# Patient Record
Sex: FEMALE | Race: WHITE | NOT HISPANIC OR LATINO | Employment: OTHER | URBAN - METROPOLITAN AREA
[De-identification: names, ages, dates, MRNs, and addresses within clinical notes are randomized per-mention and may not be internally consistent; named-entity substitution may affect disease eponyms.]

---

## 2022-08-04 DIAGNOSIS — Z00.6 EXAMINATION FOR NORMAL COMPARISON OR CONTROL IN CLINICAL RESEARCH: Primary | ICD-10-CM

## 2022-08-09 DIAGNOSIS — Z01.812 BLOOD TESTS PRIOR TO TREATMENT OR PROCEDURE: Primary | ICD-10-CM

## 2022-08-11 ENCOUNTER — OFFICE VISIT (OUTPATIENT)
Dept: PULMONOLOGY | Facility: CLINIC | Age: 72
End: 2022-08-11

## 2022-08-11 VITALS
TEMPERATURE: 97.5 F | HEIGHT: 61 IN | DIASTOLIC BLOOD PRESSURE: 60 MMHG | HEART RATE: 71 BPM | WEIGHT: 163.4 LBS | BODY MASS INDEX: 30.85 KG/M2 | OXYGEN SATURATION: 98 % | SYSTOLIC BLOOD PRESSURE: 128 MMHG

## 2022-08-11 DIAGNOSIS — M05.79 RHEUMATOID ARTHRITIS INVOLVING MULTIPLE SITES WITH POSITIVE RHEUMATOID FACTOR: ICD-10-CM

## 2022-08-11 DIAGNOSIS — R06.02 SOB (SHORTNESS OF BREATH): Primary | ICD-10-CM

## 2022-08-11 DIAGNOSIS — J84.10 PULMONARY FIBROSIS: ICD-10-CM

## 2022-08-11 PROCEDURE — 94729 DIFFUSING CAPACITY: CPT | Performed by: INTERNAL MEDICINE

## 2022-08-11 PROCEDURE — 94060 EVALUATION OF WHEEZING: CPT | Performed by: INTERNAL MEDICINE

## 2022-08-11 PROCEDURE — 94726 PLETHYSMOGRAPHY LUNG VOLUMES: CPT | Performed by: INTERNAL MEDICINE

## 2022-08-11 PROCEDURE — 99204 OFFICE O/P NEW MOD 45 MIN: CPT | Performed by: INTERNAL MEDICINE

## 2022-08-11 RX ORDER — ALBUTEROL SULFATE 90 UG/1
4 AEROSOL, METERED RESPIRATORY (INHALATION) ONCE
Status: COMPLETED | OUTPATIENT
Start: 2022-08-11 | End: 2022-08-11

## 2022-08-11 RX ORDER — ACETAMINOPHEN 325 MG/1
650 TABLET ORAL EVERY 4 HOURS PRN
COMMUNITY

## 2022-08-11 RX ORDER — OMEGA-3 FATTY ACIDS/FISH OIL 300-1000MG
1 CAPSULE ORAL AS NEEDED
COMMUNITY

## 2022-08-11 RX ORDER — ADALIMUMAB 40MG/0.4ML
40 KIT SUBCUTANEOUS
COMMUNITY
Start: 2022-07-12

## 2022-08-11 RX ORDER — SIMETHICONE 125 MG
125 TABLET,CHEWABLE ORAL EVERY 6 HOURS PRN
COMMUNITY

## 2022-08-11 RX ORDER — PANTOPRAZOLE SODIUM 40 MG/1
40 TABLET, DELAYED RELEASE ORAL DAILY
COMMUNITY
Start: 2022-08-01

## 2022-08-11 RX ORDER — FLUTICASONE PROPIONATE 50 MCG
2 SPRAY, SUSPENSION (ML) NASAL DAILY
COMMUNITY
Start: 2022-08-01

## 2022-08-11 RX ORDER — HYDROXYCHLOROQUINE SULFATE 200 MG/1
200 TABLET, FILM COATED ORAL
COMMUNITY
End: 2022-08-11 | Stop reason: ALTCHOICE

## 2022-08-11 RX ORDER — AMOXICILLIN 500 MG
1200 CAPSULE ORAL DAILY
COMMUNITY

## 2022-08-11 RX ADMIN — ALBUTEROL SULFATE 4 PUFF: 90 AEROSOL, METERED RESPIRATORY (INHALATION) at 13:58

## 2022-08-11 NOTE — PROGRESS NOTES
New Pulmonary Patient Office Visit      Patient Name: Carey Ramsey    Referring Physician: Regina Weinstein MD    Chief Complaint:    Chief Complaint   Patient presents with   • Pulmonary fibrosis     New patient, pulmonary fibrosis        History of Present Illness: Carey Ramsey is a 72 y.o. female who is here today to establish care with Pulmonary.      72-year-old female with past medical history of longstanding rheumatoid arthritis and inflammatory arthritis with possible lupus.  Patient states that she is under care of Dr. Weinstein for a number of years.  She also has history of iron deficiency anemia, hiatal hernia and GERD and hypertension.  She also has history of multiple allergies and used to be on allergy shots before but stopped using those.  Patient started having problem with more joint pain and joint stiffness.  She  has been off and on multiple drugs for her inflammatory arthritis.  She was tried on CellCept for little while but she could not tolerate it due to GI side effects.  She subsequently was started on diet therapy and states that things are getting stabilized for little bit and she was not on any treatment for 6 months earlier this year.  She started having more problem with the joint discomfort and was placed on Humira in first part of May which she has been taking currently.  She has been on Plaquenil for a number of years as well.  She had COVID-19 infection in December but stated that she was not very sick from it and she stayed at home.  Did not require any hospitalization or any steroid treatments at that time.  She is started having pain with deep breathing and pleuritic component few months ago and underwent chest x-ray which was abnormal subsequently led to chest CT scan and found to have pulmonary fibrotic changes and she is referred here for further evaluation.  Patient states that she used to have dry cough after COVID-19 infection but that kind of went away.  She has  occasional cough with postnasal drip but not persistent.  Denies any sputum production currently.  Denies any fevers, chills or night sweats.  Denies any ongoing exposures at home.  She denies any mold exposure.  No birds in the house.  No down comforter or pillow use.  Used to have feather pillows for years ago but not lately.  She does have a dog which is outside the house.  No swimming pool or hot tub use.  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  No asbestos exposure in the past either.    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  She feels that her voice is changing a little bit but she is able to keep a note and does not really get short of breath sinking.  However if she talks for too long that initiates cough.      Subjective      Review of Systems:   Review of Systems   Constitutional: Positive for fatigue.   HENT: Negative.    Eyes: Positive for itching.   Respiratory: Positive for shortness of breath.    Cardiovascular: Negative.    Gastrointestinal: Positive for abdominal distention and constipation.   Endocrine: Positive for cold intolerance and heat intolerance.   Musculoskeletal: Positive for arthralgias, back pain, gait problem, joint swelling, myalgias, neck pain and neck stiffness.   Skin: Negative.    Allergic/Immunologic: Positive for environmental allergies.   Hematological: Negative.    Psychiatric/Behavioral: Negative.    All other systems reviewed and are negative.      Past Medical History:   Past Medical History:   Diagnosis Date   • Anemia    • Back pain    • Bronchitis    • Hypertension    • Low back pain    • Rheumatoid arthritis (HCC)        Past Surgical History: No past surgical history on file.    Family History:   Family History   Problem Relation Age of Onset   • Heart failure Mother    • Stroke Mother    • Cancer Father    • Diabetes Son    • Diabetes Son         Social History:   Social History     Socioeconomic History   • Marital status:    Tobacco Use   • Smoking status: Never Smoker   • Smokeless tobacco: Never Used   Substance and Sexual Activity   • Alcohol use: No       Medications:     Current Outpatient Medications:   •  acetaminophen (TYLENOL) 325 MG tablet, Take 650 mg by mouth Every 4 (Four) Hours As Needed., Disp: , Rfl:   •  amLODIPine-benazepril (LOTREL 5-10) 5-10 MG per capsule, Take 1 capsule by mouth Daily., Disp: , Rfl:   •  Calcium-Phosphorus-Vitamin D (CITRACAL +D3 PO), Take 1 tablet by mouth., Disp: , Rfl:   •  cyclobenzaprine (FLEXERIL) 10 MG tablet, Take 1 tablet by mouth 3 (Three) Times a Day As Needed for muscle spasms., Disp: 21 tablet, Rfl: 0  •  ferrous gluconate 324 (37.5 FE) MG tablet tablet, Take  by mouth Daily With Breakfast., Disp: , Rfl:   •  fluticasone (FLONASE) 50 MCG/ACT nasal spray, 2 sprays by Each Nare route Daily., Disp: , Rfl:   •  Humira Pen 40 MG/0.4ML Pen-injector Kit, Inject 40 mg under the skin into the appropriate area as directed Every 14 (Fourteen) Days., Disp: , Rfl:   •  hydroxychloroquine (PLAQUENIL) 200 MG tablet, Take  by mouth 2 (Two) Times a Day., Disp: , Rfl:   •  Ibuprofen 200 MG capsule, Take 1 tablet by mouth As Needed., Disp: , Rfl:   •  meloxicam (MOBIC) 15 MG tablet, Take 1 tablet by mouth Daily., Disp: 10 tablet, Rfl: 0  •  montelukast (SINGULAIR) 10 MG tablet, Take 10 mg by mouth Every Night., Disp: , Rfl:   •  Omega-3 Fatty Acids (fish oil) 1200 MG capsule capsule, Take 1,200 mg by mouth Daily., Disp: , Rfl:   •  pantoprazole (PROTONIX) 40 MG EC tablet, Take 40 mg by mouth Daily., Disp: , Rfl:   •  simethicone (MYLICON) 125 MG chewable tablet, Chew 125 mg Every 6 (Six) Hours As Needed for Flatulence., Disp: , Rfl:   •  traMADol (ULTRAM) 50 MG tablet, Take 1 tablet by mouth Every 4 (Four) Hours As Needed for moderate pain (4-6)., Disp: 20 tablet, Rfl: 0  No current facility-administered  "medications for this visit.    Allergies:   No Known Allergies    Objective     Physical Exam:  Vital Signs:   Vitals:    08/11/22 1340   BP: 128/60   BP Location: Right arm   Patient Position: Sitting   Cuff Size: Adult   Pulse: 71   Temp: 97.5 °F (36.4 °C)   TempSrc: Infrared   SpO2: 98%  Comment: resting, room air   Weight: 74.1 kg (163 lb 6.4 oz)   Height: 153.7 cm (60.5\")       Physical Exam  Vitals and nursing note reviewed.   Constitutional:       General: She is not in acute distress.     Appearance: She is well-developed. She is obese. She is not diaphoretic.   HENT:      Head: Normocephalic and atraumatic.      Comments: Mild pharyngeal erythema.      Nose: Nose normal.      Mouth/Throat:      Pharynx: No oropharyngeal exudate.   Eyes:      General: No scleral icterus.        Right eye: No discharge.         Left eye: No discharge.      Pupils: Pupils are equal, round, and reactive to light.   Neck:      Thyroid: No thyromegaly.      Trachea: No tracheal deviation.   Cardiovascular:      Rate and Rhythm: Normal rate and regular rhythm.      Heart sounds: Murmur heard.     No gallop.   Pulmonary:      Effort: Pulmonary effort is normal. No respiratory distress.      Breath sounds: Normal breath sounds. No stridor. No wheezing or rales.   Chest:      Chest wall: No tenderness.   Abdominal:      Palpations: Abdomen is soft.   Musculoskeletal:         General: No tenderness.      Cervical back: Neck supple.      Right lower leg: No edema.      Left lower leg: No edema.      Comments: Clubbing: none  Chronic arthritic changes   Lymphadenopathy:      Cervical: No cervical adenopathy.   Neurological:      Mental Status: She is alert and oriented to person, place, and time.      Cranial Nerves: No cranial nerve deficit.      Coordination: Coordination normal.   Psychiatric:         Behavior: Behavior normal.         Thought Content: Thought content normal.         Judgment: Judgment normal.         Results Review: "   I reviewed the patient's new clinical results.    Patient's outside high-resolution CT scan reviewed.  Patient does have bilateral pulmonary fibrotic changes with mild traction bronchiectasis especially left lower lobe.  Nonspecific pleural thickening noted which is worse on the right side.  Calcified mediastinal lymph nodes and calcified granuloma in the spleen.  Radiology report reviewed as well.  No previous studies available for comparison.    PFT IMPRESSION:   1. Available data suggests normal spirometry.    2. No significant bronchodilator response, but this does not preclude their clinical use.  3. Lung volume reveals normal       4. Airway resistance is mildly elevated .  5. DLCO is mildly reduced and could be suggestive of emphysema, interstitial lung disease, significant anemia, Pulmonary vascular disease etc. Clinical correlation will be required.      Assessment / Plan      Assessment:   Problem List Items Addressed This Visit    None     Visit Diagnoses     SOB (shortness of breath)    -  Primary    Relevant Medications    albuterol sulfate HFA (PROVENTIL HFA;VENTOLIN HFA;PROAIR HFA) inhaler 4 puff (Completed)    Other Relevant Orders    Pulmonary Function Test (Completed)    Pulmonary fibrosis (HCC)        Relevant Medications    fluticasone (FLONASE) 50 MCG/ACT nasal spray    Other Relevant Orders    CT Chest Hi Resolution    Rheumatoid arthritis involving multiple sites with positive rheumatoid factor (HCC)        Relevant Medications    Humira Pen 40 MG/0.4ML Pen-injector Kit    Ibuprofen 200 MG capsule          Plan:   1.  Patient with a history of inflammatory arthritis presenting with abnormal chest CT scan with evidence of pulmonary fibrosis.  Discussed that more than likely etiology for her pulmonary fibrosis is underlying rheumatologic disease processes.  Idiopathic pulmonary fibrosis is a possibility but seems less likely.  Hypersensitivity pneumonitis.  Discussed that treatment generally  is by controlling the inflammation as she is already on those medications to control her rheumatoid arthritis.  Discussed that alternative medication such as antifibrotic agents can be considered but I do worry about the side effects.  She is not much symptomatic from pulmonary fibrosis.  PFTs are showing normal spirometry with mild reduction in DLCO.  no significant hypoxemia noted either.  At this point I think we wait full watching and getting her back in 3-month with CT scan and PFTs would be reasonable.  If we are seeing significant decline in PFTs or imaging then we can intervene with medication such as nintedanib which has been approved for this indication.  Discussed the medication and its potential benefits of slowing down the progression of lung disease reviewed with the patient.  Side effects reviewed which can be unexpected weight loss and diarrhea.  She does have some pleural thickening which seems again inflammatory and that may be causing her pleurisy symptoms which have abated now.  If she continues to have issues we can try a short course of prednisone and see if that will help.  Patient is comfortable with our discussion and is willing to follow-up closely.    2.  I reviewed cardiology work-up to look for any evidence of pulmonary hypertension.    3.  She does have intermittent cough.  Has been on ACE inhibitor's with benazepril.  Discussed that if continues to have issues with cough then will switch it out to angiotensin receptor blocker.  Her cough etiology could be from upper airway cough syndrome as well as from pulmonary fibrosis and has multiple possible etiologies.  She also has reflux with hiatal hernia and that seems to be under control.    4.  Up-to-date on immunization.  We will continue monitoring.    I will see her back in 3 months with above studies or sooner if needed.  Patient had no further questions.  Thank you for allowing me to participate in the care of this patient.    Follow  Up:   3 months with HRCT and PFTs    Discussed plan of care in detail with patient today. Patient verbally understands and agrees.I spent 45 minutes on this date of service. This time includes time spent by me in the following activities:preparing for the visit, reviewing tests, obtaining and/or reviewing a separately obtained history, performing a medically appropriate examination, counseling the patient, ordering tests, or procedures, and/or documenting information in the medical record.     Laurent Tomlinson MD  Pulmonary Critical Care and Sleep Medicine

## 2022-12-05 ENCOUNTER — HOSPITAL ENCOUNTER (OUTPATIENT)
Dept: CT IMAGING | Facility: HOSPITAL | Age: 72
Discharge: HOME | End: 2022-12-05
Admitting: INTERNAL MEDICINE

## 2022-12-05 DIAGNOSIS — J84.10 PULMONARY FIBROSIS: ICD-10-CM

## 2022-12-05 PROCEDURE — 71250 CT THORAX DX C-: CPT

## 2022-12-21 ENCOUNTER — OFFICE VISIT (OUTPATIENT)
Dept: PULMONOLOGY | Facility: CLINIC | Age: 72
End: 2022-12-21

## 2022-12-21 VITALS
DIASTOLIC BLOOD PRESSURE: 80 MMHG | RESPIRATION RATE: 18 BRPM | OXYGEN SATURATION: 96 % | HEART RATE: 68 BPM | BODY MASS INDEX: 32.55 KG/M2 | TEMPERATURE: 97.5 F | HEIGHT: 61 IN | SYSTOLIC BLOOD PRESSURE: 126 MMHG | WEIGHT: 172.4 LBS

## 2022-12-21 DIAGNOSIS — M06.9 RHEUMATOID ARTHRITIS INVOLVING MULTIPLE SITES, UNSPECIFIED WHETHER RHEUMATOID FACTOR PRESENT: ICD-10-CM

## 2022-12-21 DIAGNOSIS — R06.02 SOB (SHORTNESS OF BREATH): Primary | ICD-10-CM

## 2022-12-21 DIAGNOSIS — J84.10 PULMONARY FIBROSIS: ICD-10-CM

## 2022-12-21 PROCEDURE — 94060 EVALUATION OF WHEEZING: CPT | Performed by: INTERNAL MEDICINE

## 2022-12-21 PROCEDURE — 99214 OFFICE O/P EST MOD 30 MIN: CPT | Performed by: INTERNAL MEDICINE

## 2022-12-21 PROCEDURE — 94618 PULMONARY STRESS TESTING: CPT | Performed by: INTERNAL MEDICINE

## 2022-12-21 PROCEDURE — 94726 PLETHYSMOGRAPHY LUNG VOLUMES: CPT | Performed by: INTERNAL MEDICINE

## 2022-12-21 PROCEDURE — 94729 DIFFUSING CAPACITY: CPT | Performed by: INTERNAL MEDICINE

## 2022-12-21 RX ORDER — MONTELUKAST SODIUM 10 MG/1
1 TABLET ORAL DAILY
COMMUNITY

## 2022-12-21 RX ORDER — ALBUTEROL SULFATE 90 UG/1
4 AEROSOL, METERED RESPIRATORY (INHALATION) ONCE
Status: COMPLETED | OUTPATIENT
Start: 2022-12-21 | End: 2022-12-21

## 2022-12-21 RX ORDER — PREDNISONE 20 MG/1
30 TABLET ORAL DAILY
Qty: 11 TABLET | Refills: 0 | Status: SHIPPED | OUTPATIENT
Start: 2022-12-21

## 2022-12-21 RX ORDER — AMLODIPINE AND VALSARTAN 5; 160 MG/1; MG/1
TABLET ORAL
COMMUNITY
Start: 2022-12-15

## 2022-12-21 RX ORDER — LIDOCAINE HYDROCHLORIDE 20 MG/ML
SOLUTION OROPHARYNGEAL
COMMUNITY
Start: 2022-11-07

## 2022-12-21 RX ORDER — TIZANIDINE 2 MG/1
TABLET ORAL
COMMUNITY
Start: 2022-08-26

## 2022-12-21 RX ADMIN — ALBUTEROL SULFATE 4 PUFF: 90 AEROSOL, METERED RESPIRATORY (INHALATION) at 15:58

## 2022-12-21 NOTE — PROGRESS NOTES
Follow Up Office Visit      Patient Name: Tala Ramsey    Chief Complaint:    Chief Complaint   Patient presents with   • Shortness of Breath     3 mo f/u       History of Present Illness: Tala Ramsey is a 72 y.o. female who is here today for follow up of Pulmonary fibrosis    72-year-old female with past medical history of longstanding rheumatoid arthritis and inflammatory arthritis with possible lupus.  Patient states that she is under care of Dr. Weinstein for a number of years.  She also has history of iron deficiency anemia, hiatal hernia and GERD and hypertension.  She also has history of multiple allergies and used to be on allergy shots before but stopped using those.  Patient started having problem with more joint pain and joint stiffness.  She  has been off and on multiple drugs for her inflammatory arthritis.  She was tried on CellCept for little while but she could not tolerate it due to GI side effects.  She subsequently was started on diet therapy and states that things are getting stabilized for little bit and she was not on any treatment for 6 months earlier this year.  She started having more problem with the joint discomfort and was placed on Humira in first part of May which she has been taking currently.  She has been on Plaquenil for a number of years as well.  She had COVID-19 infection in December but stated that she was not very sick from it and she stayed at home.  Did not require any hospitalization or any steroid treatments at that time.  She is started having pain with deep breathing and pleuritic component few months ago and underwent chest x-ray which was abnormal subsequently led to chest CT scan and found to have pulmonary fibrotic changes and she is referred here for further evaluation.  Patient states that she used to have dry cough after COVID-19 infection but that kind of went away    Patient was evaluated and found to have pulmonary fibrosis but her PFTs were  relatively stable and no other untoward findings noted on the CT scan.  We discussed biopsy but patient is not interested in that either.  We brought her back to have another follow-up CT scan, PFTs and 6-minute walk test.  Patient comes today for follow-up continues to have intermittent chest discomfort which is pleuritic component.  She states that it has significantly improved after starting Humira.  She also was having worsening cough after upper respiratory infection.  She was treated with amoxicillin for tonsillar infection and her ACE inhibitor was changed to ARB and with that her cough significantly improved.  She states that now she has cough intermittently but not as persistent.  She denies any worsening shortness of breath.  Does get short of breath with exertional activity but it has not worsened in the interim.  She continues on her med and states that her joints still hurt at times like today's not a good day for her.  Overall she seem to have some improvement in her joint symptoms with current treatment plan.    She has not had influenza vaccination.  Offered her that and she states she has only taken influenza vaccination 2 times in her life and she is just not wanting to get it.  She has not had COVID-19 booster and she is thinking about that.  Has never had pneumonia vaccines either.    Subjective      Review of Systems:   Review of Systems   Constitutional: Positive for fatigue.   HENT: Negative.    Respiratory: Positive for cough and shortness of breath.    Cardiovascular: Positive for leg swelling.   Gastrointestinal: Negative.    Endocrine: Negative.    Musculoskeletal: Positive for arthralgias, back pain and joint swelling.   Skin: Negative.    Neurological: Negative.    Hematological: Negative.    Psychiatric/Behavioral: Negative.    All other systems reviewed and are negative.      The following portions of the patient's history were reviewed and updated as appropriate: allergies, current  "medications, past family history, past medical history, past social history, past surgical history and problem list.    Objective     Physical Exam:  Vital Signs:   Vitals:    12/21/22 1537   BP: 126/80   Pulse: 68   Resp: 18   Temp: 97.5 °F (36.4 °C)   TempSrc: Infrared   SpO2: 96%   Weight: 78.2 kg (172 lb 6.4 oz)   Height: 153.7 cm (60.5\")     Body mass index is 33.12 kg/m².    Physical Exam  Vitals and nursing note reviewed.   Constitutional:       General: She is not in acute distress.     Appearance: She is well-developed. She is obese. She is not diaphoretic.   Cardiovascular:      Rate and Rhythm: Normal rate and regular rhythm.      Heart sounds: Murmur heard.     No gallop.   Pulmonary:      Effort: Pulmonary effort is normal. No respiratory distress.      Breath sounds: Normal breath sounds. No stridor. No wheezing or rales. Occ crackles right base.    Musculoskeletal:         General: No tenderness.      Cervical back: Neck supple.      Right lower leg: No edema.      Left lower leg: No edema.      Comments: Clubbing: none  Chronic arthritic changes   Neurological:      Mental Status: She is alert and oriented to person, place, and time.      Cranial Nerves: No cranial nerve deficit.      Coordination: Coordination normal.   Psychiatric:         Behavior: Behavior normal.         Thought Content: Thought content normal.         Judgment: Judgment normal.          Results Review:   I reviewed the patient's new clinical results.  High-resolution CT scan of the chest reviewed.  Bilateral interstitial and pulmonary fibrotic changes.  Stable for the most part.  No adenopathy noted.  No other suspicious nodules noted either.  No consolidation.  Left pleural thickening has worsened.  Radiology reporting pleural effusion but it is nondependent and no definite pleural fluid collection and looks more like pleural thickening.      PFT IMPRESSION:   1. Available data suggests normal spirometry.    2. No significant " bronchodilator response, but this does not preclude their clinical use.  3. Lung volume reveals mild restriction.       4. Airway resistance is mildly elevated.  5. DLCO is mildly reduced and could be suggestive of emphysema, interstitial lung disease, significant anemia, Pulmonary vascular disease etc. Clinical correlation will be required.   6.  Comparing to previous study done in August spirometry is stable.  Lung volume has decreased but FVC is stable.  DLCO has slightly improved.    6-minute walk test did not show any evidence of hypoxemia.  Moderate dyspnea noted.  Patient's total walk distance was 1000 feet which is 78% predicted.    Assessment / Plan      Assessment:   Problem List Items Addressed This Visit    None  Visit Diagnoses     SOB (shortness of breath)    -  Primary    Relevant Orders    6 Minute Walk Test    Pulmonary fibrosis (HCC)        Relevant Medications    montelukast (SINGULAIR) 10 MG tablet    Rheumatoid arthritis involving multiple sites, unspecified whether rheumatoid factor present (HCC)        Relevant Medications    predniSONE (DELTASONE) 20 MG tablet          Plan:   1.  Imaging, PFT and 6-minute walk test reviewed.  Discussed in detail with the patient.  The left lung pleural thickening he has slightly worsened.  I do not think there is any significant pleural effusion but looks more pleural thickening as there is significant nondependent component to it.  Reviewed the etiology behind it.  Discussed that her pattern is not consistent with a UIP pattern.  We are dealing with mostly nonspecific interstitial pneumonia and likely component would be underlying autoimmune disease process.  Discussed that generally treatment is control of autoimmune disease process which leads to improvement or stabilization.  Doubt if there is any drug toxicity here.  If things continue to worsen then antifibrotic therapy with nintedanib we considered.  Discussed that therapy in detail with her and  discussed side effects.  Patient states that she will take the medication if absolutely needed.  Discussed that currently I do not have that indication at this point.  I am however concerned about pleural thickening.  Offered VATS biopsy but patient worried about side effects as well.  After our discussion we decided to follow her in 3 more months with repeat CT scan and see where things are.  If continues to have worsening findings then we will proceed with biopsy before making any major treatment decisions.    2.  There is pleuritic chest discomfort.  She does have pleural thickening.  I will like to give her a trial of prednisone 0.5 mg/kg for 7 days.  She is amenable to that.  She has taken short courses before but does not want to stay on it for long-term which I completely agree with.  We will try her on 7 days treatment course and see how she tolerates that and see if that will help improve some of the chest discomfort she is experiencing    3.  Cough has improved after switching ACE inhibitor's to ARB.  Likely some upper airway cough syndrome component as well.  Continue to monitor for now.  There may be component of pulmonary fibrosis playing a role in dry cough.  No definite evidence of infection currently.    4.  Offered immunization but patient is hesitant.  We will continue to monitor.    We will follow her in 3 months or sooner as needed.  I will discuss case with Dr. Weinstein (message left with nursing staff) as well.    Follow Up:   3 months with HRCT    Discussed plan of care in detail with patient today. Patient verbally understands and agrees.     Laurent Tomlinson MD  Pulmonary Critical Care and Sleep Medicine      ADDENDUM:  Discussed with Dr Weinstein. We will continue to monitor for now and if gets worse or does not improve will consider VATS biopsy.

## 2022-12-22 DIAGNOSIS — J84.10 PULMONARY FIBROSIS: Primary | ICD-10-CM

## 2023-03-20 ENCOUNTER — HOSPITAL ENCOUNTER (OUTPATIENT)
Dept: CT IMAGING | Facility: HOSPITAL | Age: 73
Discharge: HOME | End: 2023-03-20
Admitting: INTERNAL MEDICINE
Payer: MEDICARE

## 2023-03-20 DIAGNOSIS — J84.10 PULMONARY FIBROSIS: ICD-10-CM

## 2023-03-20 PROCEDURE — 71250 CT THORAX DX C-: CPT

## 2023-04-23 ENCOUNTER — APPOINTMENT (OUTPATIENT)
Dept: CT IMAGING | Facility: HOSPITAL | Age: 73
End: 2023-04-23
Payer: MEDICARE

## 2023-04-23 ENCOUNTER — HOSPITAL ENCOUNTER (INPATIENT)
Facility: HOSPITAL | Age: 73
LOS: 32 days | Discharge: NURSING, REHAB OR LONG-TERM CARE | End: 2023-05-25
Attending: EMERGENCY MEDICINE | Admitting: STUDENT IN AN ORGANIZED HEALTH CARE EDUCATION/TRAINING PROGRAM
Payer: MEDICARE

## 2023-04-23 ENCOUNTER — APPOINTMENT (OUTPATIENT)
Dept: GENERAL RADIOLOGY | Facility: HOSPITAL | Age: 73
End: 2023-04-23
Payer: MEDICARE

## 2023-04-23 DIAGNOSIS — R09.02 HYPOXIA: ICD-10-CM

## 2023-04-23 DIAGNOSIS — M51.26 LUMBAR HERNIATED DISC: ICD-10-CM

## 2023-04-23 DIAGNOSIS — Z78.9 FAILURE OF OUTPATIENT TREATMENT: ICD-10-CM

## 2023-04-23 DIAGNOSIS — J18.9 MULTIFOCAL PNEUMONIA: Primary | ICD-10-CM

## 2023-04-23 LAB
ALBUMIN SERPL-MCNC: 3.5 G/DL (ref 3.5–5.2)
ALBUMIN/GLOB SERPL: 0.9 G/DL
ALP SERPL-CCNC: 109 U/L (ref 39–117)
ALT SERPL W P-5'-P-CCNC: 10 U/L (ref 1–33)
ANION GAP SERPL CALCULATED.3IONS-SCNC: 14 MMOL/L (ref 5–15)
AST SERPL-CCNC: 23 U/L (ref 1–32)
BACTERIA UR QL AUTO: NORMAL /HPF
BASOPHILS # BLD AUTO: 0.08 10*3/MM3 (ref 0–0.2)
BASOPHILS NFR BLD AUTO: 0.6 % (ref 0–1.5)
BILIRUB SERPL-MCNC: 0.2 MG/DL (ref 0–1.2)
BILIRUB UR QL STRIP: NEGATIVE
BUN SERPL-MCNC: 14 MG/DL (ref 8–23)
BUN/CREAT SERPL: 28 (ref 7–25)
CALCIUM SPEC-SCNC: 9 MG/DL (ref 8.6–10.5)
CHLORIDE SERPL-SCNC: 92 MMOL/L (ref 98–107)
CLARITY UR: CLEAR
CO2 SERPL-SCNC: 23 MMOL/L (ref 22–29)
COLOR UR: YELLOW
CREAT SERPL-MCNC: 0.5 MG/DL (ref 0.57–1)
D-LACTATE SERPL-SCNC: 1.1 MMOL/L (ref 0.5–2)
DEPRECATED RDW RBC AUTO: 45.3 FL (ref 37–54)
EGFRCR SERPLBLD CKD-EPI 2021: 99.2 ML/MIN/1.73
EOSINOPHIL # BLD AUTO: 0.14 10*3/MM3 (ref 0–0.4)
EOSINOPHIL NFR BLD AUTO: 1 % (ref 0.3–6.2)
ERYTHROCYTE [DISTWIDTH] IN BLOOD BY AUTOMATED COUNT: 14.6 % (ref 12.3–15.4)
FLUAV RNA RESP QL NAA+PROBE: NOT DETECTED
FLUBV RNA RESP QL NAA+PROBE: NOT DETECTED
GLOBULIN UR ELPH-MCNC: 3.9 GM/DL
GLUCOSE SERPL-MCNC: 104 MG/DL (ref 65–99)
GLUCOSE UR STRIP-MCNC: NEGATIVE MG/DL
HCT VFR BLD AUTO: 35.2 % (ref 34–46.6)
HGB BLD-MCNC: 11.5 G/DL (ref 12–15.9)
HGB UR QL STRIP.AUTO: ABNORMAL
HOLD SPECIMEN: NORMAL
HYALINE CASTS UR QL AUTO: NORMAL /LPF
IMM GRANULOCYTES # BLD AUTO: 0.22 10*3/MM3 (ref 0–0.05)
IMM GRANULOCYTES NFR BLD AUTO: 1.6 % (ref 0–0.5)
KETONES UR QL STRIP: NEGATIVE
LEUKOCYTE ESTERASE UR QL STRIP.AUTO: NEGATIVE
LYMPHOCYTES # BLD AUTO: 1.5 10*3/MM3 (ref 0.7–3.1)
LYMPHOCYTES NFR BLD AUTO: 10.7 % (ref 19.6–45.3)
MCH RBC QN AUTO: 29.3 PG (ref 26.6–33)
MCHC RBC AUTO-ENTMCNC: 32.7 G/DL (ref 31.5–35.7)
MCV RBC AUTO: 89.6 FL (ref 79–97)
MONOCYTES # BLD AUTO: 0.79 10*3/MM3 (ref 0.1–0.9)
MONOCYTES NFR BLD AUTO: 5.6 % (ref 5–12)
MRSA DNA SPEC QL NAA+PROBE: NEGATIVE
NEUTROPHILS NFR BLD AUTO: 11.33 10*3/MM3 (ref 1.7–7)
NEUTROPHILS NFR BLD AUTO: 80.5 % (ref 42.7–76)
NITRITE UR QL STRIP: NEGATIVE
NRBC BLD AUTO-RTO: 0.2 /100 WBC (ref 0–0.2)
NT-PROBNP SERPL-MCNC: 163.6 PG/ML (ref 0–900)
OSMOLALITY SERPL: 273 MOSM/KG (ref 275–295)
OSMOLALITY UR: 230 MOSM/KG (ref 300–1100)
PH UR STRIP.AUTO: 6.5 [PH] (ref 5–8)
PLATELET # BLD AUTO: 592 10*3/MM3 (ref 140–450)
PMV BLD AUTO: 8.3 FL (ref 6–12)
POTASSIUM SERPL-SCNC: 4.4 MMOL/L (ref 3.5–5.2)
PROT SERPL-MCNC: 7.4 G/DL (ref 6–8.5)
PROT UR QL STRIP: NEGATIVE
QT INTERVAL: 350 MS
QTC INTERVAL: 423 MS
RBC # BLD AUTO: 3.93 10*6/MM3 (ref 3.77–5.28)
RBC # UR STRIP: NORMAL /HPF
REF LAB TEST METHOD: NORMAL
RSV RNA NPH QL NAA+NON-PROBE: NOT DETECTED
SARS-COV-2 RNA RESP QL NAA+PROBE: NOT DETECTED
SODIUM SERPL-SCNC: 129 MMOL/L (ref 136–145)
SODIUM UR-SCNC: 27 MMOL/L
SP GR UR STRIP: 1.01 (ref 1–1.03)
SQUAMOUS #/AREA URNS HPF: NORMAL /HPF
TROPONIN T SERPL HS-MCNC: 8 NG/L
UROBILINOGEN UR QL STRIP: ABNORMAL
WBC # UR STRIP: NORMAL /HPF
WBC NRBC COR # BLD: 14.06 10*3/MM3 (ref 3.4–10.8)
WHOLE BLOOD HOLD SPECIMEN: NORMAL

## 2023-04-23 PROCEDURE — 25010000002 HEPARIN (PORCINE) PER 1000 UNITS: Performed by: INTERNAL MEDICINE

## 2023-04-23 PROCEDURE — 87449 NOS EACH ORGANISM AG IA: CPT | Performed by: INTERNAL MEDICINE

## 2023-04-23 PROCEDURE — 83880 ASSAY OF NATRIURETIC PEPTIDE: CPT | Performed by: EMERGENCY MEDICINE

## 2023-04-23 PROCEDURE — 25010000002 AZITHROMYCIN PER 500 MG: Performed by: EMERGENCY MEDICINE

## 2023-04-23 PROCEDURE — 25010000002 PIPERACILLIN SOD-TAZOBACTAM PER 1 G: Performed by: INTERNAL MEDICINE

## 2023-04-23 PROCEDURE — 84300 ASSAY OF URINE SODIUM: CPT | Performed by: INTERNAL MEDICINE

## 2023-04-23 PROCEDURE — 25010000002 PIPERACILLIN SOD-TAZOBACTAM PER 1 G: Performed by: EMERGENCY MEDICINE

## 2023-04-23 PROCEDURE — 93005 ELECTROCARDIOGRAM TRACING: CPT

## 2023-04-23 PROCEDURE — 36415 COLL VENOUS BLD VENIPUNCTURE: CPT

## 2023-04-23 PROCEDURE — 85025 COMPLETE CBC W/AUTO DIFF WBC: CPT | Performed by: EMERGENCY MEDICINE

## 2023-04-23 PROCEDURE — 99285 EMERGENCY DEPT VISIT HI MDM: CPT

## 2023-04-23 PROCEDURE — 84484 ASSAY OF TROPONIN QUANT: CPT | Performed by: EMERGENCY MEDICINE

## 2023-04-23 PROCEDURE — 80053 COMPREHEN METABOLIC PANEL: CPT | Performed by: EMERGENCY MEDICINE

## 2023-04-23 PROCEDURE — 87899 AGENT NOS ASSAY W/OPTIC: CPT | Performed by: INTERNAL MEDICINE

## 2023-04-23 PROCEDURE — 83935 ASSAY OF URINE OSMOLALITY: CPT | Performed by: INTERNAL MEDICINE

## 2023-04-23 PROCEDURE — 93005 ELECTROCARDIOGRAM TRACING: CPT | Performed by: EMERGENCY MEDICINE

## 2023-04-23 PROCEDURE — 87637 SARSCOV2&INF A&B&RSV AMP PRB: CPT | Performed by: EMERGENCY MEDICINE

## 2023-04-23 PROCEDURE — 83930 ASSAY OF BLOOD OSMOLALITY: CPT | Performed by: INTERNAL MEDICINE

## 2023-04-23 PROCEDURE — 94640 AIRWAY INHALATION TREATMENT: CPT

## 2023-04-23 PROCEDURE — 71045 X-RAY EXAM CHEST 1 VIEW: CPT

## 2023-04-23 PROCEDURE — 71250 CT THORAX DX C-: CPT

## 2023-04-23 PROCEDURE — 94799 UNLISTED PULMONARY SVC/PX: CPT

## 2023-04-23 PROCEDURE — 81001 URINALYSIS AUTO W/SCOPE: CPT | Performed by: INTERNAL MEDICINE

## 2023-04-23 PROCEDURE — 99223 1ST HOSP IP/OBS HIGH 75: CPT | Performed by: INTERNAL MEDICINE

## 2023-04-23 PROCEDURE — 83605 ASSAY OF LACTIC ACID: CPT | Performed by: EMERGENCY MEDICINE

## 2023-04-23 PROCEDURE — 87040 BLOOD CULTURE FOR BACTERIA: CPT | Performed by: EMERGENCY MEDICINE

## 2023-04-23 PROCEDURE — 87641 MR-STAPH DNA AMP PROBE: CPT | Performed by: INTERNAL MEDICINE

## 2023-04-23 RX ORDER — SODIUM CHLORIDE 0.9 % (FLUSH) 0.9 %
10 SYRINGE (ML) INJECTION EVERY 12 HOURS SCHEDULED
Status: DISCONTINUED | OUTPATIENT
Start: 2023-04-23 | End: 2023-05-17

## 2023-04-23 RX ORDER — SODIUM CHLORIDE 0.9 % (FLUSH) 0.9 %
10 SYRINGE (ML) INJECTION AS NEEDED
Status: DISCONTINUED | OUTPATIENT
Start: 2023-04-23 | End: 2023-05-25 | Stop reason: HOSPADM

## 2023-04-23 RX ORDER — IPRATROPIUM BROMIDE AND ALBUTEROL SULFATE 2.5; .5 MG/3ML; MG/3ML
3 SOLUTION RESPIRATORY (INHALATION)
Status: DISCONTINUED | OUTPATIENT
Start: 2023-04-23 | End: 2023-05-25 | Stop reason: HOSPADM

## 2023-04-23 RX ORDER — ONDANSETRON 2 MG/ML
4 INJECTION INTRAMUSCULAR; INTRAVENOUS EVERY 6 HOURS PRN
Status: DISCONTINUED | OUTPATIENT
Start: 2023-04-23 | End: 2023-05-25 | Stop reason: HOSPADM

## 2023-04-23 RX ORDER — ACETAMINOPHEN 650 MG/1
650 SUPPOSITORY RECTAL EVERY 4 HOURS PRN
Status: DISCONTINUED | OUTPATIENT
Start: 2023-04-23 | End: 2023-05-25 | Stop reason: HOSPADM

## 2023-04-23 RX ORDER — HYDROXYCHLOROQUINE SULFATE 200 MG/1
200 TABLET, FILM COATED ORAL EVERY 12 HOURS SCHEDULED
Status: DISCONTINUED | OUTPATIENT
Start: 2023-04-23 | End: 2023-04-24

## 2023-04-23 RX ORDER — ACETAMINOPHEN 160 MG/5ML
650 SOLUTION ORAL EVERY 4 HOURS PRN
Status: DISCONTINUED | OUTPATIENT
Start: 2023-04-23 | End: 2023-05-25 | Stop reason: HOSPADM

## 2023-04-23 RX ORDER — TIZANIDINE 4 MG/1
2 TABLET ORAL EVERY 12 HOURS PRN
Status: DISCONTINUED | OUTPATIENT
Start: 2023-04-23 | End: 2023-04-26

## 2023-04-23 RX ORDER — ONDANSETRON 4 MG/1
4 TABLET, FILM COATED ORAL EVERY 6 HOURS PRN
Status: DISCONTINUED | OUTPATIENT
Start: 2023-04-23 | End: 2023-05-25 | Stop reason: HOSPADM

## 2023-04-23 RX ORDER — PANTOPRAZOLE SODIUM 40 MG/1
40 TABLET, DELAYED RELEASE ORAL DAILY
Status: DISCONTINUED | OUTPATIENT
Start: 2023-04-24 | End: 2023-05-25 | Stop reason: HOSPADM

## 2023-04-23 RX ORDER — HEPARIN SODIUM 5000 [USP'U]/ML
5000 INJECTION, SOLUTION INTRAVENOUS; SUBCUTANEOUS EVERY 8 HOURS SCHEDULED
Status: DISCONTINUED | OUTPATIENT
Start: 2023-04-23 | End: 2023-05-25 | Stop reason: HOSPADM

## 2023-04-23 RX ORDER — MONTELUKAST SODIUM 10 MG/1
10 TABLET ORAL DAILY
Status: DISCONTINUED | OUTPATIENT
Start: 2023-04-24 | End: 2023-05-25 | Stop reason: HOSPADM

## 2023-04-23 RX ORDER — ACETAMINOPHEN 325 MG/1
650 TABLET ORAL EVERY 4 HOURS PRN
Status: DISCONTINUED | OUTPATIENT
Start: 2023-04-23 | End: 2023-05-25 | Stop reason: HOSPADM

## 2023-04-23 RX ORDER — SODIUM CHLORIDE 9 MG/ML
40 INJECTION, SOLUTION INTRAVENOUS AS NEEDED
Status: DISCONTINUED | OUTPATIENT
Start: 2023-04-23 | End: 2023-05-25 | Stop reason: HOSPADM

## 2023-04-23 RX ORDER — VALSARTAN 160 MG/1
160 TABLET ORAL
Status: DISCONTINUED | OUTPATIENT
Start: 2023-04-24 | End: 2023-05-09

## 2023-04-23 RX ORDER — AMLODIPINE BESYLATE 5 MG/1
5 TABLET ORAL
Status: DISCONTINUED | OUTPATIENT
Start: 2023-04-24 | End: 2023-05-25 | Stop reason: HOSPADM

## 2023-04-23 RX ORDER — FLUTICASONE PROPIONATE 50 MCG
2 SPRAY, SUSPENSION (ML) NASAL DAILY
Status: DISCONTINUED | OUTPATIENT
Start: 2023-04-23 | End: 2023-04-26

## 2023-04-23 RX ADMIN — TAZOBACTAM SODIUM AND PIPERACILLIN SODIUM 3.38 G: 375; 3 INJECTION, SOLUTION INTRAVENOUS at 17:49

## 2023-04-23 RX ADMIN — HYDROXYCHLOROQUINE SULFATE 200 MG: 200 TABLET ORAL at 21:04

## 2023-04-23 RX ADMIN — TAZOBACTAM SODIUM AND PIPERACILLIN SODIUM 3.38 G: 375; 3 INJECTION, SOLUTION INTRAVENOUS at 11:38

## 2023-04-23 RX ADMIN — AZITHROMYCIN 500 MG: 500 INJECTION, POWDER, LYOPHILIZED, FOR SOLUTION INTRAVENOUS at 12:19

## 2023-04-23 RX ADMIN — FLUTICASONE PROPIONATE 2 SPRAY: 50 SPRAY, METERED NASAL at 15:23

## 2023-04-23 RX ADMIN — ACETAMINOPHEN 325MG 650 MG: 325 TABLET ORAL at 21:04

## 2023-04-23 RX ADMIN — TIZANIDINE 2 MG: 4 TABLET ORAL at 21:04

## 2023-04-23 RX ADMIN — IPRATROPIUM BROMIDE AND ALBUTEROL SULFATE 3 ML: .5; 2.5 SOLUTION RESPIRATORY (INHALATION) at 20:27

## 2023-04-23 RX ADMIN — HEPARIN SODIUM 5000 UNITS: 5000 INJECTION, SOLUTION INTRAVENOUS; SUBCUTANEOUS at 21:04

## 2023-04-23 RX ADMIN — HEPARIN SODIUM 5000 UNITS: 5000 INJECTION, SOLUTION INTRAVENOUS; SUBCUTANEOUS at 14:10

## 2023-04-23 RX ADMIN — ACETAMINOPHEN 325MG 650 MG: 325 TABLET ORAL at 14:10

## 2023-04-23 RX ADMIN — Medication 10 ML: at 21:04

## 2023-04-23 NOTE — ED PROVIDER NOTES
EMERGENCY DEPARTMENT ENCOUNTER    Pt Name: Tala Ramsey  MRN: [id number]  Pt :   1950  Room Number:    Date of encounter:  2023  PCP: Sg Horne MD  ED Provider: Jose R Castellon MD    Historian: Patient and son      HPI:  Chief Complaint: Shortness of breath and cough        Context: Tala Ramsey is a 72 y.o. female who presents to the ED c/o symptoms which started as what the patient describes as a sinus infection roughly a week and 1/2 to 2 weeks ago.  She was placed on amoxicillin and the sinuses seem to clear up.  5 days ago she had increasing dyspnea and was seen by her PCP.  Chest x-ray was performed and she was diagnosed with pneumonia.  She was placed on Levaquin, albuterol and given a steroid shot on that day.  This should be day 5 for her for Levaquin and she feels as if she is worsening not improving.  She has significant dyspnea on exertion and some orthopnea.  The patient does have a history of pulmonary fibrosis and follows with Dr. Tomlinson.  She denies fevers, chills, nausea, vomiting.  She has no home oxygen.  She has not been checking oxygen saturations at home.          PAST MEDICAL HISTORY  Past Medical History:   Diagnosis Date   • Anemia    • Back pain    • Bronchitis    • Hypertension    • Low back pain    • Rheumatoid arthritis          PAST SURGICAL HISTORY  History reviewed. No pertinent surgical history.      FAMILY HISTORY  Family History   Problem Relation Age of Onset   • Heart failure Mother    • Stroke Mother    • Cancer Father    • Diabetes Son    • Diabetes Son          SOCIAL HISTORY  Social History     Socioeconomic History   • Marital status:    Tobacco Use   • Smoking status: Never   • Smokeless tobacco: Never   Vaping Use   • Vaping Use: Never used   Substance and Sexual Activity   • Alcohol use: No   • Drug use: Never   • Sexual activity: Defer         ALLERGIES  Patient has no known allergies.        REVIEW OF SYSTEMS  Review of  Systems       All systems reviewed and negative except for those discussed in HPI.       PHYSICAL EXAM    I have reviewed the triage vital signs and nursing notes.    ED Triage Vitals [04/23/23 1014]   Temp Heart Rate Resp BP SpO2   97.7 °F (36.5 °C) 91 20 152/44 (!) 82 %      Temp src Heart Rate Source Patient Position BP Location FiO2 (%)   Oral Monitor Sitting -- --       Physical Exam  GENERAL:   Appears in very mild respiratory distress but feeling improved since we placed her on oxygen at 2 L.  HENT: Nares patent.  Oxygen via nasal cannula.  Somewhat dry mucous membranes  EYES: No scleral icterus.  CV: Regular rhythm, regular rate.  No murmurs gallops rubs  RESPIRATORY: Diminished in lower half of lung fields with rales throughout mid and lower lung fields of the left side.  No accessory muscle use or retractions.  Able to speak in full sentences.  ABDOMEN: Soft, nontender  MUSCULOSKELETAL: No deformities.   NEURO: Alert, moves all extremities, follows commands.  SKIN: Warm, dry, no rash visualized.      LAB RESULTS  Recent Results (from the past 24 hour(s))   ECG 12 Lead ED Triage Standing Order; SOA    Collection Time: 04/23/23 10:45 AM   Result Value Ref Range    QT Interval 350 ms    QTC Interval 423 ms   Comprehensive Metabolic Panel    Collection Time: 04/23/23 10:48 AM    Specimen: Blood   Result Value Ref Range    Glucose 104 (H) 65 - 99 mg/dL    BUN 14 8 - 23 mg/dL    Creatinine 0.50 (L) 0.57 - 1.00 mg/dL    Sodium 129 (L) 136 - 145 mmol/L    Potassium 4.4 3.5 - 5.2 mmol/L    Chloride 92 (L) 98 - 107 mmol/L    CO2 23.0 22.0 - 29.0 mmol/L    Calcium 9.0 8.6 - 10.5 mg/dL    Total Protein 7.4 6.0 - 8.5 g/dL    Albumin 3.5 3.5 - 5.2 g/dL    ALT (SGPT) 10 1 - 33 U/L    AST (SGOT) 23 1 - 32 U/L    Alkaline Phosphatase 109 39 - 117 U/L    Total Bilirubin 0.2 0.0 - 1.2 mg/dL    Globulin 3.9 gm/dL    A/G Ratio 0.9 g/dL    BUN/Creatinine Ratio 28.0 (H) 7.0 - 25.0    Anion Gap 14.0 5.0 - 15.0 mmol/L    eGFR 99.2  >60.0 mL/min/1.73   BNP    Collection Time: 04/23/23 10:48 AM    Specimen: Blood   Result Value Ref Range    proBNP 163.6 0.0 - 900.0 pg/mL   Single High Sensitivity Troponin T    Collection Time: 04/23/23 10:48 AM    Specimen: Blood   Result Value Ref Range    HS Troponin T 8 <10 ng/L   CBC Auto Differential    Collection Time: 04/23/23 10:48 AM    Specimen: Blood   Result Value Ref Range    WBC 14.06 (H) 3.40 - 10.80 10*3/mm3    RBC 3.93 3.77 - 5.28 10*6/mm3    Hemoglobin 11.5 (L) 12.0 - 15.9 g/dL    Hematocrit 35.2 34.0 - 46.6 %    MCV 89.6 79.0 - 97.0 fL    MCH 29.3 26.6 - 33.0 pg    MCHC 32.7 31.5 - 35.7 g/dL    RDW 14.6 12.3 - 15.4 %    RDW-SD 45.3 37.0 - 54.0 fl    MPV 8.3 6.0 - 12.0 fL    Platelets 592 (H) 140 - 450 10*3/mm3    Neutrophil % 80.5 (H) 42.7 - 76.0 %    Lymphocyte % 10.7 (L) 19.6 - 45.3 %    Monocyte % 5.6 5.0 - 12.0 %    Eosinophil % 1.0 0.3 - 6.2 %    Basophil % 0.6 0.0 - 1.5 %    Immature Grans % 1.6 (H) 0.0 - 0.5 %    Neutrophils, Absolute 11.33 (H) 1.70 - 7.00 10*3/mm3    Lymphocytes, Absolute 1.50 0.70 - 3.10 10*3/mm3    Monocytes, Absolute 0.79 0.10 - 0.90 10*3/mm3    Eosinophils, Absolute 0.14 0.00 - 0.40 10*3/mm3    Basophils, Absolute 0.08 0.00 - 0.20 10*3/mm3    Immature Grans, Absolute 0.22 (H) 0.00 - 0.05 10*3/mm3    nRBC 0.2 0.0 - 0.2 /100 WBC   Lactic Acid, Plasma    Collection Time: 04/23/23 10:48 AM    Specimen: Blood   Result Value Ref Range    Lactate 1.1 0.5 - 2.0 mmol/L       If labs were ordered, I independently reviewed the results and considered them in treating the patient.        RADIOLOGY  XR Chest 1 View    Result Date: 4/23/2023  XR CHEST 1 VW Date of Exam: 4/23/2023 10:25 AM EDT Indication: SOA triage protocol Comparison: High-res chest CT 3/20/2023 Findings: There is a background interstitial lung disease with peripheral septal thickening and bronchiectasis lower lobe predominant. There is increased opacity in the left lung likely due to superimposed multifocal  pneumonia. There is some airspace opacity in the periphery of the right upper lobe that appears increased as well from previous CT chest. There is osteopenia. Heart size is normal.    Multifocal airspace opacities left greater than right superimposed on background of chronic interstitial lung disease. Multifocal pneumonia is suspected. Electronically Signed: Shelley Ulloa  4/23/2023 11:01 AM EDT  Workstation ID: [id number]      I ordered and independently reviewed the above noted radiographic studies.      I viewed images of chest x-ray which showed pulmonary fibrotic changes with superimposed significant left mid lung field infiltrate likely indicating pneumonia per my independent interpretation.    See radiologist's dictation for official interpretation.        PROCEDURES    Procedures    ECG 12 Lead ED Triage Standing Order; SOA   Preliminary Result   Test Reason : ED Triage Standing Order~   Blood Pressure :   */*   mmHG   Vent. Rate :  88 BPM     Atrial Rate :  88 BPM      P-R Int : 154 ms          QRS Dur :  92 ms       QT Int : 350 ms       P-R-T Axes :  47 -24  33 degrees      QTc Int : 423 ms      Normal sinus rhythm   Voltage criteria for left ventricular hypertrophy   Abnormal ECG   No previous ECGs available      Referred By: ERMD           Confirmed By:           MEDICATIONS GIVEN IN ER    Medications   sodium chloride 0.9 % flush 10 mL (has no administration in time range)   piperacillin-tazobactam (ZOSYN) 3.375 g in iso-osmotic dextrose 50 ml (premix) (3.375 g Intravenous New Bag 4/23/23 1138)   AZITHROMYCIN 500 MG/250 ML 0.9% NS IVPB (vial-mate) (has no administration in time range)         MEDICAL DECISION MAKING, PROGRESS, and CONSULTS    All labs have been independently reviewed by me.  All radiology studies have been reviewed by me and the radiologist dictating the report.  All EKG's have been independently viewed and interpreted by me.      Discussion below represents my analysis of pertinent  findings related to patient's condition, differential diagnosis, treatment plan and final disposition.      Differential diagnosis:    Pneumonia versus CHF versus pulmonary embolism versus bronchitis, etc.  Patient has underlying pulmonary fibrosis to further complicate things.      Additional sources:    - Discussed/ obtained information from independent historians: Patient's son is a good historian    - External (non-ED) record review: Epic chart review    - Chronic or social conditions impacting care: Pulmonary fibrosis    - Shared decision making: Patient, son in full agreement with current plan for evaluation, treatment with IV antibiotics, admission to the hospitalist.      Orders placed during this visit:  Orders Placed This Encounter   Procedures   • Blood Culture - Blood,   • Blood Culture - Blood,   • COVID PRE-OP / PRE-PROCEDURE SCREENING ORDER (NO ISOLATION) - Swab, Nasopharynx   • COVID-19, FLU A/B, RSV PCR - Swab, Nasopharynx   • XR Chest 1 View   • [place] Draw   • Comprehensive Metabolic Panel   • BNP   • Single High Sensitivity Troponin T   • CBC Auto Differential   • Lactic Acid, Plasma   • NPO Diet NPO Type: Strict NPO   • Undress & Gown   • Cardiac Monitoring   • Continuous Pulse Oximetry   • Vital Signs   • Oxygen Therapy- Nasal Cannula; 2 LPM; Titrate for SPO2: equal to or greater than, 92%   • ECG 12 Lead ED Triage Standing Order; SOA   • Insert Peripheral IV   • CBC & Differential   • Green Top (Gel)   • Lavender Top   • Gold Top - SST   • Gray Top         Additional orders considered but not ordered:  CT scan of the chest which can be performed on an inpatient basis if felt needed    ED Course:    Consultants:      ED Course as of 04/23/23 1149   Sun Apr 23, 2023   1146 I have ordered broad-spectrum antibiotics to cover the patient for possible Pseudomonas infection.  I have contacted Dr. Kelly, hospitalist for admission.  The patient has been on oxygen at 2 L and oxygen saturations are  remaining in the mid 90s on this.  She continues to appear nontoxic. [MS]      ED Course User Index  [MS] Jose R Castellon MD                  AS OF 11:49 EDT VITALS:    BP - 170/76  HR - 91  TEMP - 97.7 °F (36.5 °C) (Oral)  O2 SATS - 98%                  DIAGNOSIS  Final diagnoses:   Multifocal pneumonia   Failure of outpatient treatment   Hypoxia         DISPOSITION  Admission      Please note that portions of this document were completed with voice recognition software.      Jose R Castellon MD  04/23/23 0593

## 2023-04-23 NOTE — LETTER
EMS Transport Request  For use at [facility], [place], [place], and [place] only   Patient Name: Tala Ramsey : 1950   Weight:80.3 kg (177 lb) Pick-up Location: [facility] BLS/ALS: BLS/ALS: BLS   Insurance: [employer] MEDICARE REPLACEMENT Auth End Date:    Pre-Cert #: D/C Summary complete:    Destination: [address], [facility]   Contact Precautions: None   Equipment (O2, Fluids, etc.): O2, settings 6 liters   Arrive By Date/Time: 2023 Late evening Stretcher/WC: Stretcher   CM Requesting: Ivet Reynolds, Social Work Student  Ext: [phone] [place]   Notes/Medical Necessity: Fall risk, poor sitting balance, Acute hypoxemic respiratory failure -likely due to ILD exacerbation      ______________________________________________________________________    *Only 2 patient bags OR 1 carry-on size bag are permitted.  Wheelchairs and walkers CANNOT transported with the patient. Acknowledge: Yes

## 2023-04-23 NOTE — H&P
"    [facility]  HISTORY AND PHYSICAL    Patient Name: Tala Ramsey  : 1950  MRN: [id number]  Primary Care Physician: Sg Horne MD  Date of admission: 2023      Subjective   Subjective     Chief Complaint:  Shortness of breath    HPI:  Tala Ramsey is a 72 y.o. female with history of HTN, iron deficiency anemia, RA/inflammatory arthritis, possible lupus and pulmonary fibrosis presents with increasing shortness of breath.  Symptoms started approximately 10 days ago, with Ms Ramsey visiting her PCP for \"ulcers in my mouth\" and throat discomfort.  Amoxicillin prescribed.  The patient revisited her PCP on Tuesday and was prescribed levaquin for possible pneumonia. She was also given a steroid injection. Since then, her shortness of breath has progressed with Ms Ramsey experiencing significant dyspnea with exertion.        Review of Systems   Constitutional: Negative for chills and fever.   HENT: Negative.    Eyes: Negative.    Respiratory: Positive for shortness of breath.    Gastrointestinal: Negative.    Endocrine: Negative.    Genitourinary: Negative.    Musculoskeletal: Positive for arthralgias.   Skin: Negative.    Allergic/Immunologic: Negative.    Neurological: Negative.    Hematological: Negative.    Psychiatric/Behavioral: Negative.           Personal History     Past Medical History:   Diagnosis Date   • Anemia    • Back pain    • Bronchitis    • Hypertension    • Low back pain    • Pulmonary fibrosis    • Rheumatoid arthritis              Past Surgical History:   Procedure Laterality Date   • DILATION AND CURETTAGE, DIAGNOSTIC / THERAPEUTIC         Family History: family history includes Cancer in her father; Diabetes in her son and son; Heart failure in her mother; Stroke in her mother.     Social History:  reports that she has never smoked. She has never used smokeless tobacco. She reports that she does not drink alcohol and does not " use drugs.  Social History     Social History Narrative   • Not on file       Medications:  Available home medication information reviewed.  Medications Prior to Admission   Medication Sig Dispense Refill Last Dose   • acetaminophen (TYLENOL) 325 MG tablet Take 2 tablets by mouth Every 4 (Four) Hours As Needed.      • amLODIPine-valsartan (EXFORGE) 5-160 MG per tablet TAKE 1 A DAY FOR BLOOD PRESSURE      • Calcium-Phosphorus-Vitamin D (CITRACAL +D3 PO) Take 1 tablet by mouth.      • cyclobenzaprine (FLEXERIL) 10 MG tablet Take 1 tablet by mouth 3 (Three) Times a Day As Needed for muscle spasms. 21 tablet 0    • fluticasone (FLONASE) 50 MCG/ACT nasal spray 2 sprays by Each Nare route Daily.      • Humira Pen 40 MG/0.4ML Pen-injector Kit Inject 40 mg under the skin into the appropriate area as directed Every 14 (Fourteen) Days.      • hydroxychloroquine (PLAQUENIL) 200 MG tablet Take  by mouth 2 (Two) Times a Day.      • Ibuprofen 200 MG capsule Take 1 tablet by mouth As Needed.      • Lidocaine Viscous HCl (XYLOCAINE) 2 % solution       • montelukast (SINGULAIR) 10 MG tablet Take 1 tablet by mouth Daily.      • Omega-3 Fatty Acids (fish oil) 1200 MG capsule capsule Take 1 capsule by mouth Daily.      • pantoprazole (PROTONIX) 40 MG EC tablet Take 1 tablet by mouth Daily.      • predniSONE (DELTASONE) 20 MG tablet Take 1.5 tablets by mouth Daily. 11 tablet 0    • simethicone (MYLICON) 125 MG chewable tablet Chew 1 tablet Every 6 (Six) Hours As Needed for Flatulence.      • tiZANidine (ZANAFLEX) 2 MG tablet 1'2-1 in the Am and afternoon, 1-2 at bedtime. as needed      • ferrous gluconate 324 (37.5 FE) MG tablet tablet Take  by mouth Daily With Breakfast.      • montelukast (SINGULAIR) 10 MG tablet Take 1 tablet by mouth Every Night.          No Known Allergies    Objective   Objective     Vital Signs:   Temp:  [97.7 °F (36.5 °C)] 97.7 °F (36.5 °C)  Heart Rate:  [80-91] 83  Resp:  [20] 20  BP: (137-170)/(44-76)  137/58  Flow (L/min):  [2-4] 2       Physical Exam   Constitutional - appears fatigue, in bed  HEENT-NCAT, mucous membranes moist  CV-RRR  Resp-dry crackles  Abd-soft, nontender, nondistended, normoactive bowel sounds  Ext-No lower extremity cyanosis, clubbing or edema bilaterally  Neuro-alert, speech clear, moves all extremities   Psych-normal affect   Skin- No rash on exposed UE or LE bilaterally      Result Review:  I have personally reviewed the results from the time of this admission to 4/23/2023 13:11 EDT and agree with these findings:  [x]  Laboratory list / accordion  [x]  Microbiology  []  Radiology  []  EKG/Telemetry   []  Cardiology/Vascular   []  Pathology  []  Old records  []  Other:  Most notable findings include:         LAB RESULTS:      Lab 04/23/23  1048   WBC 14.06*   HEMOGLOBIN 11.5*   HEMATOCRIT 35.2   PLATELETS 592*   NEUTROS ABS 11.33*   IMMATURE GRANS (ABS) 0.22*   LYMPHS ABS 1.50   MONOS ABS 0.79   EOS ABS 0.14   MCV 89.6   LACTATE 1.1         Lab 04/23/23  1048   SODIUM 129*   POTASSIUM 4.4   CHLORIDE 92*   CO2 23.0   ANION GAP 14.0   BUN 14   CREATININE 0.50*   EGFR 99.2   GLUCOSE 104*   CALCIUM 9.0         Lab 04/23/23  1048   TOTAL PROTEIN 7.4   ALBUMIN 3.5   GLOBULIN 3.9   ALT (SGPT) 10   AST (SGOT) 23   BILIRUBIN 0.2   ALK PHOS 109         Lab 04/23/23  1048   PROBNP 163.6   HSTROP T 8                     Microbiology Results (last 10 days)     Procedure Component Value - Date/Time    COVID PRE-OP / PRE-PROCEDURE SCREENING ORDER (NO ISOLATION) - Swab, Nasopharynx [[id number]]  (Normal) Collected: 04/23/23 1125    Lab Status: Final result Specimen: Swab from Nasopharynx Updated: 04/23/23 1225    Narrative:      The following orders were created for panel order COVID PRE-OP / PRE-PROCEDURE SCREENING ORDER (NO ISOLATION) - Swab, Nasopharynx.  Procedure                               Abnormality         Status                     ---------                               -----------          ------                     COVID-19, FLU A/B, RSV P...[[id number]]  Normal              Final result                 Please view results for these tests on the individual orders.    COVID-19, FLU A/B, RSV PCR - Swab, Nasopharynx [[id number]]  (Normal) Collected: 04/23/23 1125    Lab Status: Final result Specimen: Swab from Nasopharynx Updated: 04/23/23 1225     COVID19 Not Detected     Influenza A PCR Not Detected     Influenza B PCR Not Detected     RSV, PCR Not Detected    Narrative:      Fact sheet for providers: https://www.fda.gov/media/669909/download    Fact sheet for patients: https://www.fda.gov/media/206518/download    Test performed by PCR.          XR Chest 1 View    Result Date: 4/23/2023  XR CHEST 1 VW Date of Exam: 4/23/2023 10:25 AM EDT Indication: SOA triage protocol Comparison: High-res chest CT 3/20/2023 Findings: There is a background interstitial lung disease with peripheral septal thickening and bronchiectasis lower lobe predominant. There is increased opacity in the left lung likely due to superimposed multifocal pneumonia. There is some airspace opacity in the periphery of the right upper lobe that appears increased as well from previous CT chest. There is osteopenia. Heart size is normal.    Impression: Multifocal airspace opacities left greater than right superimposed on background of chronic interstitial lung disease. Multifocal pneumonia is suspected. Electronically Signed: Shelley Ulloa  4/23/2023 11:01 AM EDT  Workstation ID: [id number]          Assessment & Plan   Assessment & Plan     Active Hospital Problems    Diagnosis  POA   • **Multifocal pneumonia [J18.9]  Yes       Pneumonia  Pulmonary fibrosis  - Obtain CT chest  - check MRSA nares, legionella and strep pneumo urinary antigens, respiratory culture  - CBC, procalcitionin and CRP am  - continue zosyn and azithromycin  - duonebs  - consider Pulmonary/ID consultations  - Addendum: CT chest reviewed, concern for viral pneumonia.  Initial  COVID-19/Influenza/RSV PCR negative.  Will obtain full respiratory PCR panel    RA    HTN    Hyponatremia  - may represent volume depletion vs SIADH  - check serum and urine osmoles, urine sodium, UA  - currently asymptomatic    anemia      DVT prophylaxis:  heparin      CODE STATUS:  DNR -- patient says with her underlying pulmonary fibrosis she would not want CPR or intubation with mechanical ventilation should her medical condition worsen.  Son at bedside during this discussion.  Code Status and Medical Interventions:   Ordered at: 04/23/23 1310     Medical Intervention Limits:    NO intubation (DNI)     Level Of Support Discussed With:    Patient     Code Status (Patient has no pulse and is not breathing):    No CPR (Do Not Attempt to Resuscitate)     Medical Interventions (Patient has pulse or is breathing):    Limited Support       Expected Discharge        Elias Morales MD  04/23/23

## 2023-04-23 NOTE — LETTER
[facility]  [facility] [address]  [phone]        May 2, 2023      Patient: Tala Ramsey  YOB: 1950  Date of Visit: 4/23/2023      Inpatient admission at [facility].    Attending: Marisela COOK  Certifying: Dr. Estefania Singleton  Referring: Dr. Wojciech Castrejon      Thank you            Anh Singh RN

## 2023-04-24 LAB
ALBUMIN SERPL-MCNC: 3.3 G/DL (ref 3.5–5.2)
ALBUMIN/GLOB SERPL: 0.9 G/DL
ALP SERPL-CCNC: 130 U/L (ref 39–117)
ALT SERPL W P-5'-P-CCNC: 13 U/L (ref 1–33)
ANION GAP SERPL CALCULATED.3IONS-SCNC: 11 MMOL/L (ref 5–15)
AST SERPL-CCNC: 22 U/L (ref 1–32)
B PARAPERT DNA SPEC QL NAA+PROBE: NOT DETECTED
B PERT DNA SPEC QL NAA+PROBE: NOT DETECTED
BASOPHILS # BLD AUTO: 0.07 10*3/MM3 (ref 0–0.2)
BASOPHILS NFR BLD AUTO: 0.5 % (ref 0–1.5)
BILIRUB SERPL-MCNC: 0.3 MG/DL (ref 0–1.2)
BUN SERPL-MCNC: 12 MG/DL (ref 8–23)
BUN/CREAT SERPL: 19 (ref 7–25)
C PNEUM DNA NPH QL NAA+NON-PROBE: NOT DETECTED
CALCIUM SPEC-SCNC: 8.3 MG/DL (ref 8.6–10.5)
CHLORIDE SERPL-SCNC: 95 MMOL/L (ref 98–107)
CO2 SERPL-SCNC: 24 MMOL/L (ref 22–29)
CREAT SERPL-MCNC: 0.63 MG/DL (ref 0.57–1)
CRP SERPL-MCNC: 15.21 MG/DL (ref 0–0.5)
DEPRECATED RDW RBC AUTO: 46.7 FL (ref 37–54)
EGFRCR SERPLBLD CKD-EPI 2021: 93.8 ML/MIN/1.73
EOSINOPHIL # BLD AUTO: 0.31 10*3/MM3 (ref 0–0.4)
EOSINOPHIL NFR BLD AUTO: 2.4 % (ref 0.3–6.2)
ERYTHROCYTE [DISTWIDTH] IN BLOOD BY AUTOMATED COUNT: 14.9 % (ref 12.3–15.4)
FLUAV SUBTYP SPEC NAA+PROBE: NOT DETECTED
FLUBV RNA ISLT QL NAA+PROBE: NOT DETECTED
GLOBULIN UR ELPH-MCNC: 3.5 GM/DL
GLUCOSE SERPL-MCNC: 113 MG/DL (ref 65–99)
HADV DNA SPEC NAA+PROBE: NOT DETECTED
HCOV 229E RNA SPEC QL NAA+PROBE: NOT DETECTED
HCOV HKU1 RNA SPEC QL NAA+PROBE: NOT DETECTED
HCOV NL63 RNA SPEC QL NAA+PROBE: NOT DETECTED
HCOV OC43 RNA SPEC QL NAA+PROBE: NOT DETECTED
HCT VFR BLD AUTO: 30.8 % (ref 34–46.6)
HGB BLD-MCNC: 10.2 G/DL (ref 12–15.9)
HMPV RNA NPH QL NAA+NON-PROBE: NOT DETECTED
HPIV1 RNA ISLT QL NAA+PROBE: NOT DETECTED
HPIV2 RNA SPEC QL NAA+PROBE: NOT DETECTED
HPIV3 RNA NPH QL NAA+PROBE: NOT DETECTED
HPIV4 P GENE NPH QL NAA+PROBE: NOT DETECTED
IMM GRANULOCYTES # BLD AUTO: 0.17 10*3/MM3 (ref 0–0.05)
IMM GRANULOCYTES NFR BLD AUTO: 1.3 % (ref 0–0.5)
L PNEUMO1 AG UR QL IA: NEGATIVE
LYMPHOCYTES # BLD AUTO: 1.14 10*3/MM3 (ref 0.7–3.1)
LYMPHOCYTES NFR BLD AUTO: 8.9 % (ref 19.6–45.3)
M PNEUMO IGG SER IA-ACNC: NOT DETECTED
MCH RBC QN AUTO: 29.8 PG (ref 26.6–33)
MCHC RBC AUTO-ENTMCNC: 33.1 G/DL (ref 31.5–35.7)
MCV RBC AUTO: 90.1 FL (ref 79–97)
MONOCYTES # BLD AUTO: 0.65 10*3/MM3 (ref 0.1–0.9)
MONOCYTES NFR BLD AUTO: 5.1 % (ref 5–12)
NEUTROPHILS NFR BLD AUTO: 10.5 10*3/MM3 (ref 1.7–7)
NEUTROPHILS NFR BLD AUTO: 81.8 % (ref 42.7–76)
NRBC BLD AUTO-RTO: 0 /100 WBC (ref 0–0.2)
PLATELET # BLD AUTO: 520 10*3/MM3 (ref 140–450)
PMV BLD AUTO: 8.4 FL (ref 6–12)
POTASSIUM SERPL-SCNC: 4.1 MMOL/L (ref 3.5–5.2)
PROCALCITONIN SERPL-MCNC: 0.05 NG/ML (ref 0–0.25)
PROT SERPL-MCNC: 6.8 G/DL (ref 6–8.5)
RBC # BLD AUTO: 3.42 10*6/MM3 (ref 3.77–5.28)
RHINOVIRUS RNA SPEC NAA+PROBE: NOT DETECTED
RSV RNA NPH QL NAA+NON-PROBE: NOT DETECTED
S PNEUM AG SPEC QL LA: NEGATIVE
SARS-COV-2 RNA NPH QL NAA+NON-PROBE: NOT DETECTED
SODIUM SERPL-SCNC: 130 MMOL/L (ref 136–145)
WBC NRBC COR # BLD: 12.84 10*3/MM3 (ref 3.4–10.8)

## 2023-04-24 PROCEDURE — 84145 PROCALCITONIN (PCT): CPT | Performed by: INTERNAL MEDICINE

## 2023-04-24 PROCEDURE — 94799 UNLISTED PULMONARY SVC/PX: CPT

## 2023-04-24 PROCEDURE — 0202U NFCT DS 22 TRGT SARS-COV-2: CPT | Performed by: INTERNAL MEDICINE

## 2023-04-24 PROCEDURE — 25010000002 HEPARIN (PORCINE) PER 1000 UNITS: Performed by: INTERNAL MEDICINE

## 2023-04-24 PROCEDURE — 80053 COMPREHEN METABOLIC PANEL: CPT | Performed by: INTERNAL MEDICINE

## 2023-04-24 PROCEDURE — 99223 1ST HOSP IP/OBS HIGH 75: CPT | Performed by: INTERNAL MEDICINE

## 2023-04-24 PROCEDURE — 25010000002 METHYLPREDNISOLONE PER 40 MG: Performed by: INTERNAL MEDICINE

## 2023-04-24 PROCEDURE — 0 METHYLPREDNISOLONE PER 125 MG: Performed by: INTERNAL MEDICINE

## 2023-04-24 PROCEDURE — 25010000002 PIPERACILLIN SOD-TAZOBACTAM PER 1 G: Performed by: INTERNAL MEDICINE

## 2023-04-24 PROCEDURE — 85025 COMPLETE CBC W/AUTO DIFF WBC: CPT | Performed by: INTERNAL MEDICINE

## 2023-04-24 PROCEDURE — 86140 C-REACTIVE PROTEIN: CPT | Performed by: INTERNAL MEDICINE

## 2023-04-24 PROCEDURE — 99233 SBSQ HOSP IP/OBS HIGH 50: CPT | Performed by: INTERNAL MEDICINE

## 2023-04-24 RX ORDER — IPRATROPIUM BROMIDE AND ALBUTEROL SULFATE 2.5; .5 MG/3ML; MG/3ML
3 SOLUTION RESPIRATORY (INHALATION) EVERY 4 HOURS PRN
Status: DISCONTINUED | OUTPATIENT
Start: 2023-04-24 | End: 2023-05-25 | Stop reason: HOSPADM

## 2023-04-24 RX ORDER — SULFAMETHOXAZOLE AND TRIMETHOPRIM 800; 160 MG/1; MG/1
1 TABLET ORAL 3 TIMES WEEKLY
Status: DISCONTINUED | OUTPATIENT
Start: 2023-04-24 | End: 2023-05-09

## 2023-04-24 RX ORDER — METHYLPREDNISOLONE SODIUM SUCCINATE 40 MG/ML
40 INJECTION, POWDER, LYOPHILIZED, FOR SOLUTION INTRAMUSCULAR; INTRAVENOUS EVERY 12 HOURS
Status: DISCONTINUED | OUTPATIENT
Start: 2023-04-24 | End: 2023-04-24

## 2023-04-24 RX ADMIN — AMLODIPINE BESYLATE 5 MG: 5 TABLET ORAL at 08:25

## 2023-04-24 RX ADMIN — SODIUM CHLORIDE 1000 MG: 900 INJECTION INTRAVENOUS at 15:54

## 2023-04-24 RX ADMIN — ACETAMINOPHEN 325MG 650 MG: 325 TABLET ORAL at 08:25

## 2023-04-24 RX ADMIN — ACETAMINOPHEN 325MG 650 MG: 325 TABLET ORAL at 20:51

## 2023-04-24 RX ADMIN — TAZOBACTAM SODIUM AND PIPERACILLIN SODIUM 3.38 G: 375; 3 INJECTION, SOLUTION INTRAVENOUS at 02:47

## 2023-04-24 RX ADMIN — TAZOBACTAM SODIUM AND PIPERACILLIN SODIUM 3.38 G: 375; 3 INJECTION, SOLUTION INTRAVENOUS at 18:40

## 2023-04-24 RX ADMIN — TAZOBACTAM SODIUM AND PIPERACILLIN SODIUM 3.38 G: 375; 3 INJECTION, SOLUTION INTRAVENOUS at 08:26

## 2023-04-24 RX ADMIN — SULFAMETHOXAZOLE AND TRIMETHOPRIM 1 TABLET: 800; 160 TABLET ORAL at 10:43

## 2023-04-24 RX ADMIN — PANTOPRAZOLE SODIUM 40 MG: 40 TABLET, DELAYED RELEASE ORAL at 08:25

## 2023-04-24 RX ADMIN — IPRATROPIUM BROMIDE AND ALBUTEROL SULFATE 3 ML: 2.5; .5 SOLUTION RESPIRATORY (INHALATION) at 03:09

## 2023-04-24 RX ADMIN — METHYLPREDNISOLONE SODIUM SUCCINATE 40 MG: 40 INJECTION, POWDER, FOR SOLUTION INTRAMUSCULAR; INTRAVENOUS at 08:34

## 2023-04-24 RX ADMIN — IPRATROPIUM BROMIDE AND ALBUTEROL SULFATE 3 ML: .5; 2.5 SOLUTION RESPIRATORY (INHALATION) at 07:17

## 2023-04-24 RX ADMIN — Medication 10 ML: at 20:51

## 2023-04-24 RX ADMIN — VALSARTAN 160 MG: 160 TABLET, FILM COATED ORAL at 08:25

## 2023-04-24 RX ADMIN — HEPARIN SODIUM 5000 UNITS: 5000 INJECTION, SOLUTION INTRAVENOUS; SUBCUTANEOUS at 05:44

## 2023-04-24 RX ADMIN — HEPARIN SODIUM 5000 UNITS: 5000 INJECTION, SOLUTION INTRAVENOUS; SUBCUTANEOUS at 20:51

## 2023-04-24 RX ADMIN — FLUTICASONE PROPIONATE 2 SPRAY: 50 SPRAY, METERED NASAL at 08:25

## 2023-04-24 RX ADMIN — ACETAMINOPHEN 325MG 650 MG: 325 TABLET ORAL at 15:54

## 2023-04-24 RX ADMIN — IPRATROPIUM BROMIDE AND ALBUTEROL SULFATE 3 ML: .5; 2.5 SOLUTION RESPIRATORY (INHALATION) at 19:23

## 2023-04-24 RX ADMIN — MONTELUKAST 10 MG: 10 TABLET, FILM COATED ORAL at 08:25

## 2023-04-24 RX ADMIN — IPRATROPIUM BROMIDE AND ALBUTEROL SULFATE 3 ML: .5; 2.5 SOLUTION RESPIRATORY (INHALATION) at 15:29

## 2023-04-24 RX ADMIN — TIZANIDINE 2 MG: 4 TABLET ORAL at 20:51

## 2023-04-24 RX ADMIN — HEPARIN SODIUM 5000 UNITS: 5000 INJECTION, SOLUTION INTRAVENOUS; SUBCUTANEOUS at 15:54

## 2023-04-24 NOTE — CASE MANAGEMENT/SOCIAL WORK
Discharge Planning Assessment  [facility]     Patient Name: Tala Ramsey  MRN: [id number]  Today's Date: 4/24/2023    Admit Date: 4/23/2023    Plan: CHRISTIANO an   Discharge Needs Assessment     Row Name 04/24/23 1300       Living Environment    People in Home child(nikolas), adult    Current Living Arrangements home    Primary Care Provided by self    Provides Primary Care For no one    Family Caregiver if Needed child(nikolas), adult    Quality of Family Relationships involved    Able to Return to Prior Arrangements other (see comments)    Living Arrangement Comments may need rehab prior to home       Transition Planning    Patient/Family Anticipates Transition to home with help/services;inpatient rehabilitation facility    Patient/Family Anticipated Services at Transition     Transportation Anticipated family or friend will provide       Discharge Needs Assessment    Concerns to be Addressed discharge planning               Discharge Plan     Row Name 04/24/23 1301       Plan    Plan CM eval    Plan Comments Spoke with patient at bedside regarding discharge planning-  Patient confirmed she lives in [place] [facility] [place] [facility] and has 2 adult sons who live in the home with her but they both work during the day so she won't have much help at home. She is current on high flow 02 at 65%/45L and does not wear 02 at baseline. Pulmonary consult pending for today. PT and OT ordered. Discussed with patient that CM will follow for PT and OT recommendations and discuss further dc planning based on their recommendations. She will need to be off high flow 02 to be considered for skilled rehab if recommended - CM will continue to follow- derrick [phone]    Final Discharge Disposition Code 01 - home or self-care              Continued Care and Services - Admitted Since 4/23/2023    Coordination has not been started for this encounter.          Demographic Summary     Row Name 04/24/23 1300       General Information    Admission Type  inpatient    Arrived From home    Referral Source admission list    Reason for Consult discharge planning    Preferred Language English       Contact Information    Permission Granted to Share Info With                Functional Status     Row Name 04/24/23 1300       Functional Status    Usual Activity Tolerance moderate    Current Activity Tolerance moderate       Functional Status, IADL    Medications independent    Meal Preparation independent    Housekeeping independent    Laundry independent    Shopping independent               Psychosocial    No documentation.                Abuse/Neglect    No documentation.                Legal    No documentation.                Substance Abuse    No documentation.                Patient Forms    No documentation.                   Isabel Riggs RN

## 2023-04-24 NOTE — PROGRESS NOTES
"                    Clinical Nutrition       Patient Name: Tala Ramsey  YOB: 1950  MRN: [id number]  Date of Encounter: 04/24/23 09:34 EDT  Admission date: 4/23/2023    Comment:    Changed to a soft, whole diet 2/2 SOB - may return to regular consistency by request    Reason for Visit   MST score 2+, \"Unsure\" unintentional weight loss, Reduced oral intake    EMR Reviewed     EMR Reviewed: yes     Admission Diagnosis:  Multifocal pneumonia [J18.9]    Problem List:    Multifocal pneumonia          Anthropometric      Flowsheet Rows    Flowsheet Row First Filed Value   Admission Height 153.7 cm (60.5\") Documented at 04/23/2023 1014   Admission Weight 80.3 kg (177 lb) Documented at 04/23/2023 1014            Height: 153.7 cm (60.5\")  Last Filed Weight: Weight: 80.3 kg (177 lb) (04/23/23 1014)  Weight Method: Stated  BMI: BMI (Calculated): 34  BMI classification: Obese Class I: 30-34.9kg/m2   IBW:  102lbs    UBW: 177lbs  Weight change:   No documented weight change      Nutrition Focused Physical Exam     Date: 4/24    NFPE completed, patient does not meet criteria for MSA at this time.        Reported/Observed/Food/Nutrition Related - Comments     Pt up in bed with HFNC in place - able to provide some weight/nutrition hx. ? Reliable hxn. Endorsed a moderate appetite however recent decline in intake 2/2 acute illness and oral ulcers. Denies difficulty chewing/swallowing - however endorsed being SOB during breakfast. Discussed soft to chew diet, agreeable. Dislikes ONS - however reports drinking Glucerna as needed.  Denies N/V/D. NKFA     Current Nutrition Prescription     Diet: Regular/House Diet; Texture: Regular Texture (IDDSI 7); Fluid Consistency: Thin (IDDSI 0)  No active supplement orders      Average Intake from Charting: Insufficient data     Nutrition Diagnosis     Problem Predicted suboptimal energy intake   Etiology SOA   Signs/Symptoms Reported difficulty with PO intake.  "   Status:    Actions     Follow treatment progress, Care plan reviewed, Advise alternate selection, Advised available snacks, Menu adjusted, Encourage intake    Monitor Per Protocol      Yamilex Davidson MS,RD,LD,   Time Spent: 25min

## 2023-04-24 NOTE — PROGRESS NOTES
[facility] Medicine Services  PROGRESS NOTE    Patient Name: Tala Ramsey  : 1950  MRN: [id number]    Date of Admission: 2023  Primary Care Physician: Sg Horne MD    Subjective   Subjective     CC:  Hypoxia, pneumonia    HPI:  Dyspnea, dry cough. Some intermittent pleuritic pain    ROS:  No n/v/d  No rash    Objective   Objective     Vital Signs:   Temp:  [97.7 °F (36.5 °C)-98.7 °F (37.1 °C)] 98.7 °F (37.1 °C)  Heart Rate:  [] 89  Resp:  [16-30] 18  BP: ()/(44-76) 152/72  Flow (L/min):  [2-40] 40     Physical Exam:  Constitutional:Alert, oriented x 3, nontoxic appearing but ill appearing, hi flow cnaula in place, sitting upright in bed. Frequent dry coughing  Psych:Normal/appropriate affect  HEENT:NCAT, oropharynx clear  Neck: neck supple, full range of motion  Neuro: Face symmetric, speech clear, equal , moves all extremities  Cardiac: RRR; No pretibial pitting edema  Resp: faint mid insp crackles noted, no distress  GI: abd soft, nontender, obese  Skin: No extremity rash  Musculoskeletal/extremities: no cyanosis of extremities; no significant ankle edema        Results Reviewed:  LAB RESULTS:      Lab 23  0647 23  1048   WBC 12.84* 14.06*   HEMOGLOBIN 10.2* 11.5*   HEMATOCRIT 30.8* 35.2   PLATELETS 520* 592*   NEUTROS ABS 10.50* 11.33*   IMMATURE GRANS (ABS) 0.17* 0.22*   LYMPHS ABS 1.14 1.50   MONOS ABS 0.65 0.79   EOS ABS 0.31 0.14   MCV 90.1 89.6   CRP 15.21*  --    PROCALCITONIN 0.05  --    LACTATE  --  1.1         Lab 23  0647 23  1048   SODIUM 130* 129*   POTASSIUM 4.1 4.4   CHLORIDE 95* 92*   CO2 24.0 23.0   ANION GAP 11.0 14.0   BUN 12 14   CREATININE 0.63 0.50*   EGFR 93.8 99.2   GLUCOSE 113* 104*   CALCIUM 8.3* 9.0         Lab 23  0647 23  1048   TOTAL PROTEIN 6.8 7.4   ALBUMIN 3.3* 3.5   GLOBULIN 3.5 3.9   ALT (SGPT) 13 10   AST (SGOT) 22 23   BILIRUBIN 0.3 0.2   ALK PHOS 130* 109         Lab  04/23/23  1048   PROBNP 163.6   HSTROP T 8                 Brief Urine Lab Results  (Last result in the past 365 days)      Color   Clarity   Blood   Leuk Est   Nitrite   Protein   CREAT   Urine HCG        04/23/23 1757 Yellow   Clear   Trace   Negative   Negative   Negative                 Microbiology Results Abnormal     Procedure Component Value - Date/Time    Respiratory Panel PCR w/COVID-19(SARS-CoV-2) CHARI/MARISSA/APOLINAR/PAD/COR/MAD/AMY In-House, NP Swab in UTM/VTM, 3-4 HR TAT - Swab, Nasopharynx [[id number]]  (Normal) Collected: 04/24/23 0544    Lab Status: Final result Specimen: Swab from Nasopharynx Updated: 04/24/23 0647     ADENOVIRUS, PCR Not Detected     Coronavirus 229E Not Detected     Coronavirus HKU1 Not Detected     Coronavirus NL63 Not Detected     Coronavirus OC43 Not Detected     COVID19 Not Detected     Human Metapneumovirus Not Detected     Human Rhinovirus/Enterovirus Not Detected     Influenza A PCR Not Detected     Influenza B PCR Not Detected     Parainfluenza Virus 1 Not Detected     Parainfluenza Virus 2 Not Detected     Parainfluenza Virus 3 Not Detected     Parainfluenza Virus 4 Not Detected     RSV, PCR Not Detected     Bordetella pertussis pcr Not Detected     Bordetella parapertussis PCR Not Detected     Chlamydophila pneumoniae PCR Not Detected     Mycoplasma pneumo by PCR Not Detected    Narrative:      In the setting of a positive respiratory panel with a viral infection PLUS a negative procalcitonin without other underlying concern for bacterial infection, consider observing off antibiotics or discontinuation of antibiotics and continue supportive care. If the respiratory panel is positive for atypical bacterial infection (Bordetella pertussis, Chlamydophila pneumoniae, or Mycoplasma pneumoniae), consider antibiotic de-escalation to target atypical bacterial infection.    MRSA Screen, PCR (Inpatient) - Swab, Nares [[id number]]  (Normal) Collected: 04/23/23 1540    Lab Status: Final  result Specimen: Swab from Nares Updated: 04/23/23 1651     MRSA PCR Negative    Narrative:      The negative predictive value of this diagnostic test is high and should only be used to consider de-escalating anti-MRSA therapy. A positive result may indicate colonization with MRSA and must be correlated clinically.  MRSA Negative    COVID PRE-OP / PRE-PROCEDURE SCREENING ORDER (NO ISOLATION) - Swab, Nasopharynx [[id number]]  (Normal) Collected: 04/23/23 1125    Lab Status: Final result Specimen: Swab from Nasopharynx Updated: 04/23/23 1225    Narrative:      The following orders were created for panel order COVID PRE-OP / PRE-PROCEDURE SCREENING ORDER (NO ISOLATION) - Swab, Nasopharynx.  Procedure                               Abnormality         Status                     ---------                               -----------         ------                     COVID-19, FLU A/B, RSV P...[[id number]]  Normal              Final result                 Please view results for these tests on the individual orders.    COVID-19, FLU A/B, RSV PCR - Swab, Nasopharynx [[id number]]  (Normal) Collected: 04/23/23 1125    Lab Status: Final result Specimen: Swab from Nasopharynx Updated: 04/23/23 1225     COVID19 Not Detected     Influenza A PCR Not Detected     Influenza B PCR Not Detected     RSV, PCR Not Detected    Narrative:      Fact sheet for providers: https://www.fda.gov/media/999987/download    Fact sheet for patients: https://www.fda.gov/media/572950/download    Test performed by PCR.          CT Chest Without Contrast Diagnostic    Result Date: 4/23/2023  CT CHEST WO CONTRAST DIAGNOSTIC Date of Exam: 4/23/2023 3:34 PM EDT Indication: Pneumonia, complication suspected, xray done. Comparison: Chest x-ray 4/23/2023, high-resolution chest CT 3/20/2023 Technique: Axial CT images were obtained of the chest without contrast administration.  Reconstructed coronal and sagittal images were also obtained. Automated exposure control  and iterative construction methods were used. Findings: Hilum and Mediastinum: Severe coronary artery atherosclerotic calcification. Enlarged hilar mediastinal lymph nodes. AP window lymph node measures 1 cm short axis image #28. Precarinal lymph node measures 1 cm short axis measurement 29. No pericardial effusion. Moderate size hiatal hernia. Thoracic aorta and pulmonary arteries are normal in caliber. Lung Parenchyma and Pleura: No focal consolidations. No suspicious pulmonary nodules. No endobronchial lesions. No significant pleural effusions. Upper Abdomen: Unremarkable. Soft tissues: Unremarkable. Osseous structures: No aggressive focal lytic or sclerotic osseous lesions. Osteopenia. Compression deformity of a midthoracic vertebral body, unchanged since 2016.     Impression: 1.There is diffuse airspace opacity superimposed on chronic interstitial lung disease. The focal pneumonia most likely. Correlate for possible viral pneumonia such as COVID-19 or influenza. Bacterial pneumonia less likely. 2.Moderate size hiatal hernia. 3.Stable compression deformity mid thoracic spine. Osteopenia. 4.Enlarged mediastinal lymph nodes likely reactive. Follow-up CT chest with contrast in 3 months time is recommended. Electronically Signed: Shelley Ulloa  4/23/2023 3:54 PM EDT  Workstation ID: [id number]    XR Chest 1 View    Result Date: 4/23/2023  XR CHEST 1 VW Date of Exam: 4/23/2023 10:25 AM EDT Indication: SOA triage protocol Comparison: High-res chest CT 3/20/2023 Findings: There is a background interstitial lung disease with peripheral septal thickening and bronchiectasis lower lobe predominant. There is increased opacity in the left lung likely due to superimposed multifocal pneumonia. There is some airspace opacity in the periphery of the right upper lobe that appears increased as well from previous CT chest. There is osteopenia. Heart size is normal.    Impression: Multifocal airspace opacities left greater than right  "superimposed on background of chronic interstitial lung disease. Multifocal pneumonia is suspected. Electronically Signed: Shelley Ulloa  4/23/2023 11:01 AM EDT  Workstation ID: [id number]          Current medications:  Scheduled Meds:amLODIPine, 5 mg, Oral, Q24H   And  valsartan, 160 mg, Oral, Q24H  azithromycin, 500 mg, Intravenous, Q24H  fluticasone, 2 spray, Each Nare, Daily  heparin (porcine), 5,000 Units, Subcutaneous, Q8H  ipratropium-albuterol, 3 mL, Nebulization, 4x Daily - RT  methylPREDNISolone sodium succinate, 40 mg, Intravenous, Q12H  montelukast, 10 mg, Oral, Daily  pantoprazole, 40 mg, Oral, Daily  piperacillin-tazobactam, 3.375 g, Intravenous, Q8H  sodium chloride, 10 mL, Intravenous, Q12H      Continuous Infusions:   PRN Meds:.•  acetaminophen **OR** acetaminophen **OR** acetaminophen  •  ipratropium-albuterol  •  Magnesium Standard Dose Replacement - Follow Nurse / BPA Driven Protocol  •  ondansetron **OR** ondansetron  •  sodium chloride  •  sodium chloride  •  sodium chloride  •  tiZANidine    Assessment & Plan   Assessment & Plan     Active Hospital Problems    Diagnosis  POA   • **Multifocal pneumonia [J18.9]  Yes      Resolved Hospital Problems   No resolved problems to display.        Brief Hospital Course to date:  Tala Ramsey is a 72 y.o. female w/ hx interstitial lung dz, RA (on humira until recently, recent steroid injections) who presents w/ progressive dyspnea, cough over ~10 days, failed levaquin. Hypoxic upon admission. Ct chest w/o contrast w/ bilateral multifocal air space dz. resp pcr panel negative. Had progressive hypoxia evening after admission placed on hi flow canula    Acute hypoxic resp failure  Pulm fibrosis  Worsening bilateral airspace dz/pneumonia  -has been on levaquin last week  -unclear if progession/\"flare\" of interstitial lung dz vs infection  -continue day #2 zosyn & z-max; add thrice weekly bactrim ds (pcp prophylaxis as has been on humira and steroids)  -Pulm " "consult pending (d/w Dr. Roshan Aguila who agrees to see in consultation)  -wean oxygen as able (on no oxygen at home), currently on 65% fio2 hi flow canula  -currently dnr/dni, encouraged to discuss w/ pulm when they see today    Ra  -holding humira  -has received steroid \"shots\" recently  -hold plaquenil    Hyponatremia  -improved, 129 on admisison, 130 now. Monitor    HTN  -continue amlodpine/valsartan    Chronic anemia  -monitor    4 Am labs: cbc,cmp,mag,crp,probnp    Expected Discharge Location and Transportation: home  Expected Discharge ? 4/29 (when oxygenation improved)       DVT prophylaxis:  Medical DVT prophylaxis orders are present.     AM-PAC 6 Clicks Score (PT): 17 (04/23/23 1501)    CODE STATUS:   Code Status and Medical Interventions:   Ordered at: 04/23/23 1310     Medical Intervention Limits:    NO intubation (DNI)     Level Of Support Discussed With:    Patient     Code Status (Patient has no pulse and is not breathing):    No CPR (Do Not Attempt to Resuscitate)     Medical Interventions (Patient has pulse or is breathing):    Limited Support       Ramón Blanton MD  04/24/23      "

## 2023-04-24 NOTE — CONSULTS
"Pulmonary Hospital Consult Note     LOS: 1 day   Patient Care Team:  Sg Horne MD as PCP - General (Family Medicine)  Laurent Tomlinson MD as Consulting Physician (Pulmonary Disease)    Chief Complaint:    Chief Complaint   Patient presents with   • Shortness of Breath       Subjective     HPI:     72 y.o. female with history of rheumatoid arthritis/inflammatory arthritis and possible lupus on Humira and Plaquenil followed by Dr. Weinstein.  Also with history of iron deficiency anemia, hiatal hernia, GERD, hypertension.  She saw Dr. Tomlinson for pulmonary fibrosis in August 2022 and again in December 2022.  At that time, she had a normal spirometry with mild restriction and decreased DLCO.  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    Patient in the past few weeks reported some URI type symptoms followed by worsening dyspnea.  She actually held her Humira.  She saw her PCP about 10 days before the admission reporting \"ulcers in my mouth\", and throat discomfort and was treated with amoxicillin.  Subsequently was seen and treated with Levaquin for possible pneumonia and given a steroid injection.  She has had progressive dyspnea.    She presented to the hospital on 4/23/2023 and was subsequently admitted to the hospitalist service for bilateral infiltrates/possible pneumonia and started on azithromycin and Zosyn.    Procalcitonin was within normal limits.  White blood cell count was elevated at 14.  She had hyponatremia with a sodium of 129.  Respiratory PCR panel was negative.  Blood cultures pending.  She has not been able to produce any sputum.    Patient has not had any significant improvement since admission.  She denies fevers or chills.    History taken from: patient    Past Medical History:   Diagnosis Date   • " Anemia    • Back pain    • Bronchitis    • Hypertension    • Low back pain    • Pulmonary fibrosis    • Rheumatoid arthritis        Past Surgical History:   Procedure Laterality Date   • DILATION AND CURETTAGE, DIAGNOSTIC / THERAPEUTIC         Family History   Problem Relation Age of Onset   • Heart failure Mother    • Stroke Mother    • Cancer Father    • Diabetes Son    • Diabetes Son        Social History     Socioeconomic History   • Marital status:    Tobacco Use   • Smoking status: Never   • Smokeless tobacco: Never   Vaping Use   • Vaping Use: Never used   Substance and Sexual Activity   • Alcohol use: No   • Drug use: Never   • Sexual activity: Defer       No Known Allergies    PMH/FH/SocH were reviewed by me and updates were made.     Review of Systems:    All other systems were reviewed and are negative.  Exceptions are noted in subjective or below.    Objective     Vital Signs  Temp:  [97.9 °F (36.6 °C)-98.7 °F (37.1 °C)] 97.9 °F (36.6 °C)  Heart Rate:  [] 96  Resp:  [16-30] 18  BP: (124-152)/(55-72) 151/55    Physical Exam:     Constitutional:    Alert, cooperative, in no acute distress   Head:    Normocephalic, without obvious abnormality, atraumatic   Eyes:            Lids and lashes normal, conjunctivae and sclerae normal, no   icterus, no pallor, corneas clear, PER   ENMT:   Ears appear intact with no abnormalities noted         Neck:  Trachea midline, no thyromegaly, no JVD       Lungs/Resp:    Moderately increased effort, symmetric chest rise, no crepitus, mild to moderate bilateral diffuse rales, no chest wall tenderness                 Heart/CV:    Regular rhythm and normal rate, normal S1 and S2, no            murmur   Abdomen/GI:     Normal bowel sounds, no masses, no organomegaly, soft,        non-tender, non-distended   :     Deferred   Extremities/MSK:   No clubbing, cyanosis or edema.  No deformities.    Pulses:   Pulses palpable and equal bilaterally   Skin:   No bleeding,  bruising or rash   Hem/Lymph:   No cervical or supraclavicular adenopathy.    Neurologic:   Moves all extremities with no obvious focal motor deficit.  Cranial nerves 2 - 12 grossly intact             Psychiatric: Normal mood and affect, oriented x 3.   The above findings are documentation my personal physical examination from today.  Electronically signed by:Bernard Aguila MD 04/24/23 18:25 EDT     Results Review:     I reviewed the patient's new clinical results.   Results from last 7 days   Lab Units 04/24/23  0647 04/23/23  1048   SODIUM mmol/L 130* 129*   POTASSIUM mmol/L 4.1 4.4   CHLORIDE mmol/L 95* 92*   CO2 mmol/L 24.0 23.0   BUN mg/dL 12 14   CREATININE mg/dL 0.63 0.50*   CALCIUM mg/dL 8.3* 9.0   BILIRUBIN mg/dL 0.3 0.2   ALK PHOS U/L 130* 109   ALT (SGPT) U/L 13 10   AST (SGOT) U/L 22 23   GLUCOSE mg/dL 113* 104*     Results from last 7 days   Lab Units 04/24/23  0647 04/23/23  1048   WBC 10*3/mm3 12.84* 14.06*   HEMOGLOBIN g/dL 10.2* 11.5*   HEMATOCRIT % 30.8* 35.2   PLATELETS 10*3/mm3 520* 592*           I reviewed the patient's new imaging including images and reports.    CT chest from 4/23/2023 was reviewed.  I reviewed both the images and the radiologist report.  She has bilateral diffuse groundglass infiltrates with a background of chronic fibrotic lung disease.    Medication Review:   amLODIPine, 5 mg, Oral, Q24H   And  valsartan, 160 mg, Oral, Q24H  azithromycin, 500 mg, Intravenous, Q24H  fluticasone, 2 spray, Each Nare, Daily  heparin (porcine), 5,000 Units, Subcutaneous, Q8H  ipratropium-albuterol, 3 mL, Nebulization, 4x Daily - RT  methylPREDNISolone sodium succinate, 1,000 mg, Intravenous, Q24H  montelukast, 10 mg, Oral, Daily  pantoprazole, 40 mg, Oral, Daily  piperacillin-tazobactam, 3.375 g, Intravenous, Q8H  sodium chloride, 10 mL, Intravenous, Q12H  sulfamethoxazole-trimethoprim, 1 tablet, Oral, Once per day on Mon Wed Fri           Assessment & Plan       Multifocal pneumonia     "Pulmonary fibrosis    ILD (interstitial lung disease) -likely RA associated ILD with acute exacerbation    Acute hypoxemic respiratory failure -likely due to ILD exacerbation    72 y.o. female with history of rheumatoid arthritis/inflammatory arthritis and possible lupus on Humira and Plaquenil followed by Dr. Weinstein.  Also with history of iron deficiency anemia, hiatal hernia, GERD, hypertension.  She saw Dr. Tomlinson for pulmonary fibrosis in August 2022 and again in December 2022.  At that time, she had a normal spirometry with mild restriction and decreased DLCO.  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    Patient in the past few weeks reported some URI type symptoms followed by worsening dyspnea.  She actually held her Humira.  She saw her PCP about 10 days before the admission reporting \"ulcers in my mouth\", and throat discomfort and was treated with amoxicillin.  Subsequently was seen and treated with Levaquin for possible pneumonia and given a steroid injection.  She has had progressive dyspnea.    She presented to the hospital on 4/23/2023 and was subsequently admitted to the hospitalist service for bilateral infiltrates/possible pneumonia and started on azithromycin and Zosyn.    Procalcitonin was within normal limits.  proBNP was low at 163.6.  White blood cell count was elevated at 14.  She had hyponatremia with a sodium of 129.  Respiratory PCR panel was negative.  Blood cultures pending.  She has not been able to produce any sputum.    Patient has not had any significant improvement since admission.  She denies fevers or chills.    Patient has fairly severe lung disease at this point.  She is requiring high flow nasal cannula oxygen to maintain oxygen saturations and is dyspneic at rest.    Ideally, I would " perform a bronchoscopy to rule out infection prior to consideration of pulse dose steroids.  I do not think she would tolerate bronchoscopy without being intubated, and likely would have difficulty coming off of mechanical ventilation.  I had this discussion with the patient.  She does not want to go on mechanical ventilation and wishes to be a DO NOT RESUSCITATE (as previously relayed to the hospitalist).    I think that the patient most likely has an acute exacerbation of her underlying interstitial lung disease, which is likely related to her underlying rheumatoid arthritis.  Is also possible she has interstitial lung disease related to Humira, atypical infectious process, or other cause for her infiltrates and respiratory failure.    After discussion with the patient including the risks of exacerbating infection, developing opportunistic infection, and death, I offered the patient a trial of a pulse dose of steroids to hopefully decrease the inflammation from presumed flare of interstitial lung disease.  The patient is in agreement, and I will give her Solu-Medrol 1 g daily for 3 days followed by a prolonged steroid taper.  I would recommend continuing the Zosyn and azithromycin for now and recommended adding Bactrim for pneumocystis prophylaxis.    1.  For bilateral pulmonary infiltrates: Differential includes flare of underlying rheumatoid associated interstitial lung disease, which I feel most likely, followed by possible infectious including opportunistic infection, or other inflammatory cause.  Plan for pulse steroids followed by prolonged steroid taper as above.  Continue current antibiotics and add Bactrim for pneumocystis prophylaxis.  2.  For acute hypoxemic respiratory failure: As above.  Supplemental oxygen as needed.  3.  For rheumatoid arthritis: Recently held Humira secondary to presumed illness.  Plaquenil is currently held.  I will consider restarting this.  4.  For possible pneumonia: As  above.  5.  DVT prophylaxis: Subcutaneous heparin.    Discussed with Dr. Blanton.    Electronically signed by:    Bernard Aguila MD  04/24/23  18:25 EDT        • Please note that portions of this note were completed with [employer] - a voice recognition program.

## 2023-04-24 NOTE — PLAN OF CARE
Goal Outcome Evaluation:  Plan of Care Reviewed With: patient        Progress: declining  Outcome Evaluation: VSS on 40L 65% high flow nasal cannula. Pt had been on 5L nasal cannula for much of the shift and had been ambualting to BSC, at 0300 pt was assisted to bedside commode and assisted back to bed, O2 sats dropped to 79%, pt tachypneic, pt O2 increased to 6L nasal cannula and was unable to recover, pt sats remained in the low 80s, pt was placed on 15L nonrebreather and RT was called, Caren Dominguez APRN notfied, prn nebs and high flow O2 ordered. Pt maintaining sats @ 92% currently on HFNC. IV abx infusing. Son at bedside. Pt appears to be sleeping at this time.

## 2023-04-24 NOTE — SIGNIFICANT NOTE
Called per nursing secondary to pt with increased dyspnea after getting up to the bathroom. Difficult recovery, had to increase to 6L then NRB. Elle PRN ordered. HF nasal canula, wean as appropriate

## 2023-04-25 ENCOUNTER — APPOINTMENT (OUTPATIENT)
Dept: GENERAL RADIOLOGY | Facility: HOSPITAL | Age: 73
End: 2023-04-25
Payer: MEDICARE

## 2023-04-25 LAB
ALBUMIN SERPL-MCNC: 3.5 G/DL (ref 3.5–5.2)
ALBUMIN/GLOB SERPL: 1.1 G/DL
ALP SERPL-CCNC: 185 U/L (ref 39–117)
ALT SERPL W P-5'-P-CCNC: 32 U/L (ref 1–33)
ANION GAP SERPL CALCULATED.3IONS-SCNC: 15 MMOL/L (ref 5–15)
ARTERIAL PATENCY WRIST A: ABNORMAL
AST SERPL-CCNC: 41 U/L (ref 1–32)
ATMOSPHERIC PRESS: ABNORMAL MM[HG]
BASE EXCESS BLDA CALC-SCNC: 1.2 MMOL/L (ref 0–2)
BDY SITE: ABNORMAL
BILIRUB SERPL-MCNC: 0.2 MG/DL (ref 0–1.2)
BODY TEMPERATURE: 37 C
BUN SERPL-MCNC: 13 MG/DL (ref 8–23)
BUN/CREAT SERPL: 21.7 (ref 7–25)
CALCIUM SPEC-SCNC: 9.3 MG/DL (ref 8.6–10.5)
CHLORIDE SERPL-SCNC: 98 MMOL/L (ref 98–107)
CO2 BLDA-SCNC: 25.9 MMOL/L (ref 22–33)
CO2 SERPL-SCNC: 19 MMOL/L (ref 22–29)
COHGB MFR BLD: ABNORMAL %
CREAT SERPL-MCNC: 0.6 MG/DL (ref 0.57–1)
CRP SERPL-MCNC: 14.7 MG/DL (ref 0–0.5)
D DIMER PPP FEU-MCNC: 1.88 MCGFEU/ML (ref 0–0.73)
DEPRECATED RDW RBC AUTO: 45.5 FL (ref 37–54)
EGFRCR SERPLBLD CKD-EPI 2021: 94.9 ML/MIN/1.73
EPAP: 0
ERYTHROCYTE [DISTWIDTH] IN BLOOD BY AUTOMATED COUNT: 14.6 % (ref 12.3–15.4)
GEN 5 2HR TROPONIN T REFLEX: 10 NG/L
GLOBULIN UR ELPH-MCNC: 3.2 GM/DL
GLUCOSE BLDC GLUCOMTR-MCNC: 240 MG/DL (ref 70–130)
GLUCOSE SERPL-MCNC: 149 MG/DL (ref 65–99)
HCO3 BLDA-SCNC: 24.8 MMOL/L (ref 20–26)
HCT VFR BLD AUTO: 33.1 % (ref 34–46.6)
HCT VFR BLD CALC: 36 % (ref 38–51)
HGB BLD-MCNC: 10.8 G/DL (ref 12–15.9)
HGB BLDA-MCNC: 11.8 G/DL (ref 14–18)
INHALED O2 CONCENTRATION: 100 %
IPAP: 0
MAGNESIUM SERPL-MCNC: 2.5 MG/DL (ref 1.6–2.4)
MCH RBC QN AUTO: 29.1 PG (ref 26.6–33)
MCHC RBC AUTO-ENTMCNC: 32.6 G/DL (ref 31.5–35.7)
MCV RBC AUTO: 89.2 FL (ref 79–97)
METHGB BLD QL: 0.5 % (ref 0–1.5)
MODALITY: ABNORMAL
NOTE: ABNORMAL
NT-PROBNP SERPL-MCNC: 210.6 PG/ML (ref 0–900)
NT-PROBNP SERPL-MCNC: 360.4 PG/ML (ref 0–900)
OXYHGB MFR BLDV: 95.8 % (ref 94–99)
PAW @ PEAK INSP FLOW SETTING VENT: 0 CMH2O
PCO2 BLDA: 35.2 MM HG (ref 35–45)
PCO2 TEMP ADJ BLD: 35.2 MM HG (ref 35–45)
PH BLDA: 7.46 PH UNITS (ref 7.35–7.45)
PH, TEMP CORRECTED: 7.46 PH UNITS
PLATELET # BLD AUTO: 624 10*3/MM3 (ref 140–450)
PMV BLD AUTO: 8.4 FL (ref 6–12)
PO2 BLDA: 103 MM HG (ref 83–108)
PO2 TEMP ADJ BLD: 103 MM HG (ref 83–108)
POTASSIUM SERPL-SCNC: 4.4 MMOL/L (ref 3.5–5.2)
PROT SERPL-MCNC: 6.7 G/DL (ref 6–8.5)
RBC # BLD AUTO: 3.71 10*6/MM3 (ref 3.77–5.28)
SODIUM SERPL-SCNC: 132 MMOL/L (ref 136–145)
TOTAL RATE: 0 BREATHS/MINUTE
TROPONIN T DELTA: -2 NG/L
TROPONIN T SERPL HS-MCNC: 12 NG/L
VENTILATOR MODE: ABNORMAL
WBC NRBC COR # BLD: 15.74 10*3/MM3 (ref 3.4–10.8)

## 2023-04-25 PROCEDURE — 36600 WITHDRAWAL OF ARTERIAL BLOOD: CPT

## 2023-04-25 PROCEDURE — 25010000002 PIPERACILLIN SOD-TAZOBACTAM PER 1 G: Performed by: INTERNAL MEDICINE

## 2023-04-25 PROCEDURE — 85027 COMPLETE CBC AUTOMATED: CPT | Performed by: INTERNAL MEDICINE

## 2023-04-25 PROCEDURE — 80053 COMPREHEN METABOLIC PANEL: CPT | Performed by: INTERNAL MEDICINE

## 2023-04-25 PROCEDURE — 97162 PT EVAL MOD COMPLEX 30 MIN: CPT

## 2023-04-25 PROCEDURE — 93005 ELECTROCARDIOGRAM TRACING: CPT | Performed by: PHYSICIAN ASSISTANT

## 2023-04-25 PROCEDURE — 82375 ASSAY CARBOXYHB QUANT: CPT

## 2023-04-25 PROCEDURE — 82962 GLUCOSE BLOOD TEST: CPT

## 2023-04-25 PROCEDURE — 82805 BLOOD GASES W/O2 SATURATION: CPT

## 2023-04-25 PROCEDURE — 71045 X-RAY EXAM CHEST 1 VIEW: CPT

## 2023-04-25 PROCEDURE — 94660 CPAP INITIATION&MGMT: CPT

## 2023-04-25 PROCEDURE — 85379 FIBRIN DEGRADATION QUANT: CPT | Performed by: PHYSICIAN ASSISTANT

## 2023-04-25 PROCEDURE — 99232 SBSQ HOSP IP/OBS MODERATE 35: CPT | Performed by: INTERNAL MEDICINE

## 2023-04-25 PROCEDURE — 86140 C-REACTIVE PROTEIN: CPT | Performed by: INTERNAL MEDICINE

## 2023-04-25 PROCEDURE — 84484 ASSAY OF TROPONIN QUANT: CPT | Performed by: PHYSICIAN ASSISTANT

## 2023-04-25 PROCEDURE — 94799 UNLISTED PULMONARY SVC/PX: CPT

## 2023-04-25 PROCEDURE — 25010000002 FUROSEMIDE PER 20 MG: Performed by: INTERNAL MEDICINE

## 2023-04-25 PROCEDURE — 99233 SBSQ HOSP IP/OBS HIGH 50: CPT | Performed by: INTERNAL MEDICINE

## 2023-04-25 PROCEDURE — 97166 OT EVAL MOD COMPLEX 45 MIN: CPT

## 2023-04-25 PROCEDURE — 83880 ASSAY OF NATRIURETIC PEPTIDE: CPT | Performed by: INTERNAL MEDICINE

## 2023-04-25 PROCEDURE — 93010 ELECTROCARDIOGRAM REPORT: CPT | Performed by: INTERNAL MEDICINE

## 2023-04-25 PROCEDURE — 94761 N-INVAS EAR/PLS OXIMETRY MLT: CPT

## 2023-04-25 PROCEDURE — 83050 HGB METHEMOGLOBIN QUAN: CPT

## 2023-04-25 PROCEDURE — 83880 ASSAY OF NATRIURETIC PEPTIDE: CPT | Performed by: PHYSICIAN ASSISTANT

## 2023-04-25 PROCEDURE — 25010000002 AZITHROMYCIN PER 500 MG: Performed by: INTERNAL MEDICINE

## 2023-04-25 PROCEDURE — 25010000002 HEPARIN (PORCINE) PER 1000 UNITS: Performed by: INTERNAL MEDICINE

## 2023-04-25 PROCEDURE — 0 METHYLPREDNISOLONE PER 125 MG: Performed by: INTERNAL MEDICINE

## 2023-04-25 PROCEDURE — 83735 ASSAY OF MAGNESIUM: CPT | Performed by: INTERNAL MEDICINE

## 2023-04-25 RX ORDER — HYDROXYZINE HYDROCHLORIDE 25 MG/1
25 TABLET, FILM COATED ORAL EVERY 6 HOURS PRN
Status: COMPLETED | OUTPATIENT
Start: 2023-04-25 | End: 2023-05-05

## 2023-04-25 RX ORDER — SIMETHICONE 80 MG
80 TABLET,CHEWABLE ORAL 4 TIMES DAILY PRN
Status: DISCONTINUED | OUTPATIENT
Start: 2023-04-25 | End: 2023-05-25 | Stop reason: HOSPADM

## 2023-04-25 RX ORDER — HYDROXYZINE HYDROCHLORIDE 25 MG/1
25 TABLET, FILM COATED ORAL 3 TIMES DAILY PRN
Status: DISCONTINUED | OUTPATIENT
Start: 2023-04-25 | End: 2023-04-25

## 2023-04-25 RX ORDER — GUAIFENESIN 600 MG/1
1200 TABLET, EXTENDED RELEASE ORAL EVERY 12 HOURS SCHEDULED
Status: DISCONTINUED | OUTPATIENT
Start: 2023-04-25 | End: 2023-05-25 | Stop reason: HOSPADM

## 2023-04-25 RX ORDER — CETIRIZINE HYDROCHLORIDE 10 MG/1
10 TABLET ORAL DAILY
Status: DISCONTINUED | OUTPATIENT
Start: 2023-04-25 | End: 2023-05-25 | Stop reason: HOSPADM

## 2023-04-25 RX ORDER — METHYLPREDNISOLONE SODIUM SUCCINATE 125 MG/2ML
125 INJECTION, POWDER, LYOPHILIZED, FOR SOLUTION INTRAMUSCULAR; INTRAVENOUS
Status: ACTIVE | OUTPATIENT
Start: 2023-04-25 | End: 2023-04-25

## 2023-04-25 RX ORDER — CALCIUM CARBONATE 500 MG/1
2 TABLET, CHEWABLE ORAL 3 TIMES DAILY PRN
Status: DISCONTINUED | OUTPATIENT
Start: 2023-04-25 | End: 2023-05-25 | Stop reason: HOSPADM

## 2023-04-25 RX ORDER — FUROSEMIDE 10 MG/ML
20 INJECTION INTRAMUSCULAR; INTRAVENOUS ONCE
Status: COMPLETED | OUTPATIENT
Start: 2023-04-25 | End: 2023-04-25

## 2023-04-25 RX ADMIN — TAZOBACTAM SODIUM AND PIPERACILLIN SODIUM 3.38 G: 375; 3 INJECTION, SOLUTION INTRAVENOUS at 17:33

## 2023-04-25 RX ADMIN — IPRATROPIUM BROMIDE AND ALBUTEROL SULFATE 3 ML: 2.5; .5 SOLUTION RESPIRATORY (INHALATION) at 03:07

## 2023-04-25 RX ADMIN — HEPARIN SODIUM 5000 UNITS: 5000 INJECTION, SOLUTION INTRAVENOUS; SUBCUTANEOUS at 05:39

## 2023-04-25 RX ADMIN — SODIUM CHLORIDE 1000 MG: 900 INJECTION INTRAVENOUS at 15:19

## 2023-04-25 RX ADMIN — ACETAMINOPHEN 325MG 650 MG: 325 TABLET ORAL at 03:35

## 2023-04-25 RX ADMIN — TAZOBACTAM SODIUM AND PIPERACILLIN SODIUM 3.38 G: 375; 3 INJECTION, SOLUTION INTRAVENOUS at 08:50

## 2023-04-25 RX ADMIN — ACETAMINOPHEN 325MG 650 MG: 325 TABLET ORAL at 15:18

## 2023-04-25 RX ADMIN — TAZOBACTAM SODIUM AND PIPERACILLIN SODIUM 3.38 G: 375; 3 INJECTION, SOLUTION INTRAVENOUS at 02:42

## 2023-04-25 RX ADMIN — HYDROXYZINE HYDROCHLORIDE 25 MG: 25 TABLET, FILM COATED ORAL at 21:38

## 2023-04-25 RX ADMIN — GUAIFENESIN 1200 MG: 600 TABLET, EXTENDED RELEASE ORAL at 15:20

## 2023-04-25 RX ADMIN — VALSARTAN 160 MG: 160 TABLET, FILM COATED ORAL at 08:50

## 2023-04-25 RX ADMIN — AZITHROMYCIN 500 MG: 500 INJECTION, POWDER, LYOPHILIZED, FOR SOLUTION INTRAVENOUS at 12:29

## 2023-04-25 RX ADMIN — AMLODIPINE BESYLATE 5 MG: 5 TABLET ORAL at 08:50

## 2023-04-25 RX ADMIN — MONTELUKAST 10 MG: 10 TABLET, FILM COATED ORAL at 08:50

## 2023-04-25 RX ADMIN — IPRATROPIUM BROMIDE AND ALBUTEROL SULFATE 3 ML: .5; 2.5 SOLUTION RESPIRATORY (INHALATION) at 07:48

## 2023-04-25 RX ADMIN — FLUTICASONE PROPIONATE 2 SPRAY: 50 SPRAY, METERED NASAL at 08:50

## 2023-04-25 RX ADMIN — HEPARIN SODIUM 5000 UNITS: 5000 INJECTION, SOLUTION INTRAVENOUS; SUBCUTANEOUS at 15:19

## 2023-04-25 RX ADMIN — CETIRIZINE HYDROCHLORIDE 10 MG: 10 TABLET, FILM COATED ORAL at 15:20

## 2023-04-25 RX ADMIN — IPRATROPIUM BROMIDE AND ALBUTEROL SULFATE 3 ML: .5; 2.5 SOLUTION RESPIRATORY (INHALATION) at 15:22

## 2023-04-25 RX ADMIN — FUROSEMIDE 20 MG: 10 INJECTION, SOLUTION INTRAMUSCULAR; INTRAVENOUS at 15:19

## 2023-04-25 RX ADMIN — Medication 10 ML: at 21:38

## 2023-04-25 RX ADMIN — IPRATROPIUM BROMIDE AND ALBUTEROL SULFATE 3 ML: 2.5; .5 SOLUTION RESPIRATORY (INHALATION) at 19:49

## 2023-04-25 RX ADMIN — HEPARIN SODIUM 5000 UNITS: 5000 INJECTION, SOLUTION INTRAVENOUS; SUBCUTANEOUS at 21:38

## 2023-04-25 RX ADMIN — CALCIUM CARBONATE (ANTACID) CHEW TAB 500 MG 2 TABLET: 500 CHEW TAB at 02:42

## 2023-04-25 RX ADMIN — ACETAMINOPHEN 325MG 650 MG: 325 TABLET ORAL at 09:47

## 2023-04-25 RX ADMIN — PANTOPRAZOLE SODIUM 40 MG: 40 TABLET, DELAYED RELEASE ORAL at 08:50

## 2023-04-25 RX ADMIN — GUAIFENESIN 1200 MG: 600 TABLET, EXTENDED RELEASE ORAL at 20:58

## 2023-04-25 RX ADMIN — ALUMINUM HYDROXIDE, MAGNESIUM HYDROXIDE, AND DIMETHICONE: 400; 400; 40 SUSPENSION ORAL at 15:18

## 2023-04-25 NOTE — PLAN OF CARE
Goal Outcome Evaluation:  Plan of Care Reviewed With: patient        Progress: no change  Outcome Evaluation: Pt presents below her functional baseline with deficits including generalized weakness, decreased activity tolerance, impaired mobility, and poor resp status warranting skilled OT services. Deferred all OOB activity due to SpO2 drop to low 80s with EOB. Rec SNF at [place].

## 2023-04-25 NOTE — THERAPY EVALUATION
Patient Name: Tala Ramsey  : 1950    MRN: [id number]                              Today's Date: 2023       Admit Date: 2023    Visit Dx:     ICD-10-CM ICD-9-CM   1. Multifocal pneumonia  J18.9 486   2. Failure of outpatient treatment  Z78.9 V49.89   3. Hypoxia  R09.02 799.02     Patient Active Problem List   Diagnosis   • Lumbar herniated disc   • Multifocal pneumonia   • Pulmonary fibrosis   • ILD (interstitial lung disease) -likely RA associated ILD with acute exacerbation   • Acute hypoxemic respiratory failure -likely due to ILD exacerbation     Past Medical History:   Diagnosis Date   • Anemia    • Back pain    • Bronchitis    • Hypertension    • Low back pain    • Pulmonary fibrosis    • Rheumatoid arthritis      Past Surgical History:   Procedure Laterality Date   • DILATION AND CURETTAGE, DIAGNOSTIC / THERAPEUTIC        General Information     Row Name 23 1518          OT Time and Intention    Document Type evaluation  -AN     Mode of Treatment occupational therapy  -AN     Row Name 23 1518          General Information    Patient Profile Reviewed yes  -AN     Prior Level of Function independent:;all household mobility;community mobility;gait;ADL's;home management  -AN     Existing Precautions/Restrictions fall;oxygen therapy device and L/min  HFNC  -AN     Barriers to Rehab medically complex  -AN     Row Name 23 1518          Living Environment    People in Home child(nikolas), adult  -AN     Row Name 23 1518          Home Main Entrance    Number of Stairs, Main Entrance none  -AN     Row Name 23 1518          Stairs Within Home, Primary    Stairs Comment, Within Home, Primary 1 level with a basement, however reports that she does not have to go downstairs  -AN     Row Name 23 1518          Cognition    Orientation Status (Cognition) oriented x 4  -AN     Row Name 23 1518          Safety Issues, Functional Mobility    Safety Issues Affecting  Function (Mobility) safety precautions follow-through/compliance;safety precaution awareness  -AN     Impairments Affecting Function (Mobility) balance;strength;shortness of breath;postural/trunk control;endurance/activity tolerance  -AN     Comment, Safety Issues/Impairments (Mobility) activity limited by resp status, increased time and cues for PLB with bed mobility  -AN           User Key  (r) = Recorded By, (t) = Taken By, (c) = Cosigned By    Initials Name Provider Type    Maddison Fisher OT Occupational Therapist                 Mobility/ADL's     Row Name 04/25/23 1523          Bed Mobility    Bed Mobility rolling left;rolling right;supine-sit  -AN     Rolling Left [place] (Bed Mobility) minimum assist (75% patient effort);1 person assist;verbal cues  -AN     Rolling Right [place] (Bed Mobility) minimum assist (75% patient effort);1 person assist;verbal cues  -AN     Supine-Sit [place] (Bed Mobility) verbal cues;moderate assist (50% patient effort);1 person assist  -AN     Bed Mobility, Safety Issues decreased use of arms for pushing/pulling;impaired trunk control for bed mobility  -AN     Assistive Device (Bed Mobility) head of bed elevated;bed rails  -AN     Comment, (Bed Mobility) cues for sequencing of steps, assist provided at trunk  -AN     Row Name 04/25/23 1523          Transfers    Comment, (Transfers) deferred transfers due to poor resp status  -AN     Row Name 04/25/23 1523          Functional Mobility    Functional Mobility- Ind. Level unable to perform  -AN     Row Name 04/25/23 1523          Activities of Daily Living    BADL Assessment/Intervention upper body dressing;lower body dressing  -AN     Row Name 04/25/23 1523          Upper Body Dressing Assessment/Training    [place] Level (Upper Body Dressing) don;set up  gown  -AN     Position (Upper Body Dressing) edge of bed sitting  -AN     Row Name 04/25/23 1523          Lower Body Dressing Assessment/Training     [place] Level (Lower Body Dressing) don;socks;moderate assist (50% patient effort)  -AN     Position (Lower Body Dressing) edge of bed sitting  -AN           User Key  (r) = Recorded By, (t) = Taken By, (c) = Cosigned By    Initials Name Provider Type    Maddison Fisher OT Occupational Therapist               Obj/Interventions     Row Name 04/25/23 1529          Sensory Assessment (Somatosensory)    Sensory Assessment (Somatosensory) UE sensation intact  -AN     Row Name 04/25/23 1529          Vision Assessment/Intervention    Visual Impairment/Limitations WFL  -AN     Row Name 04/25/23 1529          Range of Motion Comprehensive    General Range of Motion no range of motion deficits identified  -AN     Row Name 04/25/23 1529          Strength Comprehensive (MMT)    General Manual Muscle Testing (MMT) Assessment upper extremity strength deficits identified  -AN     Comment, General Manual Muscle Testing (MMT) Assessment BUE grossly 3+/5  -AN     Row Name 04/25/23 1529          Motor Skills    Motor Skills functional endurance  -AN     Functional Endurance SpO2 dropped to 81% with seated EOB activities, returning to low 90s with PLB  -AN     Row Name 04/25/23 1529          Balance    Balance Assessment sitting static balance;sitting dynamic balance  -AN     Static Sitting Balance supervision  -AN     Dynamic Sitting Balance standby assist  -AN     Position, Sitting Balance sitting edge of bed  -AN           User Key  (r) = Recorded By, (t) = Taken By, (c) = Cosigned By    Initials Name Provider Type    Maddison Fisher OT Occupational Therapist               Goals/Plan     Row Name 04/25/23 1536          Bed Mobility Goal 1 (OT)    Activity/Assistive Device (Bed Mobility Goal 1, OT) sit to supine/supine to sit  -AN     [place] Level/Cues Needed (Bed Mobility Goal 1, OT) contact guard required  -AN     Time Frame (Bed Mobility Goal 1, OT) long term goal (LTG);10 days  -AN     Row Name 04/25/23  1536          Transfer Goal 1 (OT)    Activity/Assistive Device (Transfer Goal 1, OT) sit-to-stand/stand-to-sit;commode, bedside without drop arms  -AN     [place] Level/Cues Needed (Transfer Goal 1, OT) minimum assist (75% or more patient effort)  -AN     Time Frame (Transfer Goal 1, OT) long term goal (LTG);10 days  -AN     Row Name 04/25/23 1536          Toileting Goal 1 (OT)    Activity/Device (Toileting Goal 1, OT) adjust/manage clothing;perform perineal hygiene;commode, bedside without drop arms  -AN     [place] Level/Cues Needed (Toileting Goal 1, OT) minimum assist (75% or more patient effort)  -AN     Time Frame (Toileting Goal 1, OT) long term goal (LTG);10 days  -AN     Row Name 04/25/23 1536          Strength Goal 1 (OT)    Strength Goal 1 (OT) Pt will perform UE HEP for improved resp and strengthening with supervision.  -AN     Time Frame (Strength Goal 1, OT) long term goal (LTG);10 days  -AN     Row Name 04/25/23 1536          Therapy Assessment/Plan (OT)    Planned Therapy Interventions (OT) activity tolerance training;adaptive equipment training;BADL retraining;edema control/reduction;functional balance retraining;occupation/activity based interventions;patient/caregiver education/training;transfer/mobility retraining;strengthening exercise  -AN           User Key  (r) = Recorded By, (t) = Taken By, (c) = Cosigned By    Initials Name Provider Type    AN Maddison Bean OT Occupational Therapist               Clinical Impression     Row Name 04/25/23 1530          Pain Assessment    Pretreatment Pain Rating 0/10 - no pain  -AN     Posttreatment Pain Rating 0/10 - no pain  -AN     Pre/Posttreatment Pain Comment asymptomatic  -AN     Pain Intervention(s) Repositioned;Ambulation/increased activity  -AN     Row Name 04/25/23 1530          Plan of Care Review    Plan of Care Reviewed With patient  -AN     Progress no change  -AN     Outcome Evaluation Pt presents below her functional baseline  with deficits including generalized weakness, decreased activity tolerance, impaired mobility, and poor resp status warranting skilled OT services. Deferred all OOB activity due to SpO2 drop to low 80s with EOB. Rec SNF at [place].  -AN     Row Name 04/25/23 1530          Therapy Assessment/Plan (OT)    Patient/Family Therapy Goal Statement (OT) Return to PLOF  -AN     Rehab Potential (OT) good, to achieve stated therapy goals  -AN     Criteria for Skilled Therapeutic Interventions Met (OT) yes;skilled treatment is necessary  -AN     Therapy Frequency (OT) daily  -AN     Row Name 04/25/23 1530          Therapy Plan Review/Discharge Plan (OT)    Anticipated Discharge Disposition (OT) skilled nursing facility  -AN     Row Name 04/25/23 1530          Vital Signs    Pre Systolic BP Rehab --  VSS  -AN     Pre SpO2 (%) 93  -AN     O2 Delivery Pre Treatment hi-flow  -AN     Intra SpO2 (%) 81  -AN     O2 Delivery Intra Treatment hi-flow  -AN     Post SpO2 (%) 92  -AN     O2 Delivery Post Treatment hi-flow  -AN     Pre Patient Position Supine  -AN     Intra Patient Position Sitting  -AN     Post Patient Position Sitting  -AN     Row Name 04/25/23 1530          Positioning and Restraints    Pre-Treatment Position in bed  -AN     Post Treatment Position bed  -AN     In Bed notified nsg;sitting EOB;with PT  -AN           User Key  (r) = Recorded By, (t) = Taken By, (c) = Cosigned By    Initials Name Provider Type    Maddison Fisher, OT Occupational Therapist               Outcome Measures     Row Name 04/25/23 8844          How much help from another is currently needed...    Putting on and taking off regular lower body clothing? 3  -AN     Bathing (including washing, rinsing, and drying) 2  -AN     Toileting (which includes using toilet bed pan or urinal) 2  -AN     Putting on and taking off regular upper body clothing 3  -AN     Taking care of personal grooming (such as brushing teeth) 3  -AN     Eating meals 3  -AN      AM-PAC 6 Clicks Score (OT) 16  -AN     Row Name 04/25/23 1528 04/25/23 0800       How much help from another person do you currently need...    Turning from your back to your side while in flat bed without using bedrails? 3  -KG 3  -ORLY    Moving from lying on back to sitting on the side of a flat bed without bedrails? 3  -KG 3  -ORLY    Moving to and from a bed to a chair (including a wheelchair)? 2  -KG 3  -ORLY    Standing up from a chair using your arms (e.g., wheelchair, bedside chair)? 2  -KG 3  -ORLY    Climbing 3-5 steps with a railing? 1  -KG 3  -ORLY    To walk in hospital room? 1  -KG 3  -ORLY    AM-PAC 6 Clicks Score (PT) 12  -KG 18  -ORLY    Highest level of mobility 4 --> Transferred to chair/commode  -KG 6 --> Walked 10 steps or more  -ORLY    Row Name 04/25/23 1542 04/25/23 1528       Functional Assessment    Outcome Measure Options AM-PAC 6 Clicks Daily Activity (OT)  -AN AM-PAC 6 Clicks Basic Mobility (PT)  -KG          User Key  (r) = Recorded By, (t) = Taken By, (c) = Cosigned By    Initials Name Provider Type    Toña Hernandez, RN Registered Nurse    Cici Pyle, PT Physical Therapist    Maddison Fisher, OT Occupational Therapist                Occupational Therapy Education     Title: PT OT SLP Therapies (In Progress)     Topic: Occupational Therapy (In Progress)     Point: ADL training (Done)     Description:   Instruct learner(s) on proper safety adaptation and remediation techniques during self care or transfers.   Instruct in proper use of assistive devices.              Learning Progress Summary           Patient Acceptance, E, VU by AN at 4/25/2023 1543   Family Acceptance, E, VU by AN at 4/25/2023 1543                   Point: Home exercise program (Not Started)     Description:   Instruct learner(s) on appropriate technique for monitoring, assisting and/or progressing therapeutic exercises/activities.              Learner Progress:  Not documented in this visit.           Point: Precautions (Done)     Description:   Instruct learner(s) on prescribed precautions during self-care and functional transfers.              Learning Progress Summary           Patient Acceptance, E, VU by AN at 4/25/2023 1543   Family Acceptance, E, VU by AN at 4/25/2023 1543                   Point: Body mechanics (Done)     Description:   Instruct learner(s) on proper positioning and spine alignment during self-care, functional mobility activities and/or exercises.              Learning Progress Summary           Patient Acceptance, E, VU by AN at 4/25/2023 1543   Family Acceptance, E, VU by AN at 4/25/2023 1543                               User Key     Initials Effective Dates Name Provider Type Discipline    AN 09/21/21 -  Maddison Bean OT Occupational Therapist OT              OT Recommendation and Plan  Planned Therapy Interventions (OT): activity tolerance training, adaptive equipment training, BADL retraining, edema control/reduction, functional balance retraining, occupation/activity based interventions, patient/caregiver education/training, transfer/mobility retraining, strengthening exercise  Therapy Frequency (OT): daily  Plan of Care Review  Plan of Care Reviewed With: patient  Progress: no change  Outcome Evaluation: Pt presents below her functional baseline with deficits including generalized weakness, decreased activity tolerance, impaired mobility, and poor resp status warranting skilled OT services. Deferred all OOB activity due to SpO2 drop to low 80s with EOB. Rec SNF at [place].     Time Calculation:    Time Calculation- OT     Row Name 04/25/23 1544             Time Calculation- OT    OT Start Time 1350  -AN      OT Received On 04/25/23  -AN      OT Goal Re-Cert Due Date 05/05/23  -AN         Untimed Charges    OT Eval/Re-eval Minutes 38  -AN         Total Minutes    Untimed Charges Total Minutes 38  -AN       Total Minutes 38  -AN            User Key  (r) = Recorded By, (t) = Taken  By, (c) = Cosigned By    Initials Name Provider Type    Maddison Fisher OT Occupational Therapist              Therapy Charges for Today     Code Description Service Date Service Provider Modifiers Qty    51163701299 HC OT EVAL MOD COMPLEXITY 3 4/25/2023 Maddison Bean OT GO 1               Maddison Bean OT  4/25/2023

## 2023-04-25 NOTE — THERAPY EVALUATION
Patient Name: Tala Ramsey  : 1950    MRN: [id number]                              Today's Date: 2023       Admit Date: 2023    Visit Dx:     ICD-10-CM ICD-9-CM   1. Multifocal pneumonia  J18.9 486   2. Failure of outpatient treatment  Z78.9 V49.89   3. Hypoxia  R09.02 799.02     Patient Active Problem List   Diagnosis   • Lumbar herniated disc   • Multifocal pneumonia   • Pulmonary fibrosis   • ILD (interstitial lung disease) -likely RA associated ILD with acute exacerbation   • Acute hypoxemic respiratory failure -likely due to ILD exacerbation     Past Medical History:   Diagnosis Date   • Anemia    • Back pain    • Bronchitis    • Hypertension    • Low back pain    • Pulmonary fibrosis    • Rheumatoid arthritis      Past Surgical History:   Procedure Laterality Date   • DILATION AND CURETTAGE, DIAGNOSTIC / THERAPEUTIC        General Information     Row Name 23 1524          Physical Therapy Time and Intention    Document Type evaluation  -KG     Mode of Treatment physical therapy  -KG     Row Name 23 1524          General Information    Patient Profile Reviewed yes  -KG     Prior Level of Function independent:;all household mobility;gait;transfer;ADL's;dressing;bathing  -KG     Existing Precautions/Restrictions fall;oxygen therapy device and L/min;other (see comments)  HFNC  -KG     Barriers to Rehab medically complex  -KG     Row Name 23 1524          Living Environment    People in Home child(nikolas), adult  -KG     Row Name 23 1524          Home Main Entrance    Number of Stairs, Main Entrance none  -KG     Row Name 23 1524          Stairs Within Home, Primary    Number of Stairs, Within Home, Primary twelve  -KG     Row Name 23 1524          Cognition    Orientation Status (Cognition) oriented x 4  -KG     Row Name 23 1524          Safety Issues, Functional Mobility    Safety Issues Affecting Function (Mobility) insight into  deficits/self-awareness;safety precaution awareness;safety precautions follow-through/compliance  -KG     Impairments Affecting Function (Mobility) balance;coordination;endurance/activity tolerance;motor control;motor planning;postural/trunk control;shortness of breath;strength  -KG           User Key  (r) = Recorded By, (t) = Taken By, (c) = Cosigned By    Initials Name Provider Type    Cici Pyle PT Physical Therapist               Mobility     Row Name 04/25/23 1524          Bed Mobility    Bed Mobility scooting/bridging;sit-supine  -KG     Scooting/Bridging Centre (Bed Mobility) maximum assist (25% patient effort);2 person assist;verbal cues  -KG     Sit-Supine Centre (Bed Mobility) moderate assist (50% patient effort);2 person assist;verbal cues  -KG     Assistive Device (Bed Mobility) bed rails;draw sheet;head of bed elevated  -KG     Comment, (Bed Mobility) VC's for sequencing and technique. Pt required assistance with trunk and BLEs. Pt able to sit EOB with supervision. Mobility significantly limited this date by increased SOA.  -KG     Row Name 04/25/23 1524          Transfers    Comment, (Transfers) Unable to assess due to respiratory status.  -KG     Row Name 04/25/23 1524          Bed-Chair Transfer    Bed-Chair Centre (Transfers) unable to assess  -KG     Row Name 04/25/23 1524          Sit-Stand Transfer    Sit-Stand Centre (Transfers) unable to assess  -KG     Row Name 04/25/23 1524          Gait/Stairs (Locomotion)    Centre Level (Gait) unable to assess  -KG     Comment, (Gait/Stairs) Ambulation deferred. Not appropriate to assess.  -KG           User Key  (r) = Recorded By, (t) = Taken By, (c) = Cosigned By    Initials Name Provider Type    Cici Pyle, PT Physical Therapist               Obj/Interventions     Row Name 04/25/23 1526          Range of Motion Comprehensive    General Range of Motion no range of motion deficits identified  -KG      Comment, General Range of Motion B LE WFL  -KG     Row Name 04/25/23 1526          Strength Comprehensive (MMT)    Comment, General Manual Muscle Testing (MMT) Assessment B LE grossly 3+/5  -KG     Row Name 04/25/23 1526          Balance    Balance Assessment sitting static balance;sitting dynamic balance  -KG     Static Sitting Balance supervision  -KG     Dynamic Sitting Balance standby assist  -KG     Position, Sitting Balance unsupported;sitting edge of bed  -KG     Row Name 04/25/23 1526          Sensory Assessment (Somatosensory)    Sensory Assessment (Somatosensory) LE sensation intact  -KG           User Key  (r) = Recorded By, (t) = Taken By, (c) = Cosigned By    Initials Name Provider Type    KG Cici Salas N, PT Physical Therapist               Goals/Plan     Row Name 04/25/23 1528          Bed Mobility Goal 1 (PT)    Activity/Assistive Device (Bed Mobility Goal 1, PT) sit to supine;supine to sit  -KG     [place] Level/Cues Needed (Bed Mobility Goal 1, PT) minimum assist (75% or more patient effort)  -KG     Time Frame (Bed Mobility Goal 1, PT) 2 weeks  -KG     Progress/Outcomes (Bed Mobility Goal 1, PT) goal ongoing  -KG     Row Name 04/25/23 1528          Transfer Goal 1 (PT)    Activity/Assistive Device (Transfer Goal 1, PT) sit-to-stand/stand-to-sit;bed-to-chair/chair-to-bed;walker, rolling  -KG     [place] Level/Cues Needed (Transfer Goal 1, PT) minimum assist (75% or more patient effort)  -KG     Time Frame (Transfer Goal 1, PT) 2 weeks  -KG     Progress/Outcome (Transfer Goal 1, PT) goal ongoing  -KG     Row Name 04/25/23 1528          Gait Training Goal 1 (PT)    Activity/Assistive Device (Gait Training Goal 1, PT) gait (walking locomotion);assistive device use;walker, rolling  -KG     [place] Level (Gait Training Goal 1, PT) moderate assist (50-74% patient effort)  -KG     Distance (Gait Training Goal 1, PT) 25 feet  -KG     Time Frame (Gait Training Goal 1, PT) 2 weeks   -KG     Progress/Outcome (Gait Training Goal 1, PT) goal ongoing  -KG     Row Name 04/25/23 1528          Therapy Assessment/Plan (PT)    Planned Therapy Interventions (PT) balance training;bed mobility training;gait training;strengthening;transfer training  -KG           User Key  (r) = Recorded By, (t) = Taken By, (c) = Cosigned By    Initials Name Provider Type    KG Cici Salas N, PT Physical Therapist               Clinical Impression     Row Name 04/25/23 1527          Pain    Pretreatment Pain Rating 0/10 - no pain  -KG     Posttreatment Pain Rating 0/10 - no pain  -KG     Row Name 04/25/23 1527          Plan of Care Review    Plan of Care Reviewed With patient  -KG     Outcome Evaluation PT initial evaluation completed for pt presenting with generalized weakness, increased SOA, impaired balance, and decreased functional mobility. Pt required modA x2 for sit to supine. All OOB mobility deferred due to respiratory status. Pt's decreased independence warrants PT skilled care. Recommend D/C to SNF.  -KG     Row Name 04/25/23 1521          Therapy Assessment/Plan (PT)    Patient/Family Therapy Goals Statement (PT) return to PLOF  -KG     Rehab Potential (PT) good, to achieve stated therapy goals  -KG     Criteria for Skilled Interventions Met (PT) yes;skilled treatment is necessary  -KG     Therapy Frequency (PT) daily  -KG     Row Name 04/25/23 1521          Vital Signs    Pre Systolic BP Rehab 139  -KG     Pre Treatment Diastolic BP 95  -KG     Pretreatment Heart Rate (beats/min) 103  -KG     Posttreatment Heart Rate (beats/min) 103  -KG     Pre SpO2 (%) 92  -KG     O2 Delivery Pre Treatment hi-flow  -KG     Intra SpO2 (%) 81  -KG     O2 Delivery Intra Treatment hi-flow  -KG     Post SpO2 (%) 90  -KG     O2 Delivery Post Treatment hi-flow  -KG     Pre Patient Position Sitting  -KG     Intra Patient Position Sitting  -KG     Post Patient Position Supine  -KG     Row Name 04/25/23 1520           Positioning and Restraints    Pre-Treatment Position in bed  -KG     Post Treatment Position bed  -KG     In Bed notified nsg;supine;call light within reach;encouraged to call for assist;exit alarm on;with family/caregiver;side rails up x3  -KG           User Key  (r) = Recorded By, (t) = Taken By, (c) = Cosigned By    Initials Name Provider Type    Cici Pyle, PT Physical Therapist               Outcome Measures     Row Name 04/25/23 1528 04/25/23 0800       How much help from another person do you currently need...    Turning from your back to your side while in flat bed without using bedrails? 3  -KG 3  -AN    Moving from lying on back to sitting on the side of a flat bed without bedrails? 3  -KG 3  -AN    Moving to and from a bed to a chair (including a wheelchair)? 2  -KG 3  -AN    Standing up from a chair using your arms (e.g., wheelchair, bedside chair)? 2  -KG 3  -AN    Climbing 3-5 steps with a railing? 1  -KG 3  -AN    To walk in hospital room? 1  -KG 3  -AN    AM-PAC 6 Clicks Score (PT) 12  -KG 18  -AN    Highest level of mobility 4 --> Transferred to chair/commode  -KG 6 --> Walked 10 steps or more  -AN    Row Name 04/25/23 1528          Functional Assessment    Outcome Measure Options AM-PAC 6 Clicks Basic Mobility (PT)  -KG           User Key  (r) = Recorded By, (t) = Taken By, (c) = Cosigned By    Initials Name Provider Type    Toña Whitt RN Registered Nurse    Cici Pyle, PT Physical Therapist                             Physical Therapy Education     Title: PT OT SLP Therapies (In Progress)     Topic: Physical Therapy (In Progress)     Point: Mobility training (In Progress)     Learning Progress Summary           Patient Acceptance, E, NR by ALICE at 4/25/2023 1402                   Point: Home exercise program (Not Started)     Learner Progress:  Not documented in this visit.          Point: Body mechanics (In Progress)     Learning Progress Summary            Patient Acceptance, E, NR by KG at 4/25/2023 1402                   Point: Precautions (In Progress)     Learning Progress Summary           Patient Acceptance, E, NR by KG at 4/25/2023 1402                               User Key     Initials Effective Dates Name Provider Type Discipline    KG 05/22/20 -  Cici Salas PT Physical Therapist PT              PT Recommendation and Plan  Planned Therapy Interventions (PT): balance training, bed mobility training, gait training, strengthening, transfer training  Plan of Care Reviewed With: patient  Outcome Evaluation: PT initial evaluation completed for pt presenting with generalized weakness, increased SOA, impaired balance, and decreased functional mobility. Pt required modA x2 for sit to supine. All OOB mobility deferred due to respiratory status. Pt's decreased independence warrants PT skilled care. Recommend D/C to SNF.     Time Calculation:    PT Charges     Row Name 04/25/23 1402             Time Calculation    Start Time 1402  -KG      PT Received On 04/25/23  -KG      PT Goal Re-Cert Due Date 05/05/23  -KG         Untimed Charges    PT Eval/Re-eval Minutes 31  -KG         Total Minutes    Untimed Charges Total Minutes 31  -KG       Total Minutes 31  -KG            User Key  (r) = Recorded By, (t) = Taken By, (c) = Cosigned By    Initials Name Provider Type    KG Cici Salas, PT Physical Therapist              Therapy Charges for Today     Code Description Service Date Service Provider Modifiers Qty    07030285253 HC PT EVAL MOD COMPLEXITY 3 4/25/2023 Cici Salas, PT GP 1          PT G-Codes  Outcome Measure Options: AM-PAC 6 Clicks Basic Mobility (PT)  AM-PAC 6 Clicks Score (PT): 12  PT Discharge Summary  Anticipated Discharge Disposition (PT): skilled nursing facility    Marcelina Salas PT  4/25/2023

## 2023-04-25 NOTE — PLAN OF CARE
Goal Outcome Evaluation:  Plan of Care Reviewed With: patient        Progress: declining  Outcome Evaluation: VSS. High flow had to be increased to 55L 70% to maintain O2 sats of >90%. Tachypenic. PRN meds given for pain. Son at bedside. Pt appears to be resting at this time.

## 2023-04-25 NOTE — PLAN OF CARE
Goal Outcome Evaluation:  Plan of Care Reviewed With: patient           Outcome Evaluation: PT initial evaluation completed for pt presenting with generalized weakness, increased SOA, impaired balance, and decreased functional mobility. Pt required modA x2 for sit to supine. All OOB mobility deferred due to respiratory status. Pt's decreased independence warrants PT skilled care. Recommend D/C to SNF.

## 2023-04-25 NOTE — PROGRESS NOTES
[facility] Medicine Services  PROGRESS NOTE    Patient Name: Tala Ramsey  : 1950  MRN: [id number]    Date of Admission: 2023  Primary Care Physician: Sg Horne MD    Subjective   Subjective     CC:  Hypoxia, pneumonia    HPI:  Dyspnea persists. No sputum. No n/v/d    ROS:  No n/v/d  No rash    Objective   Objective     Vital Signs:   Temp:  [97.7 °F (36.5 °C)-98.3 °F (36.8 °C)] 97.9 °F (36.6 °C)  Heart Rate:  [] 100  Resp:  [16-18] 18  BP: (133-156)/(55-95) 139/95  Flow (L/min):  [40-55] 55     Physical Exam:  Constitutional:Alert, oriented x 3, nontoxic appearing but ill appearing, hi flow cnaula in place, sitting upright in bed. Frequent dry coughing  Psych:Normal/appropriate affect  HEENT:NCAT, oropharynx clear  Neck: neck supple, full range of motion  Neuro: Face symmetric, speech clear, equal , moves all extremities  Cardiac: RRR; No pretibial pitting edema  Resp: faint mid insp crackles noted, no distress  GI: abd soft, nontender, obese  Skin: No extremity rash  Musculoskeletal/extremities: no cyanosis of extremities; no significant ankle edema        Results Reviewed:  LAB RESULTS:      Lab 23  0747 23  0647 23  1048   WBC 15.74* 12.84* 14.06*   HEMOGLOBIN 10.8* 10.2* 11.5*   HEMATOCRIT 33.1* 30.8* 35.2   PLATELETS 624* 520* 592*   NEUTROS ABS  --  10.50* 11.33*   IMMATURE GRANS (ABS)  --  0.17* 0.22*   LYMPHS ABS  --  1.14 1.50   MONOS ABS  --  0.65 0.79   EOS ABS  --  0.31 0.14   MCV 89.2 90.1 89.6   CRP 14.70* 15.21*  --    PROCALCITONIN  --  0.05  --    LACTATE  --   --  1.1         Lab 23  0747 23  0647 04/23/23  1048   SODIUM 132* 130* 129*   POTASSIUM 4.4 4.1 4.4   CHLORIDE 98 95* 92*   CO2 19.0* 24.0 23.0   ANION GAP 15.0 11.0 14.0   BUN 13 12 14   CREATININE 0.60 0.63 0.50*   EGFR 94.9 93.8 99.2   GLUCOSE 149* 113* 104*   CALCIUM 9.3 8.3* 9.0   MAGNESIUM 2.5*  --   --          Lab 23  0747  04/24/23  0647 04/23/23  1048   TOTAL PROTEIN 6.7 6.8 7.4   ALBUMIN 3.5 3.3* 3.5   GLOBULIN 3.2 3.5 3.9   ALT (SGPT) 32 13 10   AST (SGOT) 41* 22 23   BILIRUBIN 0.2 0.3 0.2   ALK PHOS 185* 130* 109         Lab 04/25/23  0747 04/23/23  1048   PROBNP 210.6 163.6   HSTROP T  --  8                 Brief Urine Lab Results  (Last result in the past 365 days)      Color   Clarity   Blood   Leuk Est   Nitrite   Protein   CREAT   Urine HCG        04/23/23 1757 Yellow   Clear   Trace   Negative   Negative   Negative                 Microbiology Results Abnormal     Procedure Component Value - Date/Time    Blood Culture - Blood, Arm, Right [[id number]]  (Normal) Collected: 04/23/23 1125    Lab Status: Preliminary result Specimen: Blood from Arm, Right Updated: 04/25/23 1145     Blood Culture No growth at 2 days    Blood Culture - Blood, Arm, Left [[id number]]  (Normal) Collected: 04/23/23 1125    Lab Status: Preliminary result Specimen: Blood from Arm, Left Updated: 04/25/23 1145     Blood Culture No growth at 2 days    S. Pneumo Ag Urine or CSF - Urine, Urine, Clean Catch [[id number]]  (Normal) Collected: 04/23/23 1757    Lab Status: Final result Specimen: Urine, Clean Catch Updated: 04/24/23 0949     Strep Pneumo Ag Negative    Legionella Antigen, Urine - Urine, Urine, Clean Catch [[id number]]  (Normal) Collected: 04/23/23 1757    Lab Status: Final result Specimen: Urine, Clean Catch Updated: 04/24/23 0949     LEGIONELLA ANTIGEN, URINE Negative    Respiratory Panel PCR w/COVID-19(SARS-CoV-2) CHARI/MARISSA/APOLINAR/PAD/COR/MAD/AMY In-House, NP Swab in UTM/VTM, 3-4 HR TAT - Swab, Nasopharynx [[id number]]  (Normal) Collected: 04/24/23 0544    Lab Status: Final result Specimen: Swab from Nasopharynx Updated: 04/24/23 0647     ADENOVIRUS, PCR Not Detected     Coronavirus 229E Not Detected     Coronavirus HKU1 Not Detected     Coronavirus NL63 Not Detected     Coronavirus OC43 Not Detected     COVID19 Not Detected     Human Metapneumovirus Not  Detected     Human Rhinovirus/Enterovirus Not Detected     Influenza A PCR Not Detected     Influenza B PCR Not Detected     Parainfluenza Virus 1 Not Detected     Parainfluenza Virus 2 Not Detected     Parainfluenza Virus 3 Not Detected     Parainfluenza Virus 4 Not Detected     RSV, PCR Not Detected     Bordetella pertussis pcr Not Detected     Bordetella parapertussis PCR Not Detected     Chlamydophila pneumoniae PCR Not Detected     Mycoplasma pneumo by PCR Not Detected    Narrative:      In the setting of a positive respiratory panel with a viral infection PLUS a negative procalcitonin without other underlying concern for bacterial infection, consider observing off antibiotics or discontinuation of antibiotics and continue supportive care. If the respiratory panel is positive for atypical bacterial infection (Bordetella pertussis, Chlamydophila pneumoniae, or Mycoplasma pneumoniae), consider antibiotic de-escalation to target atypical bacterial infection.    MRSA Screen, PCR (Inpatient) - Swab, Nares [[id number]]  (Normal) Collected: 04/23/23 1540    Lab Status: Final result Specimen: Swab from Nares Updated: 04/23/23 1651     MRSA PCR Negative    Narrative:      The negative predictive value of this diagnostic test is high and should only be used to consider de-escalating anti-MRSA therapy. A positive result may indicate colonization with MRSA and must be correlated clinically.  MRSA Negative    COVID PRE-OP / PRE-PROCEDURE SCREENING ORDER (NO ISOLATION) - Swab, Nasopharynx [[id number]]  (Normal) Collected: 04/23/23 1125    Lab Status: Final result Specimen: Swab from Nasopharynx Updated: 04/23/23 1225    Narrative:      The following orders were created for panel order COVID PRE-OP / PRE-PROCEDURE SCREENING ORDER (NO ISOLATION) - Swab, Nasopharynx.  Procedure                               Abnormality         Status                     ---------                               -----------         ------                      COVID-19, FLU A/B, RSV P...[[id number]]  Normal              Final result                 Please view results for these tests on the individual orders.    COVID-19, FLU A/B, RSV PCR - Swab, Nasopharynx [[id number]]  (Normal) Collected: 04/23/23 1125    Lab Status: Final result Specimen: Swab from Nasopharynx Updated: 04/23/23 1225     COVID19 Not Detected     Influenza A PCR Not Detected     Influenza B PCR Not Detected     RSV, PCR Not Detected    Narrative:      Fact sheet for providers: https://www.fda.gov/media/045146/download    Fact sheet for patients: https://www.fda.gov/media/653569/download    Test performed by PCR.          CT Chest Without Contrast Diagnostic    Result Date: 4/23/2023  CT CHEST WO CONTRAST DIAGNOSTIC Date of Exam: 4/23/2023 3:34 PM EDT Indication: Pneumonia, complication suspected, xray done. Comparison: Chest x-ray 4/23/2023, high-resolution chest CT 3/20/2023 Technique: Axial CT images were obtained of the chest without contrast administration.  Reconstructed coronal and sagittal images were also obtained. Automated exposure control and iterative construction methods were used. Findings: Hilum and Mediastinum: Severe coronary artery atherosclerotic calcification. Enlarged hilar mediastinal lymph nodes. AP window lymph node measures 1 cm short axis image #28. Precarinal lymph node measures 1 cm short axis measurement 29. No pericardial effusion. Moderate size hiatal hernia. Thoracic aorta and pulmonary arteries are normal in caliber. Lung Parenchyma and Pleura: No focal consolidations. No suspicious pulmonary nodules. No endobronchial lesions. No significant pleural effusions. Upper Abdomen: Unremarkable. Soft tissues: Unremarkable. Osseous structures: No aggressive focal lytic or sclerotic osseous lesions. Osteopenia. Compression deformity of a midthoracic vertebral body, unchanged since 2016.     Impression: 1.There is diffuse airspace opacity superimposed on chronic  interstitial lung disease. The focal pneumonia most likely. Correlate for possible viral pneumonia such as COVID-19 or influenza. Bacterial pneumonia less likely. 2.Moderate size hiatal hernia. 3.Stable compression deformity mid thoracic spine. Osteopenia. 4.Enlarged mediastinal lymph nodes likely reactive. Follow-up CT chest with contrast in 3 months time is recommended. Electronically Signed: Shelley Ulloa  4/23/2023 3:54 PM EDT  Workstation ID: [id number]    XR Chest 1 View    Result Date: 4/25/2023  XR CHEST 1 VW Date of Exam: 4/25/2023 8:57 AM EDT Indication: f/u respiratory illness Comparison: CT 4/23/2023. Findings: Extensive multifocal bilateral airspace disease appears similar to CT comparison, with background fibrotic changes also present. There is no new focal lobar consolidation. There is no enlarging effusion or distinct pneumothorax. Unchanged heart and mediastinal contours.     Impression: Impression: Extensive multifocal bilateral airspace disease appears similar to CT comparison, with background fibrotic changes also present. There is no new focal lobar consolidation. There is no enlarging effusion or distinct pneumothorax. Unchanged heart and mediastinal contours. Electronically Signed: Oleksandr Sewell  4/25/2023 10:46 AM EDT  Workstation ID: [id number]          Current medications:  Scheduled Meds:amLODIPine, 5 mg, Oral, Q24H   And  valsartan, 160 mg, Oral, Q24H  azithromycin, 500 mg, Intravenous, Q24H  cetirizine, 10 mg, Oral, Daily  fluticasone, 2 spray, Each Nare, Daily  furosemide, 20 mg, Intravenous, Once  guaiFENesin, 1,200 mg, Oral, Q12H  heparin (porcine), 5,000 Units, Subcutaneous, Q8H  ipratropium-albuterol, 3 mL, Nebulization, 4x Daily - RT  methylPREDNISolone sodium succinate, 1,000 mg, Intravenous, Q24H  montelukast, 10 mg, Oral, Daily  pantoprazole, 40 mg, Oral, Daily  piperacillin-tazobactam, 3.375 g, Intravenous, Q8H  sodium chloride, 10 mL, Intravenous, Q12H  sulfamethoxazole-trimethoprim,  "1 tablet, Oral, Once per day on Mon Wed Fri      Continuous Infusions:   PRN Meds:.•  acetaminophen **OR** acetaminophen **OR** acetaminophen  •  GI cocktail  •  calcium carbonate  •  ipratropium-albuterol  •  Magnesium Standard Dose Replacement - Follow Nurse / BPA Driven Protocol  •  ondansetron **OR** ondansetron  •  simethicone  •  sodium chloride  •  sodium chloride  •  sodium chloride  •  tiZANidine    Assessment & Plan   Assessment & Plan     Active Hospital Problems    Diagnosis  POA   • **Multifocal pneumonia [J18.9]  Yes   • Pulmonary fibrosis [J84.10]  Yes   • ILD (interstitial lung disease) -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        Brief Hospital Course to date:  Tala Ramsey is a 72 y.o. female w/ hx interstitial lung dz, RA (on humira until recently, recent steroid injections) who presents w/ progressive dyspnea, cough over ~10 days, failed levaquin. Hypoxic upon admission. Ct chest w/o contrast w/ bilateral multifocal air space dz. resp pcr panel negative. Had progressive hypoxia evening after admission placed on hi flow canula    Acute hypoxic resp failure  Pulm fibrosis  Worsening bilateral airspace dz/pneumonia  -has been on levaquin last week  -unclear if progession/\"flare\" of interstitial lung dz vs infection  -continue day #3 zosyn & z-max; add thrice weekly bactrim ds (pcp prophylaxis as has been on humira and steroids)  -Pulm consult following: continue on pulse steroids 1g solumedrol day #2/3  -will give lasix 20mg iv today  -currently dnr/dni, encouraged to discuss w/ pulm when they see today  -currently on 75% fio2 hi flow canula (up from 65-60% fio2 yesterday)    RA  -holding humira  -has received steroid \"shots\" recently  -hold plaquenil    Hyponatremia, improved  -improved, 129 on admisison, 132 now . " Watch sodium getting lasix    HTN  -continue amlodpine/valsartan    Chronic anemia   -monitor    4 Am labs: cbc,bmp,crp,probnp,cxr      Expected Discharge Location and Transportation: home  Expected Discharge ? 4/29 (when oxygenation improved)  No data recorded     DVT prophylaxis:  Medical DVT prophylaxis orders are present.     AM-PAC 6 Clicks Score (PT): 18 (04/25/23 0800)    CODE STATUS:   Code Status and Medical Interventions:   Ordered at: 04/23/23 1310     Medical Intervention Limits:    NO intubation (DNI)     Level Of Support Discussed With:    Patient     Code Status (Patient has no pulse and is not breathing):    No CPR (Do Not Attempt to Resuscitate)     Medical Interventions (Patient has pulse or is breathing):    Limited Support       Ramón Blanton MD  04/25/23

## 2023-04-25 NOTE — PROGRESS NOTES
"Pulmonary Hospital Consult Note     LOS: 2 days   Patient Care Team:  Sg Horne MD as PCP - General (Family Medicine)  Laurent Tomlinson MD as Consulting Physician (Pulmonary Disease)    Chief Complaint:    Chief Complaint   Patient presents with   • Shortness of Breath     Subjective     HPI:     Initial history (from pulmonary consult note 4/24/2023):    72 y.o. female with history of rheumatoid arthritis/inflammatory arthritis and possible lupus on Humira and Plaquenil followed by Dr. Weinstein.  Also with history of iron deficiency anemia, hiatal hernia, GERD, hypertension.  She saw Dr. Tomlinson for pulmonary fibrosis in August 2022 and again in December 2022.  At that time, she had a normal spirometry with mild restriction and decreased DLCO.  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    Patient in the past few weeks reported some URI type symptoms followed by worsening dyspnea.  She actually held her Humira.  She saw her PCP about 10 days before the admission reporting \"ulcers in my mouth\", and throat discomfort and was treated with amoxicillin.  Subsequently was seen and treated with Levaquin for possible pneumonia and given a steroid injection.  She has had progressive dyspnea.    She presented to the hospital on 4/23/2023 and was subsequently admitted to the hospitalist service for bilateral infiltrates/possible pneumonia and started on azithromycin and Zosyn.    Procalcitonin was within normal limits.  White blood cell count was elevated at 14.  She had hyponatremia with a sodium of 129.  Respiratory PCR panel was negative.  Blood cultures pending.  She has not been able to produce any sputum.    Patient has not had any significant improvement since admission.  She denies fevers or chills.    Interval history: " Patient not sure if she feels any better today.  Still requiring high flow nasal cannula oxygen.  Drops oxygen saturations with activity.  Seems to have tolerated the pulse steroids yesterday evening.    History taken from: patient    Past Medical History:   Diagnosis Date   • Anemia    • Back pain    • Bronchitis    • Hypertension    • Low back pain    • Pulmonary fibrosis    • Rheumatoid arthritis        Past Surgical History:   Procedure Laterality Date   • DILATION AND CURETTAGE, DIAGNOSTIC / THERAPEUTIC         Family History   Problem Relation Age of Onset   • Heart failure Mother    • Stroke Mother    • Cancer Father    • Diabetes Son    • Diabetes Son        Social History     Socioeconomic History   • Marital status:    Tobacco Use   • Smoking status: Never   • Smokeless tobacco: Never   Vaping Use   • Vaping Use: Never used   Substance and Sexual Activity   • Alcohol use: No   • Drug use: Never   • Sexual activity: Defer       No Known Allergies    PMH/FH/SocH were reviewed by me and updates were made.     Review of Systems:    All other systems were reviewed and are negative.  Exceptions are noted in subjective or below.    Objective     Vital Signs  Temp:  [97.7 °F (36.5 °C)-98.3 °F (36.8 °C)] 97.9 °F (36.6 °C)  Heart Rate:  [] 100  Resp:  [16-18] 18  BP: (133-156)/(55-95) 139/95    Physical Exam:     Constitutional:    Alert, cooperative, in no acute distress   Head:    Normocephalic, without obvious abnormality, atraumatic   Eyes:            Lids and lashes normal, conjunctivae and sclerae normal, no   icterus, no pallor, corneas clear, PER   ENMT:   Ears appear intact with no abnormalities noted         Neck:  Trachea midline, no thyromegaly, no JVD       Lungs/Resp:    Moderately increased effort, symmetric chest rise, no crepitus, mild bilateral diffuse rales, no chest wall tenderness                 Heart/CV:    Regular rhythm and normal rate, normal S1 and S2, no            murmur    Abdomen/GI:     Normal bowel sounds, no masses, no organomegaly, soft,        non-tender, non-distended   :     Deferred   Extremities/MSK:   No clubbing, cyanosis or edema.  No deformities.    Pulses:   Pulses palpable and equal bilaterally   Skin:   No bleeding, bruising or rash   Hem/Lymph:   No cervical or supraclavicular adenopathy.    Neurologic:   Moves all extremities with no obvious focal motor deficit.  Cranial nerves 2 - 12 grossly intact             Psychiatric: Normal mood and affect, oriented x 3.   The above findings are documentation my personal physical examination from today.  Electronically signed by:Bernard Aguila MD 04/25/23 14:37 EDT     Results Review:     I reviewed the patient's new clinical results.   Results from last 7 days   Lab Units 04/25/23  0747 04/24/23  0647 04/23/23  1048   SODIUM mmol/L 132* 130* 129*   POTASSIUM mmol/L 4.4 4.1 4.4   CHLORIDE mmol/L 98 95* 92*   CO2 mmol/L 19.0* 24.0 23.0   BUN mg/dL 13 12 14   CREATININE mg/dL 0.60 0.63 0.50*   CALCIUM mg/dL 9.3 8.3* 9.0   BILIRUBIN mg/dL 0.2 0.3 0.2   ALK PHOS U/L 185* 130* 109   ALT (SGPT) U/L 32 13 10   AST (SGOT) U/L 41* 22 23   GLUCOSE mg/dL 149* 113* 104*     Results from last 7 days   Lab Units 04/25/23  0747 04/24/23  0647 04/23/23  1048   WBC 10*3/mm3 15.74* 12.84* 14.06*   HEMOGLOBIN g/dL 10.8* 10.2* 11.5*   HEMATOCRIT % 33.1* 30.8* 35.2   PLATELETS 10*3/mm3 624* 520* 592*           I reviewed the patient's new imaging including images and reports.    Chest x-ray today shows essentially stable diffuse bilateral interstitial/airspace disease.    Medication Review:   amLODIPine, 5 mg, Oral, Q24H   And  valsartan, 160 mg, Oral, Q24H  azithromycin, 500 mg, Intravenous, Q24H  cetirizine, 10 mg, Oral, Daily  fluticasone, 2 spray, Each Nare, Daily  furosemide, 20 mg, Intravenous, Once  guaiFENesin, 1,200 mg, Oral, Q12H  heparin (porcine), 5,000 Units, Subcutaneous, Q8H  ipratropium-albuterol, 3 mL, Nebulization, 4x  "Daily - RT  methylPREDNISolone sodium succinate, 1,000 mg, Intravenous, Q24H  montelukast, 10 mg, Oral, Daily  pantoprazole, 40 mg, Oral, Daily  piperacillin-tazobactam, 3.375 g, Intravenous, Q8H  sodium chloride, 10 mL, Intravenous, Q12H  sulfamethoxazole-trimethoprim, 1 tablet, Oral, Once per day on Mon Wed Fri           Assessment & Plan       Multifocal pneumonia    Pulmonary fibrosis    ILD (interstitial lung disease) -likely RA associated ILD with acute exacerbation    Acute hypoxemic respiratory failure -likely due to ILD exacerbation    72 y.o. female with history of rheumatoid arthritis/inflammatory arthritis and possible lupus on Humira and Plaquenil followed by Dr. Weinstein.  Also with history of iron deficiency anemia, hiatal hernia, GERD, hypertension.  She saw Dr. Tomlinson for pulmonary fibrosis in August 2022 and again in December 2022.  At that time, she had a normal spirometry with mild restriction and decreased DLCO.  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    Patient in the past few weeks reported some URI type symptoms followed by worsening dyspnea.  She actually held her Humira.  She saw her PCP about 10 days before the admission reporting \"ulcers in my mouth\", and throat discomfort and was treated with amoxicillin.  Subsequently was seen and treated with Levaquin for possible pneumonia and given a steroid injection.  She has had progressive dyspnea.    She presented to the hospital on 4/23/2023 and was subsequently admitted to the hospitalist service for bilateral infiltrates/possible pneumonia and started on azithromycin and Zosyn.    Procalcitonin was within normal limits.  proBNP was low at 163.6.  White blood cell count was elevated at 14.  She had hyponatremia with a sodium of 129.  Respiratory PCR " panel was negative.  Blood cultures pending.  She has not been able to produce any sputum.    Patient has not had any significant improvement since admission.  She denies fevers or chills.    Patient has fairly severe lung disease at this point.  She is requiring high flow nasal cannula oxygen to maintain oxygen saturations and is dyspneic at rest.    Ideally, I would perform a bronchoscopy to rule out infection prior to consideration of pulse dose steroids.  I do not think she would tolerate bronchoscopy without being intubated, and likely would have difficulty coming off of mechanical ventilation.  I had this discussion with the patient.  She does not want to go on mechanical ventilation and wishes to be a DO NOT RESUSCITATE (as previously relayed to the hospitalist).    I think that the patient most likely has an acute exacerbation of her underlying interstitial lung disease, which is likely related to her underlying rheumatoid arthritis.  Is also possible she has interstitial lung disease related to Humira, atypical infectious process, or other cause for her infiltrates and respiratory failure.    After discussion with the patient including the risks of exacerbating infection, developing opportunistic infection, and death, I offered the patient a trial of a pulse dose of steroids to hopefully decrease the inflammation from presumed flare of interstitial lung disease.  The patient is in agreement, and I will give her Solu-Medrol 1 g daily for 3 days followed by a prolonged steroid taper.  I would recommend continuing the Zosyn and azithromycin for now and recommended adding Bactrim for pneumocystis prophylaxis.    No major change today.  If she responds to steroids and will likely take a little bit of time.  I explained this to her.  I discussed with the patient's son as well.    1.  For bilateral pulmonary infiltrates: Differential includes flare of underlying rheumatoid associated interstitial lung disease,  which I feel most likely, followed by possible infectious including opportunistic infection, or other inflammatory cause.  Plan for pulse steroids followed by prolonged steroid taper as above.  Continue current antibiotics and add Bactrim for pneumocystis prophylaxis.  2.  For acute hypoxemic respiratory failure: As above.  Supplemental oxygen as needed.  3.  For rheumatoid arthritis: Recently held Humira secondary to presumed illness.  Plaquenil is currently held.  I will consider restarting this.  4.  For possible pneumonia: As above.  5.  DVT prophylaxis: Subcutaneous heparin.      Electronically signed by:    Bernard Aguila MD  04/25/23  14:37 EDT        • Please note that portions of this note were completed with [employer]now [employer] - a voice recognition program.

## 2023-04-26 ENCOUNTER — APPOINTMENT (OUTPATIENT)
Dept: GENERAL RADIOLOGY | Facility: HOSPITAL | Age: 73
End: 2023-04-26
Payer: MEDICARE

## 2023-04-26 LAB
ANION GAP SERPL CALCULATED.3IONS-SCNC: 15 MMOL/L (ref 5–15)
BUN SERPL-MCNC: 16 MG/DL (ref 8–23)
BUN/CREAT SERPL: 25.4 (ref 7–25)
CALCIUM SPEC-SCNC: 9.3 MG/DL (ref 8.6–10.5)
CHLORIDE SERPL-SCNC: 98 MMOL/L (ref 98–107)
CO2 SERPL-SCNC: 23 MMOL/L (ref 22–29)
CREAT SERPL-MCNC: 0.63 MG/DL (ref 0.57–1)
CRP SERPL-MCNC: 6.04 MG/DL (ref 0–0.5)
DEPRECATED RDW RBC AUTO: 48.3 FL (ref 37–54)
EGFRCR SERPLBLD CKD-EPI 2021: 93.8 ML/MIN/1.73
ERYTHROCYTE [DISTWIDTH] IN BLOOD BY AUTOMATED COUNT: 15.2 % (ref 12.3–15.4)
GLUCOSE SERPL-MCNC: 147 MG/DL (ref 65–99)
HCT VFR BLD AUTO: 34 % (ref 34–46.6)
HGB BLD-MCNC: 10.8 G/DL (ref 12–15.9)
MAGNESIUM SERPL-MCNC: 2.8 MG/DL (ref 1.6–2.4)
MCH RBC QN AUTO: 28.9 PG (ref 26.6–33)
MCHC RBC AUTO-ENTMCNC: 31.8 G/DL (ref 31.5–35.7)
MCV RBC AUTO: 90.9 FL (ref 79–97)
PLATELET # BLD AUTO: 739 10*3/MM3 (ref 140–450)
PMV BLD AUTO: 8.6 FL (ref 6–12)
POTASSIUM SERPL-SCNC: 4.3 MMOL/L (ref 3.5–5.2)
RBC # BLD AUTO: 3.74 10*6/MM3 (ref 3.77–5.28)
SODIUM SERPL-SCNC: 136 MMOL/L (ref 136–145)
WBC NRBC COR # BLD: 23.58 10*3/MM3 (ref 3.4–10.8)

## 2023-04-26 PROCEDURE — 99233 SBSQ HOSP IP/OBS HIGH 50: CPT | Performed by: INTERNAL MEDICINE

## 2023-04-26 PROCEDURE — 94664 DEMO&/EVAL PT USE INHALER: CPT

## 2023-04-26 PROCEDURE — 99232 SBSQ HOSP IP/OBS MODERATE 35: CPT | Performed by: INTERNAL MEDICINE

## 2023-04-26 PROCEDURE — 25010000002 FUROSEMIDE PER 20 MG: Performed by: INTERNAL MEDICINE

## 2023-04-26 PROCEDURE — 25010000002 AZITHROMYCIN PER 500 MG: Performed by: INTERNAL MEDICINE

## 2023-04-26 PROCEDURE — 94799 UNLISTED PULMONARY SVC/PX: CPT

## 2023-04-26 PROCEDURE — 86140 C-REACTIVE PROTEIN: CPT | Performed by: INTERNAL MEDICINE

## 2023-04-26 PROCEDURE — 25010000002 PIPERACILLIN SOD-TAZOBACTAM PER 1 G: Performed by: INTERNAL MEDICINE

## 2023-04-26 PROCEDURE — 0 METHYLPREDNISOLONE PER 125 MG: Performed by: INTERNAL MEDICINE

## 2023-04-26 PROCEDURE — 71045 X-RAY EXAM CHEST 1 VIEW: CPT

## 2023-04-26 PROCEDURE — 83735 ASSAY OF MAGNESIUM: CPT | Performed by: INTERNAL MEDICINE

## 2023-04-26 PROCEDURE — 80048 BASIC METABOLIC PNL TOTAL CA: CPT | Performed by: INTERNAL MEDICINE

## 2023-04-26 PROCEDURE — 85027 COMPLETE CBC AUTOMATED: CPT | Performed by: INTERNAL MEDICINE

## 2023-04-26 PROCEDURE — 94761 N-INVAS EAR/PLS OXIMETRY MLT: CPT

## 2023-04-26 PROCEDURE — 25010000002 HEPARIN (PORCINE) PER 1000 UNITS: Performed by: INTERNAL MEDICINE

## 2023-04-26 RX ORDER — METHYLPREDNISOLONE SODIUM SUCCINATE 125 MG/2ML
60 INJECTION, POWDER, LYOPHILIZED, FOR SOLUTION INTRAMUSCULAR; INTRAVENOUS EVERY 12 HOURS
Status: DISCONTINUED | OUTPATIENT
Start: 2023-04-27 | End: 2023-04-29

## 2023-04-26 RX ORDER — FLUTICASONE PROPIONATE 50 MCG
2 SPRAY, SUSPENSION (ML) NASAL NIGHTLY
Status: DISCONTINUED | OUTPATIENT
Start: 2023-04-26 | End: 2023-05-25 | Stop reason: HOSPADM

## 2023-04-26 RX ORDER — TIZANIDINE 4 MG/1
2 TABLET ORAL EVERY 8 HOURS PRN
Status: DISCONTINUED | OUTPATIENT
Start: 2023-04-26 | End: 2023-05-09

## 2023-04-26 RX ORDER — FUROSEMIDE 10 MG/ML
20 INJECTION INTRAMUSCULAR; INTRAVENOUS ONCE
Status: COMPLETED | OUTPATIENT
Start: 2023-04-26 | End: 2023-04-26

## 2023-04-26 RX ORDER — ALPRAZOLAM 0.25 MG/1
0.25 TABLET ORAL 3 TIMES DAILY PRN
Status: DISCONTINUED | OUTPATIENT
Start: 2023-04-26 | End: 2023-04-27

## 2023-04-26 RX ORDER — ALPRAZOLAM 0.25 MG/1
0.25 TABLET ORAL 2 TIMES DAILY PRN
Status: DISCONTINUED | OUTPATIENT
Start: 2023-04-26 | End: 2023-04-26 | Stop reason: SDUPTHER

## 2023-04-26 RX ORDER — CHOLECALCIFEROL (VITAMIN D3) 125 MCG
5 CAPSULE ORAL NIGHTLY
Status: DISCONTINUED | OUTPATIENT
Start: 2023-04-26 | End: 2023-05-25 | Stop reason: HOSPADM

## 2023-04-26 RX ORDER — MORPHINE SULFATE 2 MG/ML
2 INJECTION, SOLUTION INTRAMUSCULAR; INTRAVENOUS
Status: DISCONTINUED | OUTPATIENT
Start: 2023-04-26 | End: 2023-04-28

## 2023-04-26 RX ADMIN — TAZOBACTAM SODIUM AND PIPERACILLIN SODIUM 3.38 G: 375; 3 INJECTION, SOLUTION INTRAVENOUS at 17:35

## 2023-04-26 RX ADMIN — AMLODIPINE BESYLATE 5 MG: 5 TABLET ORAL at 08:57

## 2023-04-26 RX ADMIN — HEPARIN SODIUM 5000 UNITS: 5000 INJECTION, SOLUTION INTRAVENOUS; SUBCUTANEOUS at 13:47

## 2023-04-26 RX ADMIN — HEPARIN SODIUM 5000 UNITS: 5000 INJECTION, SOLUTION INTRAVENOUS; SUBCUTANEOUS at 21:00

## 2023-04-26 RX ADMIN — VALSARTAN 160 MG: 160 TABLET, FILM COATED ORAL at 08:57

## 2023-04-26 RX ADMIN — IPRATROPIUM BROMIDE AND ALBUTEROL SULFATE 3 ML: .5; 2.5 SOLUTION RESPIRATORY (INHALATION) at 12:25

## 2023-04-26 RX ADMIN — TAZOBACTAM SODIUM AND PIPERACILLIN SODIUM 3.38 G: 375; 3 INJECTION, SOLUTION INTRAVENOUS at 08:58

## 2023-04-26 RX ADMIN — IPRATROPIUM BROMIDE AND ALBUTEROL SULFATE 3 ML: .5; 2.5 SOLUTION RESPIRATORY (INHALATION) at 15:27

## 2023-04-26 RX ADMIN — PANTOPRAZOLE SODIUM 40 MG: 40 TABLET, DELAYED RELEASE ORAL at 08:51

## 2023-04-26 RX ADMIN — HEPARIN SODIUM 5000 UNITS: 5000 INJECTION, SOLUTION INTRAVENOUS; SUBCUTANEOUS at 05:08

## 2023-04-26 RX ADMIN — SULFAMETHOXAZOLE AND TRIMETHOPRIM 1 TABLET: 800; 160 TABLET ORAL at 08:56

## 2023-04-26 RX ADMIN — AZITHROMYCIN 500 MG: 500 INJECTION, POWDER, LYOPHILIZED, FOR SOLUTION INTRAVENOUS at 13:47

## 2023-04-26 RX ADMIN — FUROSEMIDE 20 MG: 10 INJECTION, SOLUTION INTRAMUSCULAR; INTRAVENOUS at 12:11

## 2023-04-26 RX ADMIN — SODIUM CHLORIDE 1000 MG: 900 INJECTION INTRAVENOUS at 16:34

## 2023-04-26 RX ADMIN — IPRATROPIUM BROMIDE AND ALBUTEROL SULFATE 3 ML: .5; 2.5 SOLUTION RESPIRATORY (INHALATION) at 07:08

## 2023-04-26 RX ADMIN — Medication 5 MG: at 21:00

## 2023-04-26 RX ADMIN — IPRATROPIUM BROMIDE AND ALBUTEROL SULFATE 3 ML: .5; 2.5 SOLUTION RESPIRATORY (INHALATION) at 19:03

## 2023-04-26 RX ADMIN — GUAIFENESIN 1200 MG: 600 TABLET, EXTENDED RELEASE ORAL at 08:51

## 2023-04-26 RX ADMIN — TAZOBACTAM SODIUM AND PIPERACILLIN SODIUM 3.38 G: 375; 3 INJECTION, SOLUTION INTRAVENOUS at 01:15

## 2023-04-26 RX ADMIN — Medication 2 SPRAY: at 21:15

## 2023-04-26 RX ADMIN — GUAIFENESIN 1200 MG: 600 TABLET, EXTENDED RELEASE ORAL at 20:59

## 2023-04-26 RX ADMIN — TIZANIDINE 2 MG: 4 TABLET ORAL at 21:00

## 2023-04-26 NOTE — PLAN OF CARE
Problem: Adult Inpatient Plan of Care  Goal: Plan of Care Review  Outcome: Ongoing, Progressing  Flowsheets (Taken 4/26/2023 1423)  Progress: no change  Plan of Care Reviewed With: patient  Outcome Evaluation: Bp slightly elevated,patient tolerating high flow O2 turned down by  to 80% 40 liters, IV Lasix 1x given per order, purewick in place, fall precautions in place, will continue with plan of care   Goal Outcome Evaluation:  Plan of Care Reviewed With: patient        Progress: no change  Outcome Evaluation: Bp slightly elevated,patient tolerating high flow O2 turned down by  to 80% 40 liters, IV Lasix 1x given per order, purewick in place, fall precautions in place, will continue with plan of care

## 2023-04-26 NOTE — PLAN OF CARE
Goal Outcome Evaluation:  Plan of Care Reviewed With: patient        Progress: no change  Outcome Evaluation: New palliative consult for assistance with GOC and symptom management per Dr. Blanton.  No ACP on file.  NOK are patient's sons Kalyan Li and Sebastian Ramsey.  Dr. Castrejon saw pt this afternoon and discussed GOC.  Palliative care introduced and brochure provided.  Pt. endorsed right shoulder pain at 2/10 ongoing for years related to RA.  Pt. stated she normally takes humira injections and is on plaquinil but is off of these meds due to infection.  She stated that the high dose steroids does help with the joint pain.  Pt. is on HFNC and stated her dyspnea is better than it was when she initially entered the hospital.  Her appetite is not very good but she is not nauseated.  Her daughter- in- law Laurel is at bedside.  Dr. Castrejon added Prn xanax for anxiety and IV morphine for dyspnea.  Plan to monitor for the next day or two.  Palliative care to follow for support, POC and ongoing GOC.      Problem: Palliative Care  Goal: Enhanced Quality of Life  Intervention: Promote Advance Care Planning  Flowsheets (Taken 4/26/2023 1537)  Life Transition/Adjustment:   palliative care initiated   palliative care discussed

## 2023-04-26 NOTE — CONSULTS
Sg Horne MD  Consulting physician: Harvinder    Chief Complaint   Patient presents with   • Shortness of Breath       Reason for consult: [facility]    HPI: Patient is a 72-year-old female brought hospital due to dyspnea.  Patient been found to have multifocal pneumonia as well as interstitial lung disease like related to her RA.  Patient states that prior to his hospitalization she is completely independent and was not using home oxygen.  Since coming to hospital patient has had a dramatic decline in her respiratory status to the point where now she requires high flow oxygen.  Apparently last night the patient was having a severe amount of distress secondary to anxiety as well as dyspnea.  Patient states that her dyspnea seems to be somewhat better today but the patient's daughter-in-law, who is at bedside, reports that the patient continues to have some anxiety.  On top of this the daughter-in-law states that this morning she did note some confusion and the patient, which she states the patient has never had in the past.    Pain assesment: Denies    Dyspnea: Positive    N/V: Denies    PPS: 40      Past Medical History:   Diagnosis Date   • Anemia    • Back pain    • Bronchitis    • Hypertension    • Low back pain    • Pulmonary fibrosis    • Rheumatoid arthritis      Past Surgical History:   Procedure Laterality Date   • DILATION AND CURETTAGE, DIAGNOSTIC / THERAPEUTIC       Current Code Status     Date Active Code Status Order ID Comments User Context       4/23/2023 1310 No CPR (Do Not Attempt to Resuscitate) [id number]  Elias Morales MD Inpatient      Question Answer    Code Status (Patient has no pulse and is not breathing) No CPR (Do Not Attempt to Resuscitate)    Medical Interventions (Patient has pulse or is breathing) Limited Support    Medical Intervention Limits: NO intubation (DNI)    Level Of Support Discussed With Patient              Current Facility-Administered Medications   Medication Dose Route  Frequency Provider Last Rate Last Admin   • acetaminophen (TYLENOL) tablet 650 mg  650 mg Oral Q4H PRN Elias Morales MD   650 mg at 04/25/23 1518    Or   • acetaminophen (TYLENOL) 160 MG/5ML solution 650 mg  650 mg Oral Q4H PRN Elias Morales MD        Or   • acetaminophen (TYLENOL) suppository 650 mg  650 mg Rectal Q4H PRN Elias Morales MD       • ALPRAZolam (XANAX) tablet 0.25 mg  0.25 mg Oral BID PRN Ramón Blanton MD       • ALPRAZolam (XANAX) tablet 0.25 mg  0.25 mg Oral TID PRN Wojciech Castrejon, DO       • aluminum-magnesium hydroxide-simethicone (MAALOX MAX) 400-400-40 MG/5ML 30 mL, Lidocaine Viscous HCl (XYLOCAINE) 2 % 15 mL suspension   Oral BID PRN Ramón Blanton MD   Given at 04/25/23 1518   • amLODIPine (NORVASC) tablet 5 mg  5 mg Oral Q24H Elias Morales MD   5 mg at 04/26/23 0857    And   • valsartan (DIOVAN) tablet 160 mg  160 mg Oral Q24H Elias Morales MD   160 mg at 04/26/23 0857   • [START ON 4/27/2023] azithromycin (ZITHROMAX) 250 mg in sodium chloride 0.9 % 250 mL IVPB  250 mg Intravenous Q24H Ramón Blanton MD       • AZITHROMYCIN 500 MG/250 ML 0.9% NS IVPB (vial-mate)  500 mg Intravenous Q24H Elias Morales MD   500 mg at 04/25/23 1229   • calcium carbonate (TUMS) chewable tablet 500 mg (200 mg elemental)  2 tablet Oral TID PRN Emelia Saucedo APRN   2 tablet at 04/25/23 0242   • cetirizine (zyrTEC) tablet 10 mg  10 mg Oral Daily Ramón Blanton MD   10 mg at 04/25/23 1520   • fluticasone (FLONASE) 50 MCG/ACT nasal spray 2 spray  2 spray Each Nare Nightly Ramón Blanton MD       • guaiFENesin (MUCINEX) 12 hr tablet 1,200 mg  1,200 mg Oral Q12H Ramón Blanton MD   1,200 mg at 04/26/23 0851   • heparin (porcine) 5000 UNIT/ML injection 5,000 Units  5,000 Units Subcutaneous Q8H Elias Morales MD   5,000 Units at 04/26/23 0508   • hydrOXYzine (ATARAX) tablet 25 mg  25 mg Oral Q6H PRN Morgan Baker MD       • ipratropium-albuterol (DUO-NEB)  nebulizer solution 3 mL  3 mL Nebulization 4x Daily - RT Elias Morales MD   3 mL at 04/26/23 1225   • ipratropium-albuterol (DUO-NEB) nebulizer solution 3 mL  3 mL Nebulization Q4H PRN Caren Dominguez APRN   3 mL at 04/1950   • Magnesium Standard Dose Replacement - Follow Nurse / BPA Driven Protocol   Does not apply PRN Ramón Blanton MD       • melatonin tablet 5 mg  5 mg Oral Nightly Ramón Blanton MD       • methylPREDNISolone sodium succinate (SOLU-Medrol) 1,000 mg in sodium chloride 0.9 % 100 mL IVPB  1,000 mg Intravenous Q24H Bernard Aguila  mL/hr at 04/25/23 1519 1,000 mg at 04/25/23 1519   • montelukast (SINGULAIR) tablet 10 mg  10 mg Oral Daily Elias Morales MD   10 mg at 04/25/23 0850   • morphine injection 2 mg  2 mg Intravenous Q1H PRN Wojciech Castrejon DO       • ondansetron (ZOFRAN) tablet 4 mg  4 mg Oral Q6H PRN Elias Morales MD        Or   • ondansetron (ZOFRAN) injection 4 mg  4 mg Intravenous Q6H PRN Elias Morales MD       • pantoprazole (PROTONIX) EC tablet 40 mg  40 mg Oral Daily Elias Morales MD   40 mg at 04/26/23 0851   • piperacillin-tazobactam (ZOSYN) 3.375 g in iso-osmotic dextrose 50 ml (premix)  3.375 g Intravenous Q8H Elias Morales MD   3.375 g at 04/26/23 0858   • simethicone (MYLICON) chewable tablet 80 mg  80 mg Oral 4x Daily PRN Ramón Blanton MD       • sodium chloride 0.9 % flush 10 mL  10 mL Intravenous PRN Jose R Castellon MD       • sodium chloride 0.9 % flush 10 mL  10 mL Intravenous Q12H Elias Morales MD   10 mL at 04/25/23 2138   • sodium chloride 0.9 % flush 10 mL  10 mL Intravenous PRN Elias Morales MD       • sodium chloride 0.9 % infusion 40 mL  40 mL Intravenous PRN Elias Morales MD       • sulfamethoxazole-trimethoprim (BACTRIM DS,SEPTRA DS) 800-160 MG per tablet 1 tablet  1 tablet Oral Once per day on Mon Wed Fri West, Ramón AVILA MD   1 tablet at 04/26/23 0856   • tiZANidine (ZANAFLEX) tablet 2 mg  2 mg  "Oral Q8H PRN Ramón Blanton MD            •  acetaminophen **OR** acetaminophen **OR** acetaminophen  •  ALPRAZolam  •  ALPRAZolam  •  GI cocktail  •  calcium carbonate  •  hydrOXYzine  •  ipratropium-albuterol  •  Magnesium Standard Dose Replacement - Follow Nurse / BPA Driven Protocol  •  Morphine  •  ondansetron **OR** ondansetron  •  simethicone  •  sodium chloride  •  sodium chloride  •  sodium chloride  •  tiZANidine  No Known Allergies  Family History   Problem Relation Age of Onset   • Heart failure Mother    • Stroke Mother    • Cancer Father    • Diabetes Son    • Diabetes Son      Social History     Socioeconomic History   • Marital status:    Tobacco Use   • Smoking status: Never   • Smokeless tobacco: Never   Vaping Use   • Vaping Use: Never used   Substance and Sexual Activity   • Alcohol use: No   • Drug use: Never   • Sexual activity: Defer     Review of Systems -all other reviewed and found negative except as mentioned in the HPI      /66   Pulse 90   Temp 98.7 °F (37.1 °C) (Oral)   Resp 18   Ht 153.7 cm (60.5\")   Wt 80.3 kg (177 lb)   SpO2 95%   BMI 34.00 kg/m²     Intake/Output Summary (Last 24 hours) at 4/26/2023 1244  Last data filed at 4/26/2023 1220  Gross per 24 hour   Intake --   Output 2525 ml   Net -2525 ml     Physical Exam:      General Appearance:    Alert, cooperative, conversational dyspnea noted   Head:    Normocephalic, without obvious abnormality, atraumatic   Eyes:            Lids and lashes normal, conjunctivae and sclerae normal, no   icterus, no pallor, corneas clear, PERRLA   Ears:    Ears appear intact with no abnormalities noted   Throat:   No oral lesions, no thrush, oral mucosa moist   Neck:   No adenopathy, supple, trachea midline, no thyromegaly, no     carotid bruit, no JVD   Back:     No kyphosis present, no scoliosis present, no skin lesions,       erythema or scars, no tenderness to percussion or                   palpation,   range of " motion normal   Lungs:    Diminished breath sounds throughout    Heart:    Regular rhythm and normal rate, normal S1 and S2, no            murmur, no gallop, no rub, no click   Breast Exam:    Deferred   Abdomen:     Normal bowel sounds, no masses, no organomegaly, soft        non-tender, non-distended, no guarding, no rebound                 tenderness   Genitalia:    Deferred   Extremities:   Moves all extremities well, no edema, no cyanosis, no              redness   Pulses:   Pulses palpable and equal bilaterally   Skin:   No bleeding, bruising or rash   Lymph nodes:   No palpable adenopathy   Neurologic:   Cranial nerves 2 - 12 grossly intact, sensation intact, DTR        present and equal bilaterally       Results from last 7 days   Lab Units 04/26/23  0532   WBC 10*3/mm3 23.58*   HEMOGLOBIN g/dL 10.8*   HEMATOCRIT % 34.0   PLATELETS 10*3/mm3 739*     Results from last 7 days   Lab Units 04/26/23  0532 04/25/23  0747   SODIUM mmol/L 136 132*   POTASSIUM mmol/L 4.3 4.4   CHLORIDE mmol/L 98 98   CO2 mmol/L 23.0 19.0*   BUN mg/dL 16 13   CREATININE mg/dL 0.63 0.60   CALCIUM mg/dL 9.3 9.3   BILIRUBIN mg/dL  --  0.2   ALK PHOS U/L  --  185*   ALT (SGPT) U/L  --  32   AST (SGOT) U/L  --  41*   GLUCOSE mg/dL 147* 149*     Results from last 7 days   Lab Units 04/26/23  0532   SODIUM mmol/L 136   POTASSIUM mmol/L 4.3   CHLORIDE mmol/L 98   CO2 mmol/L 23.0   BUN mg/dL 16   CREATININE mg/dL 0.63   GLUCOSE mg/dL 147*   CALCIUM mg/dL 9.3     Imaging Results (Last 72 Hours)     Procedure Component Value Units Date/Time    XR Chest 1 View [[id number]] Collected: 04/26/23 0748     Updated: 04/26/23 0752    Narrative:      XR CHEST 1 VW    Date of Exam: 4/26/2023 3:55 AM EDT    Indication: f/u respiratory illness    Comparison: 4/25/2023    Findings:  The heart appears enlarged. There is indistinctness of the pulmonary vasculature. Patchy airspace disease is seen within the left upper lobe and the right lower lobe as well as  the left lower lobe, similar compared to the previous study. No significant   pleural effusion identified. No pneumothorax. No acute osseous abnormality identified.      Impression:      Impression:  No significant change. Redemonstration of findings consistent with multifocal pneumonia.      Electronically Signed: Rizwana Miles    4/26/2023 7:49 AM EDT    Workstation ID: [id number]    XR Chest 1 View [[id number]] Collected: 04/25/23 2008     Updated: 04/25/23 2012    Narrative:      XR CHEST 1 VW    Date of Exam: 4/25/2023 7:44 PM EDT    Indication: dyspnea    Comparison: 4/25/2023 at 0912 hours    Findings:  Multifocal airspace infiltrates are again seen throughout the lungs bilaterally with interstitial changes. There is mild improvement in aeration when compared to the prior. The cardiac silhouette and mediastinum are stable.      Impression:      Impression:  Mild improvement in aeration throughout the lungs bilaterally.      Electronically Signed: Johnathon Walker    4/25/2023 8:09 PM EDT    Workstation ID: [id number]    XR Chest 1 View [[id number]] Collected: 04/25/23 1045     Updated: 04/25/23 1049    Narrative:      XR CHEST 1 VW    Date of Exam: 4/25/2023 8:57 AM EDT    Indication: f/u respiratory illness    Comparison: CT 4/23/2023.    Findings:  Extensive multifocal bilateral airspace disease appears similar to CT comparison, with background fibrotic changes also present. There is no new focal lobar consolidation. There is no enlarging effusion or distinct pneumothorax. Unchanged heart and   mediastinal contours.      Impression:      Impression:  Extensive multifocal bilateral airspace disease appears similar to CT comparison, with background fibrotic changes also present. There is no new focal lobar consolidation. There is no enlarging effusion or distinct pneumothorax. Unchanged heart and   mediastinal contours.        Electronically Signed: Oleksandr Sewell    4/25/2023 10:46 AM EDT    Workstation ID: [id number]     CT Chest Without Contrast Diagnostic [[id number]] Collected: 04/23/23 1547     Updated: 04/23/23 1557    Narrative:      CT CHEST WO CONTRAST DIAGNOSTIC    Date of Exam: 4/23/2023 3:34 PM EDT    Indication: Pneumonia, complication suspected, xray done.    Comparison: Chest x-ray 4/23/2023, high-resolution chest CT 3/20/2023    Technique: Axial CT images were obtained of the chest without contrast administration.  Reconstructed coronal and sagittal images were also obtained. Automated exposure control and iterative construction methods were used.      Findings:  Hilum and Mediastinum: Severe coronary artery atherosclerotic calcification. Enlarged hilar mediastinal lymph nodes. AP window lymph node measures 1 cm short axis image #28. Precarinal lymph node measures 1 cm short axis measurement 29. No pericardial   effusion. Moderate size hiatal hernia. Thoracic aorta and pulmonary arteries are normal in caliber.    Lung Parenchyma and Pleura: No focal consolidations. No suspicious pulmonary nodules. No endobronchial lesions. No significant pleural effusions.    Upper Abdomen: Unremarkable.    Soft tissues: Unremarkable.    Osseous structures: No aggressive focal lytic or sclerotic osseous lesions. Osteopenia. Compression deformity of a midthoracic vertebral body, unchanged since 2016.      Impression:      1.There is diffuse airspace opacity superimposed on chronic interstitial lung disease. The focal pneumonia most likely. Correlate for possible viral pneumonia such as COVID-19 or influenza. Bacterial pneumonia less likely.  2.Moderate size hiatal hernia.  3.Stable compression deformity mid thoracic spine. Osteopenia.  4.Enlarged mediastinal lymph nodes likely reactive. Follow-up CT chest with contrast in 3 months time is recommended.      Electronically Signed: Shelley Ulloa    4/23/2023 3:54 PM EDT    Workstation ID: [id number]        Impression: Interstitial lung disease  Pneumonia  Respiratory  distress  Dyspnea  Anxiety  Goals of care  Plan: Patient's CODE STATUS has been adjusted to be DNR/DNI which I agree with.  Overall from a goals of care standpoint the plan is to see how the patient does over the next 1 to 2 days and have further discussions based on how she progresses.  Did discuss with the daughter and all that this is very concerning that the patient may start declining further.  Again we will have further discussion based on how the patient responds to treatment.  I will go ahead and add some as needed Xanax as well as some as needed morphine to help with the patient's anxiety and dyspnea.  We will make adjustments with this as we proceed forward based on symptoms.        Wojciech Castrejon DO  04/26/23  12:44 EDT

## 2023-04-26 NOTE — PROGRESS NOTES
[facility]  PROGRESS NOTE    Patient Name: Tala Ramsey  : 1950  MRN: [id number]    Date of Admission: 2023  Primary Care Physician: Sg Horne MD    Subjective   Subjective     CC:  Hypoxia, pneumonia    HPI:  Worsening resp status overnight, requiring increased supplemental oxygen, now on 99% fio2    ROS:  No n/v/d  No rash    Objective   Objective     Vital Signs:   Temp:  [97.7 °F (36.5 °C)-98.4 °F (36.9 °C)] 97.7 °F (36.5 °C)  Heart Rate:  [] 83  Resp:  [16-20] 18  BP: (139-177)/() 162/101  Flow (L/min):  [40-60] 40     Physical Exam:  Constitutional:Alert, oriented x 3, nontoxic appearing but ill appearing, hi flow cnaula in place, sitting upright in bed, no overt distress  Psych:Normal/appropriate affect  HEENT:NCAT, oropharynx clear  Neck: neck supple, full range of motion  Neuro: Face symmetric, speech clear, equal , moves all extremities  Cardiac: RRR; No pretibial pitting edema  Resp: very faint scattered bilateral mid insp crackles noted, no distress  GI: abd soft, nontender  Skin: No extremity rash  Musculoskeletal/extremities: no cyanosis of extremities; no significant ankle edema        Results Reviewed:  LAB RESULTS:      Lab 23  0532 237 23  0747 23  0647 23  1048   WBC 23.58*  --  15.74* 12.84* 14.06*   HEMOGLOBIN 10.8*  --  10.8* 10.2* 11.5*   HEMATOCRIT 34.0  --  33.1* 30.8* 35.2   PLATELETS 739*  --  624* 520* 592*   NEUTROS ABS  --   --   --  10.50* 11.33*   IMMATURE GRANS (ABS)  --   --   --  0.17* 0.22*   LYMPHS ABS  --   --   --  1.14 1.50   MONOS ABS  --   --   --  0.65 0.79   EOS ABS  --   --   --  0.31 0.14   MCV 90.9  --  89.2 90.1 89.6   CRP 6.04*  --  14.70* 15.21*  --    PROCALCITONIN  --   --   --  0.05  --    LACTATE  --   --   --   --  1.1   D DIMER QUANT  --  1.88*  --   --   --          Lab 23  0532 23  0747 23  0647 23  1048   SODIUM  136 132* 130* 129*   POTASSIUM 4.3 4.4 4.1 4.4   CHLORIDE 98 98 95* 92*   CO2 23.0 19.0* 24.0 23.0   ANION GAP 15.0 15.0 11.0 14.0   BUN 16 13 12 14   CREATININE 0.63 0.60 0.63 0.50*   EGFR 93.8 94.9 93.8 99.2   GLUCOSE 147* 149* 113* 104*   CALCIUM 9.3 9.3 8.3* 9.0   MAGNESIUM 2.8* 2.5*  --   --          Lab 04/25/23  0747 04/24/23  0647 04/23/23  1048   TOTAL PROTEIN 6.7 6.8 7.4   ALBUMIN 3.5 3.3* 3.5   GLOBULIN 3.2 3.5 3.9   ALT (SGPT) 32 13 10   AST (SGOT) 41* 22 23   BILIRUBIN 0.2 0.3 0.2   ALK PHOS 185* 130* 109         Lab 04/25/23 2212 04/25/23 1957 04/25/23  0747 04/23/23  1048   PROBNP  --  360.4 210.6 163.6   HSTROP T 10* 12*  --  8                 Lab 04/25/23  1948   PH, ARTERIAL 7.457*   PCO2, ARTERIAL 35.2   PO2 .0   FIO2 100   HCO3 ART 24.8   BASE EXCESS ART 1.2     Brief Urine Lab Results  (Last result in the past 365 days)      Color   Clarity   Blood   Leuk Est   Nitrite   Protein   CREAT   Urine HCG        04/23/23 1757 Yellow   Clear   Trace   Negative   Negative   Negative                 Microbiology Results Abnormal     Procedure Component Value - Date/Time    Blood Culture - Blood, Arm, Right [[id number]]  (Normal) Collected: 04/23/23 1125    Lab Status: Preliminary result Specimen: Blood from Arm, Right Updated: 04/25/23 1145     Blood Culture No growth at 2 days    Blood Culture - Blood, Arm, Left [[id number]]  (Normal) Collected: 04/23/23 1125    Lab Status: Preliminary result Specimen: Blood from Arm, Left Updated: 04/25/23 1145     Blood Culture No growth at 2 days    S. Pneumo Ag Urine or CSF - Urine, Urine, Clean Catch [[id number]]  (Normal) Collected: 04/23/23 1757    Lab Status: Final result Specimen: Urine, Clean Catch Updated: 04/24/23 0949     Strep Pneumo Ag Negative    Legionella Antigen, Urine - Urine, Urine, Clean Catch [[id number]]  (Normal) Collected: 04/23/23 7806    Lab Status: Final result Specimen: Urine, Clean Catch Updated: 04/24/23 0949     LEGIONELLA ANTIGEN,  URINE Negative    Respiratory Panel PCR w/COVID-19(SARS-CoV-2) CHARI/MARISSA/APOLINAR/PAD/COR/MAD/AMY In-House, NP Swab in UTM/VTM, 3-4 HR TAT - Swab, Nasopharynx [[id number]]  (Normal) Collected: 04/24/23 0544    Lab Status: Final result Specimen: Swab from Nasopharynx Updated: 04/24/23 0647     ADENOVIRUS, PCR Not Detected     Coronavirus 229E Not Detected     Coronavirus HKU1 Not Detected     Coronavirus NL63 Not Detected     Coronavirus OC43 Not Detected     COVID19 Not Detected     Human Metapneumovirus Not Detected     Human Rhinovirus/Enterovirus Not Detected     Influenza A PCR Not Detected     Influenza B PCR Not Detected     Parainfluenza Virus 1 Not Detected     Parainfluenza Virus 2 Not Detected     Parainfluenza Virus 3 Not Detected     Parainfluenza Virus 4 Not Detected     RSV, PCR Not Detected     Bordetella pertussis pcr Not Detected     Bordetella parapertussis PCR Not Detected     Chlamydophila pneumoniae PCR Not Detected     Mycoplasma pneumo by PCR Not Detected    Narrative:      In the setting of a positive respiratory panel with a viral infection PLUS a negative procalcitonin without other underlying concern for bacterial infection, consider observing off antibiotics or discontinuation of antibiotics and continue supportive care. If the respiratory panel is positive for atypical bacterial infection (Bordetella pertussis, Chlamydophila pneumoniae, or Mycoplasma pneumoniae), consider antibiotic de-escalation to target atypical bacterial infection.    MRSA Screen, PCR (Inpatient) - Swab, Nares [[id number]]  (Normal) Collected: 04/23/23 1540    Lab Status: Final result Specimen: Swab from Nares Updated: 04/23/23 1651     MRSA PCR Negative    Narrative:      The negative predictive value of this diagnostic test is high and should only be used to consider de-escalating anti-MRSA therapy. A positive result may indicate colonization with MRSA and must be correlated clinically.  MRSA Negative    COVID PRE-OP /  PRE-PROCEDURE SCREENING ORDER (NO ISOLATION) - Swab, Nasopharynx [[id number]]  (Normal) Collected: 04/23/23 1125    Lab Status: Final result Specimen: Swab from Nasopharynx Updated: 04/23/23 1225    Narrative:      The following orders were created for panel order COVID PRE-OP / PRE-PROCEDURE SCREENING ORDER (NO ISOLATION) - Swab, Nasopharynx.  Procedure                               Abnormality         Status                     ---------                               -----------         ------                     COVID-19, FLU A/B, RSV P...[[id number]]  Normal              Final result                 Please view results for these tests on the individual orders.    COVID-19, FLU A/B, RSV PCR - Swab, Nasopharynx [[id number]]  (Normal) Collected: 04/23/23 1125    Lab Status: Final result Specimen: Swab from Nasopharynx Updated: 04/23/23 1225     COVID19 Not Detected     Influenza A PCR Not Detected     Influenza B PCR Not Detected     RSV, PCR Not Detected    Narrative:      Fact sheet for providers: https://www.fda.gov/media/321852/download    Fact sheet for patients: https://www.fda.gov/media/760437/download    Test performed by PCR.          XR Chest 1 View    Result Date: 4/26/2023  XR CHEST 1 VW Date of Exam: 4/26/2023 3:55 AM EDT Indication: f/u respiratory illness Comparison: 4/25/2023 Findings: The heart appears enlarged. There is indistinctness of the pulmonary vasculature. Patchy airspace disease is seen within the left upper lobe and the right lower lobe as well as the left lower lobe, similar compared to the previous study. No significant pleural effusion identified. No pneumothorax. No acute osseous abnormality identified.     Impression: Impression: No significant change. Redemonstration of findings consistent with multifocal pneumonia. Electronically Signed: Rizwana Miles  4/26/2023 7:49 AM EDT  Workstation ID: [id number]    XR Chest 1 View    Result Date: 4/25/2023  XR CHEST 1 VW Date of Exam: 4/25/2023  7:44 PM EDT Indication: dyspnea Comparison: 4/25/2023 at 0912 hours Findings: Multifocal airspace infiltrates are again seen throughout the lungs bilaterally with interstitial changes. There is mild improvement in aeration when compared to the prior. The cardiac silhouette and mediastinum are stable.     Impression: Impression: Mild improvement in aeration throughout the lungs bilaterally. Electronically Signed: Johnathon Walker  4/25/2023 8:09 PM EDT  Workstation ID: [id number]    XR Chest 1 View    Result Date: 4/25/2023  XR CHEST 1 VW Date of Exam: 4/25/2023 8:57 AM EDT Indication: f/u respiratory illness Comparison: CT 4/23/2023. Findings: Extensive multifocal bilateral airspace disease appears similar to CT comparison, with background fibrotic changes also present. There is no new focal lobar consolidation. There is no enlarging effusion or distinct pneumothorax. Unchanged heart and mediastinal contours.     Impression: Impression: Extensive multifocal bilateral airspace disease appears similar to CT comparison, with background fibrotic changes also present. There is no new focal lobar consolidation. There is no enlarging effusion or distinct pneumothorax. Unchanged heart and mediastinal contours. Electronically Signed: Oleksandr Sewell  4/25/2023 10:46 AM EDT  Workstation ID: [id number]          Current medications:  Scheduled Meds:amLODIPine, 5 mg, Oral, Q24H   And  valsartan, 160 mg, Oral, Q24H  [START ON 4/27/2023] azithromycin, 250 mg, Intravenous, Q24H  azithromycin, 500 mg, Intravenous, Q24H  cetirizine, 10 mg, Oral, Daily  fluticasone, 2 spray, Each Nare, Nightly  furosemide, 20 mg, Intravenous, Once  guaiFENesin, 1,200 mg, Oral, Q12H  heparin (porcine), 5,000 Units, Subcutaneous, Q8H  ipratropium-albuterol, 3 mL, Nebulization, 4x Daily - RT  methylPREDNISolone sodium succinate, 1,000 mg, Intravenous, Q24H  montelukast, 10 mg, Oral, Daily  pantoprazole, 40 mg, Oral, Daily  piperacillin-tazobactam, 3.375 g,  "Intravenous, Q8H  sodium chloride, 10 mL, Intravenous, Q12H  sulfamethoxazole-trimethoprim, 1 tablet, Oral, Once per day on Mon Wed Fri      Continuous Infusions:   PRN Meds:.•  acetaminophen **OR** acetaminophen **OR** acetaminophen  •  GI cocktail  •  calcium carbonate  •  hydrOXYzine  •  ipratropium-albuterol  •  Magnesium Standard Dose Replacement - Follow Nurse / BPA Driven Protocol  •  ondansetron **OR** ondansetron  •  simethicone  •  sodium chloride  •  sodium chloride  •  sodium chloride  •  tiZANidine    Assessment & Plan   Assessment & Plan     Active Hospital Problems    Diagnosis  POA   • **Multifocal pneumonia [J18.9]  Yes   • Pulmonary fibrosis [J84.10]  Yes   • ILD (interstitial lung disease) -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        Brief Hospital Course to date:  Tala Ramsey is a 72 y.o. female w/ hx interstitial lung dz, RA (on humira until recently, recent steroid injections) who presents w/ progressive dyspnea, cough over ~10 days, failed levaquin. Hypoxic upon admission. Ct chest w/o contrast w/ bilateral multifocal air space dz. resp pcr panel negative. Had progressive hypoxia evening after admission placed on hi flow canula    Acute hypoxic resp failure, owrsening  Pulm fibrosis  Bilateral airspace dz/pneumonia  -has been on levaquin last week  -unclear if progession/\"flare\" of interstitial lung dz vs infection  -continue day #4 zosyn & z-max; added thrice weekly bactrim ds (pcp prophylaxis as has been on humira and steroids)  -continue scheduled & prn duonebs  -Pulm following: continue on pulse steroids 1g solumedrol day #3/3. Further steroids per pulmonary  -repeat lasix 20mg iv today & prn  -oxygenation worse today, now requiring 99% fio2 on hi flow canula (up from 75% fio2 yesterday).    **Long d/w " "patient today 4/26/23 along w/ daughter in law bedside. Patient still wishes to be dnr/dni. I will ask palliative care to follow. Guarded prognosis at best as trajectory is poor at this point.      RA  -holding humira  -has received steroid \"shots\" recently  -hold plaquenil    Hyponatremia,resolved  -improved, 129 on admisison; now resolved    HTN  -continue amlodpine/valsartan    Chronic anemia   -monitor    4 Am labs: cbc,cmp,crp,mag (& daily cxr)    **addendum @ 6534**:  -went by to visit again this afternoon. Patient's hi flow canula now turned down to 78% fio2 (from 99% fio2 this morning). Reasonably comfortable, but does have some anxiety and muscular back pain for which xanax and zanaflex has been ordered. Daughter in law also present. Will see how next 1-2 days progress.  -again, patient is DNR/DNI (in event of further clinical decompensation patient would NOT want mechanical ventilation; in this case patient would wish to pursue comfort measures; patient's son and daughter in law have witnessed these discussions and agreed)        Expected Discharge Location and Transportation: home  Expected Discharge ? 4/29 (when oxygenation improved)  No data recorded     DVT prophylaxis:  Medical DVT prophylaxis orders are present. sq heparin    AM-PAC 6 Clicks Score (PT): 13 (04/26/23 0855)    CODE STATUS:   Code Status and Medical Interventions:   Ordered at: 04/23/23 1310     Medical Intervention Limits:    NO intubation (DNI)     Level Of Support Discussed With:    Patient     Code Status (Patient has no pulse and is not breathing):    No CPR (Do Not Attempt to Resuscitate)     Medical Interventions (Patient has pulse or is breathing):    Limited Support       Ramón Blanton MD  04/26/23      "

## 2023-04-26 NOTE — CASE MANAGEMENT/SOCIAL WORK
Continued Stay Note  [facility]     Patient Name: Tala Ramsey  MRN: [id number]  Today's Date: 4/26/2023    Admit Date: 4/23/2023    Plan:  update   Discharge Plan     Row Name 04/26/23 1442       Plan    Plan CM update    Plan Comments Patient is not ready for discharge at this time. She remains on HF 02 at 99%. CM will continue to follow for PT/OT recommendations once her 02 demands decrease - derrick [phone]               Discharge Codes    No documentation.               Expected Discharge Date and Time     Expected Discharge Date Expected Discharge Time    May 1, 2023             Derrick Riggs RN

## 2023-04-26 NOTE — PROGRESS NOTES
"Pulmonary Hospital Consult Note     LOS: 3 days   Patient Care Team:  Sg Horne MD as PCP - General (Family Medicine)  Laurent Tomlinson MD as Consulting Physician (Pulmonary Disease)    Chief Complaint:    Chief Complaint   Patient presents with   • Shortness of Breath     Subjective     HPI:     Initial history (from pulmonary consult note 4/24/2023):    72 y.o. female with history of rheumatoid arthritis/inflammatory arthritis and possible lupus on Humira and Plaquenil followed by Dr. Weinstein.  Also with history of iron deficiency anemia, hiatal hernia, GERD, hypertension.  She saw Dr. Tomlinson for pulmonary fibrosis in August 2022 and again in December 2022.  At that time, she had a normal spirometry with mild restriction and decreased DLCO.  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    Patient in the past few weeks reported some URI type symptoms followed by worsening dyspnea.  She actually held her Humira.  She saw her PCP about 10 days before the admission reporting \"ulcers in my mouth\", and throat discomfort and was treated with amoxicillin.  Subsequently was seen and treated with Levaquin for possible pneumonia and given a steroid injection.  She has had progressive dyspnea.    She presented to the hospital on 4/23/2023 and was subsequently admitted to the hospitalist service for bilateral infiltrates/possible pneumonia and started on azithromycin and Zosyn.    Procalcitonin was within normal limits.  White blood cell count was elevated at 14.  She had hyponatremia with a sodium of 129.  Respiratory PCR panel was negative.  Blood cultures pending.  She has not been able to produce any sputum.    Patient has not had any significant improvement since admission.  She denies fevers or chills.    Interval history: " Patient's symptoms are about the same although she had a rough night and apparently required BiPAP.  She was on 100% high flow nasal cannula when I saw her but I was able to bump her down to 80% high flow with oxygen saturations running about 90 to 92%.  I told nursing I am okay with 88-92 for an oxygen saturation.      History taken from: patient    Past Medical History:   Diagnosis Date   • Anemia    • Back pain    • Bronchitis    • Hypertension    • Low back pain    • Pulmonary fibrosis    • Rheumatoid arthritis        Past Surgical History:   Procedure Laterality Date   • DILATION AND CURETTAGE, DIAGNOSTIC / THERAPEUTIC         Family History   Problem Relation Age of Onset   • Heart failure Mother    • Stroke Mother    • Cancer Father    • Diabetes Son    • Diabetes Son        Social History     Socioeconomic History   • Marital status:    Tobacco Use   • Smoking status: Never   • Smokeless tobacco: Never   Vaping Use   • Vaping Use: Never used   Substance and Sexual Activity   • Alcohol use: No   • Drug use: Never   • Sexual activity: Defer       No Known Allergies    PMH/FH/SocH were reviewed by me and updates were made.     Review of Systems:    All other systems were reviewed and are negative.  Exceptions are noted in subjective or below.    Objective     Vital Signs  Temp:  [97.7 °F (36.5 °C)-98.7 °F (37.1 °C)] 98.5 °F (36.9 °C)  Heart Rate:  [] 88  Resp:  [16-20] 18  BP: (145-169)/() 145/79    Physical Exam:     Constitutional:    Alert, cooperative, in no acute distress   Head:    Normocephalic, without obvious abnormality, atraumatic   Eyes:            Lids and lashes normal, conjunctivae and sclerae normal, no   icterus, no pallor, corneas clear, PER   ENMT:   Ears appear intact with no abnormalities noted         Neck:  Trachea midline, no thyromegaly, no JVD       Lungs/Resp:    Moderately increased effort, symmetric chest rise, no crepitus, mild bilateral diffuse rales, no chest  wall tenderness                 Heart/CV:    Regular rhythm and normal rate, normal S1 and S2, no            murmur   Abdomen/GI:     Normal bowel sounds, no masses, no organomegaly, soft,        non-tender, non-distended   :     Deferred   Extremities/MSK:   No clubbing, cyanosis or edema.  No deformities.    Pulses:   Pulses palpable and equal bilaterally   Skin:   No bleeding, bruising or rash   Hem/Lymph:   No cervical or supraclavicular adenopathy.    Neurologic:   Moves all extremities with no obvious focal motor deficit.  Cranial nerves 2 - 12 grossly intact             Psychiatric: Normal mood and affect, oriented x 3.   The above findings are documentation my personal physical examination from today.  Electronically signed by:Bernard Aguila MD 04/26/23 17:51 EDT     Results Review:     I reviewed the patient's new clinical results.   Results from last 7 days   Lab Units 04/26/23  0532 04/25/23  0747 04/24/23  0647 04/23/23  1048   SODIUM mmol/L 136 132* 130* 129*   POTASSIUM mmol/L 4.3 4.4 4.1 4.4   CHLORIDE mmol/L 98 98 95* 92*   CO2 mmol/L 23.0 19.0* 24.0 23.0   BUN mg/dL 16 13 12 14   CREATININE mg/dL 0.63 0.60 0.63 0.50*   CALCIUM mg/dL 9.3 9.3 8.3* 9.0   BILIRUBIN mg/dL  --  0.2 0.3 0.2   ALK PHOS U/L  --  185* 130* 109   ALT (SGPT) U/L  --  32 13 10   AST (SGOT) U/L  --  41* 22 23   GLUCOSE mg/dL 147* 149* 113* 104*     Results from last 7 days   Lab Units 04/26/23  0532 04/25/23  0747 04/24/23  0647   WBC 10*3/mm3 23.58* 15.74* 12.84*   HEMOGLOBIN g/dL 10.8* 10.8* 10.2*   HEMATOCRIT % 34.0 33.1* 30.8*   PLATELETS 10*3/mm3 739* 624* 520*     Results from last 7 days   Lab Units 04/25/23  1948   PH, ARTERIAL pH units 7.457*   PO2 ART mm Hg 103.0   PCO2, ARTERIAL mm Hg 35.2   HCO3 ART mmol/L 24.8       I reviewed the patient's new imaging including images and reports.    Chest x-ray today shows essentially stable diffuse bilateral interstitial/airspace disease.    Medication Review:   amLODIPine,  "5 mg, Oral, Q24H   And  valsartan, 160 mg, Oral, Q24H  [START ON 4/27/2023] azithromycin, 250 mg, Intravenous, Q24H  azithromycin, 500 mg, Intravenous, Q24H  cetirizine, 10 mg, Oral, Daily  fluticasone, 2 spray, Each Nare, Nightly  guaiFENesin, 1,200 mg, Oral, Q12H  heparin (porcine), 5,000 Units, Subcutaneous, Q8H  ipratropium-albuterol, 3 mL, Nebulization, 4x Daily - RT  melatonin, 5 mg, Oral, Nightly  montelukast, 10 mg, Oral, Daily  pantoprazole, 40 mg, Oral, Daily  piperacillin-tazobactam, 3.375 g, Intravenous, Q8H  sodium chloride, 10 mL, Intravenous, Q12H  sulfamethoxazole-trimethoprim, 1 tablet, Oral, Once per day on Mon Wed Fri           Assessment & Plan       Multifocal pneumonia    Pulmonary fibrosis    ILD (interstitial lung disease) -likely RA associated ILD with acute exacerbation    Acute hypoxemic respiratory failure -likely due to ILD exacerbation    72 y.o. female with history of rheumatoid arthritis/inflammatory arthritis and possible lupus on Humira and Plaquenil followed by Dr. Weinstein.  Also with history of iron deficiency anemia, hiatal hernia, GERD, hypertension.  She saw Dr. Tomlinson for pulmonary fibrosis in August 2022 and again in December 2022.  At that time, she had a normal spirometry with mild restriction and decreased DLCO.  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    Patient in the past few weeks reported some URI type symptoms followed by worsening dyspnea.  She actually held her Humira.  She saw her PCP about 10 days before the admission reporting \"ulcers in my mouth\", and throat discomfort and was treated with amoxicillin.  Subsequently was seen and treated with Levaquin for possible pneumonia and given a steroid injection.  She has had progressive dyspnea.    She presented to the hospital " on 4/23/2023 and was subsequently admitted to the hospitalist service for bilateral infiltrates/possible pneumonia and started on azithromycin and Zosyn.    Procalcitonin was within normal limits.  proBNP was low at 163.6.  White blood cell count was elevated at 14.  She had hyponatremia with a sodium of 129.  Respiratory PCR panel was negative.  Blood cultures pending.  She has not been able to produce any sputum.    Patient has not had any significant improvement since admission.  She denies fevers or chills.    Patient has fairly severe lung disease at this point.  She is requiring high flow nasal cannula oxygen to maintain oxygen saturations and is dyspneic at rest.    Ideally, I would perform a bronchoscopy to rule out infection prior to consideration of pulse dose steroids.  I do not think she would tolerate bronchoscopy without being intubated, and likely would have difficulty coming off of mechanical ventilation.  I had this discussion with the patient.  She does not want to go on mechanical ventilation and wishes to be a DO NOT RESUSCITATE (as previously relayed to the hospitalist).    I think that the patient most likely has an acute exacerbation of her underlying interstitial lung disease, which is likely related to her underlying rheumatoid arthritis.  Is also possible she has interstitial lung disease related to Humira, atypical infectious process, or other cause for her infiltrates and respiratory failure.    After discussion with the patient including the risks of exacerbating infection, developing opportunistic infection, and death, I offered the patient a trial of a pulse dose of steroids to hopefully decrease the inflammation from presumed flare of interstitial lung disease.  The patient is in agreement, and I will give her Solu-Medrol 1 g daily for 3 days followed by a prolonged steroid taper.  I would recommend continuing the Zosyn and azithromycin for now and recommended adding Bactrim for  pneumocystis prophylaxis.    No major change today.  If she responds to steroids and will likely take a little bit of time.  I explained this to her.  I discussed with the patient's son as well.    1.  For bilateral pulmonary infiltrates: Differential includes flare of underlying rheumatoid associated interstitial lung disease, which I feel most likely, followed by possible infectious including opportunistic infection, or other inflammatory cause.  Plan for pulse steroids followed by prolonged steroid taper as above.  Today will be day 3 of pulse dose steroids.  Continue current antibiotics and add Bactrim for pneumocystis prophylaxis.  I agree with having palliative care follow along.  I discussed the patient with Dr. Blanton.  2.  For acute hypoxemic respiratory failure: As above.  Supplemental oxygen as needed.  3.  For rheumatoid arthritis: Recently held Humira secondary to presumed illness.  Plaquenil is currently held.  I will consider restarting this.  4.  For possible pneumonia: As above.  5.  DVT prophylaxis: Subcutaneous heparin.      Electronically signed by:    Bernard Aguila MD  04/26/23  17:51 EDT        • Please note that portions of this note were completed with [employer] - a voice recognition program.

## 2023-04-26 NOTE — PLAN OF CARE
Patient welcomed  visit, former [employer] 's wife. Patient not able to hold long conversation due to breathing issues, but shared a bit of her history and he reliance upon God. Prayed at bedside with patient. Provided spiritual support and active listening.

## 2023-04-26 NOTE — PLAN OF CARE
Goal Outcome Evaluation:  Plan of Care Reviewed With: patient        Progress: declining  Outcome Evaluation: VSS on Bipap 65%. Rapid reponse called at 1933 for respiratory distress, pt was tachypneic, shallow, labored breathing o2 sat 82% maxed out on on high flow nasal cannula. Pt placed on bipap at that time. IV abx infusing per orders. Family at bedside.

## 2023-04-27 ENCOUNTER — APPOINTMENT (OUTPATIENT)
Dept: GENERAL RADIOLOGY | Facility: HOSPITAL | Age: 73
End: 2023-04-27
Payer: MEDICARE

## 2023-04-27 LAB
ALBUMIN SERPL-MCNC: 3.1 G/DL (ref 3.5–5.2)
ALBUMIN/GLOB SERPL: 1.2 G/DL
ALP SERPL-CCNC: 152 U/L (ref 39–117)
ALT SERPL W P-5'-P-CCNC: 27 U/L (ref 1–33)
ANION GAP SERPL CALCULATED.3IONS-SCNC: 14 MMOL/L (ref 5–15)
AST SERPL-CCNC: 17 U/L (ref 1–32)
BILIRUB SERPL-MCNC: 0.2 MG/DL (ref 0–1.2)
BUN SERPL-MCNC: 23 MG/DL (ref 8–23)
BUN/CREAT SERPL: 37.1 (ref 7–25)
CALCIUM SPEC-SCNC: 8.6 MG/DL (ref 8.6–10.5)
CHLORIDE SERPL-SCNC: 99 MMOL/L (ref 98–107)
CO2 SERPL-SCNC: 24 MMOL/L (ref 22–29)
CREAT SERPL-MCNC: 0.62 MG/DL (ref 0.57–1)
CRP SERPL-MCNC: 2.55 MG/DL (ref 0–0.5)
DEPRECATED RDW RBC AUTO: 49.4 FL (ref 37–54)
EGFRCR SERPLBLD CKD-EPI 2021: 94.2 ML/MIN/1.73
ERYTHROCYTE [DISTWIDTH] IN BLOOD BY AUTOMATED COUNT: 15.1 % (ref 12.3–15.4)
GLOBULIN UR ELPH-MCNC: 2.6 GM/DL
GLUCOSE SERPL-MCNC: 160 MG/DL (ref 65–99)
HCT VFR BLD AUTO: 31.3 % (ref 34–46.6)
HGB BLD-MCNC: 9.9 G/DL (ref 12–15.9)
MAGNESIUM SERPL-MCNC: 2.5 MG/DL (ref 1.6–2.4)
MCH RBC QN AUTO: 28.9 PG (ref 26.6–33)
MCHC RBC AUTO-ENTMCNC: 31.6 G/DL (ref 31.5–35.7)
MCV RBC AUTO: 91.5 FL (ref 79–97)
PLATELET # BLD AUTO: 616 10*3/MM3 (ref 140–450)
PMV BLD AUTO: 8.6 FL (ref 6–12)
POTASSIUM SERPL-SCNC: 4.8 MMOL/L (ref 3.5–5.2)
PROT SERPL-MCNC: 5.7 G/DL (ref 6–8.5)
RBC # BLD AUTO: 3.42 10*6/MM3 (ref 3.77–5.28)
SODIUM SERPL-SCNC: 137 MMOL/L (ref 136–145)
WBC NRBC COR # BLD: 15.91 10*3/MM3 (ref 3.4–10.8)

## 2023-04-27 PROCEDURE — 94761 N-INVAS EAR/PLS OXIMETRY MLT: CPT

## 2023-04-27 PROCEDURE — 83735 ASSAY OF MAGNESIUM: CPT | Performed by: INTERNAL MEDICINE

## 2023-04-27 PROCEDURE — 71045 X-RAY EXAM CHEST 1 VIEW: CPT

## 2023-04-27 PROCEDURE — 86140 C-REACTIVE PROTEIN: CPT | Performed by: INTERNAL MEDICINE

## 2023-04-27 PROCEDURE — 94799 UNLISTED PULMONARY SVC/PX: CPT

## 2023-04-27 PROCEDURE — 99232 SBSQ HOSP IP/OBS MODERATE 35: CPT | Performed by: INTERNAL MEDICINE

## 2023-04-27 PROCEDURE — 25010000002 PIPERACILLIN SOD-TAZOBACTAM PER 1 G: Performed by: INTERNAL MEDICINE

## 2023-04-27 PROCEDURE — 80053 COMPREHEN METABOLIC PANEL: CPT | Performed by: INTERNAL MEDICINE

## 2023-04-27 PROCEDURE — 25010000002 HEPARIN (PORCINE) PER 1000 UNITS: Performed by: INTERNAL MEDICINE

## 2023-04-27 PROCEDURE — 99233 SBSQ HOSP IP/OBS HIGH 50: CPT | Performed by: INTERNAL MEDICINE

## 2023-04-27 PROCEDURE — 25010000002 MORPHINE PER 10 MG: Performed by: FAMILY MEDICINE

## 2023-04-27 PROCEDURE — 25010000002 AZITHROMYCIN PER 500 MG: Performed by: INTERNAL MEDICINE

## 2023-04-27 PROCEDURE — 25010000002 METHYLPREDNISOLONE PER 125 MG: Performed by: INTERNAL MEDICINE

## 2023-04-27 PROCEDURE — 85027 COMPLETE CBC AUTOMATED: CPT | Performed by: INTERNAL MEDICINE

## 2023-04-27 PROCEDURE — 25010000002 FUROSEMIDE PER 20 MG: Performed by: INTERNAL MEDICINE

## 2023-04-27 RX ORDER — HYDROXYCHLOROQUINE SULFATE 200 MG/1
200 TABLET, FILM COATED ORAL EVERY 12 HOURS SCHEDULED
Status: DISCONTINUED | OUTPATIENT
Start: 2023-04-27 | End: 2023-05-25 | Stop reason: HOSPADM

## 2023-04-27 RX ORDER — FUROSEMIDE 10 MG/ML
20 INJECTION INTRAMUSCULAR; INTRAVENOUS ONCE
Status: COMPLETED | OUTPATIENT
Start: 2023-04-27 | End: 2023-04-27

## 2023-04-27 RX ORDER — ALPRAZOLAM 0.25 MG/1
0.25 TABLET ORAL 4 TIMES DAILY PRN
Status: DISCONTINUED | OUTPATIENT
Start: 2023-04-27 | End: 2023-05-01

## 2023-04-27 RX ORDER — AZITHROMYCIN 250 MG/1
250 TABLET, FILM COATED ORAL
Status: COMPLETED | OUTPATIENT
Start: 2023-04-28 | End: 2023-04-29

## 2023-04-27 RX ADMIN — HEPARIN SODIUM 5000 UNITS: 5000 INJECTION, SOLUTION INTRAVENOUS; SUBCUTANEOUS at 14:23

## 2023-04-27 RX ADMIN — IPRATROPIUM BROMIDE AND ALBUTEROL SULFATE 3 ML: .5; 2.5 SOLUTION RESPIRATORY (INHALATION) at 07:40

## 2023-04-27 RX ADMIN — IPRATROPIUM BROMIDE AND ALBUTEROL SULFATE 3 ML: .5; 2.5 SOLUTION RESPIRATORY (INHALATION) at 17:23

## 2023-04-27 RX ADMIN — MORPHINE SULFATE 2 MG: 2 INJECTION, SOLUTION INTRAMUSCULAR; INTRAVENOUS at 21:50

## 2023-04-27 RX ADMIN — Medication 10 ML: at 21:42

## 2023-04-27 RX ADMIN — AMLODIPINE BESYLATE 5 MG: 5 TABLET ORAL at 08:55

## 2023-04-27 RX ADMIN — TIZANIDINE 2 MG: 4 TABLET ORAL at 21:40

## 2023-04-27 RX ADMIN — HEPARIN SODIUM 5000 UNITS: 5000 INJECTION, SOLUTION INTRAVENOUS; SUBCUTANEOUS at 06:19

## 2023-04-27 RX ADMIN — METHYLPREDNISOLONE SODIUM SUCCINATE 60 MG: 125 INJECTION, POWDER, FOR SOLUTION INTRAMUSCULAR; INTRAVENOUS at 18:11

## 2023-04-27 RX ADMIN — FUROSEMIDE 20 MG: 10 INJECTION, SOLUTION INTRAMUSCULAR; INTRAVENOUS at 07:29

## 2023-04-27 RX ADMIN — PANTOPRAZOLE SODIUM 40 MG: 40 TABLET, DELAYED RELEASE ORAL at 08:55

## 2023-04-27 RX ADMIN — GUAIFENESIN 1200 MG: 600 TABLET, EXTENDED RELEASE ORAL at 08:55

## 2023-04-27 RX ADMIN — MONTELUKAST 10 MG: 10 TABLET, FILM COATED ORAL at 08:55

## 2023-04-27 RX ADMIN — TAZOBACTAM SODIUM AND PIPERACILLIN SODIUM 3.38 G: 375; 3 INJECTION, SOLUTION INTRAVENOUS at 11:41

## 2023-04-27 RX ADMIN — Medication 2 SPRAY: at 21:42

## 2023-04-27 RX ADMIN — TAZOBACTAM SODIUM AND PIPERACILLIN SODIUM 3.38 G: 375; 3 INJECTION, SOLUTION INTRAVENOUS at 02:07

## 2023-04-27 RX ADMIN — GUAIFENESIN 1200 MG: 600 TABLET, EXTENDED RELEASE ORAL at 21:40

## 2023-04-27 RX ADMIN — VALSARTAN 160 MG: 160 TABLET, FILM COATED ORAL at 08:55

## 2023-04-27 RX ADMIN — Medication 5 MG: at 21:40

## 2023-04-27 RX ADMIN — FUROSEMIDE 20 MG: 10 INJECTION, SOLUTION INTRAMUSCULAR; INTRAVENOUS at 11:41

## 2023-04-27 RX ADMIN — ALPRAZOLAM 0.25 MG: 0.25 TABLET ORAL at 14:23

## 2023-04-27 RX ADMIN — HEPARIN SODIUM 5000 UNITS: 5000 INJECTION, SOLUTION INTRAVENOUS; SUBCUTANEOUS at 21:40

## 2023-04-27 RX ADMIN — TAZOBACTAM SODIUM AND PIPERACILLIN SODIUM 3.38 G: 375; 3 INJECTION, SOLUTION INTRAVENOUS at 18:11

## 2023-04-27 RX ADMIN — AZITHROMYCIN 250 MG: 500 INJECTION, POWDER, LYOPHILIZED, FOR SOLUTION INTRAVENOUS at 08:55

## 2023-04-27 RX ADMIN — ACETAMINOPHEN 325MG 650 MG: 325 TABLET ORAL at 12:10

## 2023-04-27 RX ADMIN — ALPRAZOLAM 0.25 MG: 0.25 TABLET ORAL at 07:29

## 2023-04-27 RX ADMIN — HYDROXYCHLOROQUINE SULFATE 200 MG: 200 TABLET, FILM COATED ORAL at 21:40

## 2023-04-27 RX ADMIN — CETIRIZINE HYDROCHLORIDE 10 MG: 10 TABLET, FILM COATED ORAL at 08:55

## 2023-04-27 NOTE — PROGRESS NOTES
"Pulmonary Hospital Consult Note     LOS: 4 days   Patient Care Team:  Sg Horne MD as PCP - General (Family Medicine)  Laurent Tomlinson MD as Consulting Physician (Pulmonary Disease)    Chief Complaint:    Chief Complaint   Patient presents with   • Shortness of Breath     Subjective     HPI:     Initial history (from pulmonary consult note 4/24/2023):    72 y.o. female with history of rheumatoid arthritis/inflammatory arthritis and possible lupus on Humira and Plaquenil followed by Dr. Weinstein.  Also with history of iron deficiency anemia, hiatal hernia, GERD, hypertension.  She saw Dr. Tomlinson for pulmonary fibrosis in August 2022 and again in December 2022.  At that time, she had a normal spirometry with mild restriction and decreased DLCO.  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    Patient in the past few weeks reported some URI type symptoms followed by worsening dyspnea.  She actually held her Humira.  She saw her PCP about 10 days before the admission reporting \"ulcers in my mouth\", and throat discomfort and was treated with amoxicillin.  Subsequently was seen and treated with Levaquin for possible pneumonia and given a steroid injection.  She has had progressive dyspnea.    She presented to the hospital on 4/23/2023 and was subsequently admitted to the hospitalist service for bilateral infiltrates/possible pneumonia and started on azithromycin and Zosyn.    Procalcitonin was within normal limits.  White blood cell count was elevated at 14.  She had hyponatremia with a sodium of 129.  Respiratory PCR panel was negative.  Blood cultures pending.  She has not been able to produce any sputum.    Patient has not had any significant improvement since admission.  She denies fevers or chills.    Interval history: " Patient reports she had a rough day earlier today but slept well last night.  She apparently was on 100% high flow this morning but is now back to 80%.  No fevers or chills.  Family is at the bedside.      History taken from: patient    Past Medical History:   Diagnosis Date   • Anemia    • Back pain    • Bronchitis    • Hypertension    • Low back pain    • Pulmonary fibrosis    • Rheumatoid arthritis        Past Surgical History:   Procedure Laterality Date   • DILATION AND CURETTAGE, DIAGNOSTIC / THERAPEUTIC         Family History   Problem Relation Age of Onset   • Heart failure Mother    • Stroke Mother    • Cancer Father    • Diabetes Son    • Diabetes Son        Social History     Socioeconomic History   • Marital status:    Tobacco Use   • Smoking status: Never   • Smokeless tobacco: Never   Vaping Use   • Vaping Use: Never used   Substance and Sexual Activity   • Alcohol use: No   • Drug use: Never   • Sexual activity: Defer       No Known Allergies    PMH/FH/SocH were reviewed by me and updates were made.     Review of Systems:    All other systems were reviewed and are negative.  Exceptions are noted in subjective or below.    Objective     Vital Signs  Temp:  [97.8 °F (36.6 °C)-98.5 °F (36.9 °C)] 98.5 °F (36.9 °C)  Heart Rate:  [62-95] 77  Resp:  [16-20] 20  BP: (121-162)/() 162/60    Physical Exam:     Constitutional:    Alert, cooperative, in no acute distress   Head:    Normocephalic, without obvious abnormality, atraumatic   Eyes:            Lids and lashes normal, conjunctivae and sclerae normal, no   icterus, no pallor, corneas clear, PER   ENMT:   Ears appear intact with no abnormalities noted         Neck:  Trachea midline, no thyromegaly, no JVD       Lungs/Resp:    Moderately increased effort, symmetric chest rise, no crepitus, mild bilateral diffuse rales, no chest wall tenderness                 Heart/CV:    Regular rhythm and normal rate, normal S1 and S2, no            murmur    Abdomen/GI:     Normal bowel sounds, no masses, no organomegaly, soft,        non-tender, non-distended   :     Deferred   Extremities/MSK:   No clubbing, cyanosis or edema.  No deformities.    Pulses:   Pulses palpable and equal bilaterally   Skin:   No bleeding, bruising or rash   Hem/Lymph:   No cervical or supraclavicular adenopathy.    Neurologic:   Moves all extremities with no obvious focal motor deficit.  Cranial nerves 2 - 12 grossly intact             Psychiatric: Normal mood and affect, oriented x 3.   The above findings are documentation my personal physical examination from today.  Electronically signed by:Bernard Aguila MD 04/27/23 18:06 EDT     Results Review:     I reviewed the patient's new clinical results.   Results from last 7 days   Lab Units 04/27/23 0346 04/26/23  0532 04/25/23  0747 04/24/23  0647   SODIUM mmol/L 137 136 132* 130*   POTASSIUM mmol/L 4.8 4.3 4.4 4.1   CHLORIDE mmol/L 99 98 98 95*   CO2 mmol/L 24.0 23.0 19.0* 24.0   BUN mg/dL 23 16 13 12   CREATININE mg/dL 0.62 0.63 0.60 0.63   CALCIUM mg/dL 8.6 9.3 9.3 8.3*   BILIRUBIN mg/dL 0.2  --  0.2 0.3   ALK PHOS U/L 152*  --  185* 130*   ALT (SGPT) U/L 27  --  32 13   AST (SGOT) U/L 17  --  41* 22   GLUCOSE mg/dL 160* 147* 149* 113*     Results from last 7 days   Lab Units 04/27/23  0346 04/26/23  0532 04/25/23  0747   WBC 10*3/mm3 15.91* 23.58* 15.74*   HEMOGLOBIN g/dL 9.9* 10.8* 10.8*   HEMATOCRIT % 31.3* 34.0 33.1*   PLATELETS 10*3/mm3 616* 739* 624*     Results from last 7 days   Lab Units 04/25/23 1948   PH, ARTERIAL pH units 7.457*   PO2 ART mm Hg 103.0   PCO2, ARTERIAL mm Hg 35.2   HCO3 ART mmol/L 24.8       I reviewed the patient's new imaging including images and reports.    Chest x-ray today again shows essentially stable diffuse bilateral interstitial/airspace disease.    Medication Review:   amLODIPine, 5 mg, Oral, Q24H   And  valsartan, 160 mg, Oral, Q24H  [START ON 4/28/2023] azithromycin, 250 mg, Oral,  "Q24H  cetirizine, 10 mg, Oral, Daily  fluticasone, 2 spray, Each Nare, Nightly  guaiFENesin, 1,200 mg, Oral, Q12H  heparin (porcine), 5,000 Units, Subcutaneous, Q8H  ipratropium-albuterol, 3 mL, Nebulization, 4x Daily - RT  melatonin, 5 mg, Oral, Nightly  methylPREDNISolone sodium succinate, 60 mg, Intravenous, Q12H  montelukast, 10 mg, Oral, Daily  pantoprazole, 40 mg, Oral, Daily  piperacillin-tazobactam, 3.375 g, Intravenous, Q8H  sodium chloride, 10 mL, Intravenous, Q12H  sulfamethoxazole-trimethoprim, 1 tablet, Oral, Once per day on Mon Wed Fri           Assessment & Plan       Multifocal pneumonia    Pulmonary fibrosis    ILD (interstitial lung disease) -likely RA associated ILD with acute exacerbation    Acute hypoxemic respiratory failure -likely due to ILD exacerbation    72 y.o. female with history of rheumatoid arthritis/inflammatory arthritis and possible lupus on Humira and Plaquenil followed by Dr. Weinstein.  Also with history of iron deficiency anemia, hiatal hernia, GERD, hypertension.  She saw Dr. Tomlinson for pulmonary fibrosis in August 2022 and again in December 2022.  At that time, she had a normal spirometry with mild restriction and decreased DLCO.  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    Patient in the past few weeks reported some URI type symptoms followed by worsening dyspnea.  She actually held her Humira.  She saw her PCP about 10 days before the admission reporting \"ulcers in my mouth\", and throat discomfort and was treated with amoxicillin.  Subsequently was seen and treated with Levaquin for possible pneumonia and given a steroid injection.  She has had progressive dyspnea.    She presented to the hospital on 4/23/2023 and was subsequently admitted to the hospitalist service for bilateral " infiltrates/possible pneumonia and started on azithromycin and Zosyn.    Procalcitonin was within normal limits.  proBNP was low at 163.6.  White blood cell count was elevated at 14.  She had hyponatremia with a sodium of 129.  Respiratory PCR panel was negative.  Blood cultures pending.  She has not been able to produce any sputum.    Patient has not had any significant improvement since admission.  She denies fevers or chills.    Patient has fairly severe lung disease at this point.  She is requiring high flow nasal cannula oxygen to maintain oxygen saturations and is dyspneic at rest.    Ideally, I would perform a bronchoscopy to rule out infection prior to consideration of pulse dose steroids.  I do not think she would tolerate bronchoscopy without being intubated, and likely would have difficulty coming off of mechanical ventilation.  I had this discussion with the patient.  She does not want to go on mechanical ventilation and wishes to be a DO NOT RESUSCITATE (as previously relayed by the hospitalist).    I think that the patient most likely has an acute exacerbation of her underlying interstitial lung disease, which is likely related to her underlying rheumatoid arthritis.  Is also possible she has interstitial lung disease related to Humira, atypical infectious process, or other cause for her infiltrates and respiratory failure.    Patient is status post 3 days of gram Solu-Medrol daily, completed on 4/20/2023.  Continues antibiotics and Bactrim for pneumocystis prophylaxis.    No major change today.  She is getting some benzodiazepine for anxiety and morphine for dyspnea.    1.  For bilateral pulmonary infiltrates: Differential includes flare of underlying rheumatoid associated interstitial lung disease, which I feel most likely, followed by possible infectious including opportunistic infection, or other inflammatory cause.  Plan for pulse steroids followed by prolonged steroid taper as above.  Completed 3  days of pulse dose steroids (1 g Solu-Medrol, completed on 4/26/2023).  Continue current antibiotics and add Bactrim for pneumocystis prophylaxis.  I agree with having palliative care follow along.    2.  For acute hypoxemic respiratory failure: As above.  Supplemental oxygen as needed.  3.  For rheumatoid arthritis: Recently held Humira secondary to presumed illness.  Restart Plaquenil.  Currently on Solu-Medrol 60 mg every 12 hours.  4.  For possible pneumonia: As above.  5.  DVT prophylaxis: Subcutaneous heparin.      Electronically signed by:    Bernard Aguila MD  04/27/23  18:06 EDT        • Please note that portions of this note were completed with [employer] - a voice recognition program.

## 2023-04-27 NOTE — PLAN OF CARE
Goal Outcome Evaluation:  Plan of Care Reviewed With: patient        Progress: improving   VSS, A/Ox4. Pt currently on high flow nasal cannula. IV Lasik given per order. PRNs given for anxiety. Currently  Problem: Adult Inpatient Plan of Care  Goal: Plan of Care Review  Outcome: Ongoing, Progressing  Flowsheets (Taken 4/27/2023 1634)  Progress: improving  Plan of Care Reviewed With: patient  Goal: Patient-Specific Goal (Individualized)  Outcome: Ongoing, Progressing  Goal: Absence of Hospital-Acquired Illness or Injury  Outcome: Ongoing, Progressing  Intervention: Identify and Manage Fall Risk  Recent Flowsheet Documentation  Taken 4/27/2023 1600 by Velma Calderon, RN  Safety Promotion/Fall Prevention:   clutter free environment maintained   assistive device/personal items within reach   activity supervised   fall prevention program maintained   safety round/check completed   toileting scheduled   room organization consistent   nonskid shoes/slippers when out of bed   lighting adjusted  Taken 4/27/2023 1400 by Velma Calderon, RN  Safety Promotion/Fall Prevention:   clutter free environment maintained   assistive device/personal items within reach   activity supervised   fall prevention program maintained   toileting scheduled   safety round/check completed   room organization consistent   nonskid shoes/slippers when out of bed   lighting adjusted  Taken 4/27/2023 1210 by Velma Calderon, RN  Safety Promotion/Fall Prevention:   clutter free environment maintained   assistive device/personal items within reach   activity supervised   fall prevention program maintained   toileting scheduled   safety round/check completed   room organization consistent   nonskid shoes/slippers when out of bed   lighting adjusted  Taken 4/27/2023 1000 by Velma Calderon, RN  Safety Promotion/Fall Prevention:   clutter free environment maintained   assistive device/personal items within reach   activity supervised   fall prevention program  maintained   toileting scheduled   safety round/check completed   room organization consistent   nonskid shoes/slippers when out of bed   lighting adjusted  Taken 4/27/2023 0800 by Velma Calderon RN  Safety Promotion/Fall Prevention:   clutter free environment maintained   activity supervised   assistive device/personal items within reach   fall prevention program maintained   toileting scheduled   safety round/check completed   room organization consistent   nonskid shoes/slippers when out of bed   lighting adjusted  Intervention: Prevent Skin Injury  Recent Flowsheet Documentation  Taken 4/27/2023 1600 by Velma Calderon RN  Body Position: position changed independently  Taken 4/27/2023 1400 by Velma Calderon RN  Body Position: position changed independently  Taken 4/27/2023 1210 by Velma Calderon RN  Body Position: position changed independently  Taken 4/27/2023 1000 by Velma Calderon RN  Body Position: position changed independently  Taken 4/27/2023 0800 by Velma Calderon RN  Body Position: position changed independently  Intervention: Prevent and Manage VTE (Venous Thromboembolism) Risk  Recent Flowsheet Documentation  Taken 4/27/2023 1600 by Velma Calderon RN  Activity Management: activity encouraged  Taken 4/27/2023 1400 by Velma Calderon RN  Activity Management: activity encouraged  Taken 4/27/2023 1210 by Velma Calderon RN  Activity Management: activity encouraged  Taken 4/27/2023 1000 by Velma Calderon RN  Activity Management: activity encouraged  Taken 4/27/2023 0800 by Velma Calderon RN  Activity Management: activity encouraged  Intervention: Prevent Infection  Recent Flowsheet Documentation  Taken 4/27/2023 1600 by Velma Calderon RN  Infection Prevention:   single patient room provided   rest/sleep promoted   visitors restricted/screened   hand hygiene promoted  Taken 4/27/2023 1400 by Velma Calderon RN  Infection Prevention:   rest/sleep promoted   single patient room provided   visitors  restricted/screened   hand hygiene promoted  Taken 4/27/2023 1210 by Velma Calderon RN  Infection Prevention:   single patient room provided   rest/sleep promoted   visitors restricted/screened   hand hygiene promoted  Taken 4/27/2023 1000 by Velma Calderon RN  Infection Prevention:   rest/sleep promoted   visitors restricted/screened   single patient room provided   hand hygiene promoted  Taken 4/27/2023 0800 by Velma Calderon RN  Infection Prevention:   rest/sleep promoted   single patient room provided   visitors restricted/screened   hand hygiene promoted  Goal: Optimal Comfort and Wellbeing  Outcome: Ongoing, Progressing  Intervention: Monitor Pain and Promote Comfort  Recent Flowsheet Documentation  Taken 4/27/2023 1210 by Velma Calderon RN  Pain Management Interventions: see MAR  Intervention: Provide Person-Centered Care  Recent Flowsheet Documentation  Taken 4/27/2023 0800 by Velma Calderon RN  Trust Relationship/Rapport:   care explained   choices provided  Goal: Readiness for Transition of Care  Outcome: Ongoing, Progressing     Problem: Asthma Comorbidity  Goal: Maintenance of Asthma Control  Outcome: Ongoing, Progressing  Intervention: Maintain Asthma Symptom Control  Recent Flowsheet Documentation  Taken 4/27/2023 1600 by Velma Calderon RN  Medication Review/Management: medications reviewed  Taken 4/27/2023 1400 by Velma Calderon RN  Medication Review/Management: medications reviewed  Taken 4/27/2023 1210 by Velma Calderon RN  Medication Review/Management: medications reviewed  Taken 4/27/2023 1000 by Velma Calderon RN  Medication Review/Management: medications reviewed  Taken 4/27/2023 0800 by Velma Calderon RN  Medication Review/Management: medications reviewed     Problem: Behavioral Health Comorbidity  Goal: Maintenance of Behavioral Health Symptom Control  Outcome: Ongoing, Progressing  Intervention: Maintain Behavioral Health Symptom Control  Recent Flowsheet Documentation  Taken 4/27/2023 1600  by Velma Calderon RN  Medication Review/Management: medications reviewed  Taken 4/27/2023 1400 by Velma Calderon RN  Medication Review/Management: medications reviewed  Taken 4/27/2023 1210 by Velma Calderon RN  Medication Review/Management: medications reviewed  Taken 4/27/2023 1000 by Velma Calderon RN  Medication Review/Management: medications reviewed  Taken 4/27/2023 0800 by Velma Calderon RN  Medication Review/Management: medications reviewed     Problem: COPD (Chronic Obstructive Pulmonary Disease) Comorbidity  Goal: Maintenance of COPD Symptom Control  Outcome: Ongoing, Progressing  Intervention: Maintain COPD-Symptom Control  Recent Flowsheet Documentation  Taken 4/27/2023 1600 by Velma Calderon RN  Medication Review/Management: medications reviewed  Taken 4/27/2023 1400 by Velma Calderon RN  Medication Review/Management: medications reviewed  Taken 4/27/2023 1210 by Velma Calderon RN  Medication Review/Management: medications reviewed  Taken 4/27/2023 1000 by Velma Calderon RN  Medication Review/Management: medications reviewed  Taken 4/27/2023 0800 by Velma Calderon RN  Medication Review/Management: medications reviewed     Problem: Diabetes Comorbidity  Goal: Blood Glucose Level Within Targeted Range  Outcome: Ongoing, Progressing     Problem: Heart Failure Comorbidity  Goal: Maintenance of Heart Failure Symptom Control  Outcome: Ongoing, Progressing  Intervention: Maintain Heart Failure-Management  Recent Flowsheet Documentation  Taken 4/27/2023 1600 by Velma Calderon RN  Medication Review/Management: medications reviewed  Taken 4/27/2023 1400 by Velma Calderon RN  Medication Review/Management: medications reviewed  Taken 4/27/2023 1210 by Velma Calderon RN  Medication Review/Management: medications reviewed  Taken 4/27/2023 1000 by Velma Calderon RN  Medication Review/Management: medications reviewed  Taken 4/27/2023 0800 by Velma Calderon RN  Medication Review/Management: medications reviewed      Problem: Hypertension Comorbidity  Goal: Blood Pressure in Desired Range  Outcome: Ongoing, Progressing  Intervention: Maintain Blood Pressure Management  Recent Flowsheet Documentation  Taken 4/27/2023 1600 by Velma Calderon RN  Medication Review/Management: medications reviewed  Taken 4/27/2023 1400 by Velma Calderon RN  Medication Review/Management: medications reviewed  Taken 4/27/2023 1210 by Velma Calderon RN  Medication Review/Management: medications reviewed  Taken 4/27/2023 1000 by Velma Calderon RN  Medication Review/Management: medications reviewed  Taken 4/27/2023 0800 by Velma Calderon RN  Medication Review/Management: medications reviewed     Problem: Obstructive Sleep Apnea Risk or Actual Comorbidity Management  Goal: Unobstructed Breathing During Sleep  Outcome: Ongoing, Progressing     Problem: Osteoarthritis Comorbidity  Goal: Maintenance of Osteoarthritis Symptom Control  Outcome: Ongoing, Progressing  Intervention: Maintain Osteoarthritis Symptom Control  Recent Flowsheet Documentation  Taken 4/27/2023 1600 by Velma Calderon RN  Activity Management: activity encouraged  Medication Review/Management: medications reviewed  Taken 4/27/2023 1400 by Velma Calderon RN  Activity Management: activity encouraged  Medication Review/Management: medications reviewed  Taken 4/27/2023 1210 by Velma Calderon RN  Activity Management: activity encouraged  Medication Review/Management: medications reviewed  Taken 4/27/2023 1000 by Velma Calderon RN  Activity Management: activity encouraged  Medication Review/Management: medications reviewed  Taken 4/27/2023 0800 by Velma Calderon RN  Activity Management: activity encouraged  Medication Review/Management: medications reviewed     Problem: Pain Chronic (Persistent) (Comorbidity Management)  Goal: Acceptable Pain Control and Functional Ability  Outcome: Ongoing, Progressing  Intervention: Manage Persistent Pain  Recent Flowsheet Documentation  Taken 4/27/2023 1600  by Velma Calderon RN  Medication Review/Management: medications reviewed  Taken 4/27/2023 1400 by Velma Calderon RN  Medication Review/Management: medications reviewed  Taken 4/27/2023 1210 by Velma Calderon RN  Medication Review/Management: medications reviewed  Taken 4/27/2023 1000 by Velma Calderon RN  Medication Review/Management: medications reviewed  Taken 4/27/2023 0800 by Velma Calderon RN  Medication Review/Management: medications reviewed  Intervention: Develop Pain Management Plan  Recent Flowsheet Documentation  Taken 4/27/2023 1210 by Velma Calderon RN  Pain Management Interventions: see MAR  Intervention: Optimize Psychosocial Wellbeing  Recent Flowsheet Documentation  Taken 4/27/2023 0800 by Velma Calderon RN  Diversional Activities: television  Family/Support System Care: support provided     Problem: Seizure Disorder Comorbidity  Goal: Maintenance of Seizure Control  Outcome: Ongoing, Progressing     Problem: Fall Injury Risk  Goal: Absence of Fall and Fall-Related Injury  Outcome: Ongoing, Progressing  Intervention: Identify and Manage Contributors  Recent Flowsheet Documentation  Taken 4/27/2023 1600 by Velma Calderon RN  Medication Review/Management: medications reviewed  Taken 4/27/2023 1400 by Velma Calderon RN  Medication Review/Management: medications reviewed  Taken 4/27/2023 1210 by Velma Calderon RN  Medication Review/Management: medications reviewed  Taken 4/27/2023 1000 by Velma Calderon RN  Medication Review/Management: medications reviewed  Taken 4/27/2023 0800 by Velma Calderon RN  Medication Review/Management: medications reviewed  Intervention: Promote Injury-Free Environment  Recent Flowsheet Documentation  Taken 4/27/2023 1600 by Velma Calderon RN  Safety Promotion/Fall Prevention:   clutter free environment maintained   assistive device/personal items within reach   activity supervised   fall prevention program maintained   safety round/check completed   toileting scheduled    room organization consistent   nonskid shoes/slippers when out of bed   lighting adjusted  Taken 4/27/2023 1400 by Velma Calderon RN  Safety Promotion/Fall Prevention:   clutter free environment maintained   assistive device/personal items within reach   activity supervised   fall prevention program maintained   toileting scheduled   safety round/check completed   room organization consistent   nonskid shoes/slippers when out of bed   lighting adjusted  Taken 4/27/2023 1210 by Velma Calderon RN  Safety Promotion/Fall Prevention:   clutter free environment maintained   assistive device/personal items within reach   activity supervised   fall prevention program maintained   toileting scheduled   safety round/check completed   room organization consistent   nonskid shoes/slippers when out of bed   lighting adjusted  Taken 4/27/2023 1000 by Velma Calderon RN  Safety Promotion/Fall Prevention:   clutter free environment maintained   assistive device/personal items within reach   activity supervised   fall prevention program maintained   toileting scheduled   safety round/check completed   room organization consistent   nonskid shoes/slippers when out of bed   lighting adjusted  Taken 4/27/2023 0800 by Velma Calderon RN  Safety Promotion/Fall Prevention:   clutter free environment maintained   activity supervised   assistive device/personal items within reach   fall prevention program maintained   toileting scheduled   safety round/check completed   room organization consistent   nonskid shoes/slippers when out of bed   lighting adjusted     Problem: Fluid Imbalance (Pneumonia)  Goal: Fluid Balance  Outcome: Ongoing, Progressing     Problem: Infection (Pneumonia)  Goal: Resolution of Infection Signs and Symptoms  Outcome: Ongoing, Progressing     Problem: Respiratory Compromise (Pneumonia)  Goal: Effective Oxygenation and Ventilation  Outcome: Ongoing, Progressing  Intervention: Promote Airway Secretion Clearance  Recent  Flowsheet Documentation  Taken 4/27/2023 0800 by Velma Calderon RN  Cough And Deep Breathing: done independently per patient  Intervention: Optimize Oxygenation and Ventilation  Recent Flowsheet Documentation  Taken 4/27/2023 1600 by Velma Calderon RN  Head of Bed (HOB) Positioning: HOB elevated  Taken 4/27/2023 1400 by Velma Calderon RN  Head of Bed (HOB) Positioning: HOB elevated  Taken 4/27/2023 1210 by Velma Calderon RN  Head of Bed (HOB) Positioning: HOB elevated  Taken 4/27/2023 1000 by Velma Calderon RN  Head of Bed (HOB) Positioning: HOB elevated  Taken 4/27/2023 0800 by Velma Calderon RN  Head of Bed (HOB) Positioning: HOB elevated     Problem: Palliative Care  Goal: Enhanced Quality of Life  Outcome: Ongoing, Progressing  Intervention: Maximize Comfort  Recent Flowsheet Documentation  Taken 4/27/2023 1210 by Velma Calderon RN  Pain Management Interventions: see MAR  Intervention: Optimize Psychosocial Wellbeing  Recent Flowsheet Documentation  Taken 4/27/2023 0800 by Velma Calderon RN  Family/Support System Care: support provided    resting in bed. Will continue plan of care.

## 2023-04-27 NOTE — PROGRESS NOTES
Palliative Care Progress Note    Date of Admission: 4/23/2023    Subjective: Does report that she slept very well last night.  Family at bedside states that she did have some dyspnea earlier this morning but that seems to have improved.  Current Code Status     Date Active Code Status Order ID Comments User Context       4/23/2023 1310 No CPR (Do Not Attempt to Resuscitate) [id number]  Elias Morales MD Inpatient      Question Answer    Code Status (Patient has no pulse and is not breathing) No CPR (Do Not Attempt to Resuscitate)    Medical Interventions (Patient has pulse or is breathing) Limited Support    Medical Intervention Limits: NO intubation (DNI)    Level Of Support Discussed With Patient              No current facility-administered medications on file prior to encounter.     Current Outpatient Medications on File Prior to Encounter   Medication Sig Dispense Refill   • acetaminophen (TYLENOL) 325 MG tablet Take 2 tablets by mouth Every 4 (Four) Hours As Needed.     • amLODIPine-valsartan (EXFORGE) 5-160 MG per tablet TAKE 1 A DAY FOR BLOOD PRESSURE     • Calcium-Phosphorus-Vitamin D (CITRACAL +D3 PO) Take 1 tablet by mouth.     • cyclobenzaprine (FLEXERIL) 10 MG tablet Take 1 tablet by mouth 3 (Three) Times a Day As Needed for muscle spasms. 21 tablet 0   • fluticasone (FLONASE) 50 MCG/ACT nasal spray 2 sprays by Each Nare route Daily.     • Humira Pen 40 MG/0.4ML Pen-injector Kit Inject 40 mg under the skin into the appropriate area as directed Every 14 (Fourteen) Days.     • hydroxychloroquine (PLAQUENIL) 200 MG tablet Take  by mouth 2 (Two) Times a Day.     • Ibuprofen 200 MG capsule Take 1 tablet by mouth As Needed.     • Lidocaine Viscous HCl (XYLOCAINE) 2 % solution      • montelukast (SINGULAIR) 10 MG tablet Take 1 tablet by mouth Daily.     • Omega-3 Fatty Acids (fish oil) 1200 MG capsule capsule Take 1 capsule by mouth Daily.     • pantoprazole (PROTONIX) 40 MG EC tablet Take 1 tablet by mouth  "Daily.     • predniSONE (DELTASONE) 20 MG tablet Take 1.5 tablets by mouth Daily. 11 tablet 0   • simethicone (MYLICON) 125 MG chewable tablet Chew 1 tablet Every 6 (Six) Hours As Needed for Flatulence.     • tiZANidine (ZANAFLEX) 2 MG tablet 1'2-1 in the Am and afternoon, 1-2 at bedtime. as needed          •  acetaminophen **OR** acetaminophen **OR** acetaminophen  •  ALPRAZolam  •  GI cocktail  •  calcium carbonate  •  hydrOXYzine  •  ipratropium-albuterol  •  Magnesium Standard Dose Replacement - Follow Nurse / BPA Driven Protocol  •  Morphine  •  ondansetron **OR** ondansetron  •  simethicone  •  sodium chloride  •  sodium chloride  •  sodium chloride  •  tiZANidine    Objective: /69 (BP Location: Right arm, Patient Position: Lying)   Pulse 78   Temp 98.1 °F (36.7 °C) (Oral)   Resp 16   Ht 153.7 cm (60.5\")   Wt 80.3 kg (177 lb)   SpO2 90%   BMI 34.00 kg/m²      Intake/Output Summary (Last 24 hours) at 4/27/2023 1236  Last data filed at 4/27/2023 0928  Gross per 24 hour   Intake 240 ml   Output 2500 ml   Net -2260 ml     Physical Exam:      General Appearance:    Alert, cooperative, conversational dyspnea noted   Head:    Normocephalic, without obvious abnormality, atraumatic   Eyes:            Lids and lashes normal, conjunctivae and sclerae normal, no   icterus, no pallor, corneas clear, PERRLA   Ears:    Ears appear intact with no abnormalities noted   Throat:   No oral lesions, no thrush, oral mucosa moist   Neck:   No adenopathy, supple, trachea midline, no thyromegaly, no     carotid bruit, no JVD   Back:     No kyphosis present, no scoliosis present, no skin lesions,       erythema or scars, no tenderness to percussion or                   palpation,   range of motion normal   Lungs:     Clear to auscultation,respirations regular, even and                   unlabored    Heart:    Regular rhythm and normal rate, normal S1 and S2, no            murmur, no gallop, no rub, no click   Breast Exam:  "   Deferred   Abdomen:     Normal bowel sounds, no masses, no organomegaly, soft        non-tender, non-distended, no guarding, no rebound                 tenderness   Genitalia:    Deferred   Extremities:   Moves all extremities well, no edema, no cyanosis, no              redness   Pulses:   Pulses palpable and equal bilaterally   Skin:   No bleeding, bruising or rash   Lymph nodes:   No palpable adenopathy   Neurologic:   Cranial nerves 2 - 12 grossly intact, sensation intact, DTR        present and equal bilaterally     Results from last 7 days   Lab Units 04/27/23  0346   WBC 10*3/mm3 15.91*   HEMOGLOBIN g/dL 9.9*   HEMATOCRIT % 31.3*   PLATELETS 10*3/mm3 616*     Results from last 7 days   Lab Units 04/27/23  0346   SODIUM mmol/L 137   POTASSIUM mmol/L 4.8   CHLORIDE mmol/L 99   CO2 mmol/L 24.0   BUN mg/dL 23   CREATININE mg/dL 0.62   CALCIUM mg/dL 8.6   BILIRUBIN mg/dL 0.2   ALK PHOS U/L 152*   ALT (SGPT) U/L 27   AST (SGOT) U/L 17   GLUCOSE mg/dL 160*       Impression: Interstitial lung disease  Pneumonia  Respiratory distress  Dyspnea  Anxiety  Goals of care  Plan: We will continue to follow and support the patient.  Did talk to the patient and family about the use of benzos as well as opioids for the treatment of the patient's anxiety and dyspnea.  Wife further goals of care conversations based on how the patient responds to treatment.            Wojciech Castrejon DO  04/27/23  12:36 EDT

## 2023-04-27 NOTE — PROGRESS NOTES
[facility] Medicine Services  PROGRESS NOTE    Patient Name: Tala Ramsey  : 1950  MRN: [id number]    Date of Admission: 2023  Primary Care Physician: Sg Horne MD    Subjective   Subjective     CC:  Hypoxia, pneumonia    HPI:  No improvement in hypoxia.no pain. + fatigue. Requiring 95% hi flow    ROS:  No n/v/d  No rash    Objective   Objective     Vital Signs:   Temp:  [97.8 °F (36.6 °C)-98.3 °F (36.8 °C)] 98.1 °F (36.7 °C)  Heart Rate:  [62-95] 82  Resp:  [16-20] 16  BP: (121-160)/() 155/69  Flow (L/min):  [40] 40     Physical Exam:  Constitutional:Alert, oriented x 3, nontoxic appearing but ill appearing, hi flow cnaula in place, sitting upright in bed, no overt distress  Psych:Normal/appropriate affect  HEENT:NCAT, oropharynx clear  Neck: neck supple, full range of motion  Neuro: Face symmetric, speech clear, equal , moves all extremities  Cardiac: RRR; No pretibial pitting edema  Resp: very faint scattered bilateral mid insp crackles noted, no distress  GI: abd soft, nontender  Skin: No extremity rash  Musculoskeletal/extremities: no cyanosis of extremities; no significant ankle edema        Results Reviewed:  LAB RESULTS:      Lab 23  0346 23  0532 23  1957 23  0747 23  0647 23  1048   WBC 15.91* 23.58*  --  15.74* 12.84* 14.06*   HEMOGLOBIN 9.9* 10.8*  --  10.8* 10.2* 11.5*   HEMATOCRIT 31.3* 34.0  --  33.1* 30.8* 35.2   PLATELETS 616* 739*  --  624* 520* 592*   NEUTROS ABS  --   --   --   --  10.50* 11.33*   IMMATURE GRANS (ABS)  --   --   --   --  0.17* 0.22*   LYMPHS ABS  --   --   --   --  1.14 1.50   MONOS ABS  --   --   --   --  0.65 0.79   EOS ABS  --   --   --   --  0.31 0.14   MCV 91.5 90.9  --  89.2 90.1 89.6   CRP 2.55* 6.04*  --  14.70* 15.21*  --    PROCALCITONIN  --   --   --   --  0.05  --    LACTATE  --   --   --   --   --  1.1   D DIMER QUANT  --   --  1.88*  --   --   --          Lab  04/27/23  0346 04/26/23  0532 04/25/23  0747 04/24/23  0647 04/23/23  1048   SODIUM 137 136 132* 130* 129*   POTASSIUM 4.8 4.3 4.4 4.1 4.4   CHLORIDE 99 98 98 95* 92*   CO2 24.0 23.0 19.0* 24.0 23.0   ANION GAP 14.0 15.0 15.0 11.0 14.0   BUN 23 16 13 12 14   CREATININE 0.62 0.63 0.60 0.63 0.50*   EGFR 94.2 93.8 94.9 93.8 99.2   GLUCOSE 160* 147* 149* 113* 104*   CALCIUM 8.6 9.3 9.3 8.3* 9.0   MAGNESIUM 2.5* 2.8* 2.5*  --   --          Lab 04/27/23  0346 04/25/23  0747 04/24/23  0647 04/23/23  1048   TOTAL PROTEIN 5.7* 6.7 6.8 7.4   ALBUMIN 3.1* 3.5 3.3* 3.5   GLOBULIN 2.6 3.2 3.5 3.9   ALT (SGPT) 27 32 13 10   AST (SGOT) 17 41* 22 23   BILIRUBIN 0.2 0.2 0.3 0.2   ALK PHOS 152* 185* 130* 109         Lab 04/25/23 2212 04/25/23 1957 04/25/23  0747 04/23/23  1048   PROBNP  --  360.4 210.6 163.6   HSTROP T 10* 12*  --  8                 Lab 04/25/23  1948   PH, ARTERIAL 7.457*   PCO2, ARTERIAL 35.2   PO2 .0   FIO2 100   HCO3 ART 24.8   BASE EXCESS ART 1.2     Brief Urine Lab Results  (Last result in the past 365 days)      Color   Clarity   Blood   Leuk Est   Nitrite   Protein   CREAT   Urine HCG        04/23/23 1757 Yellow   Clear   Trace   Negative   Negative   Negative                 Microbiology Results Abnormal     Procedure Component Value - Date/Time    Blood Culture - Blood, Arm, Right [[id number]]  (Normal) Collected: 04/23/23 1125    Lab Status: Preliminary result Specimen: Blood from Arm, Right Updated: 04/27/23 1145     Blood Culture No growth at 4 days    Blood Culture - Blood, Arm, Left [[id number]]  (Normal) Collected: 04/23/23 1125    Lab Status: Preliminary result Specimen: Blood from Arm, Left Updated: 04/27/23 1145     Blood Culture No growth at 4 days    S. Pneumo Ag Urine or CSF - Urine, Urine, Clean Catch [[id number]]  (Normal) Collected: 04/23/23 1757    Lab Status: Final result Specimen: Urine, Clean Catch Updated: 04/24/23 0949     Strep Pneumo Ag Negative    Legionella Antigen, Urine -  Urine, Urine, Clean Catch [[id number]]  (Normal) Collected: 04/23/23 1757    Lab Status: Final result Specimen: Urine, Clean Catch Updated: 04/24/23 0949     LEGIONELLA ANTIGEN, URINE Negative    Respiratory Panel PCR w/COVID-19(SARS-CoV-2) CHARI/MARISSA/APOLINAR/PAD/COR/MAD/AMY In-House, NP Swab in UTM/VTM, 3-4 HR TAT - Swab, Nasopharynx [[id number]]  (Normal) Collected: 04/24/23 0544    Lab Status: Final result Specimen: Swab from Nasopharynx Updated: 04/24/23 0647     ADENOVIRUS, PCR Not Detected     Coronavirus 229E Not Detected     Coronavirus HKU1 Not Detected     Coronavirus NL63 Not Detected     Coronavirus OC43 Not Detected     COVID19 Not Detected     Human Metapneumovirus Not Detected     Human Rhinovirus/Enterovirus Not Detected     Influenza A PCR Not Detected     Influenza B PCR Not Detected     Parainfluenza Virus 1 Not Detected     Parainfluenza Virus 2 Not Detected     Parainfluenza Virus 3 Not Detected     Parainfluenza Virus 4 Not Detected     RSV, PCR Not Detected     Bordetella pertussis pcr Not Detected     Bordetella parapertussis PCR Not Detected     Chlamydophila pneumoniae PCR Not Detected     Mycoplasma pneumo by PCR Not Detected    Narrative:      In the setting of a positive respiratory panel with a viral infection PLUS a negative procalcitonin without other underlying concern for bacterial infection, consider observing off antibiotics or discontinuation of antibiotics and continue supportive care. If the respiratory panel is positive for atypical bacterial infection (Bordetella pertussis, Chlamydophila pneumoniae, or Mycoplasma pneumoniae), consider antibiotic de-escalation to target atypical bacterial infection.    MRSA Screen, PCR (Inpatient) - Swab, Nares [[id number]]  (Normal) Collected: 04/23/23 1540    Lab Status: Final result Specimen: Swab from Nares Updated: 04/23/23 1651     MRSA PCR Negative    Narrative:      The negative predictive value of this diagnostic test is high and should only be  used to consider de-escalating anti-MRSA therapy. A positive result may indicate colonization with MRSA and must be correlated clinically.  MRSA Negative    COVID PRE-OP / PRE-PROCEDURE SCREENING ORDER (NO ISOLATION) - Swab, Nasopharynx [[id number]]  (Normal) Collected: 04/23/23 1125    Lab Status: Final result Specimen: Swab from Nasopharynx Updated: 04/23/23 1225    Narrative:      The following orders were created for panel order COVID PRE-OP / PRE-PROCEDURE SCREENING ORDER (NO ISOLATION) - Swab, Nasopharynx.  Procedure                               Abnormality         Status                     ---------                               -----------         ------                     COVID-19, FLU A/B, RSV P...[[id number]]  Normal              Final result                 Please view results for these tests on the individual orders.    COVID-19, FLU A/B, RSV PCR - Swab, Nasopharynx [[id number]]  (Normal) Collected: 04/23/23 1125    Lab Status: Final result Specimen: Swab from Nasopharynx Updated: 04/23/23 1225     COVID19 Not Detected     Influenza A PCR Not Detected     Influenza B PCR Not Detected     RSV, PCR Not Detected    Narrative:      Fact sheet for providers: https://www.fda.gov/media/866104/download    Fact sheet for patients: https://www.fda.gov/media/562898/download    Test performed by PCR.          XR Chest 1 View    Result Date: 4/27/2023  XR CHEST 1 VW Date of Exam: 4/27/2023 3:26 AM EDT Indication: f/u respiratory illness Comparison: April 26, 2023 Findings: The heart is enlarged. There are diffuse bilateral alveolar and interstitial airspace opacities consistent with multifocal pneumonia or pulmonary edema. There is a small left pleural effusion.     Impression: Impression: Stable chest. Multifocal infiltrates consistent with pneumonia or edema. Electronically Signed: Edis Armenta  4/27/2023 7:08 AM EDT  Workstation ID: [id number]    XR Chest 1 View    Result Date: 4/26/2023  XR CHEST 1 VW Date of Exam:  4/26/2023 3:55 AM EDT Indication: f/u respiratory illness Comparison: 4/25/2023 Findings: The heart appears enlarged. There is indistinctness of the pulmonary vasculature. Patchy airspace disease is seen within the left upper lobe and the right lower lobe as well as the left lower lobe, similar compared to the previous study. No significant pleural effusion identified. No pneumothorax. No acute osseous abnormality identified.     Impression: Impression: No significant change. Redemonstration of findings consistent with multifocal pneumonia. Electronically Signed: Rizwana Miles  4/26/2023 7:49 AM EDT  Workstation ID: [id number]    XR Chest 1 View    Result Date: 4/25/2023  XR CHEST 1 VW Date of Exam: 4/25/2023 7:44 PM EDT Indication: dyspnea Comparison: 4/25/2023 at 0912 hours Findings: Multifocal airspace infiltrates are again seen throughout the lungs bilaterally with interstitial changes. There is mild improvement in aeration when compared to the prior. The cardiac silhouette and mediastinum are stable.     Impression: Impression: Mild improvement in aeration throughout the lungs bilaterally. Electronically Signed: Johnathon Walker  4/25/2023 8:09 PM EDT  Workstation ID: [id number]          Current medications:  Scheduled Meds:amLODIPine, 5 mg, Oral, Q24H   And  valsartan, 160 mg, Oral, Q24H  [START ON 4/28/2023] azithromycin, 250 mg, Oral, Q24H  cetirizine, 10 mg, Oral, Daily  fluticasone, 2 spray, Each Nare, Nightly  guaiFENesin, 1,200 mg, Oral, Q12H  heparin (porcine), 5,000 Units, Subcutaneous, Q8H  ipratropium-albuterol, 3 mL, Nebulization, 4x Daily - RT  melatonin, 5 mg, Oral, Nightly  methylPREDNISolone sodium succinate, 60 mg, Intravenous, Q12H  montelukast, 10 mg, Oral, Daily  pantoprazole, 40 mg, Oral, Daily  piperacillin-tazobactam, 3.375 g, Intravenous, Q8H  sodium chloride, 10 mL, Intravenous, Q12H  sulfamethoxazole-trimethoprim, 1 tablet, Oral, Once per day on Mon Wed Fri      Continuous Infusions:   PRN  "Meds:.•  acetaminophen **OR** acetaminophen **OR** acetaminophen  •  ALPRAZolam  •  GI cocktail  •  calcium carbonate  •  hydrOXYzine  •  ipratropium-albuterol  •  Magnesium Standard Dose Replacement - Follow Nurse / BPA Driven Protocol  •  Morphine  •  ondansetron **OR** ondansetron  •  simethicone  •  sodium chloride  •  sodium chloride  •  sodium chloride  •  tiZANidine    Assessment & Plan   Assessment & Plan     Active Hospital Problems    Diagnosis  POA   • **Multifocal pneumonia [J18.9]  Yes   • Pulmonary fibrosis [J84.10]  Yes   • ILD (interstitial lung disease) -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        Brief Hospital Course to date:  Tala Ramsey is a 72 y.o. female w/ hx interstitial lung dz, RA (on humira until recently, recent steroid injections) who presents w/ progressive dyspnea, cough over ~10 days, failed levaquin. Hypoxic upon admission. Ct chest w/o contrast w/ bilateral multifocal air space dz. resp pcr panel negative. Had progressive hypoxia evening after admission placed on hi flow canula    Acute hypoxic resp failure, owrsening  Pulm fibrosis  Bilateral airspace dz/pneumonia  -has been on levaquin last week  -unclear if progession/\"flare\" of interstitial lung dz vs infection  -continue day #5 zosyn & z-max; added thrice weekly bactrim ds (pcp prophylaxis as has been on humira and steroids)  -continue scheduled & prn duonebs  -Pulm following: completed 3 days of \"pulse\" solumedrol (1g daily x 3 days) on 4/26/23; now on solumedrol 60mg bid p  -repeat iv lasix today  -oxygenation worse again today, now requiring 99% fio2 on hi flow canula. No improvement today      RA  -holding humira  -has received steroid \"shots\" recently  -hold plaquenil    Hyponatremia,resolved  -improved, 129 on admisison; now " resolved    HTN  -continue amlodpine/valsartan    Chronic anemia   -monitor    Goals/limits  -Patient is DNR/DNI (in event of further clinical decompensation patient would NOT want mechanical ventilation; in this case patient would wish to pursue comfort measures; patient's son and daughter in law have witnessed these discussions and agreed)  -palliative following;    4am: cbc,bmp    Expected Discharge Location and Transportation: home  Expected Discharge ? 5/1 (when oxygenation improved)  No data recorded     DVT prophylaxis:  Medical DVT prophylaxis orders are present. sq heparin    AM-PAC 6 Clicks Score (PT): 13 (04/26/23 2000)    CODE STATUS:   Code Status and Medical Interventions:   Ordered at: 04/23/23 1310     Medical Intervention Limits:    NO intubation (DNI)     Level Of Support Discussed With:    Patient     Code Status (Patient has no pulse and is not breathing):    No CPR (Do Not Attempt to Resuscitate)     Medical Interventions (Patient has pulse or is breathing):    Limited Support       Ramón Blanton MD  04/27/23

## 2023-04-27 NOTE — PLAN OF CARE
Goal Outcome Evaluation:           Progress: no change  Outcome Evaluation: VSS on HFNC. No complaints of pain or nausea. Pt slept throughout the night. Family at bedside. Will continue with plan of care.

## 2023-04-27 NOTE — PLAN OF CARE
Goal Outcome Evaluation:  Plan of Care Reviewed With: patient, family        Progress: no change  Outcome Evaluation: Pt. tachypneic and mildly anxious on HFNC.  Explained benefit of trying prn morphine for dyspnea.  Pt. seemed hesitant to try.  Daughter-in-law at [place] in place.  Pallaitive care to continue to follow for support, POC and ongoing GOC.

## 2023-04-27 NOTE — CASE MANAGEMENT/SOCIAL WORK
Continued Stay Note  [facility]     Patient Name: Tala Ramsey  MRN: [id number]  Today's Date: 4/27/2023    Admit Date: 4/23/2023    Plan: CM update   Discharge Plan     Row Name 04/27/23 1319       Plan    Plan CM update    Plan Comments Patient is not ready for discharge at this time - Remains on high flow 02. CM will continue to follow for dc planning as oxygen demands decrease - derrick [phone]               Discharge Codes    No documentation.               Expected Discharge Date and Time     Expected Discharge Date Expected Discharge Time    May 1, 2023             Derrick Riggs RN

## 2023-04-28 LAB
ANION GAP SERPL CALCULATED.3IONS-SCNC: 11 MMOL/L (ref 5–15)
BACTERIA SPEC AEROBE CULT: NORMAL
BACTERIA SPEC AEROBE CULT: NORMAL
BUN SERPL-MCNC: 22 MG/DL (ref 8–23)
BUN/CREAT SERPL: 41.5 (ref 7–25)
CALCIUM SPEC-SCNC: 8.7 MG/DL (ref 8.6–10.5)
CHLORIDE SERPL-SCNC: 98 MMOL/L (ref 98–107)
CO2 SERPL-SCNC: 27 MMOL/L (ref 22–29)
CREAT SERPL-MCNC: 0.53 MG/DL (ref 0.57–1)
DEPRECATED RDW RBC AUTO: 49.3 FL (ref 37–54)
EGFRCR SERPLBLD CKD-EPI 2021: 97.8 ML/MIN/1.73
ERYTHROCYTE [DISTWIDTH] IN BLOOD BY AUTOMATED COUNT: 14.9 % (ref 12.3–15.4)
GLUCOSE SERPL-MCNC: 132 MG/DL (ref 65–99)
HCT VFR BLD AUTO: 31.6 % (ref 34–46.6)
HGB BLD-MCNC: 10.1 G/DL (ref 12–15.9)
MAGNESIUM SERPL-MCNC: 2.4 MG/DL (ref 1.6–2.4)
MCH RBC QN AUTO: 29.3 PG (ref 26.6–33)
MCHC RBC AUTO-ENTMCNC: 32 G/DL (ref 31.5–35.7)
MCV RBC AUTO: 91.6 FL (ref 79–97)
PLATELET # BLD AUTO: 602 10*3/MM3 (ref 140–450)
PMV BLD AUTO: 8.6 FL (ref 6–12)
POTASSIUM SERPL-SCNC: 4.4 MMOL/L (ref 3.5–5.2)
RBC # BLD AUTO: 3.45 10*6/MM3 (ref 3.77–5.28)
SODIUM SERPL-SCNC: 136 MMOL/L (ref 136–145)
WBC NRBC COR # BLD: 15.99 10*3/MM3 (ref 3.4–10.8)

## 2023-04-28 PROCEDURE — 25010000002 FUROSEMIDE PER 20 MG: Performed by: INTERNAL MEDICINE

## 2023-04-28 PROCEDURE — 25010000002 PIPERACILLIN SOD-TAZOBACTAM PER 1 G: Performed by: INTERNAL MEDICINE

## 2023-04-28 PROCEDURE — 80048 BASIC METABOLIC PNL TOTAL CA: CPT | Performed by: INTERNAL MEDICINE

## 2023-04-28 PROCEDURE — 25010000002 HEPARIN (PORCINE) PER 1000 UNITS: Performed by: INTERNAL MEDICINE

## 2023-04-28 PROCEDURE — 94799 UNLISTED PULMONARY SVC/PX: CPT

## 2023-04-28 PROCEDURE — 85027 COMPLETE CBC AUTOMATED: CPT | Performed by: INTERNAL MEDICINE

## 2023-04-28 PROCEDURE — 83735 ASSAY OF MAGNESIUM: CPT | Performed by: INTERNAL MEDICINE

## 2023-04-28 PROCEDURE — 97530 THERAPEUTIC ACTIVITIES: CPT

## 2023-04-28 PROCEDURE — 99232 SBSQ HOSP IP/OBS MODERATE 35: CPT | Performed by: INTERNAL MEDICINE

## 2023-04-28 PROCEDURE — 25010000002 METHYLPREDNISOLONE PER 125 MG: Performed by: INTERNAL MEDICINE

## 2023-04-28 PROCEDURE — 25010000002 MORPHINE PER 10 MG: Performed by: FAMILY MEDICINE

## 2023-04-28 PROCEDURE — 94664 DEMO&/EVAL PT USE INHALER: CPT

## 2023-04-28 RX ORDER — FUROSEMIDE 10 MG/ML
40 INJECTION INTRAMUSCULAR; INTRAVENOUS ONCE
Status: COMPLETED | OUTPATIENT
Start: 2023-04-28 | End: 2023-04-28

## 2023-04-28 RX ORDER — BISACODYL 10 MG
10 SUPPOSITORY, RECTAL RECTAL DAILY PRN
Status: DISCONTINUED | OUTPATIENT
Start: 2023-04-28 | End: 2023-05-06

## 2023-04-28 RX ORDER — BISACODYL 5 MG/1
5 TABLET, DELAYED RELEASE ORAL DAILY PRN
Status: DISCONTINUED | OUTPATIENT
Start: 2023-04-28 | End: 2023-05-06

## 2023-04-28 RX ORDER — POLYETHYLENE GLYCOL 3350 17 G/17G
17 POWDER, FOR SOLUTION ORAL DAILY PRN
Status: DISCONTINUED | OUTPATIENT
Start: 2023-04-28 | End: 2023-05-06

## 2023-04-28 RX ORDER — AMOXICILLIN 250 MG
2 CAPSULE ORAL 2 TIMES DAILY
Status: DISCONTINUED | OUTPATIENT
Start: 2023-04-28 | End: 2023-05-06

## 2023-04-28 RX ADMIN — Medication 5 MG: at 21:41

## 2023-04-28 RX ADMIN — IPRATROPIUM BROMIDE AND ALBUTEROL SULFATE 3 ML: .5; 2.5 SOLUTION RESPIRATORY (INHALATION) at 13:37

## 2023-04-28 RX ADMIN — METHYLPREDNISOLONE SODIUM SUCCINATE 60 MG: 125 INJECTION, POWDER, FOR SOLUTION INTRAMUSCULAR; INTRAVENOUS at 17:27

## 2023-04-28 RX ADMIN — GUAIFENESIN 1200 MG: 600 TABLET, EXTENDED RELEASE ORAL at 09:23

## 2023-04-28 RX ADMIN — MONTELUKAST 10 MG: 10 TABLET, FILM COATED ORAL at 09:23

## 2023-04-28 RX ADMIN — MORPHINE SULFATE 2 MG: 2 INJECTION, SOLUTION INTRAMUSCULAR; INTRAVENOUS at 05:37

## 2023-04-28 RX ADMIN — SENNOSIDES AND DOCUSATE SODIUM 2 TABLET: 8.6; 5 TABLET ORAL at 21:41

## 2023-04-28 RX ADMIN — Medication 2 SPRAY: at 21:43

## 2023-04-28 RX ADMIN — ACETAMINOPHEN 325MG 650 MG: 325 TABLET ORAL at 13:54

## 2023-04-28 RX ADMIN — AZITHROMYCIN MONOHYDRATE 250 MG: 250 TABLET ORAL at 09:23

## 2023-04-28 RX ADMIN — HEPARIN SODIUM 5000 UNITS: 5000 INJECTION, SOLUTION INTRAVENOUS; SUBCUTANEOUS at 21:41

## 2023-04-28 RX ADMIN — TAZOBACTAM SODIUM AND PIPERACILLIN SODIUM 3.38 G: 375; 3 INJECTION, SOLUTION INTRAVENOUS at 17:27

## 2023-04-28 RX ADMIN — FUROSEMIDE 40 MG: 10 INJECTION, SOLUTION INTRAMUSCULAR; INTRAVENOUS at 11:51

## 2023-04-28 RX ADMIN — SULFAMETHOXAZOLE AND TRIMETHOPRIM 1 TABLET: 800; 160 TABLET ORAL at 09:23

## 2023-04-28 RX ADMIN — HYDROXYCHLOROQUINE SULFATE 200 MG: 200 TABLET, FILM COATED ORAL at 09:23

## 2023-04-28 RX ADMIN — HEPARIN SODIUM 5000 UNITS: 5000 INJECTION, SOLUTION INTRAVENOUS; SUBCUTANEOUS at 13:54

## 2023-04-28 RX ADMIN — PANTOPRAZOLE SODIUM 40 MG: 40 TABLET, DELAYED RELEASE ORAL at 09:23

## 2023-04-28 RX ADMIN — HEPARIN SODIUM 5000 UNITS: 5000 INJECTION, SOLUTION INTRAVENOUS; SUBCUTANEOUS at 05:37

## 2023-04-28 RX ADMIN — METHYLPREDNISOLONE SODIUM SUCCINATE 60 MG: 125 INJECTION, POWDER, FOR SOLUTION INTRAMUSCULAR; INTRAVENOUS at 05:37

## 2023-04-28 RX ADMIN — MORPHINE SULFATE 3 MG: 4 INJECTION, SOLUTION INTRAMUSCULAR; INTRAVENOUS at 21:42

## 2023-04-28 RX ADMIN — CALCIUM CARBONATE (ANTACID) CHEW TAB 500 MG 2 TABLET: 500 CHEW TAB at 01:24

## 2023-04-28 RX ADMIN — TAZOBACTAM SODIUM AND PIPERACILLIN SODIUM 3.38 G: 375; 3 INJECTION, SOLUTION INTRAVENOUS at 09:39

## 2023-04-28 RX ADMIN — IPRATROPIUM BROMIDE AND ALBUTEROL SULFATE 3 ML: .5; 2.5 SOLUTION RESPIRATORY (INHALATION) at 19:59

## 2023-04-28 RX ADMIN — GUAIFENESIN 1200 MG: 600 TABLET, EXTENDED RELEASE ORAL at 21:41

## 2023-04-28 RX ADMIN — Medication 10 ML: at 21:43

## 2023-04-28 RX ADMIN — TIZANIDINE 2 MG: 4 TABLET ORAL at 21:41

## 2023-04-28 RX ADMIN — IPRATROPIUM BROMIDE AND ALBUTEROL SULFATE 3 ML: .5; 2.5 SOLUTION RESPIRATORY (INHALATION) at 07:25

## 2023-04-28 RX ADMIN — VALSARTAN 160 MG: 160 TABLET, FILM COATED ORAL at 09:23

## 2023-04-28 RX ADMIN — AMLODIPINE BESYLATE 5 MG: 5 TABLET ORAL at 09:23

## 2023-04-28 RX ADMIN — HYDROXYCHLOROQUINE SULFATE 200 MG: 200 TABLET, FILM COATED ORAL at 21:42

## 2023-04-28 RX ADMIN — IPRATROPIUM BROMIDE AND ALBUTEROL SULFATE 3 ML: .5; 2.5 SOLUTION RESPIRATORY (INHALATION) at 16:48

## 2023-04-28 RX ADMIN — CETIRIZINE HYDROCHLORIDE 10 MG: 10 TABLET, FILM COATED ORAL at 09:23

## 2023-04-28 RX ADMIN — ALPRAZOLAM 0.25 MG: 0.25 TABLET ORAL at 14:00

## 2023-04-28 RX ADMIN — TAZOBACTAM SODIUM AND PIPERACILLIN SODIUM 3.38 G: 375; 3 INJECTION, SOLUTION INTRAVENOUS at 01:23

## 2023-04-28 NOTE — PROGRESS NOTES
"Pulmonary Hospital Consult Note     LOS: 5 days   Patient Care Team:  Sg Horne MD as PCP - General (Family Medicine)  Laurent Tomlinson MD as Consulting Physician (Pulmonary Disease)    Chief Complaint:    Chief Complaint   Patient presents with   • Shortness of Breath     Subjective     HPI:     Initial history (from pulmonary consult note 4/24/2023):    72 y.o. female with history of rheumatoid arthritis/inflammatory arthritis and possible lupus on Humira and Plaquenil followed by Dr. Weinstein.  Also with history of iron deficiency anemia, hiatal hernia, GERD, hypertension.  She saw Dr. Tomlinson for pulmonary fibrosis in August 2022 and again in December 2022.  At that time, she had a normal spirometry with mild restriction and decreased DLCO.  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    Patient in the past few weeks reported some URI type symptoms followed by worsening dyspnea.  She actually held her Humira.  She saw her PCP about 10 days before the admission reporting \"ulcers in my mouth\", and throat discomfort and was treated with amoxicillin.  Subsequently was seen and treated with Levaquin for possible pneumonia and given a steroid injection.  She has had progressive dyspnea.    She presented to the hospital on 4/23/2023 and was subsequently admitted to the hospitalist service for bilateral infiltrates/possible pneumonia and started on azithromycin and Zosyn.    Procalcitonin was within normal limits.  White blood cell count was elevated at 14.  She had hyponatremia with a sodium of 129.  Respiratory PCR panel was negative.  Blood cultures pending.  She has not been able to produce any sputum.    Patient has not had any significant improvement since admission.  She denies fevers or chills.    Interval history: " Patient is about the same today.  Remains on about 80% high flow nasal cannula.  She required a little more than not this morning.  No fevers or chills.  Family is at the bedside.  She reports she slept better last night after some morphine.    History taken from: patient    Past Medical History:   Diagnosis Date   • Anemia    • Back pain    • Bronchitis    • Hypertension    • Low back pain    • Pulmonary fibrosis    • Rheumatoid arthritis        Past Surgical History:   Procedure Laterality Date   • DILATION AND CURETTAGE, DIAGNOSTIC / THERAPEUTIC         Family History   Problem Relation Age of Onset   • Heart failure Mother    • Stroke Mother    • Cancer Father    • Diabetes Son    • Diabetes Son        Social History     Socioeconomic History   • Marital status:    Tobacco Use   • Smoking status: Never   • Smokeless tobacco: Never   Vaping Use   • Vaping Use: Never used   Substance and Sexual Activity   • Alcohol use: No   • Drug use: Never   • Sexual activity: Defer       No Known Allergies    PMH/FH/SocH were reviewed by me and updates were made.     Review of Systems:    All other systems were reviewed and are negative.  Exceptions are noted in subjective or below.    Objective     Vital Signs  Temp:  [97.9 °F (36.6 °C)-98.5 °F (36.9 °C)] 98.1 °F (36.7 °C)  Heart Rate:  [65-89] 88  Resp:  [16-20] 18  BP: (135-166)/(50-68) 166/66    Physical Exam:     Constitutional:    Alert, cooperative, in no acute distress   Head:    Normocephalic, without obvious abnormality, atraumatic   Eyes:            Lids and lashes normal, conjunctivae and sclerae normal, no   icterus, no pallor, corneas clear, PER   ENMT:   Ears appear intact with no abnormalities noted         Neck:  Trachea midline, no thyromegaly, no JVD       Lungs/Resp:    Moderately increased effort, symmetric chest rise, no crepitus, mild bilateral diffuse rales, no chest wall tenderness                 Heart/CV:    Regular rhythm and normal rate,  normal S1 and S2, no            murmur   Abdomen/GI:     Normal bowel sounds, no masses, no organomegaly, soft,        non-tender, non-distended   :     Deferred   Extremities/MSK:   No clubbing, cyanosis or edema.  No deformities.    Pulses:   Pulses palpable and equal bilaterally   Skin:   No bleeding, bruising or rash   Hem/Lymph:   No cervical or supraclavicular adenopathy.    Neurologic:   Moves all extremities with no obvious focal motor deficit.  Cranial nerves 2 - 12 grossly intact             Psychiatric: Normal mood and affect, oriented x 3.   The above findings are documentation my personal physical examination from today.  Electronically signed by:Bernard Aguila MD 04/28/23 14:14 EDT     Results Review:     I reviewed the patient's new clinical results.   Results from last 7 days   Lab Units 04/28/23  0704 04/27/23  0346 04/26/23  0532 04/25/23  0747 04/24/23  0647   SODIUM mmol/L 136 137 136 132* 130*   POTASSIUM mmol/L 4.4 4.8 4.3 4.4 4.1   CHLORIDE mmol/L 98 99 98 98 95*   CO2 mmol/L 27.0 24.0 23.0 19.0* 24.0   BUN mg/dL 22 23 16 13 12   CREATININE mg/dL 0.53* 0.62 0.63 0.60 0.63   CALCIUM mg/dL 8.7 8.6 9.3 9.3 8.3*   BILIRUBIN mg/dL  --  0.2  --  0.2 0.3   ALK PHOS U/L  --  152*  --  185* 130*   ALT (SGPT) U/L  --  27  --  32 13   AST (SGOT) U/L  --  17  --  41* 22   GLUCOSE mg/dL 132* 160* 147* 149* 113*     Results from last 7 days   Lab Units 04/28/23  0705 04/27/23  0346 04/26/23  0532   WBC 10*3/mm3 15.99* 15.91* 23.58*   HEMOGLOBIN g/dL 10.1* 9.9* 10.8*   HEMATOCRIT % 31.6* 31.3* 34.0   PLATELETS 10*3/mm3 602* 616* 739*     Results from last 7 days   Lab Units 04/25/23  1948   PH, ARTERIAL pH units 7.457*   PO2 ART mm Hg 103.0   PCO2, ARTERIAL mm Hg 35.2   HCO3 ART mmol/L 24.8       I reviewed the patient's new imaging including images and reports.    Chest x-ray today again shows essentially stable diffuse bilateral interstitial/airspace disease.    Medication Review:   amLODIPine, 5 mg,  "Oral, Q24H   And  valsartan, 160 mg, Oral, Q24H  azithromycin, 250 mg, Oral, Q24H  cetirizine, 10 mg, Oral, Daily  fluticasone, 2 spray, Each Nare, Nightly  guaiFENesin, 1,200 mg, Oral, Q12H  heparin (porcine), 5,000 Units, Subcutaneous, Q8H  hydroxychloroquine, 200 mg, Oral, Q12H  ipratropium-albuterol, 3 mL, Nebulization, 4x Daily - RT  melatonin, 5 mg, Oral, Nightly  methylPREDNISolone sodium succinate, 60 mg, Intravenous, Q12H  montelukast, 10 mg, Oral, Daily  pantoprazole, 40 mg, Oral, Daily  piperacillin-tazobactam, 3.375 g, Intravenous, Q8H  senna-docusate sodium, 2 tablet, Oral, BID  sodium chloride, 10 mL, Intravenous, Q12H  sulfamethoxazole-trimethoprim, 1 tablet, Oral, Once per day on Mon Wed Fri           Assessment & Plan       Multifocal pneumonia    Pulmonary fibrosis    ILD (interstitial lung disease) -likely RA associated ILD with acute exacerbation    Acute hypoxemic respiratory failure -likely due to ILD exacerbation    72 y.o. female with history of rheumatoid arthritis/inflammatory arthritis and possible lupus on Humira and Plaquenil followed by Dr. Weinstein.  Also with history of iron deficiency anemia, hiatal hernia, GERD, hypertension.  She saw Dr. Tomlinson for pulmonary fibrosis in August 2022 and again in December 2022.  At that time, she had a normal spirometry with mild restriction and decreased DLCO.  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    Patient in the past few weeks reported some URI type symptoms followed by worsening dyspnea.  She actually held her Humira.  She saw her PCP about 10 days before the admission reporting \"ulcers in my mouth\", and throat discomfort and was treated with amoxicillin.  Subsequently was seen and treated with Levaquin for possible pneumonia and given a steroid " injection.  She has had progressive dyspnea.    She presented to the hospital on 4/23/2023 and was subsequently admitted to the hospitalist service for bilateral infiltrates/possible pneumonia and started on azithromycin and Zosyn.    Procalcitonin was within normal limits.  proBNP was low at 163.6.  White blood cell count was elevated at 14.  She had hyponatremia with a sodium of 129.  Respiratory PCR panel was negative.  Blood cultures pending.  She has not been able to produce any sputum.    Patient has not had any significant improvement since admission.  She denies fevers or chills.    Patient has fairly severe lung disease at this point.  She is requiring high flow nasal cannula oxygen to maintain oxygen saturations and is dyspneic at rest.    Ideally, I would perform a bronchoscopy to rule out infection prior to consideration of pulse dose steroids.  I do not think she would tolerate bronchoscopy without being intubated, and likely would have difficulty coming off of mechanical ventilation.  I had this discussion with the patient.  She does not want to go on mechanical ventilation and wishes to be a DO NOT RESUSCITATE (as previously relayed by the hospitalist).    I think that the patient most likely has an acute exacerbation of her underlying interstitial lung disease, which is likely related to her underlying rheumatoid arthritis.  Is also possible she has interstitial lung disease related to Humira, atypical infectious process, or other cause for her infiltrates and respiratory failure.    Patient is status post 3 days of gram Solu-Medrol daily, completed on 4/20/2023.  Continues antibiotics and Bactrim for pneumocystis prophylaxis.    Still no major changes.  She is getting some benzodiazepine for anxiety and morphine for dyspnea.    1.  For bilateral pulmonary infiltrates: Differential includes flare of underlying rheumatoid associated interstitial lung disease, which I feel most likely, followed by  possible infectious including opportunistic infection, or other inflammatory cause.  Plan for pulse steroids followed by prolonged steroid taper as above.  Completed 3 days of pulse dose steroids (1 g Solu-Medrol, completed on 4/26/2023).  Continue current antibiotics and Bactrim for pneumocystis prophylaxis.  Appreciate palliative care assistance.    2.  For acute hypoxemic respiratory failure: As above.  Supplemental oxygen as needed.  3.  For rheumatoid arthritis: Recently held Humira secondary to presumed illness.  Restarted Plaquenil 4/27/2023.  Currently on Solu-Medrol 60 mg every 12 hours.  4.  For possible pneumonia: As above.  5.  DVT prophylaxis: Subcutaneous heparin.    Please call if needed over the weekend.    Electronically signed by:    Bernard Aguila MD  04/28/23  14:14 EDT        • Please note that portions of this note were completed with [employer] - a voice recognition program.

## 2023-04-28 NOTE — PLAN OF CARE
Goal Outcome Evaluation:           Progress: no change  Outcome Evaluation: VSS on HFNC. Pt was willing to trying morpine, and stated that it helped a little. Purewick in place. Pt slept on and off throughout the night. Fall and skin precautions in place. Son at bedside. Will continue with plan of care.

## 2023-04-28 NOTE — PLAN OF CARE
Goal Outcome Evaluation:  Plan of Care Reviewed With: patient        Progress: improving  Outcome Evaluation: Pt able to perform STS transfer from EOB and take side steps from bed to chair with Jason x2 and B UE support. Pt's mobility significantly limited by increased SOA. Required consistent cues for proper breathing technique. Continue to recommend PT skilled care.

## 2023-04-28 NOTE — PLAN OF CARE
"Goal Outcome Evaluation:  Plan of Care Reviewed With: patient, son        Progress: no change  Outcome Evaluation: Pt. sitting up in bed on RA cleaning nose.  Sats down to 76%.  Pt. put HFNC back on and it took a few minutes for spo2 to climb back up into low 90s.  Pt. tried morphine this am and per documentation it helped \"a little\".  Dr. Castrejon increased dosage.  Explained to son at  that ACP is for LW and not financial planning.  Palliative care to continue to follow for support, POC and ongoing GOC.  "

## 2023-04-28 NOTE — PROGRESS NOTES
[facility] Medicine Services  PROGRESS NOTE    Patient Name: Tala Ramsey  : 1950  MRN: [id number]    Date of Admission: 2023  Primary Care Physician: Sg Horne MD    Subjective   Subjective     CC:  Hypoxia, pneumonia    HPI:  No new issues. No pain. Anxious this morning. Unable to wean oxygen off hi flow  ROS:  No n/v/d  No rash    Objective   Objective     Vital Signs:   Temp:  [97.9 °F (36.6 °C)-98.5 °F (36.9 °C)] 98.3 °F (36.8 °C)  Heart Rate:  [65-89] 89  Resp:  [16-20] 18  BP: (135-165)/(50-69) 153/68  Flow (L/min):  [40-45] 45     Physical Exam:  Constitutional:Alert, oriented x 3, nontoxic appearing but ill appearing, hi flow cnaula in place, sitting upright in bed, no overt distress  Psych:Normal/appropriate affect  HEENT:NCAT, oropharynx clear  Neck: neck supple, full range of motion  Neuro: Face symmetric, speech clear, equal , moves all extremities  Cardiac: RRR; No pretibial pitting edema  Resp: very faint scattered bilateral mid insp crackles noted, no distress  GI: abd soft, nontender  Skin: No extremity rash  Musculoskeletal/extremities: no cyanosis of extremities; no significant ankle edema        Results Reviewed:  LAB RESULTS:      Lab 23  0705 23  0346 23  0532 23  1957 23  0747 23  0647 23  1048   WBC 15.99* 15.91* 23.58*  --  15.74* 12.84* 14.06*   HEMOGLOBIN 10.1* 9.9* 10.8*  --  10.8* 10.2* 11.5*   HEMATOCRIT 31.6* 31.3* 34.0  --  33.1* 30.8* 35.2   PLATELETS 602* 616* 739*  --  624* 520* 592*   NEUTROS ABS  --   --   --   --   --  10.50* 11.33*   IMMATURE GRANS (ABS)  --   --   --   --   --  0.17* 0.22*   LYMPHS ABS  --   --   --   --   --  1.14 1.50   MONOS ABS  --   --   --   --   --  0.65 0.79   EOS ABS  --   --   --   --   --  0.31 0.14   MCV 91.6 91.5 90.9  --  89.2 90.1 89.6   CRP  --  2.55* 6.04*  --  14.70* 15.21*  --    PROCALCITONIN  --   --   --   --   --  0.05  --    LACTATE  --    --   --   --   --   --  1.1   D DIMER QUANT  --   --   --  1.88*  --   --   --          Lab 04/28/23  0704 04/27/23  0346 04/26/23  0532 04/25/23  0747 04/24/23  0647   SODIUM 136 137 136 132* 130*   POTASSIUM 4.4 4.8 4.3 4.4 4.1   CHLORIDE 98 99 98 98 95*   CO2 27.0 24.0 23.0 19.0* 24.0   ANION GAP 11.0 14.0 15.0 15.0 11.0   BUN 22 23 16 13 12   CREATININE 0.53* 0.62 0.63 0.60 0.63   EGFR 97.8 94.2 93.8 94.9 93.8   GLUCOSE 132* 160* 147* 149* 113*   CALCIUM 8.7 8.6 9.3 9.3 8.3*   MAGNESIUM 2.4 2.5* 2.8* 2.5*  --          Lab 04/27/23  0346 04/25/23  0747 04/24/23  0647 04/23/23  1048   TOTAL PROTEIN 5.7* 6.7 6.8 7.4   ALBUMIN 3.1* 3.5 3.3* 3.5   GLOBULIN 2.6 3.2 3.5 3.9   ALT (SGPT) 27 32 13 10   AST (SGOT) 17 41* 22 23   BILIRUBIN 0.2 0.2 0.3 0.2   ALK PHOS 152* 185* 130* 109         Lab 04/25/23 2212 04/25/23 1957 04/25/23  0747 04/23/23  1048   PROBNP  --  360.4 210.6 163.6   HSTROP T 10* 12*  --  8                 Lab 04/25/23  1948   PH, ARTERIAL 7.457*   PCO2, ARTERIAL 35.2   PO2 .0   FIO2 100   HCO3 ART 24.8   BASE EXCESS ART 1.2     Brief Urine Lab Results  (Last result in the past 365 days)      Color   Clarity   Blood   Leuk Est   Nitrite   Protein   CREAT   Urine HCG        04/23/23 1757 Yellow   Clear   Trace   Negative   Negative   Negative                 Microbiology Results Abnormal     Procedure Component Value - Date/Time    Blood Culture - Blood, Arm, Right [[id number]]  (Normal) Collected: 04/23/23 1125    Lab Status: Preliminary result Specimen: Blood from Arm, Right Updated: 04/27/23 1145     Blood Culture No growth at 4 days    Blood Culture - Blood, Arm, Left [[id number]]  (Normal) Collected: 04/23/23 1125    Lab Status: Preliminary result Specimen: Blood from Arm, Left Updated: 04/27/23 1145     Blood Culture No growth at 4 days    S. Pneumo Ag Urine or CSF - Urine, Urine, Clean Catch [[id number]]  (Normal) Collected: 04/23/23 1757    Lab Status: Final result Specimen: Urine, Clean  Catch Updated: 04/24/23 0949     Strep Pneumo Ag Negative    Legionella Antigen, Urine - Urine, Urine, Clean Catch [[id number]]  (Normal) Collected: 04/23/23 1757    Lab Status: Final result Specimen: Urine, Clean Catch Updated: 04/24/23 0949     LEGIONELLA ANTIGEN, URINE Negative    Respiratory Panel PCR w/COVID-19(SARS-CoV-2) CHARI/MARISSA/APOLINAR/PAD/COR/MAD/AMY In-House, NP Swab in UTM/VTM, 3-4 HR TAT - Swab, Nasopharynx [[id number]]  (Normal) Collected: 04/24/23 0544    Lab Status: Final result Specimen: Swab from Nasopharynx Updated: 04/24/23 0647     ADENOVIRUS, PCR Not Detected     Coronavirus 229E Not Detected     Coronavirus HKU1 Not Detected     Coronavirus NL63 Not Detected     Coronavirus OC43 Not Detected     COVID19 Not Detected     Human Metapneumovirus Not Detected     Human Rhinovirus/Enterovirus Not Detected     Influenza A PCR Not Detected     Influenza B PCR Not Detected     Parainfluenza Virus 1 Not Detected     Parainfluenza Virus 2 Not Detected     Parainfluenza Virus 3 Not Detected     Parainfluenza Virus 4 Not Detected     RSV, PCR Not Detected     Bordetella pertussis pcr Not Detected     Bordetella parapertussis PCR Not Detected     Chlamydophila pneumoniae PCR Not Detected     Mycoplasma pneumo by PCR Not Detected    Narrative:      In the setting of a positive respiratory panel with a viral infection PLUS a negative procalcitonin without other underlying concern for bacterial infection, consider observing off antibiotics or discontinuation of antibiotics and continue supportive care. If the respiratory panel is positive for atypical bacterial infection (Bordetella pertussis, Chlamydophila pneumoniae, or Mycoplasma pneumoniae), consider antibiotic de-escalation to target atypical bacterial infection.    MRSA Screen, PCR (Inpatient) - Swab, Nares [[id number]]  (Normal) Collected: 04/23/23 1540    Lab Status: Final result Specimen: Swab from Nares Updated: 04/23/23 1651     MRSA PCR Negative     Narrative:      The negative predictive value of this diagnostic test is high and should only be used to consider de-escalating anti-MRSA therapy. A positive result may indicate colonization with MRSA and must be correlated clinically.  MRSA Negative    COVID PRE-OP / PRE-PROCEDURE SCREENING ORDER (NO ISOLATION) - Swab, Nasopharynx [[id number]]  (Normal) Collected: 04/23/23 1125    Lab Status: Final result Specimen: Swab from Nasopharynx Updated: 04/23/23 1225    Narrative:      The following orders were created for panel order COVID PRE-OP / PRE-PROCEDURE SCREENING ORDER (NO ISOLATION) - Swab, Nasopharynx.  Procedure                               Abnormality         Status                     ---------                               -----------         ------                     COVID-19, FLU A/B, RSV P...[[id number]]  Normal              Final result                 Please view results for these tests on the individual orders.    COVID-19, FLU A/B, RSV PCR - Swab, Nasopharynx [[id number]]  (Normal) Collected: 04/23/23 1125    Lab Status: Final result Specimen: Swab from Nasopharynx Updated: 04/23/23 1225     COVID19 Not Detected     Influenza A PCR Not Detected     Influenza B PCR Not Detected     RSV, PCR Not Detected    Narrative:      Fact sheet for providers: https://www.fda.gov/media/958088/download    Fact sheet for patients: https://www.fda.gov/media/204534/download    Test performed by PCR.          XR Chest 1 View    Result Date: 4/27/2023  XR CHEST 1 VW Date of Exam: 4/27/2023 3:26 AM EDT Indication: f/u respiratory illness Comparison: April 26, 2023 Findings: The heart is enlarged. There are diffuse bilateral alveolar and interstitial airspace opacities consistent with multifocal pneumonia or pulmonary edema. There is a small left pleural effusion.     Impression: Impression: Stable chest. Multifocal infiltrates consistent with pneumonia or edema. Electronically Signed: Edis Armenta  4/27/2023 7:08 AM EDT   Workstation ID: [id number]          Current medications:  Scheduled Meds:amLODIPine, 5 mg, Oral, Q24H   And  valsartan, 160 mg, Oral, Q24H  azithromycin, 250 mg, Oral, Q24H  cetirizine, 10 mg, Oral, Daily  fluticasone, 2 spray, Each Nare, Nightly  furosemide, 40 mg, Intravenous, Once  guaiFENesin, 1,200 mg, Oral, Q12H  heparin (porcine), 5,000 Units, Subcutaneous, Q8H  hydroxychloroquine, 200 mg, Oral, Q12H  ipratropium-albuterol, 3 mL, Nebulization, 4x Daily - RT  melatonin, 5 mg, Oral, Nightly  methylPREDNISolone sodium succinate, 60 mg, Intravenous, Q12H  montelukast, 10 mg, Oral, Daily  pantoprazole, 40 mg, Oral, Daily  piperacillin-tazobactam, 3.375 g, Intravenous, Q8H  senna-docusate sodium, 2 tablet, Oral, BID  sodium chloride, 10 mL, Intravenous, Q12H  sulfamethoxazole-trimethoprim, 1 tablet, Oral, Once per day on Mon Wed Fri      Continuous Infusions:   PRN Meds:.•  acetaminophen **OR** acetaminophen **OR** acetaminophen  •  ALPRAZolam  •  GI cocktail  •  senna-docusate sodium **AND** polyethylene glycol **AND** bisacodyl **AND** bisacodyl  •  calcium carbonate  •  hydrOXYzine  •  ipratropium-albuterol  •  Magnesium Standard Dose Replacement - Follow Nurse / BPA Driven Protocol  •  Morphine  •  ondansetron **OR** ondansetron  •  simethicone  •  sodium chloride  •  sodium chloride  •  sodium chloride  •  tiZANidine    Assessment & Plan   Assessment & Plan     Active Hospital Problems    Diagnosis  POA   • **Multifocal pneumonia [J18.9]  Yes   • Pulmonary fibrosis [J84.10]  Yes   • ILD (interstitial lung disease) -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        Brief Hospital Course to date:  Tala Ramsey is a 72 y.o. female w/ hx interstitial lung dz, RA (on humira until recently, recent steroid injections) who presents w/  "progressive dyspnea, cough over ~10 days, failed levaquin. Hypoxic upon admission. Ct chest w/o contrast w/ bilateral multifocal air space dz. resp pcr panel negative. Had progressive hypoxia evening after admission placed on hi flow canula    Acute hypoxic resp failure, owrsening  Pulm fibrosis  Bilateral airspace dz/pneumonia  -has been on levaquin last week  -unclear if progession/\"flare\" of interstitial lung dz vs infection  -continue day #5 zosyn & z-max; added thrice weekly bactrim ds (pcp prophylaxis as has been on humira and steroids)  -continue scheduled & prn duonebs  -Pulm following: completed 3 days of \"pulse\" solumedrol (1g daily x 3 days) on 4/26/23; now on solumedrol 60mg bid   -repeat iv lasix today  -still no improvement today, still requiring ~85% fio2; hopefully will be able to soon. Very guarded prognosis  -appreciate pulm and palliative assistance    RA  -holding humira  -has received steroid \"shots\" recently  -hold plaquenil    Hyponatremia,resolved  -was 129 on admission; now resolved    HTN  -continue amlodpine/valsartan    Chronic anemia   -monitor    Goals/limits  -Patient is DNR/DNI (in event of further clinical decompensation patient would NOT want mechanical ventilation; in this case patient would wish to pursue comfort measures; patient's son and daughter in law have witnessed these discussions and agreed)  -palliative following;    am: cbc,bmp    Expected Discharge Location and Transportation: home  Expected Discharge ? 5/1 (when oxygenation improved)  No data recorded     DVT prophylaxis:  Medical DVT prophylaxis orders are present. sq heparin    AM-PAC 6 Clicks Score (PT): 13 (04/27/23 2000)    CODE STATUS:   Code Status and Medical Interventions:   Ordered at: 04/23/23 1310     Medical Intervention Limits:    NO intubation (DNI)     Level Of Support Discussed With:    Patient     Code Status (Patient has no pulse and is not breathing):    No CPR (Do Not Attempt to Resuscitate)     " Medical Interventions (Patient has pulse or is breathing):    Limited Support       Ramón Blanton MD  04/28/23

## 2023-04-28 NOTE — PLAN OF CARE
Problem: Adult Inpatient Plan of Care  Goal: Plan of Care Review  Outcome: Ongoing, Progressing  Flowsheets (Taken 4/28/2023 1858)  Progress: no change  Plan of Care Reviewed With: patient  Outcome Evaluation: VSS, pt continues to be on High Flow 40L 80% O2. Pt denies pain. Reports Xanex does improve SOA at times. Up in Chair for a couple hours today. Tolerating diet eating 25-50% of trays.  Goal: Patient-Specific Goal (Individualized)  Outcome: Ongoing, Progressing  Goal: Absence of Hospital-Acquired Illness or Injury  Outcome: Ongoing, Progressing  Intervention: Identify and Manage Fall Risk  Recent Flowsheet Documentation  Taken 4/28/2023 1800 by Yvrose Spaulding RN  Safety Promotion/Fall Prevention:   activity supervised   fall prevention program maintained   nonskid shoes/slippers when out of bed   safety round/check completed  Taken 4/28/2023 1600 by Yvrose Spaulding RN  Safety Promotion/Fall Prevention:   activity supervised   fall prevention program maintained   nonskid shoes/slippers when out of bed   safety round/check completed  Taken 4/28/2023 1400 by Yvrose Spaulding RN  Safety Promotion/Fall Prevention:   activity supervised   fall prevention program maintained   nonskid shoes/slippers when out of bed   safety round/check completed  Taken 4/28/2023 1200 by Yvrose Spaulding RN  Safety Promotion/Fall Prevention:   activity supervised   fall prevention program maintained   nonskid shoes/slippers when out of bed   safety round/check completed  Taken 4/28/2023 1000 by Yvrose Spaulding RN  Safety Promotion/Fall Prevention:   activity supervised   fall prevention program maintained   nonskid shoes/slippers when out of bed   safety round/check completed  Taken 4/28/2023 0800 by Yvrose Spaulding RN  Safety Promotion/Fall Prevention:   activity supervised   fall prevention program maintained   nonskid shoes/slippers when out of bed   safety round/check completed  Intervention: Prevent Skin Injury  Recent Flowsheet  Documentation  Taken 4/28/2023 1800 by Yvrose Spaulding RN  Body Position: sitting up in bed  Skin Protection:   adhesive use limited   incontinence pads utilized  Taken 4/28/2023 1600 by Yvrose Spaulding RN  Body Position: sitting up in bed  Skin Protection:   adhesive use limited   incontinence pads utilized  Taken 4/28/2023 1400 by Yvrose Spaulding RN  Body Position: (up in chair) other (see comments)  Taken 4/28/2023 1200 by Yvrose Spaulding RN  Body Position: sitting up in bed  Taken 4/28/2023 1000 by Yvrose Spaulding RN  Body Position: sitting up in bed  Taken 4/28/2023 0800 by Yvrose Spaulding RN  Body Position: sitting up in bed  Skin Protection:   adhesive use limited   incontinence pads utilized   skin sealant/moisture barrier applied  Intervention: Prevent and Manage VTE (Venous Thromboembolism) Risk  Recent Flowsheet Documentation  Taken 4/28/2023 1800 by Yvrose Spaulding RN  Activity Management: activity minimized  Taken 4/28/2023 1600 by Yvrose Spaulding RN  Activity Management: activity minimized  Taken 4/28/2023 1400 by Yvrose Spaulding RN  Activity Management: activity minimized  Taken 4/28/2023 1200 by Yvrose Spaulding RN  Activity Management: activity minimized  Taken 4/28/2023 1000 by Yvrose Spaulding RN  Activity Management: activity minimized  Taken 4/28/2023 0800 by Yvrose Spaulding RN  Activity Management: activity minimized  Range of Motion: active ROM (range of motion) encouraged  Goal: Optimal Comfort and Wellbeing  Outcome: Ongoing, Progressing  Intervention: Provide Person-Centered Care  Recent Flowsheet Documentation  Taken 4/28/2023 0800 by Yvrose Spaulding RN  Trust Relationship/Rapport:   care explained   thoughts/feelings acknowledged  Goal: Readiness for Transition of Care  Outcome: Ongoing, Progressing   Goal Outcome Evaluation:  Plan of Care Reviewed With: patient        Progress: no change  Outcome Evaluation: VSS, pt continues to be on High Flow 40L 80% O2. Pt denies pain. Reports Xanex does improve SOA  at times. Up in Chair for a couple hours today. Tolerating diet eating 25-50% of trays.

## 2023-04-28 NOTE — CASE MANAGEMENT/SOCIAL WORK
Continued Stay Note  [facility]     Patient Name: Tala Ramsey  MRN: [id number]  Today's Date: 4/28/2023    Admit Date: 4/23/2023    Plan: TBD   Discharge Plan     Row Name 04/28/23 1642       Plan    Plan TBD    Plan Comments Case mgt con't to follow. Will plan to make appropriate SNF referrals if still needed when patient is stable for transfer and off hi flow O2.               Discharge Codes    No documentation.               Expected Discharge Date and Time     Expected Discharge Date Expected Discharge Time    May 1, 2023             Sonja C Kellerman, RN

## 2023-04-28 NOTE — THERAPY TREATMENT NOTE
Patient Name: Tala Ramsey  : 1950    MRN: [id number]                              Today's Date: 2023       Admit Date: 2023    Visit Dx:     ICD-10-CM ICD-9-CM   1. Multifocal pneumonia  J18.9 486   2. Failure of outpatient treatment  Z78.9 V49.89   3. Hypoxia  R09.02 799.02     Patient Active Problem List   Diagnosis   • Lumbar herniated disc   • Multifocal pneumonia   • Pulmonary fibrosis   • ILD (interstitial lung disease) -likely RA associated ILD with acute exacerbation   • Acute hypoxemic respiratory failure -likely due to ILD exacerbation     Past Medical History:   Diagnosis Date   • Anemia    • Back pain    • Bronchitis    • Hypertension    • Low back pain    • Pulmonary fibrosis    • Rheumatoid arthritis      Past Surgical History:   Procedure Laterality Date   • DILATION AND CURETTAGE, DIAGNOSTIC / THERAPEUTIC        General Information     Row Name 23 1459          Physical Therapy Time and Intention    Document Type therapy note (daily note)  -KG     Mode of Treatment physical therapy  -KG     Row Name 23 1459          General Information    Existing Precautions/Restrictions fall;oxygen therapy device and L/min;other (see comments)  HFNC  -KG     Barriers to Rehab medically complex  -KG     Row Name 23 1459          Cognition    Orientation Status (Cognition) oriented x 3  -KG     Row Name 23 1459          Safety Issues, Functional Mobility    Safety Issues Affecting Function (Mobility) awareness of need for assistance;insight into deficits/self-awareness;safety precaution awareness;safety precautions follow-through/compliance  -KG     Impairments Affecting Function (Mobility) balance;coordination;endurance/activity tolerance;postural/trunk control;shortness of breath;strength  -KG           User Key  (r) = Recorded By, (t) = Taken By, (c) = Cosigned By    Initials Name Provider Type    KG Cici Salas, PT Physical Therapist                Mobility     Row Name 04/28/23 1500          Bed Mobility    Bed Mobility supine-sit  -KG     Supine-Sit [place] (Bed Mobility) minimum assist (75% patient effort);verbal cues  -KG     Assistive Device (Bed Mobility) bed rails;draw sheet;head of bed elevated  -KG     Comment, (Bed Mobility) VC's for sequencing and technique. Pt required assistance at trunk and BLEs.  -KG     Row Name 04/28/23 1500          Transfers    Comment, (Transfers) VC's for sequencing and technique. Pt able to take 2-3 side steps from bed to chair. Pt's mobility limited by increased SOA.  -KG     Row Name 04/28/23 1500          Bed-Chair Transfer    Bed-Chair [place] (Transfers) minimum assist (75% patient effort);2 person assist;verbal cues  -KG     Assistive Device (Bed-Chair Transfers) other (see comments)  B UE support  -KG     Row Name 04/28/23 1500          Sit-Stand Transfer    Sit-Stand [place] (Transfers) minimum assist (75% patient effort);2 person assist;verbal cues  -KG     Assistive Device (Sit-Stand Transfers) other (see comments)  B UE support  -KG     Row Name 04/28/23 1500          Gait/Stairs (Locomotion)    [place] Level (Gait) unable to assess  -KG     Comment, (Gait/Stairs) Ambulation deferred due to pt's respiratory status.  -KG           User Key  (r) = Recorded By, (t) = Taken By, (c) = Cosigned By    Initials Name Provider Type    KG Cici Salas, PT Physical Therapist               Obj/Interventions     Row Name 04/28/23 1503          Balance    Balance Assessment sitting static balance;standing static balance;standing dynamic balance  -KG     Static Sitting Balance supervision  -KG     Position, Sitting Balance supported;sitting edge of bed  -KG     Static Standing Balance minimal assist  -KG     Dynamic Standing Balance minimal assist;2-person assist  -KG     Position/Device Used, Standing Balance supported  -KG           User Key  (r) = Recorded By, (t) = Taken By, (c) = Cosigned By     Initials Name Provider Type    KG Cici Salas, PT Physical Therapist               Goals/Plan    No documentation.                Clinical Impression     [facility] Name 04/28/23 1503          Pain    Pretreatment Pain Rating 0/10 - no pain  -KG     Posttreatment Pain Rating 0/10 - no pain  -KG     Row Name 04/28/23 1503          Plan of Care Review    Plan of Care Reviewed With patient  -KG     Progress improving  -KG     Outcome Evaluation Pt able to perform STS transfer from EOB and take side steps from bed to chair with Jason x2 and B UE support. Pt's mobility significantly limited by increased SOA. Required consistent cues for proper breathing technique. Continue to recommend PT skilled care.  -KG     Row Name 04/28/23 1503          Vital Signs    Pre Systolic BP Rehab 166  -KG     Pre Treatment Diastolic BP 66  -KG     Pretreatment Heart Rate (beats/min) 103  -KG     Posttreatment Heart Rate (beats/min) 87  -KG     Pre SpO2 (%) 89  -KG     O2 Delivery Pre Treatment hi-flow  -KG     Intra SpO2 (%) 78  -KG     O2 Delivery Intra Treatment hi-flow  -KG     Post SpO2 (%) 88  -KG     O2 Delivery Post Treatment hi-flow  -KG     Pre Patient Position Supine  -KG     Intra Patient Position Standing  -KG     Post Patient Position Sitting  -KG     Row Name 04/28/23 1503          Positioning and Restraints    Pre-Treatment Position in bed  -KG     Post Treatment Position chair  -KG     In Chair notified nsg;reclined;call light within reach;encouraged to call for assist;exit alarm on;with family/caregiver;RUE elevated;LUE elevated;legs elevated  -KG           User Key  (r) = Recorded By, (t) = Taken By, (c) = Cosigned By    Initials Name Provider Type    KG Cici Salas, PT Physical Therapist               Outcome Measures     Row Name 04/28/23 1505 04/28/23 0800       How much help from another person do you currently need...    Turning from your back to your side while in flat bed without using bedrails? 3   -KG 3  -SH    Moving from lying on back to sitting on the side of a flat bed without bedrails? 3  -KG 2  -SH    Moving to and from a bed to a chair (including a wheelchair)? 3  -KG 2  -SH    Standing up from a chair using your arms (e.g., wheelchair, bedside chair)? 3  -KG 2  -SH    Climbing 3-5 steps with a railing? 1  -KG 1  -SH    To walk in hospital room? 2  -KG 2  -SH    AM-PAC 6 Clicks Score (PT) 15  -KG 12  -SH    Highest level of mobility 4 --> Transferred to chair/commode  -KG 4 --> Transferred to chair/commode  -SH    Row Name 04/28/23 1505          Functional Assessment    Outcome Measure Options AM-PAC 6 Clicks Basic Mobility (PT)  -KG           User Key  (r) = Recorded By, (t) = Taken By, (c) = Cosigned By    Initials Name Provider Type    KG Cici Salas, PT Physical Therapist    Yvrose Cervantes, RN Registered Nurse                             Physical Therapy Education     Title: PT OT SLP Therapies (In Progress)     Topic: Physical Therapy (In Progress)     Point: Mobility training (In Progress)     Learning Progress Summary           Patient Acceptance, E, NR by KG at 4/28/2023 1306    Acceptance, E, NR by KG at 4/25/2023 1402                   Point: Home exercise program (In Progress)     Learning Progress Summary           Patient Acceptance, E, NR by KG at 4/28/2023 1306                   Point: Body mechanics (In Progress)     Learning Progress Summary           Patient Acceptance, E, NR by KG at 4/28/2023 1306    Acceptance, E, NR by KG at 4/25/2023 1402                   Point: Precautions (In Progress)     Learning Progress Summary           Patient Acceptance, E, NR by KG at 4/28/2023 1306    Acceptance, E, NR by KG at 4/25/2023 1402                               User Key     Initials Effective Dates Name Provider Type Discipline    KG 05/22/20 -  Cici Salas, PT Physical Therapist PT              PT Recommendation and Plan  Planned Therapy Interventions (PT): balance  training, bed mobility training, gait training, strengthening, transfer training  Plan of Care Reviewed With: patient  Progress: improving  Outcome Evaluation: Pt able to perform STS transfer from EOB and take side steps from bed to chair with Jason x2 and B UE support. Pt's mobility significantly limited by increased SOA. Required consistent cues for proper breathing technique. Continue to recommend PT skilled care.     Time Calculation:    PT Charges     Row Name 04/28/23 1306             Time Calculation    Start Time 1306  -KG      PT Received On 04/28/23  -KG      PT Goal Re-Cert Due Date 05/05/23  -KG         Time Calculation- PT    Total Timed Code Minutes- PT 24 minute(s)  -KG         Timed Charges    89123 - PT Therapeutic Activity Minutes 24  -KG         Total Minutes    Timed Charges Total Minutes 24  -KG       Total Minutes 24  -KG            User Key  (r) = Recorded By, (t) = Taken By, (c) = Cosigned By    Initials Name Provider Type    KG Cici Salas, PT Physical Therapist              Therapy Charges for Today     Code Description Service Date Service Provider Modifiers Qty    49488796728 HC PT THERAPEUTIC ACT EA 15 MIN 4/28/2023 Cici Salas, PT GP 2    68556377360 HC PT THER SUPP EA 15 MIN 4/28/2023 Cici Salas, PT GP 2          PT G-Codes  Outcome Measure Options: AM-PAC 6 Clicks Basic Mobility (PT)  AM-PAC 6 Clicks Score (PT): 15  AM-PAC 6 Clicks Score (OT): 16  PT Discharge Summary  Anticipated Discharge Disposition (PT): skilled nursing facility    Marcelina Salas PT  4/28/2023

## 2023-04-29 LAB
ANION GAP SERPL CALCULATED.3IONS-SCNC: 15 MMOL/L (ref 5–15)
BUN SERPL-MCNC: 18 MG/DL (ref 8–23)
BUN/CREAT SERPL: 34 (ref 7–25)
CALCIUM SPEC-SCNC: 9 MG/DL (ref 8.6–10.5)
CHLORIDE SERPL-SCNC: 95 MMOL/L (ref 98–107)
CO2 SERPL-SCNC: 25 MMOL/L (ref 22–29)
CREAT SERPL-MCNC: 0.53 MG/DL (ref 0.57–1)
DEPRECATED RDW RBC AUTO: 48.8 FL (ref 37–54)
EGFRCR SERPLBLD CKD-EPI 2021: 97.8 ML/MIN/1.73
ERYTHROCYTE [DISTWIDTH] IN BLOOD BY AUTOMATED COUNT: 15.3 % (ref 12.3–15.4)
GLUCOSE SERPL-MCNC: 126 MG/DL (ref 65–99)
HCT VFR BLD AUTO: 35.4 % (ref 34–46.6)
HGB BLD-MCNC: 11.3 G/DL (ref 12–15.9)
MAGNESIUM SERPL-MCNC: 2.6 MG/DL (ref 1.6–2.4)
MCH RBC QN AUTO: 28.5 PG (ref 26.6–33)
MCHC RBC AUTO-ENTMCNC: 31.9 G/DL (ref 31.5–35.7)
MCV RBC AUTO: 89.2 FL (ref 79–97)
PLATELET # BLD AUTO: 768 10*3/MM3 (ref 140–450)
PMV BLD AUTO: 8.9 FL (ref 6–12)
POTASSIUM SERPL-SCNC: 4.7 MMOL/L (ref 3.5–5.2)
RBC # BLD AUTO: 3.97 10*6/MM3 (ref 3.77–5.28)
SODIUM SERPL-SCNC: 135 MMOL/L (ref 136–145)
WBC NRBC COR # BLD: 21.65 10*3/MM3 (ref 3.4–10.8)

## 2023-04-29 PROCEDURE — 25010000002 FUROSEMIDE PER 20 MG: Performed by: INTERNAL MEDICINE

## 2023-04-29 PROCEDURE — 99232 SBSQ HOSP IP/OBS MODERATE 35: CPT | Performed by: INTERNAL MEDICINE

## 2023-04-29 PROCEDURE — 25010000002 METHYLPREDNISOLONE PER 40 MG

## 2023-04-29 PROCEDURE — 85027 COMPLETE CBC AUTOMATED: CPT | Performed by: INTERNAL MEDICINE

## 2023-04-29 PROCEDURE — 94664 DEMO&/EVAL PT USE INHALER: CPT

## 2023-04-29 PROCEDURE — 25010000002 MORPHINE PER 10 MG: Performed by: FAMILY MEDICINE

## 2023-04-29 PROCEDURE — 25010000002 PIPERACILLIN SOD-TAZOBACTAM PER 1 G: Performed by: INTERNAL MEDICINE

## 2023-04-29 PROCEDURE — 80048 BASIC METABOLIC PNL TOTAL CA: CPT | Performed by: INTERNAL MEDICINE

## 2023-04-29 PROCEDURE — 25010000002 HEPARIN (PORCINE) PER 1000 UNITS: Performed by: INTERNAL MEDICINE

## 2023-04-29 PROCEDURE — 83735 ASSAY OF MAGNESIUM: CPT | Performed by: INTERNAL MEDICINE

## 2023-04-29 PROCEDURE — 94799 UNLISTED PULMONARY SVC/PX: CPT

## 2023-04-29 RX ORDER — METHYLPREDNISOLONE SODIUM SUCCINATE 125 MG/2ML
60 INJECTION, POWDER, LYOPHILIZED, FOR SOLUTION INTRAMUSCULAR; INTRAVENOUS EVERY 12 HOURS SCHEDULED
Status: DISCONTINUED | OUTPATIENT
Start: 2023-04-29 | End: 2023-04-29 | Stop reason: RX

## 2023-04-29 RX ORDER — METHYLPREDNISOLONE SODIUM SUCCINATE 40 MG/ML
60 INJECTION, POWDER, LYOPHILIZED, FOR SOLUTION INTRAMUSCULAR; INTRAVENOUS EVERY 12 HOURS SCHEDULED
Status: DISCONTINUED | OUTPATIENT
Start: 2023-04-29 | End: 2023-05-07

## 2023-04-29 RX ORDER — FUROSEMIDE 10 MG/ML
40 INJECTION INTRAMUSCULAR; INTRAVENOUS ONCE
Status: COMPLETED | OUTPATIENT
Start: 2023-04-29 | End: 2023-04-29

## 2023-04-29 RX ADMIN — METHYLPREDNISOLONE SODIUM SUCCINATE 60 MG: 40 INJECTION, POWDER, LYOPHILIZED, FOR SOLUTION INTRAMUSCULAR; INTRAVENOUS at 06:24

## 2023-04-29 RX ADMIN — FUROSEMIDE 40 MG: 10 INJECTION, SOLUTION INTRAMUSCULAR; INTRAVENOUS at 11:25

## 2023-04-29 RX ADMIN — TAZOBACTAM SODIUM AND PIPERACILLIN SODIUM 3.38 G: 375; 3 INJECTION, SOLUTION INTRAVENOUS at 17:45

## 2023-04-29 RX ADMIN — SIMETHICONE 80 MG: 80 TABLET, CHEWABLE ORAL at 15:00

## 2023-04-29 RX ADMIN — AMLODIPINE BESYLATE 5 MG: 5 TABLET ORAL at 08:42

## 2023-04-29 RX ADMIN — HEPARIN SODIUM 5000 UNITS: 5000 INJECTION, SOLUTION INTRAVENOUS; SUBCUTANEOUS at 06:25

## 2023-04-29 RX ADMIN — PANTOPRAZOLE SODIUM 40 MG: 40 TABLET, DELAYED RELEASE ORAL at 08:43

## 2023-04-29 RX ADMIN — Medication 10 ML: at 11:28

## 2023-04-29 RX ADMIN — GUAIFENESIN 1200 MG: 600 TABLET, EXTENDED RELEASE ORAL at 08:42

## 2023-04-29 RX ADMIN — IPRATROPIUM BROMIDE AND ALBUTEROL SULFATE 3 ML: .5; 2.5 SOLUTION RESPIRATORY (INHALATION) at 08:29

## 2023-04-29 RX ADMIN — VALSARTAN 160 MG: 160 TABLET, FILM COATED ORAL at 08:42

## 2023-04-29 RX ADMIN — MORPHINE SULFATE 3 MG: 4 INJECTION, SOLUTION INTRAMUSCULAR; INTRAVENOUS at 21:18

## 2023-04-29 RX ADMIN — CETIRIZINE HYDROCHLORIDE 10 MG: 10 TABLET, FILM COATED ORAL at 08:43

## 2023-04-29 RX ADMIN — ALPRAZOLAM 0.25 MG: 0.25 TABLET ORAL at 11:25

## 2023-04-29 RX ADMIN — METHYLPREDNISOLONE SODIUM SUCCINATE 60 MG: 40 INJECTION, POWDER, LYOPHILIZED, FOR SOLUTION INTRAMUSCULAR; INTRAVENOUS at 17:45

## 2023-04-29 RX ADMIN — TAZOBACTAM SODIUM AND PIPERACILLIN SODIUM 3.38 G: 375; 3 INJECTION, SOLUTION INTRAVENOUS at 03:05

## 2023-04-29 RX ADMIN — MONTELUKAST 10 MG: 10 TABLET, FILM COATED ORAL at 08:42

## 2023-04-29 RX ADMIN — GUAIFENESIN 1200 MG: 600 TABLET, EXTENDED RELEASE ORAL at 21:18

## 2023-04-29 RX ADMIN — HYDROXYCHLOROQUINE SULFATE 200 MG: 200 TABLET, FILM COATED ORAL at 21:18

## 2023-04-29 RX ADMIN — IPRATROPIUM BROMIDE AND ALBUTEROL SULFATE 3 ML: .5; 2.5 SOLUTION RESPIRATORY (INHALATION) at 20:08

## 2023-04-29 RX ADMIN — IPRATROPIUM BROMIDE AND ALBUTEROL SULFATE 3 ML: .5; 2.5 SOLUTION RESPIRATORY (INHALATION) at 12:54

## 2023-04-29 RX ADMIN — IPRATROPIUM BROMIDE AND ALBUTEROL SULFATE 3 ML: .5; 2.5 SOLUTION RESPIRATORY (INHALATION) at 16:32

## 2023-04-29 RX ADMIN — SENNOSIDES AND DOCUSATE SODIUM 2 TABLET: 8.6; 5 TABLET ORAL at 11:25

## 2023-04-29 RX ADMIN — HEPARIN SODIUM 5000 UNITS: 5000 INJECTION, SOLUTION INTRAVENOUS; SUBCUTANEOUS at 21:18

## 2023-04-29 RX ADMIN — TAZOBACTAM SODIUM AND PIPERACILLIN SODIUM 3.38 G: 375; 3 INJECTION, SOLUTION INTRAVENOUS at 11:26

## 2023-04-29 RX ADMIN — ACETAMINOPHEN 325MG 650 MG: 325 TABLET ORAL at 15:08

## 2023-04-29 RX ADMIN — TIZANIDINE 2 MG: 4 TABLET ORAL at 21:18

## 2023-04-29 RX ADMIN — ALPRAZOLAM 0.25 MG: 0.25 TABLET ORAL at 17:49

## 2023-04-29 RX ADMIN — HYDROXYCHLOROQUINE SULFATE 200 MG: 200 TABLET, FILM COATED ORAL at 08:43

## 2023-04-29 RX ADMIN — AZITHROMYCIN MONOHYDRATE 250 MG: 250 TABLET ORAL at 08:42

## 2023-04-29 RX ADMIN — Medication 5 MG: at 21:18

## 2023-04-29 RX ADMIN — HEPARIN SODIUM 5000 UNITS: 5000 INJECTION, SOLUTION INTRAVENOUS; SUBCUTANEOUS at 15:00

## 2023-04-29 RX ADMIN — Medication 10 ML: at 21:19

## 2023-04-29 RX ADMIN — SENNOSIDES AND DOCUSATE SODIUM 2 TABLET: 8.6; 5 TABLET ORAL at 21:18

## 2023-04-29 NOTE — PROGRESS NOTES
[facility] Medicine Services  PROGRESS NOTE    Patient Name: Tala Ramsey  : 1950  MRN: [id number]    Date of Admission: 2023  Primary Care Physician: Sg Horne MD    Subjective   Subjective     CC:  Hypoxia, pneumonia    HPI:  Still dyspneic w/ any movement out of bed. No sputum. No chest pain. + fatigue but perhaps slightly less so today. No n/v/d    ROS:  No n/v/d  No rash    Objective   Objective     Vital Signs:   Temp:  [97.7 °F (36.5 °C)-98.6 °F (37 °C)] 98.6 °F (37 °C)  Heart Rate:  [67-91] 84  Resp:  [18-22] 22  BP: (144-167)/(56-85) 167/61  Flow (L/min):  [35-45] 40     Physical Exam:  Constitutional:Alert, oriented x 3, nontoxic appearing but ill appearing, hi flow canula in place, sitting upright in bed, no overt distress but slightly dyspneic with speaking long sentences  Psych:Normal/appropriate affect  HEENT:NCAT, oropharynx clear  Neck: neck supple, full range of motion  Neuro: Face symmetric, speech clear, equal , moves all extremities  Cardiac: RRR; No pretibial pitting edema  Resp: very faint scattered bilateral mid insp crackles noted, no distress  GI: abd soft, nontender  Skin: No extremity rash  Musculoskeletal/extremities: no cyanosis of extremities; no significant ankle edema        Results Reviewed:  LAB RESULTS:      Lab 23  0813 23  0705 23  0346 23  0532 23  1957 23  0747 23  0647 23  1048   WBC 21.65* 15.99* 15.91* 23.58*  --  15.74* 12.84* 14.06*   HEMOGLOBIN 11.3* 10.1* 9.9* 10.8*  --  10.8* 10.2* 11.5*   HEMATOCRIT 35.4 31.6* 31.3* 34.0  --  33.1* 30.8* 35.2   PLATELETS 768* 602* 616* 739*  --  624* 520* 592*   NEUTROS ABS  --   --   --   --   --   --  10.50* 11.33*   IMMATURE GRANS (ABS)  --   --   --   --   --   --  0.17* 0.22*   LYMPHS ABS  --   --   --   --   --   --  1.14 1.50   MONOS ABS  --   --   --   --   --   --  0.65 0.79   EOS ABS  --   --   --   --   --   --  0.31  0.14   MCV 89.2 91.6 91.5 90.9  --  89.2 90.1 89.6   CRP  --   --  2.55* 6.04*  --  14.70* 15.21*  --    PROCALCITONIN  --   --   --   --   --   --  0.05  --    LACTATE  --   --   --   --   --   --   --  1.1   D DIMER QUANT  --   --   --   --  1.88*  --   --   --          Lab 04/29/23  0813 04/28/23  0704 04/27/23  0346 04/26/23  0532 04/25/23  0747   SODIUM 135* 136 137 136 132*   POTASSIUM 4.7 4.4 4.8 4.3 4.4   CHLORIDE 95* 98 99 98 98   CO2 25.0 27.0 24.0 23.0 19.0*   ANION GAP 15.0 11.0 14.0 15.0 15.0   BUN 18 22 23 16 13   CREATININE 0.53* 0.53* 0.62 0.63 0.60   EGFR 97.8 97.8 94.2 93.8 94.9   GLUCOSE 126* 132* 160* 147* 149*   CALCIUM 9.0 8.7 8.6 9.3 9.3   MAGNESIUM 2.6* 2.4 2.5* 2.8* 2.5*         Lab 04/27/23  0346 04/25/23  0747 04/24/23  0647 04/23/23  1048   TOTAL PROTEIN 5.7* 6.7 6.8 7.4   ALBUMIN 3.1* 3.5 3.3* 3.5   GLOBULIN 2.6 3.2 3.5 3.9   ALT (SGPT) 27 32 13 10   AST (SGOT) 17 41* 22 23   BILIRUBIN 0.2 0.2 0.3 0.2   ALK PHOS 152* 185* 130* 109         Lab 04/25/23 2212 04/25/23 1957 04/25/23  0747 04/23/23  1048   PROBNP  --  360.4 210.6 163.6   HSTROP T 10* 12*  --  8                 Lab 04/25/23 1948   PH, ARTERIAL 7.457*   PCO2, ARTERIAL 35.2   PO2 .0   FIO2 100   HCO3 ART 24.8   BASE EXCESS ART 1.2     Brief Urine Lab Results  (Last result in the past 365 days)      Color   Clarity   Blood   Leuk Est   Nitrite   Protein   CREAT   Urine HCG        04/23/23 1757 Yellow   Clear   Trace   Negative   Negative   Negative                 Microbiology Results Abnormal     Procedure Component Value - Date/Time    Blood Culture - Blood, Arm, Right [[id number]]  (Normal) Collected: 04/23/23 1125    Lab Status: Final result Specimen: Blood from Arm, Right Updated: 04/28/23 1145     Blood Culture No growth at 5 days    Blood Culture - Blood, Arm, Left [[id number]]  (Normal) Collected: 04/23/23 1125    Lab Status: Final result Specimen: Blood from Arm, Left Updated: 04/28/23 1145     Blood Culture No  growth at 5 days    S. Pneumo Ag Urine or CSF - Urine, Urine, Clean Catch [[id number]]  (Normal) Collected: 04/23/23 1757    Lab Status: Final result Specimen: Urine, Clean Catch Updated: 04/24/23 0949     Strep Pneumo Ag Negative    Legionella Antigen, Urine - Urine, Urine, Clean Catch [[id number]]  (Normal) Collected: 04/23/23 1757    Lab Status: Final result Specimen: Urine, Clean Catch Updated: 04/24/23 0949     LEGIONELLA ANTIGEN, URINE Negative    Respiratory Panel PCR w/COVID-19(SARS-CoV-2) CHARI/MARISSA/APOLINAR/PAD/COR/MAD/AMY In-House, NP Swab in UTM/VTM, 3-4 HR TAT - Swab, Nasopharynx [[id number]]  (Normal) Collected: 04/24/23 0544    Lab Status: Final result Specimen: Swab from Nasopharynx Updated: 04/24/23 0647     ADENOVIRUS, PCR Not Detected     Coronavirus 229E Not Detected     Coronavirus HKU1 Not Detected     Coronavirus NL63 Not Detected     Coronavirus OC43 Not Detected     COVID19 Not Detected     Human Metapneumovirus Not Detected     Human Rhinovirus/Enterovirus Not Detected     Influenza A PCR Not Detected     Influenza B PCR Not Detected     Parainfluenza Virus 1 Not Detected     Parainfluenza Virus 2 Not Detected     Parainfluenza Virus 3 Not Detected     Parainfluenza Virus 4 Not Detected     RSV, PCR Not Detected     Bordetella pertussis pcr Not Detected     Bordetella parapertussis PCR Not Detected     Chlamydophila pneumoniae PCR Not Detected     Mycoplasma pneumo by PCR Not Detected    Narrative:      In the setting of a positive respiratory panel with a viral infection PLUS a negative procalcitonin without other underlying concern for bacterial infection, consider observing off antibiotics or discontinuation of antibiotics and continue supportive care. If the respiratory panel is positive for atypical bacterial infection (Bordetella pertussis, Chlamydophila pneumoniae, or Mycoplasma pneumoniae), consider antibiotic de-escalation to target atypical bacterial infection.    MRSA Screen, PCR  (Inpatient) - Swab, Nares [[id number]]  (Normal) Collected: 04/23/23 1540    Lab Status: Final result Specimen: Swab from Nares Updated: 04/23/23 1651     MRSA PCR Negative    Narrative:      The negative predictive value of this diagnostic test is high and should only be used to consider de-escalating anti-MRSA therapy. A positive result may indicate colonization with MRSA and must be correlated clinically.  MRSA Negative    COVID PRE-OP / PRE-PROCEDURE SCREENING ORDER (NO ISOLATION) - Swab, Nasopharynx [[id number]]  (Normal) Collected: 04/23/23 1125    Lab Status: Final result Specimen: Swab from Nasopharynx Updated: 04/23/23 1225    Narrative:      The following orders were created for panel order COVID PRE-OP / PRE-PROCEDURE SCREENING ORDER (NO ISOLATION) - Swab, Nasopharynx.  Procedure                               Abnormality         Status                     ---------                               -----------         ------                     COVID-19, FLU A/B, RSV P...[[id number]]  Normal              Final result                 Please view results for these tests on the individual orders.    COVID-19, FLU A/B, RSV PCR - Swab, Nasopharynx [[id number]]  (Normal) Collected: 04/23/23 1125    Lab Status: Final result Specimen: Swab from Nasopharynx Updated: 04/23/23 1225     COVID19 Not Detected     Influenza A PCR Not Detected     Influenza B PCR Not Detected     RSV, PCR Not Detected    Narrative:      Fact sheet for providers: https://www.fda.gov/media/727426/download    Fact sheet for patients: https://www.fda.gov/media/842816/download    Test performed by PCR.          No radiology results from the last 24 hrs        Current medications:  Scheduled Meds:amLODIPine, 5 mg, Oral, Q24H   And  valsartan, 160 mg, Oral, Q24H  cetirizine, 10 mg, Oral, Daily  fluticasone, 2 spray, Each Nare, Nightly  furosemide, 40 mg, Intravenous, Once  guaiFENesin, 1,200 mg, Oral, Q12H  heparin (porcine), 5,000 Units, Subcutaneous,  Q8H  hydroxychloroquine, 200 mg, Oral, Q12H  ipratropium-albuterol, 3 mL, Nebulization, 4x Daily - RT  melatonin, 5 mg, Oral, Nightly  methylPREDNISolone sodium succinate, 60 mg, Intravenous, Q12H  montelukast, 10 mg, Oral, Daily  pantoprazole, 40 mg, Oral, Daily  piperacillin-tazobactam, 3.375 g, Intravenous, Q8H  senna-docusate sodium, 2 tablet, Oral, BID  sodium chloride, 10 mL, Intravenous, Q12H  sulfamethoxazole-trimethoprim, 1 tablet, Oral, Once per day on Mon Wed Fri      Continuous Infusions:   PRN Meds:.•  acetaminophen **OR** acetaminophen **OR** acetaminophen  •  ALPRAZolam  •  GI cocktail  •  senna-docusate sodium **AND** polyethylene glycol **AND** bisacodyl **AND** bisacodyl  •  calcium carbonate  •  hydrOXYzine  •  ipratropium-albuterol  •  Magnesium Standard Dose Replacement - Follow Nurse / BPA Driven Protocol  •  Morphine  •  ondansetron **OR** ondansetron  •  simethicone  •  sodium chloride  •  sodium chloride  •  sodium chloride  •  tiZANidine    Assessment & Plan   Assessment & Plan     Active Hospital Problems    Diagnosis  POA   • **Multifocal pneumonia [J18.9]  Yes   • Pulmonary fibrosis [J84.10]  Yes   • ILD (interstitial lung disease) -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        Brief Hospital Course to date:  Tala Ramsey is a 72 y.o. female w/ hx interstitial lung dz, RA (on humira until recently, recent steroid injections) who presents w/ progressive dyspnea, cough over ~10 days, failed levaquin. Hypoxic upon admission. Ct chest w/o contrast w/ bilateral multifocal air space dz. resp pcr panel negative. Had progressive hypoxia evening after admission placed on hi flow canula    Acute hypoxic resp failure, owrsening  Pulm fibrosis  Bilateral airspace dz/pneumonia  -has been on levaquin last week  -unclear  "if progession/\"flare\" of interstitial lung dz vs infection  -continue day #6 zosyn & z-max; added thrice weekly bactrim ds (pcp prophylaxis as has been on humira and steroids)  -continue scheduled & prn duonebs  -Pulm following: completed 3 days of \"pulse\" solumedrol (1g daily x 3 days) on 4/26/23; now on solumedrol 60mg bid   -repeat iv lasix today 40mg  -still no improvement today, still requiring ~ fio2 70% hi flow  -appreciate pulm and palliative assistance    RA  -holding humira  -has received steroid \"shots\" recently  -hold plaquenil    S/p Hyponatremia, improved  -was 129 on admission; now normalizing    HTN  -continue amlodpine/valsartan    Chronic anemia   -monitor    Goals/limits  -Patient is DNR/DNI (in event of further clinical decompensation patient would NOT want mechanical ventilation; in this case patient would wish to pursue comfort measures; patient's son and daughter in law have witnessed these discussions and agreed)  -palliative following    am: cbc,bmp    Expected Discharge Location and Transportation: home  Expected Discharge ? 5/3 (when oxygenation improved)  No data recorded     DVT prophylaxis:  Medical DVT prophylaxis orders are present. sq heparin    AM-PAC 6 Clicks Score (PT): 15 (04/28/23 2000)    CODE STATUS:   Code Status and Medical Interventions:   Ordered at: 04/23/23 1310     Medical Intervention Limits:    NO intubation (DNI)     Level Of Support Discussed With:    Patient     Code Status (Patient has no pulse and is not breathing):    No CPR (Do Not Attempt to Resuscitate)     Medical Interventions (Patient has pulse or is breathing):    Limited Support       Ramón Blanton MD  04/29/23      "

## 2023-04-29 NOTE — PLAN OF CARE
Goal Outcome Evaluation:  Plan of Care Reviewed With: patient, family        Progress: no change  Outcome Evaluation: Pt. sitting up in bed drowsing off on HFNC with son and granddaughter at BS.  Pt. had received prn xanax earlier for anxiety and was feeling drowsy.  Pt. continues to feel dyspneic.  Stated she tries morphine but takes mainly at night because it makes her drowsy.  Palliative care to continue to follow for support, POC and ongoing GOC.

## 2023-04-29 NOTE — PLAN OF CARE
"Goal Outcome Evaluation:           Progress: no change  Outcome Evaluation: VSS on HFNC. Morpine administered for dyspnea. Pt reports that it \"helps me sleep\". Pt slept throughout the night. Iv abx infusing. Fall and skin precautions in place. Son at bedside. Will continue with plan of care.  "

## 2023-04-30 LAB
ANION GAP SERPL CALCULATED.3IONS-SCNC: 12 MMOL/L (ref 5–15)
BUN SERPL-MCNC: 19 MG/DL (ref 8–23)
BUN/CREAT SERPL: 40.4 (ref 7–25)
CALCIUM SPEC-SCNC: 8.7 MG/DL (ref 8.6–10.5)
CHLORIDE SERPL-SCNC: 95 MMOL/L (ref 98–107)
CO2 SERPL-SCNC: 26 MMOL/L (ref 22–29)
CREAT SERPL-MCNC: 0.47 MG/DL (ref 0.57–1)
DEPRECATED RDW RBC AUTO: 51 FL (ref 37–54)
EGFRCR SERPLBLD CKD-EPI 2021: 100.7 ML/MIN/1.73
ERYTHROCYTE [DISTWIDTH] IN BLOOD BY AUTOMATED COUNT: 15.5 % (ref 12.3–15.4)
GLUCOSE SERPL-MCNC: 136 MG/DL (ref 65–99)
HCT VFR BLD AUTO: 35.2 % (ref 34–46.6)
HGB BLD-MCNC: 11.3 G/DL (ref 12–15.9)
MAGNESIUM SERPL-MCNC: 2.7 MG/DL (ref 1.6–2.4)
MCH RBC QN AUTO: 29.7 PG (ref 26.6–33)
MCHC RBC AUTO-ENTMCNC: 32.1 G/DL (ref 31.5–35.7)
MCV RBC AUTO: 92.6 FL (ref 79–97)
PLATELET # BLD AUTO: 662 10*3/MM3 (ref 140–450)
PMV BLD AUTO: 8.9 FL (ref 6–12)
POTASSIUM SERPL-SCNC: 5.2 MMOL/L (ref 3.5–5.2)
QT INTERVAL: 326 MS
QTC INTERVAL: 424 MS
RBC # BLD AUTO: 3.8 10*6/MM3 (ref 3.77–5.28)
SODIUM SERPL-SCNC: 133 MMOL/L (ref 136–145)
WBC NRBC COR # BLD: 20.94 10*3/MM3 (ref 3.4–10.8)

## 2023-04-30 PROCEDURE — 25010000002 HEPARIN (PORCINE) PER 1000 UNITS: Performed by: INTERNAL MEDICINE

## 2023-04-30 PROCEDURE — 99232 SBSQ HOSP IP/OBS MODERATE 35: CPT | Performed by: INTERNAL MEDICINE

## 2023-04-30 PROCEDURE — 25010000002 METHYLPREDNISOLONE PER 40 MG

## 2023-04-30 PROCEDURE — 25010000002 FUROSEMIDE PER 20 MG: Performed by: INTERNAL MEDICINE

## 2023-04-30 PROCEDURE — 94799 UNLISTED PULMONARY SVC/PX: CPT

## 2023-04-30 PROCEDURE — 25010000002 MORPHINE PER 10 MG: Performed by: FAMILY MEDICINE

## 2023-04-30 PROCEDURE — 85027 COMPLETE CBC AUTOMATED: CPT | Performed by: INTERNAL MEDICINE

## 2023-04-30 PROCEDURE — 25010000002 PIPERACILLIN SOD-TAZOBACTAM PER 1 G: Performed by: INTERNAL MEDICINE

## 2023-04-30 PROCEDURE — 83735 ASSAY OF MAGNESIUM: CPT | Performed by: INTERNAL MEDICINE

## 2023-04-30 PROCEDURE — 80048 BASIC METABOLIC PNL TOTAL CA: CPT | Performed by: INTERNAL MEDICINE

## 2023-04-30 RX ORDER — FUROSEMIDE 10 MG/ML
40 INJECTION INTRAMUSCULAR; INTRAVENOUS ONCE
Status: COMPLETED | OUTPATIENT
Start: 2023-04-30 | End: 2023-04-30

## 2023-04-30 RX ADMIN — HEPARIN SODIUM 5000 UNITS: 5000 INJECTION, SOLUTION INTRAVENOUS; SUBCUTANEOUS at 22:09

## 2023-04-30 RX ADMIN — SENNOSIDES AND DOCUSATE SODIUM 2 TABLET: 8.6; 5 TABLET ORAL at 08:48

## 2023-04-30 RX ADMIN — GUAIFENESIN 1200 MG: 600 TABLET, EXTENDED RELEASE ORAL at 22:09

## 2023-04-30 RX ADMIN — HEPARIN SODIUM 5000 UNITS: 5000 INJECTION, SOLUTION INTRAVENOUS; SUBCUTANEOUS at 13:07

## 2023-04-30 RX ADMIN — TAZOBACTAM SODIUM AND PIPERACILLIN SODIUM 3.38 G: 375; 3 INJECTION, SOLUTION INTRAVENOUS at 02:31

## 2023-04-30 RX ADMIN — VALSARTAN 160 MG: 160 TABLET, FILM COATED ORAL at 08:48

## 2023-04-30 RX ADMIN — MORPHINE SULFATE 3 MG: 4 INJECTION, SOLUTION INTRAMUSCULAR; INTRAVENOUS at 22:08

## 2023-04-30 RX ADMIN — ACETAMINOPHEN 325MG 650 MG: 325 TABLET ORAL at 13:07

## 2023-04-30 RX ADMIN — AMLODIPINE BESYLATE 5 MG: 5 TABLET ORAL at 08:48

## 2023-04-30 RX ADMIN — HYDROXYCHLOROQUINE SULFATE 200 MG: 200 TABLET, FILM COATED ORAL at 22:09

## 2023-04-30 RX ADMIN — ALPRAZOLAM 0.25 MG: 0.25 TABLET ORAL at 19:03

## 2023-04-30 RX ADMIN — METHYLPREDNISOLONE SODIUM SUCCINATE 60 MG: 40 INJECTION, POWDER, LYOPHILIZED, FOR SOLUTION INTRAMUSCULAR; INTRAVENOUS at 18:15

## 2023-04-30 RX ADMIN — IPRATROPIUM BROMIDE AND ALBUTEROL SULFATE 3 ML: .5; 2.5 SOLUTION RESPIRATORY (INHALATION) at 19:45

## 2023-04-30 RX ADMIN — HEPARIN SODIUM 5000 UNITS: 5000 INJECTION, SOLUTION INTRAVENOUS; SUBCUTANEOUS at 04:56

## 2023-04-30 RX ADMIN — FUROSEMIDE 40 MG: 10 INJECTION, SOLUTION INTRAMUSCULAR; INTRAVENOUS at 13:07

## 2023-04-30 RX ADMIN — Medication 10 ML: at 22:09

## 2023-04-30 RX ADMIN — Medication 5 MG: at 22:09

## 2023-04-30 RX ADMIN — SIMETHICONE 80 MG: 80 TABLET, CHEWABLE ORAL at 13:19

## 2023-04-30 RX ADMIN — METHYLPREDNISOLONE SODIUM SUCCINATE 60 MG: 40 INJECTION, POWDER, LYOPHILIZED, FOR SOLUTION INTRAMUSCULAR; INTRAVENOUS at 04:56

## 2023-04-30 RX ADMIN — HYDROXYCHLOROQUINE SULFATE 200 MG: 200 TABLET, FILM COATED ORAL at 08:47

## 2023-04-30 RX ADMIN — IPRATROPIUM BROMIDE AND ALBUTEROL SULFATE 3 ML: .5; 2.5 SOLUTION RESPIRATORY (INHALATION) at 12:26

## 2023-04-30 RX ADMIN — GUAIFENESIN 1200 MG: 600 TABLET, EXTENDED RELEASE ORAL at 08:47

## 2023-04-30 RX ADMIN — CETIRIZINE HYDROCHLORIDE 10 MG: 10 TABLET, FILM COATED ORAL at 08:48

## 2023-04-30 RX ADMIN — PANTOPRAZOLE SODIUM 40 MG: 40 TABLET, DELAYED RELEASE ORAL at 08:48

## 2023-04-30 RX ADMIN — IPRATROPIUM BROMIDE AND ALBUTEROL SULFATE 3 ML: .5; 2.5 SOLUTION RESPIRATORY (INHALATION) at 07:39

## 2023-04-30 RX ADMIN — MONTELUKAST 10 MG: 10 TABLET, FILM COATED ORAL at 08:48

## 2023-04-30 NOTE — PLAN OF CARE
Goal Outcome Evaluation:           Progress: no change  Outcome Evaluation: VSS on HFNC. IV morphine administered x1 for dyspnea. No complaints of pain or nausea. Slept throughout the night. Son at bedside. Fall and skin precautions in place. Will continue with plan of care.

## 2023-04-30 NOTE — PROGRESS NOTES
[facility] Medicine Services  PROGRESS NOTE    Patient Name: Tala Ramsey  : 1950  MRN: [id number]    Date of Admission: 2023  Primary Care Physician: Sg Horne MD    Subjective   Subjective     CC:  Hypoxia, pneumonia    HPI:  Dyspneic but stable. No palpitations. No chest pain    ROS:  No n/v/d  No rash    Objective   Objective     Vital Signs:   Temp:  [98.1 °F (36.7 °C)-98.6 °F (37 °C)] 98.2 °F (36.8 °C)  Heart Rate:  [71-94] 84  Resp:  [18-25] 20  BP: (145-181)/(68-77) 164/77  Flow (L/min):  [40] 40     Physical Exam:  Constitutional:Alert, oriented x 3, nontoxic appearing but ill appearing, hi flow canula in place, sitting upright in bed, no overt distress but slightly dyspneic with speaking long sentences  Psych:Normal/appropriate affect  HEENT:NCAT, oropharynx clear  Neck: neck supple, full range of motion  Neuro: Face symmetric, speech clear, equal , moves all extremities  Cardiac: RRR; No pretibial pitting edema  Resp: faint mid inspiratory scattered bilateral crackles. No overt wheezes  GI: abd soft, nontender  Skin: No extremity rash  Musculoskeletal/extremities: no cyanosis of extremities; no significant ankle edema        Results Reviewed:  LAB RESULTS:      Lab 23  0742 23  0813 23  0705 23  0346 23  0532 23  1957 23  0747 23  0647   WBC 20.94* 21.65* 15.99* 15.91* 23.58*  --  15.74* 12.84*   HEMOGLOBIN 11.3* 11.3* 10.1* 9.9* 10.8*  --  10.8* 10.2*   HEMATOCRIT 35.2 35.4 31.6* 31.3* 34.0  --  33.1* 30.8*   PLATELETS 662* 768* 602* 616* 739*  --  624* 520*   NEUTROS ABS  --   --   --   --   --   --   --  10.50*   IMMATURE GRANS (ABS)  --   --   --   --   --   --   --  0.17*   LYMPHS ABS  --   --   --   --   --   --   --  1.14   MONOS ABS  --   --   --   --   --   --   --  0.65   EOS ABS  --   --   --   --   --   --   --  0.31   MCV 92.6 89.2 91.6 91.5 90.9  --  89.2 90.1   CRP  --   --   --  2.55*  6.04*  --  14.70* 15.21*   PROCALCITONIN  --   --   --   --   --   --   --  0.05   D DIMER QUANT  --   --   --   --   --  1.88*  --   --          Lab 04/30/23  0742 04/29/23  0813 04/28/23  0704 04/27/23  0346 04/26/23  0532   SODIUM 133* 135* 136 137 136   POTASSIUM 5.2 4.7 4.4 4.8 4.3   CHLORIDE 95* 95* 98 99 98   CO2 26.0 25.0 27.0 24.0 23.0   ANION GAP 12.0 15.0 11.0 14.0 15.0   BUN 19 18 22 23 16   CREATININE 0.47* 0.53* 0.53* 0.62 0.63   EGFR 100.7 97.8 97.8 94.2 93.8   GLUCOSE 136* 126* 132* 160* 147*   CALCIUM 8.7 9.0 8.7 8.6 9.3   MAGNESIUM 2.7* 2.6* 2.4 2.5* 2.8*         Lab 04/27/23  0346 04/25/23  0747 04/24/23  0647   TOTAL PROTEIN 5.7* 6.7 6.8   ALBUMIN 3.1* 3.5 3.3*   GLOBULIN 2.6 3.2 3.5   ALT (SGPT) 27 32 13   AST (SGOT) 17 41* 22   BILIRUBIN 0.2 0.2 0.3   ALK PHOS 152* 185* 130*         Lab 04/25/23 2212 04/25/23 1957 04/25/23  0747   PROBNP  --  360.4 210.6   HSTROP T 10* 12*  --                  Lab 04/25/23 1948   PH, ARTERIAL 7.457*   PCO2, ARTERIAL 35.2   PO2 .0   FIO2 100   HCO3 ART 24.8   BASE EXCESS ART 1.2     Brief Urine Lab Results  (Last result in the past 365 days)      Color   Clarity   Blood   Leuk Est   Nitrite   Protein   CREAT   Urine HCG        04/23/23 1757 Yellow   Clear   Trace   Negative   Negative   Negative                 Microbiology Results Abnormal     Procedure Component Value - Date/Time    Blood Culture - Blood, Arm, Right [[id number]]  (Normal) Collected: 04/23/23 1125    Lab Status: Final result Specimen: Blood from Arm, Right Updated: 04/28/23 1145     Blood Culture No growth at 5 days    Blood Culture - Blood, Arm, Left [[id number]]  (Normal) Collected: 04/23/23 1125    Lab Status: Final result Specimen: Blood from Arm, Left Updated: 04/28/23 1145     Blood Culture No growth at 5 days    S. Pneumo Ag Urine or CSF - Urine, Urine, Clean Catch [[id number]]  (Normal) Collected: 04/23/23 2997    Lab Status: Final result Specimen: Urine, Clean Catch Updated:  04/24/23 0949     Strep Pneumo Ag Negative    Legionella Antigen, Urine - Urine, Urine, Clean Catch [[id number]]  (Normal) Collected: 04/23/23 1757    Lab Status: Final result Specimen: Urine, Clean Catch Updated: 04/24/23 0949     LEGIONELLA ANTIGEN, URINE Negative    Respiratory Panel PCR w/COVID-19(SARS-CoV-2) CHARI/MARISSA/APOLINAR/PAD/COR/MAD/AMY In-House, NP Swab in UTM/VTM, 3-4 HR TAT - Swab, Nasopharynx [[id number]]  (Normal) Collected: 04/24/23 0544    Lab Status: Final result Specimen: Swab from Nasopharynx Updated: 04/24/23 0647     ADENOVIRUS, PCR Not Detected     Coronavirus 229E Not Detected     Coronavirus HKU1 Not Detected     Coronavirus NL63 Not Detected     Coronavirus OC43 Not Detected     COVID19 Not Detected     Human Metapneumovirus Not Detected     Human Rhinovirus/Enterovirus Not Detected     Influenza A PCR Not Detected     Influenza B PCR Not Detected     Parainfluenza Virus 1 Not Detected     Parainfluenza Virus 2 Not Detected     Parainfluenza Virus 3 Not Detected     Parainfluenza Virus 4 Not Detected     RSV, PCR Not Detected     Bordetella pertussis pcr Not Detected     Bordetella parapertussis PCR Not Detected     Chlamydophila pneumoniae PCR Not Detected     Mycoplasma pneumo by PCR Not Detected    Narrative:      In the setting of a positive respiratory panel with a viral infection PLUS a negative procalcitonin without other underlying concern for bacterial infection, consider observing off antibiotics or discontinuation of antibiotics and continue supportive care. If the respiratory panel is positive for atypical bacterial infection (Bordetella pertussis, Chlamydophila pneumoniae, or Mycoplasma pneumoniae), consider antibiotic de-escalation to target atypical bacterial infection.    MRSA Screen, PCR (Inpatient) - Swab, Nares [[id number]]  (Normal) Collected: 04/23/23 1540    Lab Status: Final result Specimen: Swab from Nares Updated: 04/23/23 1651     MRSA PCR Negative    Narrative:      The  negative predictive value of this diagnostic test is high and should only be used to consider de-escalating anti-MRSA therapy. A positive result may indicate colonization with MRSA and must be correlated clinically.  MRSA Negative    COVID PRE-OP / PRE-PROCEDURE SCREENING ORDER (NO ISOLATION) - Swab, Nasopharynx [[id number]]  (Normal) Collected: 04/23/23 1125    Lab Status: Final result Specimen: Swab from Nasopharynx Updated: 04/23/23 1225    Narrative:      The following orders were created for panel order COVID PRE-OP / PRE-PROCEDURE SCREENING ORDER (NO ISOLATION) - Swab, Nasopharynx.  Procedure                               Abnormality         Status                     ---------                               -----------         ------                     COVID-19, FLU A/B, RSV P...[[id number]]  Normal              Final result                 Please view results for these tests on the individual orders.    COVID-19, FLU A/B, RSV PCR - Swab, Nasopharynx [[id number]]  (Normal) Collected: 04/23/23 1125    Lab Status: Final result Specimen: Swab from Nasopharynx Updated: 04/23/23 1225     COVID19 Not Detected     Influenza A PCR Not Detected     Influenza B PCR Not Detected     RSV, PCR Not Detected    Narrative:      Fact sheet for providers: https://www.fda.gov/media/755519/download    Fact sheet for patients: https://www.fda.gov/media/324712/download    Test performed by PCR.          No radiology results from the last 24 hrs        Current medications:  Scheduled Meds:amLODIPine, 5 mg, Oral, Q24H   And  valsartan, 160 mg, Oral, Q24H  cetirizine, 10 mg, Oral, Daily  fluticasone, 2 spray, Each Nare, Nightly  furosemide, 40 mg, Intravenous, Once  guaiFENesin, 1,200 mg, Oral, Q12H  heparin (porcine), 5,000 Units, Subcutaneous, Q8H  hydroxychloroquine, 200 mg, Oral, Q12H  ipratropium-albuterol, 3 mL, Nebulization, 4x Daily - RT  melatonin, 5 mg, Oral, Nightly  methylPREDNISolone sodium succinate, 60 mg, Intravenous,  "Q12H  montelukast, 10 mg, Oral, Daily  pantoprazole, 40 mg, Oral, Daily  senna-docusate sodium, 2 tablet, Oral, BID  sodium chloride, 10 mL, Intravenous, Q12H  sulfamethoxazole-trimethoprim, 1 tablet, Oral, Once per day on Mon Wed Fri      Continuous Infusions:   PRN Meds:.•  acetaminophen **OR** acetaminophen **OR** acetaminophen  •  ALPRAZolam  •  GI cocktail  •  senna-docusate sodium **AND** polyethylene glycol **AND** bisacodyl **AND** bisacodyl  •  calcium carbonate  •  hydrOXYzine  •  ipratropium-albuterol  •  Magnesium Standard Dose Replacement - Follow Nurse / BPA Driven Protocol  •  Morphine  •  ondansetron **OR** ondansetron  •  simethicone  •  sodium chloride  •  sodium chloride  •  sodium chloride  •  tiZANidine    Assessment & Plan   Assessment & Plan     Active Hospital Problems    Diagnosis  POA   • **Multifocal pneumonia [J18.9]  Yes   • Pulmonary fibrosis [J84.10]  Yes   • ILD (interstitial lung disease) -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        Brief Hospital Course to date:  Tala Ramsey is a 72 y.o. female w/ hx interstitial lung dz, RA (on humira until recently, recent steroid injections) who presents w/ progressive dyspnea, cough over ~10 days, failed levaquin. Hypoxic upon admission. Ct chest w/o contrast w/ bilateral multifocal air space dz. resp pcr panel negative. Had progressive hypoxia evening after admission placed on hi flow canula    Acute hypoxic resp failure, owrsening  Pulm fibrosis  Bilateral airspace dz/pneumonia  -has been on levaquin last week  -unclear if progession/\"flare\" of interstitial lung dz vs infection  -continue day #7/7 zosyn & z-max  -continue thrice weekly bactrim ds (pcp prophylaxis as has been on humira and steroids)  -continue scheduled & prn duonebs  -Pulm following: completed " "3 days of \"pulse\" solumedrol (1g daily x 3 days) on 4/26/23; now on solumedrol 60mg bid   -repeat iv lasix today 40mg today  -still no improvement today, still requiring ~ fio2 70% hi flow  -palliative also following  -guarded prognosis at best    RA  -holding humira  -has received steroid \"shots\" recently  -hold plaquenil    S/p Hyponatremia, improved  -was 129 on admission; now normalizing    HTN  -continue amlodpine/valsartan    Chronic anemia   -monitor    Goals/limits  -Patient is DNR/DNI (in event of further clinical decompensation patient would NOT want mechanical ventilation; in this case patient would wish to pursue comfort measures; patient's son and daughter in law have witnessed these discussions and agreed)  -palliative following    am: cbc,bmp    Expected Discharge Location and Transportation: home  Expected Discharge ? 5/4 (when oxygenation improved)  No data recorded     DVT prophylaxis:  Medical DVT prophylaxis orders are present. sq heparin    AM-PAC 6 Clicks Score (PT): 17 (04/30/23 0800)    CODE STATUS:   Code Status and Medical Interventions:   Ordered at: 04/23/23 1310     Medical Intervention Limits:    NO intubation (DNI)     Level Of Support Discussed With:    Patient     Code Status (Patient has no pulse and is not breathing):    No CPR (Do Not Attempt to Resuscitate)     Medical Interventions (Patient has pulse or is breathing):    Limited Support       Ramón Blanton MD  04/30/23      "

## 2023-05-01 ENCOUNTER — APPOINTMENT (OUTPATIENT)
Dept: GENERAL RADIOLOGY | Facility: HOSPITAL | Age: 73
End: 2023-05-01
Payer: MEDICARE

## 2023-05-01 LAB
ANION GAP SERPL CALCULATED.3IONS-SCNC: 14 MMOL/L (ref 5–15)
BUN SERPL-MCNC: 23 MG/DL (ref 8–23)
BUN/CREAT SERPL: 59 (ref 7–25)
CALCIUM SPEC-SCNC: 8.9 MG/DL (ref 8.6–10.5)
CHLORIDE SERPL-SCNC: 94 MMOL/L (ref 98–107)
CO2 SERPL-SCNC: 24 MMOL/L (ref 22–29)
CREAT SERPL-MCNC: 0.39 MG/DL (ref 0.57–1)
DEPRECATED RDW RBC AUTO: 51.8 FL (ref 37–54)
EGFRCR SERPLBLD CKD-EPI 2021: 105.3 ML/MIN/1.73
ERYTHROCYTE [DISTWIDTH] IN BLOOD BY AUTOMATED COUNT: 15.9 % (ref 12.3–15.4)
GLUCOSE SERPL-MCNC: 151 MG/DL (ref 65–99)
HCT VFR BLD AUTO: 36.7 % (ref 34–46.6)
HGB BLD-MCNC: 11.7 G/DL (ref 12–15.9)
MCH RBC QN AUTO: 29 PG (ref 26.6–33)
MCHC RBC AUTO-ENTMCNC: 31.9 G/DL (ref 31.5–35.7)
MCV RBC AUTO: 91.1 FL (ref 79–97)
PLATELET # BLD AUTO: 735 10*3/MM3 (ref 140–450)
PMV BLD AUTO: 8.7 FL (ref 6–12)
POTASSIUM SERPL-SCNC: 4.5 MMOL/L (ref 3.5–5.2)
RBC # BLD AUTO: 4.03 10*6/MM3 (ref 3.77–5.28)
SODIUM SERPL-SCNC: 132 MMOL/L (ref 136–145)
WBC NRBC COR # BLD: 23.48 10*3/MM3 (ref 3.4–10.8)

## 2023-05-01 PROCEDURE — 25010000002 HEPARIN (PORCINE) PER 1000 UNITS: Performed by: INTERNAL MEDICINE

## 2023-05-01 PROCEDURE — 94664 DEMO&/EVAL PT USE INHALER: CPT

## 2023-05-01 PROCEDURE — 94799 UNLISTED PULMONARY SVC/PX: CPT

## 2023-05-01 PROCEDURE — 97530 THERAPEUTIC ACTIVITIES: CPT

## 2023-05-01 PROCEDURE — 80048 BASIC METABOLIC PNL TOTAL CA: CPT | Performed by: INTERNAL MEDICINE

## 2023-05-01 PROCEDURE — 99233 SBSQ HOSP IP/OBS HIGH 50: CPT | Performed by: INTERNAL MEDICINE

## 2023-05-01 PROCEDURE — 85027 COMPLETE CBC AUTOMATED: CPT | Performed by: INTERNAL MEDICINE

## 2023-05-01 PROCEDURE — 25010000002 METHYLPREDNISOLONE PER 40 MG

## 2023-05-01 PROCEDURE — 99232 SBSQ HOSP IP/OBS MODERATE 35: CPT | Performed by: INTERNAL MEDICINE

## 2023-05-01 PROCEDURE — 71045 X-RAY EXAM CHEST 1 VIEW: CPT

## 2023-05-01 PROCEDURE — 25010000002 MORPHINE PER 10 MG: Performed by: FAMILY MEDICINE

## 2023-05-01 RX ORDER — ALPRAZOLAM 0.5 MG/1
0.5 TABLET ORAL 4 TIMES DAILY PRN
Status: DISCONTINUED | OUTPATIENT
Start: 2023-05-01 | End: 2023-05-03

## 2023-05-01 RX ADMIN — HEPARIN SODIUM 5000 UNITS: 5000 INJECTION, SOLUTION INTRAVENOUS; SUBCUTANEOUS at 21:31

## 2023-05-01 RX ADMIN — IPRATROPIUM BROMIDE AND ALBUTEROL SULFATE 3 ML: .5; 2.5 SOLUTION RESPIRATORY (INHALATION) at 19:55

## 2023-05-01 RX ADMIN — IPRATROPIUM BROMIDE AND ALBUTEROL SULFATE 3 ML: .5; 2.5 SOLUTION RESPIRATORY (INHALATION) at 12:16

## 2023-05-01 RX ADMIN — AMLODIPINE BESYLATE 5 MG: 5 TABLET ORAL at 08:39

## 2023-05-01 RX ADMIN — ALPRAZOLAM 0.5 MG: 0.5 TABLET ORAL at 14:02

## 2023-05-01 RX ADMIN — GUAIFENESIN 1200 MG: 600 TABLET, EXTENDED RELEASE ORAL at 21:32

## 2023-05-01 RX ADMIN — Medication 5 MG: at 21:31

## 2023-05-01 RX ADMIN — MONTELUKAST 10 MG: 10 TABLET, FILM COATED ORAL at 08:39

## 2023-05-01 RX ADMIN — IPRATROPIUM BROMIDE AND ALBUTEROL SULFATE 3 ML: .5; 2.5 SOLUTION RESPIRATORY (INHALATION) at 17:18

## 2023-05-01 RX ADMIN — HEPARIN SODIUM 5000 UNITS: 5000 INJECTION, SOLUTION INTRAVENOUS; SUBCUTANEOUS at 05:25

## 2023-05-01 RX ADMIN — SENNOSIDES AND DOCUSATE SODIUM 2 TABLET: 8.6; 5 TABLET ORAL at 08:39

## 2023-05-01 RX ADMIN — HYDROXYCHLOROQUINE SULFATE 200 MG: 200 TABLET, FILM COATED ORAL at 21:32

## 2023-05-01 RX ADMIN — METHYLPREDNISOLONE SODIUM SUCCINATE 60 MG: 40 INJECTION, POWDER, LYOPHILIZED, FOR SOLUTION INTRAMUSCULAR; INTRAVENOUS at 18:55

## 2023-05-01 RX ADMIN — GUAIFENESIN 1200 MG: 600 TABLET, EXTENDED RELEASE ORAL at 08:39

## 2023-05-01 RX ADMIN — METHYLPREDNISOLONE SODIUM SUCCINATE 60 MG: 40 INJECTION, POWDER, LYOPHILIZED, FOR SOLUTION INTRAMUSCULAR; INTRAVENOUS at 05:25

## 2023-05-01 RX ADMIN — HYDROXYCHLOROQUINE SULFATE 200 MG: 200 TABLET, FILM COATED ORAL at 08:40

## 2023-05-01 RX ADMIN — ALPRAZOLAM 0.25 MG: 0.25 TABLET ORAL at 08:51

## 2023-05-01 RX ADMIN — CETIRIZINE HYDROCHLORIDE 10 MG: 10 TABLET, FILM COATED ORAL at 08:39

## 2023-05-01 RX ADMIN — Medication 10 ML: at 21:32

## 2023-05-01 RX ADMIN — MORPHINE SULFATE 3 MG: 4 INJECTION, SOLUTION INTRAMUSCULAR; INTRAVENOUS at 21:32

## 2023-05-01 RX ADMIN — VALSARTAN 160 MG: 160 TABLET, FILM COATED ORAL at 08:40

## 2023-05-01 RX ADMIN — HEPARIN SODIUM 5000 UNITS: 5000 INJECTION, SOLUTION INTRAVENOUS; SUBCUTANEOUS at 13:58

## 2023-05-01 RX ADMIN — IPRATROPIUM BROMIDE AND ALBUTEROL SULFATE 3 ML: .5; 2.5 SOLUTION RESPIRATORY (INHALATION) at 07:58

## 2023-05-01 RX ADMIN — SULFAMETHOXAZOLE AND TRIMETHOPRIM 1 TABLET: 800; 160 TABLET ORAL at 08:38

## 2023-05-01 RX ADMIN — PANTOPRAZOLE SODIUM 40 MG: 40 TABLET, DELAYED RELEASE ORAL at 08:39

## 2023-05-01 NOTE — PROGRESS NOTES
INPATIENT PULMONARY SERVICE  PROGRESS NOTE     Hospital LOS: 8 days    Ms. Tala Ramsey, is followed for a Chief Complaint of:  Chief Complaint   Patient presents with   • Shortness of Breath       Multifocal pneumonia    Pulmonary fibrosis    ILD (interstitial lung disease) -likely RA associated ILD with acute exacerbation    Acute hypoxemic respiratory failure -likely due to ILD exacerbation      Subjective   S     Interval History:  Remains on HFNC. No significant improvement.        The patient's relevant past medical, surgical and social history were reviewed and updated in Epic as appropriate.      ROS:   Constitutional: Negative for fever.   Respiratory: Positive for dyspnea.   Cardiovascular: Negative for chest pain.   Gastrointestinal: Negative for  nausea, vomiting and diarrhea.     Objective   O     Vitals  Temp  Min: 98 °F (36.7 °C)  Max: 98.5 °F (36.9 °C)  BP  Min: 164/76  Max: 190/79  Pulse  Min: 67  Max: 116  Resp  Min: 18  Max: 20  SpO2  Min: 88 %  Max: 98 % Flow (L/min)  Min: 40  Max: 40    I/O 24 HR (7:00 AM-6:59AM):  Intake/Output       04/30/23 0700 - 05/01/23 0659 05/01/23 0700 - 05/02/23 0659    Intake (ml) -- --    Output (ml) 1825 650    Net (ml) -1825 -650          Medications (Drips):       Physical Examination    Telemetry: Normal sinus rhythm.    Constitutional:  No acute distress.  Conversant. Sitting up in bed on HFNC.    Cardiovascular: Regular rate and rhythm.  No murmurs, rub or gallop.   Respiratory: Normal symmetric chest expansion.  Normal respiratory effort.  Diminished bilaterally. No wheezing.    Abdominal:  Soft. No masses.   Non-tender. No distension.   No hepatosplenomegaly.   Extremities: No digital cyanosis or clubbing.  No peripheral edema.   Neurological:   Alert and Oriented to person, place, and time.   Moves all extremities.            Results from last 7 days   Lab Units 05/01/23  0945 04/30/23  0742 04/29/23  0813   WBC 10*3/mm3 23.48* 20.94* 21.65*    HEMOGLOBIN g/dL 11.7* 11.3* 11.3*   MCV fL 91.1 92.6 89.2   PLATELETS 10*3/mm3 735* 662* 768*     Results from last 7 days   Lab Units 05/01/23  0945 04/30/23  0742 04/29/23  0813 04/28/23  0704   SODIUM mmol/L 132* 133* 135* 136   POTASSIUM mmol/L 4.5 5.2 4.7 4.4   CO2 mmol/L 24.0 26.0 25.0 27.0   CREATININE mg/dL 0.39* 0.47* 0.53* 0.53*   MAGNESIUM mg/dL  --  2.7* 2.6* 2.4     Serum creatinine: 0.39 mg/dL (L) 05/01/23 0945  Estimated creatinine clearance: 121.9 mL/min (A)  Results from last 7 days   Lab Units 04/27/23  0346 04/25/23  0747   ALK PHOS U/L 152* 185*   BILIRUBIN mg/dL 0.2 0.2   ALT (SGPT) U/L 27 32   AST (SGOT) U/L 17 41*       Results from last 7 days   Lab Units 04/25/23  1948   PH, ARTERIAL pH units 7.457*   PCO2, ARTERIAL mm Hg 35.2   PO2 ART mm Hg 103.0   FIO2 % 100       Images:     Imaging Results (Last 24 Hours)     Procedure Component Value Units Date/Time    XR Chest 1 View [[id number]] Collected: 05/01/23 0747     Updated: 05/01/23 0754    Narrative:      XR CHEST 1 VW    Date of Exam: 5/1/2023 2:40 AM EDT    Indication: Respiratory failure, follow up pneumonia    Comparison: 4/27/2023.    Findings:  There are persistent extensive patchy bilateral airspace opacities and interstitial disease. Lung volumes are low. No pneumothorax or pleural effusion. No new lung opacity. Cardiac silhouette is unchanged. Pulmonary vasculature is indistinct.      Impression:      Impression:  Stable extensive patchy bilateral airspace disease.      Electronically Signed: Sagar Conn    5/1/2023 7:51 AM EDT    Workstation ID: [id number]          I reviewed the patient's new clinical results.  I reviewed the patient's new imaging results and agree with the interpretation.    Assessment & Plan   A / P     Ms. Ramsey is a 73yo F with a history of RA and possible lupus on Humira and Plaquenil followed by Dr. Weinstein, hiatal hernia, GERD, hypertension, and RA associated lung disease followed by Dr. Tomilnson who  presented to [facility] on 4/23/23 with bilateral infiltrates/possible pneumonia and was admitted to Hospital Medicine.     She was continued on antibiotics. She was started on IV Solumedrol.    Since admission, she has continued to require HFNC and her worsening is believed secondary to an exacerbation of her underlying lung disease. She completed 3 days of pulse dose steroids on 4/26/23 and remains on Solumedrol 60mg IV q12 hours.     Diet: Regular/House Diet; Texture: Soft to Chew (NDD 3); Soft to Chew: Whole Meat; Fluid Consistency: Thin (IDDSI 0)  Code Status and Medical Interventions:   Ordered at: 04/23/23 1310     Medical Intervention Limits:    NO intubation (DNI)     Level Of Support Discussed With:    Patient     Code Status (Patient has no pulse and is not breathing):    No CPR (Do Not Attempt to Resuscitate)     Medical Interventions (Patient has pulse or is breathing):    Limited Support       Active Hospital Problems    Diagnosis    • **Multifocal pneumonia    • Pulmonary fibrosis    • ILD (interstitial lung disease) -likely RA associated ILD with acute exacerbation    • Acute hypoxemic respiratory failure -likely due to ILD exacerbation        Assessment / Plan:  1. Wean HFNC as tolerated.   2. Continue IV Solumedrol.   3. Bactrim for prophylaxis.   4. Completed a course of empiric antibiotics.  5. Palliative Care is following. I have discussed with Ms. aRmsey that we are currently providing all therapies that are available but I am concerned with her lack of progress.     I discussed the patient's findings and my recommendations with patient and family      Destinee Denise, DO  Pulmonary and Critical Care Medicine

## 2023-05-01 NOTE — PLAN OF CARE
Problem: Adult Inpatient Plan of Care  Goal: Plan of Care Review  Outcome: Ongoing, Progressing  Flowsheets (Taken 4/30/2023 2000)  Plan of Care Reviewed With: patient  Outcome Evaluation: VSS, High flow continued 70% 40L.  Eating very little today. Family at bedside. Urinating well.  Pt SOA and desaturation with minimal activity today (ie turning & repositioning in bed) Pt encouraged to allow more repostioning to minimize risk of pressure injuries, pt reluctant and states she is more comfortable supine- is able to breath and drink easier.  Coccyx red and blanchable at this time, waffle mattress in place.  Goal: Patient-Specific Goal (Individualized)  Outcome: Ongoing, Progressing  Goal: Absence of Hospital-Acquired Illness or Injury  Outcome: Ongoing, Progressing  Intervention: Identify and Manage Fall Risk  Recent Flowsheet Documentation  Taken 4/30/2023 1800 by Yvrose Spaulding RN  Safety Promotion/Fall Prevention:   fall prevention program maintained   activity supervised   nonskid shoes/slippers when out of bed   safety round/check completed  Taken 4/30/2023 1600 by Yvrose Spaulding RN  Safety Promotion/Fall Prevention:   activity supervised   fall prevention program maintained   nonskid shoes/slippers when out of bed   safety round/check completed  Taken 4/30/2023 1400 by Yvrose Spaulding RN  Safety Promotion/Fall Prevention:   activity supervised   fall prevention program maintained   nonskid shoes/slippers when out of bed   safety round/check completed  Intervention: Prevent Skin Injury  Recent Flowsheet Documentation  Taken 4/30/2023 1800 by Yvrose Spaulding RN  Body Position: supine  Skin Protection:   incontinence pads utilized   adhesive use limited  Taken 4/30/2023 1600 by Yvrose Spaulding RN  Body Position:   weight shifting   tilted   left  Skin Protection:   adhesive use limited   incontinence pads utilized  Taken 4/30/2023 1400 by Yvrose Spaulding RN  Body Position: (weight shifting offered, pt denies)    other (see comments)   supine  Skin Protection:   adhesive use limited   incontinence pads utilized  Taken 4/30/2023 1200 by Yvrose Spaulding RN  Body Position: (weight shifting offered, pt asked to stay flat) other (see comments)  Skin Protection: adhesive use limited  Taken 4/30/2023 1000 by Yvrose Spaulding RN  Body Position:   weight shifting   position changed independently   sitting up in bed  Skin Protection:   adhesive use limited   protective footwear used  Taken 4/30/2023 0800 by Yvrose Spaulding RN  Body Position:   weight shifting   position changed independently   supine  Skin Protection:   adhesive use limited   protective footwear used  Intervention: Prevent and Manage VTE (Venous Thromboembolism) Risk  Recent Flowsheet Documentation  Taken 4/30/2023 1800 by Yvrose Spaulding RN  Activity Management: activity minimized  Taken 4/30/2023 1600 by Yvrose Spaulding RN  Activity Management: activity minimized  Taken 4/30/2023 1400 by Yvrose Spaulding RN  Activity Management: activity minimized  Goal: Optimal Comfort and Wellbeing  Outcome: Ongoing, Progressing  Goal: Readiness for Transition of Care  Outcome: Ongoing, Progressing   Goal Outcome Evaluation:  Plan of Care Reviewed With: patient        Progress: no change  Outcome Evaluation: VSS, High flow continued 70% 40L.  Eating very little today. Family at bedside. Urinating well.  Pt SOA and desaturation with minimal activity today (ie turning & repositioning in bed) Pt encouraged to allow more repostioning to minimize risk of pressure injuries, pt reluctant and states she is more comfortable supine- is able to breath and drink easier.  Coccyx red and blanchable at this time, waffle mattress in place.

## 2023-05-01 NOTE — PROGRESS NOTES
[facility] Medicine Services  PROGRESS NOTE    Patient Name: Tala Ramsey  : 1950  MRN: [id number]    Date of Admission: 2023  Primary Care Physician: Sg Horne MD    Subjective   Subjective     CC:  Hypoxia, pneumonia    HPI:  Patient resting in bed. Remains on high flow. Having some issues with pain related to bowel movements. No other issues overnight.    ROS:  Gen- No fevers, chills  CV- No chest pain, palpitations  Resp- +cough, dyspnea  GI- No N/V/D, +abd pain    Objective   Objective     Vital Signs:   Temp:  [98 °F (36.7 °C)-98.6 °F (37 °C)] 98.5 °F (36.9 °C)  Heart Rate:  [] 73  Resp:  [18-20] 20  BP: (156-190)/(71-90) 190/79  Flow (L/min):  [40] 40     Physical Exam:  Constitutional: No acute distress, awake, alert, appears chronically ill  HENT: NCAT, mucous membranes moist  Respiratory: poor air movement bilaterally with tachypnea, remains on high flow at 65% FiO2   Cardiovascular: RRR, no murmurs, rubs, or gallops  Gastrointestinal: soft, nontender, nondistended  Musculoskeletal: No bilateral ankle edema  Psychiatric: flat affect, cooperative  Neurologic: Oriented x 3, overall very weak/deconditioned.  Skin: No rashes      Results Reviewed:  LAB RESULTS:      Lab 23  0742 23  0813 23  0705 23  0346 23  0532 23  1957 23  0747   WBC 20.94* 21.65* 15.99* 15.91* 23.58*  --  15.74*   HEMOGLOBIN 11.3* 11.3* 10.1* 9.9* 10.8*  --  10.8*   HEMATOCRIT 35.2 35.4 31.6* 31.3* 34.0  --  33.1*   PLATELETS 662* 768* 602* 616* 739*  --  624*   MCV 92.6 89.2 91.6 91.5 90.9  --  89.2   CRP  --   --   --  2.55* 6.04*  --  14.70*   D DIMER QUANT  --   --   --   --   --  1.88*  --          Lab 23  0742 23  0813 23  0704 23  0346 23  0532   SODIUM 133* 135* 136 137 136   POTASSIUM 5.2 4.7 4.4 4.8 4.3   CHLORIDE 95* 95* 98 99 98   CO2 26.0 25.0 27.0 24.0 23.0   ANION GAP 12.0 15.0 11.0 14.0 15.0    BUN 19 18 22 23 16   CREATININE 0.47* 0.53* 0.53* 0.62 0.63   EGFR 100.7 97.8 97.8 94.2 93.8   GLUCOSE 136* 126* 132* 160* 147*   CALCIUM 8.7 9.0 8.7 8.6 9.3   MAGNESIUM 2.7* 2.6* 2.4 2.5* 2.8*         Lab 04/27/23  0346 04/25/23  0747   TOTAL PROTEIN 5.7* 6.7   ALBUMIN 3.1* 3.5   GLOBULIN 2.6 3.2   ALT (SGPT) 27 32   AST (SGOT) 17 41*   BILIRUBIN 0.2 0.2   ALK PHOS 152* 185*         Lab 04/25/23 2212 04/25/23 1957 04/25/23  0747   PROBNP  --  360.4 210.6   HSTROP T 10* 12*  --                  Lab 04/25/23 1948   PH, ARTERIAL 7.457*   PCO2, ARTERIAL 35.2   PO2 .0   FIO2 100   HCO3 ART 24.8   BASE EXCESS ART 1.2     Brief Urine Lab Results  (Last result in the past 365 days)      Color   Clarity   Blood   Leuk Est   Nitrite   Protein   CREAT   Urine HCG        04/23/23 1757 Yellow   Clear   Trace   Negative   Negative   Negative                 Microbiology Results Abnormal     Procedure Component Value - Date/Time    Blood Culture - Blood, Arm, Right [[id number]]  (Normal) Collected: 04/23/23 1125    Lab Status: Final result Specimen: Blood from Arm, Right Updated: 04/28/23 1145     Blood Culture No growth at 5 days    Blood Culture - Blood, Arm, Left [[id number]]  (Normal) Collected: 04/23/23 1125    Lab Status: Final result Specimen: Blood from Arm, Left Updated: 04/28/23 1145     Blood Culture No growth at 5 days    S. Pneumo Ag Urine or CSF - Urine, Urine, Clean Catch [[id number]]  (Normal) Collected: 04/23/23 1757    Lab Status: Final result Specimen: Urine, Clean Catch Updated: 04/24/23 0949     Strep Pneumo Ag Negative    Legionella Antigen, Urine - Urine, Urine, Clean Catch [[id number]]  (Normal) Collected: 04/23/23 1757    Lab Status: Final result Specimen: Urine, Clean Catch Updated: 04/24/23 0949     LEGIONELLA ANTIGEN, URINE Negative    Respiratory Panel PCR w/COVID-19(SARS-CoV-2) CHARI/MARISSA/APOLINAR/PAD/COR/MAD/AMY In-House, NP Swab in UTM/VTM, 3-4 HR TAT - Swab, Nasopharynx [[id number]]  (Normal)  Collected: 04/24/23 0544    Lab Status: Final result Specimen: Swab from Nasopharynx Updated: 04/24/23 0647     ADENOVIRUS, PCR Not Detected     Coronavirus 229E Not Detected     Coronavirus HKU1 Not Detected     Coronavirus NL63 Not Detected     Coronavirus OC43 Not Detected     COVID19 Not Detected     Human Metapneumovirus Not Detected     Human Rhinovirus/Enterovirus Not Detected     Influenza A PCR Not Detected     Influenza B PCR Not Detected     Parainfluenza Virus 1 Not Detected     Parainfluenza Virus 2 Not Detected     Parainfluenza Virus 3 Not Detected     Parainfluenza Virus 4 Not Detected     RSV, PCR Not Detected     Bordetella pertussis pcr Not Detected     Bordetella parapertussis PCR Not Detected     Chlamydophila pneumoniae PCR Not Detected     Mycoplasma pneumo by PCR Not Detected    Narrative:      In the setting of a positive respiratory panel with a viral infection PLUS a negative procalcitonin without other underlying concern for bacterial infection, consider observing off antibiotics or discontinuation of antibiotics and continue supportive care. If the respiratory panel is positive for atypical bacterial infection (Bordetella pertussis, Chlamydophila pneumoniae, or Mycoplasma pneumoniae), consider antibiotic de-escalation to target atypical bacterial infection.    MRSA Screen, PCR (Inpatient) - Swab, Nares [[id number]]  (Normal) Collected: 04/23/23 1540    Lab Status: Final result Specimen: Swab from Nares Updated: 04/23/23 1651     MRSA PCR Negative    Narrative:      The negative predictive value of this diagnostic test is high and should only be used to consider de-escalating anti-MRSA therapy. A positive result may indicate colonization with MRSA and must be correlated clinically.  MRSA Negative    COVID PRE-OP / PRE-PROCEDURE SCREENING ORDER (NO ISOLATION) - Swab, Nasopharynx [[id number]]  (Normal) Collected: 04/23/23 1125    Lab Status: Final result Specimen: Swab from Nasopharynx  Updated: 04/23/23 1225    Narrative:      The following orders were created for panel order COVID PRE-OP / PRE-PROCEDURE SCREENING ORDER (NO ISOLATION) - Swab, Nasopharynx.  Procedure                               Abnormality         Status                     ---------                               -----------         ------                     COVID-19, FLU A/B, RSV P...[[id number]]  Normal              Final result                 Please view results for these tests on the individual orders.    COVID-19, FLU A/B, RSV PCR - Swab, Nasopharynx [[id number]]  (Normal) Collected: 04/23/23 1125    Lab Status: Final result Specimen: Swab from Nasopharynx Updated: 04/23/23 1225     COVID19 Not Detected     Influenza A PCR Not Detected     Influenza B PCR Not Detected     RSV, PCR Not Detected    Narrative:      Fact sheet for providers: https://www.fda.gov/media/796625/download    Fact sheet for patients: https://www.fda.gov/media/460552/download    Test performed by PCR.          XR Chest 1 View    Result Date: 5/1/2023  XR CHEST 1 VW Date of Exam: 5/1/2023 2:40 AM EDT Indication: Respiratory failure, follow up pneumonia Comparison: 4/27/2023. Findings: There are persistent extensive patchy bilateral airspace opacities and interstitial disease. Lung volumes are low. No pneumothorax or pleural effusion. No new lung opacity. Cardiac silhouette is unchanged. Pulmonary vasculature is indistinct.     Impression: Impression: Stable extensive patchy bilateral airspace disease. Electronically Signed: Sagar Conn  5/1/2023 7:51 AM EDT  Workstation ID: [id number]          Current medications:  Scheduled Meds:amLODIPine, 5 mg, Oral, Q24H   And  valsartan, 160 mg, Oral, Q24H  cetirizine, 10 mg, Oral, Daily  fluticasone, 2 spray, Each Nare, Nightly  guaiFENesin, 1,200 mg, Oral, Q12H  heparin (porcine), 5,000 Units, Subcutaneous, Q8H  hydroxychloroquine, 200 mg, Oral, Q12H  ipratropium-albuterol, 3 mL, Nebulization, 4x Daily -  RT  melatonin, 5 mg, Oral, Nightly  methylPREDNISolone sodium succinate, 60 mg, Intravenous, Q12H  montelukast, 10 mg, Oral, Daily  pantoprazole, 40 mg, Oral, Daily  senna-docusate sodium, 2 tablet, Oral, BID  sodium chloride, 10 mL, Intravenous, Q12H  sulfamethoxazole-trimethoprim, 1 tablet, Oral, Once per day on Mon Wed Fri      Continuous Infusions:   PRN Meds:.•  acetaminophen **OR** acetaminophen **OR** acetaminophen  •  ALPRAZolam  •  GI cocktail  •  senna-docusate sodium **AND** polyethylene glycol **AND** bisacodyl **AND** bisacodyl  •  calcium carbonate  •  hydrOXYzine  •  ipratropium-albuterol  •  Magnesium Standard Dose Replacement - Follow Nurse / BPA Driven Protocol  •  Morphine  •  ondansetron **OR** ondansetron  •  simethicone  •  sodium chloride  •  sodium chloride  •  sodium chloride  •  tiZANidine    Assessment & Plan   Assessment & Plan     Active Hospital Problems    Diagnosis  POA   • **Multifocal pneumonia [J18.9]  Yes   • Pulmonary fibrosis [J84.10]  Yes   • ILD (interstitial lung disease) -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        Brief Hospital Course to date:  Tala Ramsey is a 72 y.o. female w/ hx interstitial lung dz, RA (on humira until recently, recent steroid injections) who presents w/ progressive dyspnea, cough over ~10 days, failed levaquin. Hypoxic upon admission. Ct chest w/o contrast w/ bilateral multifocal air space dz. resp pcr panel negative. Had progressive hypoxia evening after admission placed on hi flow canula    Acute hypoxic resp failure, worsening  Pulmonary fibrosis/ILD  Bilateral Pneumonia  -unclear if progession/flare of interstitial lung disease vs infection  -remains on High flow with difficulty weaning 65% FiO2 today  -s/p full course of zosyn & azithromycin  -continue 3x weekly  bactrim ds (pcp prophylaxis as has been on humira and steroids)  -continue scheduled & PRN duonebs  -Pulm following: completed 3 days of solumedrol (1g daily x 3 days) on 4/26/23; now on solumedrol 60mg bid with plan for prolonged taper  -diuresis PRN as tolerated  -palliative also following  -guarded prognosis at best    RA  -holding humira and plaquenil     S/p Hyponatremia, improved  -improved    HTN  -continue amlodpine/valsartan    Chronic anemia   -monitor    Goals/limits  -Patient is DNR/DNI (in event of further clinical decompensation patient would NOT want mechanical ventilation; in this case patient would wish to pursue comfort measures; patient's son and daughter in law have witnessed these discussions and agreed)  -palliative following    Expected Discharge Location and Transportation: home  Expected Discharge ? 5/4 (when oxygenation improved)  No data recorded     DVT prophylaxis:  Medical DVT prophylaxis orders are present. sq heparin    AM-PAC 6 Clicks Score (PT): 13 (05/01/23 0800)    CODE STATUS:   Code Status and Medical Interventions:   Ordered at: 04/23/23 1310     Medical Intervention Limits:    NO intubation (DNI)     Level Of Support Discussed With:    Patient     Code Status (Patient has no pulse and is not breathing):    No CPR (Do Not Attempt to Resuscitate)     Medical Interventions (Patient has pulse or is breathing):    Limited Support       Barbara Tong, DO  05/01/23

## 2023-05-01 NOTE — PLAN OF CARE
Goal Outcome Evaluation:  Plan of Care Reviewed With: patient        Progress: no change  Outcome Evaluation: Pt continues to be limited by anxiety, generalized weakness, and poor endurance causing functional mobility below baseline. Pt highly anxious this session and upon sitting EOB demonstrated an increased RR. PT provided cues for PLB but pt with minimal change in breathing techniques. O2 sat drop to 81% on HFNC and pt returned to supine. O2 sat returned to 90% once in supine. Pt will benefit from further IPPT for addressing deficits. PT rec d/c to SNF.

## 2023-05-01 NOTE — THERAPY TREATMENT NOTE
Patient Name: Tala Ramsey  : 1950    MRN: [id number]                              Today's Date: 2023       Admit Date: 2023    Visit Dx:     ICD-10-CM ICD-9-CM   1. Multifocal pneumonia  J18.9 486   2. Failure of outpatient treatment  Z78.9 V49.89   3. Hypoxia  R09.02 799.02     Patient Active Problem List   Diagnosis   • Lumbar herniated disc   • Multifocal pneumonia   • Pulmonary fibrosis   • ILD (interstitial lung disease) -likely RA associated ILD with acute exacerbation   • Acute hypoxemic respiratory failure -likely due to ILD exacerbation     Past Medical History:   Diagnosis Date   • Anemia    • Back pain    • Bronchitis    • Hypertension    • Low back pain    • Pulmonary fibrosis    • Rheumatoid arthritis      Past Surgical History:   Procedure Laterality Date   • DILATION AND CURETTAGE, DIAGNOSTIC / THERAPEUTIC        General Information     Row Name 23 1527          Physical Therapy Time and Intention    Document Type therapy note (daily note)  -AB     Mode of Treatment physical therapy  -AB     Row Name 23 1527          General Information    Patient Profile Reviewed yes  -AB     Existing Precautions/Restrictions fall;oxygen therapy device and L/min;other (see comments)  HFNC  -AB     Barriers to Rehab medically complex  -AB     Row Name 23 1527          Cognition    Orientation Status (Cognition) oriented x 3  -AB     Row Name 23 1527          Safety Issues, Functional Mobility    Safety Issues Affecting Function (Mobility) insight into deficits/self-awareness;safety precaution awareness;safety precautions follow-through/compliance;sequencing abilities  -AB     Impairments Affecting Function (Mobility) balance;coordination;endurance/activity tolerance;postural/trunk control;shortness of breath;strength  -AB     Comment, Safety Issues/Impairments (Mobility) Pt highly anxious with all mobility.  -AB           User Key  (r) = Recorded By, (t) = Taken By, (c)  = Cosigned By    Initials Name Provider Type    AB Oumou Phelps, PT Physical Therapist               Mobility     Row Name 05/01/23 1528          Bed Mobility    Bed Mobility supine-sit;sit-supine;scooting/bridging  -AB     Scooting/Bridging [place] (Bed Mobility) maximum assist (25% patient effort);2 person assist;verbal cues  -AB     Supine-Sit [place] (Bed Mobility) minimum assist (75% patient effort);verbal cues;2 person assist  -AB     Sit-Supine [place] (Bed Mobility) moderate assist (50% patient effort);2 person assist;verbal cues  -AB     Assistive Device (Bed Mobility) bed rails;draw sheet;head of bed elevated  -AB     Comment, (Bed Mobility) Pt required boost at trunk to sit EOB. Pt sat EOB for >10 minutes with focus on PLB and slowing RR. However pt dropping to 81% O2 sat and required assist for return to supine. Pt highly anxious once sitting EOB with difficulty slowing breathing.  -AB     Row Name 05/01/23 1528          Transfers    Comment, (Transfers) Cues for breathing techniques and sequencing. Further transfers deferred d/t pts respiratory status.  -AB           User Key  (r) = Recorded By, (t) = Taken By, (c) = Cosigned By    Initials Name Provider Type    Oumou Hermosillo, PT Physical Therapist               Obj/Interventions     Row Name 05/01/23 1531          Balance    Balance Assessment sitting static balance;sitting dynamic balance  -AB     Static Sitting Balance contact guard  -AB     Dynamic Sitting Balance contact guard  -AB     Position, Sitting Balance unsupported;sitting edge of bed  -AB     Balance Interventions sitting;static;dynamic  -AB     Comment, Balance Pt sat EOB >10 minutes with CGAx1  -AB           User Key  (r) = Recorded By, (t) = Taken By, (c) = Cosigned By    Initials Name Provider Type    Oumou Hermosillo, PT Physical Therapist               Goals/Plan    No documentation.                Clinical Impression     Row Name 05/01/23 1532          Pain     Pretreatment Pain Rating 0/10 - no pain  -AB     Posttreatment Pain Rating 0/10 - no pain  -AB     Additional Documentation Pain Scale: Numbers Pre/Post-Treatment (Group)  -AB     Row Name 05/01/23 1532          Plan of Care Review    Plan of Care Reviewed With patient  -AB     Progress no change  -AB     Outcome Evaluation Pt continues to be limited by anxiety, generalized weakness, and poor endurance causing functional mobility below baseline. Pt highly anxious this session and upon sitting EOB demonstrated an increased RR. PT provided cues for PLB but pt with minimal change in breathing techniques. O2 sat drop to 81% on HFNC and pt returned to supine. O2 sat returned to 90% once in supine. Pt will benefit from further IPPT for addressing deficits. PT rec d/c to SNF.  -AB     Row Name 05/01/23 1532          Vital Signs    Pre Systolic BP Rehab 167  -AB     Pre Treatment Diastolic BP 73  -AB     Pretreatment Heart Rate (beats/min) 94  -AB     Posttreatment Heart Rate (beats/min) 91  -AB     Pre SpO2 (%) 93  -AB     O2 Delivery Pre Treatment hi-flow  -AB     Intra SpO2 (%) 81  -AB     O2 Delivery Intra Treatment hi-flow  -AB     Post SpO2 (%) 90  -AB     O2 Delivery Post Treatment hi-flow  -AB     Pre Patient Position Supine  -AB     Intra Patient Position Sitting  -AB     Post Patient Position Supine  -AB     Row Name 05/01/23 1532          Positioning and Restraints    Pre-Treatment Position in bed  -AB     Post Treatment Position bed  -AB     In Bed notified nsg;supine;call light within reach;encouraged to call for assist;exit alarm on;side rails up x3  -AB           User Key  (r) = Recorded By, (t) = Taken By, (c) = Cosigned By    Initials Name Provider Type    AB Oumou Phelps, PT Physical Therapist               Outcome Measures     Row Name 05/01/23 1537 05/01/23 1400       How much help from another person do you currently need...    Turning from your back to your side while in flat bed without using  bedrails? 3  -AB 3  -CD    Moving from lying on back to sitting on the side of a flat bed without bedrails? 3  -AB 2  -CD    Moving to and from a bed to a chair (including a wheelchair)? 2  -AB 2  -CD    Standing up from a chair using your arms (e.g., wheelchair, bedside chair)? 2  -AB 2  -CD    Climbing 3-5 steps with a railing? 1  -AB 1  -CD    To walk in hospital room? 2  -AB 2  -CD    AM-PAC 6 Clicks Score (PT) 13  -AB 12  -CD    Highest level of mobility 4 --> Transferred to chair/commode  -AB 4 --> Transferred to chair/commode  -CD    Row Name 05/01/23 0800          How much help from another person do you currently need...    Turning from your back to your side while in flat bed without using bedrails? 3  -CN     Moving from lying on back to sitting on the side of a flat bed without bedrails? 2  -CN     Moving to and from a bed to a chair (including a wheelchair)? 2  -CN     Standing up from a chair using your arms (e.g., wheelchair, bedside chair)? 2  -CN     Climbing 3-5 steps with a railing? 2  -CN     To walk in hospital room? 2  -CN     AM-PAC 6 Clicks Score (PT) 13  -CN     Highest level of mobility 4 --> Transferred to chair/commode  -CN     Row Name 05/01/23 1537          Functional Assessment    Outcome Measure Options AM-PAC 6 Clicks Basic Mobility (PT)  -AB           User Key  (r) = Recorded By, (t) = Taken By, (c) = Cosigned By    Initials Name Provider Type    CD Madelin Madsen, RN Registered Nurse    Velma Cespedes, RN Registered Nurse    Oumou Hermosillo, PT Physical Therapist                             Physical Therapy Education     Title: PT OT SLP Therapies (In Progress)     Topic: Physical Therapy (In Progress)     Point: Mobility training (Done)     Learning Progress Summary           Patient Acceptance, E,D, VU,NR by AB at 5/1/2023 1537    Acceptance, E, NR by KG at 4/28/2023 1306    Acceptance, E, NR by KG at 4/25/2023 1402                   Point: Home exercise program (In  Progress)     Learning Progress Summary           Patient Acceptance, E, NR by KG at 4/28/2023 1306                   Point: Body mechanics (Done)     Learning Progress Summary           Patient Acceptance, E,D, VU,NR by AB at 5/1/2023 1537    Acceptance, E, NR by KG at 4/28/2023 1306    Acceptance, E, NR by KG at 4/25/2023 1402                   Point: Precautions (Done)     Learning Progress Summary           Patient Acceptance, E,D, VU,NR by AB at 5/1/2023 1537    Acceptance, E, NR by KG at 4/28/2023 1306    Acceptance, E, NR by KG at 4/25/2023 1402                               User Key     Initials Effective Dates Name Provider Type Discipline    KG 05/22/20 -  Cici Salas PT Physical Therapist PT    AB 09/22/22 -  Oumou Phelps PT Physical Therapist PT              PT Recommendation and Plan     Plan of Care Reviewed With: patient  Progress: no change  Outcome Evaluation: Pt continues to be limited by anxiety, generalized weakness, and poor endurance causing functional mobility below baseline. Pt highly anxious this session and upon sitting EOB demonstrated an increased RR. PT provided cues for PLB but pt with minimal change in breathing techniques. O2 sat drop to 81% on HFNC and pt returned to supine. O2 sat returned to 90% once in supine. Pt will benefit from further IPPT for addressing deficits. PT rec d/c to SNF.     Time Calculation:    PT Charges     Row Name 05/01/23 1538             Time Calculation    Start Time 1345  -AB      PT Received On 05/01/23  -AB      PT Goal Re-Cert Due Date 05/05/23  -AB         Timed Charges    44990 - PT Therapeutic Activity Minutes 23  -AB         Total Minutes    Timed Charges Total Minutes 23  -AB       Total Minutes 23  -AB            User Key  (r) = Recorded By, (t) = Taken By, (c) = Cosigned By    Initials Name Provider Type    AB Oumou Phelps, PT Physical Therapist              Therapy Charges for Today     Code Description Service Date Service  Provider Modifiers Qty    56294987449 HC PT THERAPEUTIC ACT EA 15 MIN 5/1/2023 Oumou Phelps, PT GP 2    91168883312 HC PT THER SUPP EA 15 MIN 5/1/2023 Oumou Phelps, PT GP 1          PT G-Codes  Outcome Measure Options: AM-PAC 6 Clicks Basic Mobility (PT)  AM-PAC 6 Clicks Score (PT): 13  AM-PAC 6 Clicks Score (OT): 16  PT Discharge Summary  Anticipated Discharge Disposition (PT): skilled nursing facility    Oumou Phelps, PT  5/1/2023

## 2023-05-01 NOTE — CASE MANAGEMENT/SOCIAL WORK
Continued Stay Note  [facility]     Patient Name: Tala Ramsey  MRN: [id number]  Today's Date: 5/1/2023    Admit Date: 4/23/2023    Plan: CM Update   Discharge Plan     Row Name 05/01/23 1503       Plan    Plan CM Update    Plan Comments Patient remains on high flow 02 at this time. Palliative care and Pulmonary are following. Attempts being made to wean 02. Progress is slow - CM will follow and once appropriate will evaluate PT/OT recommendations for additional dc planning/ rehab needs - derrick [phone]    Final Discharge Disposition Code 03 - skilled nursing facility (SNF)               Discharge Codes    No documentation.               Expected Discharge Date and Time     Expected Discharge Date Expected Discharge Time    May 1, 2023             Derrick Riggs RN

## 2023-05-01 NOTE — PLAN OF CARE
Goal Outcome Evaluation:              Outcome Evaluation: VSS, no acute distress, remains on HFNC 70% 40L, prn pain medication given, Will continue with plan of care.

## 2023-05-01 NOTE — PLAN OF CARE
"Goal Outcome Evaluation:  Plan of Care Reviewed With: patient        Progress: no change  Outcome Evaluation: Pt. sitting up in bed on 40% 71L HFNC demonstrating increased wob at rest.  Pt. endorsed anxiety but stated she would like to wait a bit before she takes her prn xanax.  Pt. sated her generalized back pain is \"about the same as usual and is not too bad\".  Son at bedside stated that he is not complaining but jese his mom doesn't have a chance to \"catch her breath before the next person comes in the room\".  Pt. only able to take bites and sips.  BP elevated on monitor.  Palliative care to continue to follow for support, POC and ongoing GOC.  "

## 2023-05-01 NOTE — PROGRESS NOTES
Palliative Care Progress Note    Date of Admission: 4/23/2023    Subjective: Does report that she slept very well last night.  Family at bedside states that she did have some dyspnea earlier this morning but that seems to have improved.  Current Code Status     Date Active Code Status Order ID Comments User Context       4/23/2023 1310 No CPR (Do Not Attempt to Resuscitate) [id number]  Elias Morales MD Inpatient      Question Answer    Code Status (Patient has no pulse and is not breathing) No CPR (Do Not Attempt to Resuscitate)    Medical Interventions (Patient has pulse or is breathing) Limited Support    Medical Intervention Limits: NO intubation (DNI)    Level Of Support Discussed With Patient              No current facility-administered medications on file prior to encounter.     Current Outpatient Medications on File Prior to Encounter   Medication Sig Dispense Refill   • acetaminophen (TYLENOL) 325 MG tablet Take 2 tablets by mouth Every 4 (Four) Hours As Needed.     • amLODIPine-valsartan (EXFORGE) 5-160 MG per tablet TAKE 1 A DAY FOR BLOOD PRESSURE     • Calcium-Phosphorus-Vitamin D (CITRACAL +D3 PO) Take 1 tablet by mouth.     • cyclobenzaprine (FLEXERIL) 10 MG tablet Take 1 tablet by mouth 3 (Three) Times a Day As Needed for muscle spasms. 21 tablet 0   • fluticasone (FLONASE) 50 MCG/ACT nasal spray 2 sprays by Each Nare route Daily.     • Humira Pen 40 MG/0.4ML Pen-injector Kit Inject 40 mg under the skin into the appropriate area as directed Every 14 (Fourteen) Days.     • hydroxychloroquine (PLAQUENIL) 200 MG tablet Take  by mouth 2 (Two) Times a Day.     • Ibuprofen 200 MG capsule Take 1 tablet by mouth As Needed.     • Lidocaine Viscous HCl (XYLOCAINE) 2 % solution      • montelukast (SINGULAIR) 10 MG tablet Take 1 tablet by mouth Daily.     • Omega-3 Fatty Acids (fish oil) 1200 MG capsule capsule Take 1 capsule by mouth Daily.     • pantoprazole (PROTONIX) 40 MG EC tablet Take 1 tablet by mouth  "Daily.     • predniSONE (DELTASONE) 20 MG tablet Take 1.5 tablets by mouth Daily. 11 tablet 0   • simethicone (MYLICON) 125 MG chewable tablet Chew 1 tablet Every 6 (Six) Hours As Needed for Flatulence.     • tiZANidine (ZANAFLEX) 2 MG tablet 1'2-1 in the Am and afternoon, 1-2 at bedtime. as needed          •  acetaminophen **OR** acetaminophen **OR** acetaminophen  •  ALPRAZolam  •  GI cocktail  •  senna-docusate sodium **AND** polyethylene glycol **AND** bisacodyl **AND** bisacodyl  •  calcium carbonate  •  hydrOXYzine  •  ipratropium-albuterol  •  Magnesium Standard Dose Replacement - Follow Nurse / BPA Driven Protocol  •  Morphine  •  ondansetron **OR** ondansetron  •  simethicone  •  sodium chloride  •  sodium chloride  •  sodium chloride  •  tiZANidine    Objective: BP (!) 190/79 (BP Location: Right arm, Patient Position: Lying)   Pulse 73   Temp 98.5 °F (36.9 °C) (Oral)   Resp 20   Ht 153.7 cm (60.5\")   Wt 80.3 kg (177 lb)   SpO2 97%   BMI 34.00 kg/m²      Intake/Output Summary (Last 24 hours) at 5/1/2023 1040  Last data filed at 4/30/2023 1721  Gross per 24 hour   Intake --   Output 1400 ml   Net -1400 ml     Physical Exam:      General Appearance:    Alert, cooperative, conversational dyspnea noted   Head:    Normocephalic, without obvious abnormality, atraumatic   Eyes:            Lids and lashes normal, conjunctivae and sclerae normal, no   icterus, no pallor, corneas clear, PERRLA   Ears:    Ears appear intact with no abnormalities noted   Throat:   No oral lesions, no thrush, oral mucosa moist   Neck:   No adenopathy, supple, trachea midline, no thyromegaly, no     carotid bruit, no JVD   Back:     No kyphosis present, no scoliosis present, no skin lesions,       erythema or scars, no tenderness to percussion or                   palpation,   range of motion normal   Lungs:     Clear to auscultation,respirations regular, even and                   unlabored    Heart:    Regular rhythm and normal " rate, normal S1 and S2, no            murmur, no gallop, no rub, no click   Breast Exam:    Deferred   Abdomen:     Normal bowel sounds, no masses, no organomegaly, soft        non-tender, non-distended, no guarding, no rebound                 tenderness   Genitalia:    Deferred   Extremities:   Moves all extremities well, no edema, no cyanosis, no              redness   Pulses:   Pulses palpable and equal bilaterally   Skin:   No bleeding, bruising or rash   Lymph nodes:   No palpable adenopathy   Neurologic:   Cranial nerves 2 - 12 grossly intact, sensation intact, DTR        present and equal bilaterally     Results from last 7 days   Lab Units 05/01/23  0945   WBC 10*3/mm3 23.48*   HEMOGLOBIN g/dL 11.7*   HEMATOCRIT % 36.7   PLATELETS 10*3/mm3 735*     Results from last 7 days   Lab Units 05/01/23  0945 04/28/23  0704 04/27/23  0346   SODIUM mmol/L 132*   < > 137   POTASSIUM mmol/L 4.5   < > 4.8   CHLORIDE mmol/L 94*   < > 99   CO2 mmol/L 24.0   < > 24.0   BUN mg/dL 23   < > 23   CREATININE mg/dL 0.39*   < > 0.62   CALCIUM mg/dL 8.9   < > 8.6   BILIRUBIN mg/dL  --   --  0.2   ALK PHOS U/L  --   --  152*   ALT (SGPT) U/L  --   --  27   AST (SGOT) U/L  --   --  17   GLUCOSE mg/dL 151*   < > 160*    < > = values in this interval not displayed.       Impression: Interstitial lung disease  Pneumonia  Respiratory distress  Dyspnea  Anxiety  Goals of care  Plan: Patient continues to have an up-and-down course.  States that this morning she was fairly dyspneic when trying to use bedpan.          Wojciech Castrejon DO  05/01/23  10:40 EDT

## 2023-05-01 NOTE — PLAN OF CARE
Goal Outcome Evaluation:  Plan of Care Reviewed With: patient        Progress: improving  Outcome Evaluation: patient alert and oriented patient tolerates activity poorly becomes soa and weakness, unable to even sit up to bsc without sa02 dropping

## 2023-05-02 PROCEDURE — 94799 UNLISTED PULMONARY SVC/PX: CPT

## 2023-05-02 PROCEDURE — 25010000002 METHYLPREDNISOLONE PER 40 MG

## 2023-05-02 PROCEDURE — 99233 SBSQ HOSP IP/OBS HIGH 50: CPT | Performed by: INTERNAL MEDICINE

## 2023-05-02 PROCEDURE — 25010000002 MORPHINE PER 10 MG: Performed by: FAMILY MEDICINE

## 2023-05-02 PROCEDURE — 25010000002 HEPARIN (PORCINE) PER 1000 UNITS: Performed by: INTERNAL MEDICINE

## 2023-05-02 PROCEDURE — 94761 N-INVAS EAR/PLS OXIMETRY MLT: CPT

## 2023-05-02 RX ADMIN — MONTELUKAST 10 MG: 10 TABLET, FILM COATED ORAL at 08:40

## 2023-05-02 RX ADMIN — VALSARTAN 160 MG: 160 TABLET, FILM COATED ORAL at 08:40

## 2023-05-02 RX ADMIN — HEPARIN SODIUM 5000 UNITS: 5000 INJECTION, SOLUTION INTRAVENOUS; SUBCUTANEOUS at 05:30

## 2023-05-02 RX ADMIN — METHYLPREDNISOLONE SODIUM SUCCINATE 60 MG: 40 INJECTION, POWDER, LYOPHILIZED, FOR SOLUTION INTRAMUSCULAR; INTRAVENOUS at 18:15

## 2023-05-02 RX ADMIN — MORPHINE SULFATE 3 MG: 4 INJECTION, SOLUTION INTRAMUSCULAR; INTRAVENOUS at 21:34

## 2023-05-02 RX ADMIN — ALPRAZOLAM 0.5 MG: 0.5 TABLET ORAL at 16:12

## 2023-05-02 RX ADMIN — GUAIFENESIN 1200 MG: 600 TABLET, EXTENDED RELEASE ORAL at 21:35

## 2023-05-02 RX ADMIN — IPRATROPIUM BROMIDE AND ALBUTEROL SULFATE 3 ML: .5; 2.5 SOLUTION RESPIRATORY (INHALATION) at 13:02

## 2023-05-02 RX ADMIN — CETIRIZINE HYDROCHLORIDE 10 MG: 10 TABLET, FILM COATED ORAL at 08:40

## 2023-05-02 RX ADMIN — Medication 10 ML: at 21:35

## 2023-05-02 RX ADMIN — IPRATROPIUM BROMIDE AND ALBUTEROL SULFATE 3 ML: .5; 2.5 SOLUTION RESPIRATORY (INHALATION) at 15:19

## 2023-05-02 RX ADMIN — IPRATROPIUM BROMIDE AND ALBUTEROL SULFATE 3 ML: .5; 2.5 SOLUTION RESPIRATORY (INHALATION) at 07:20

## 2023-05-02 RX ADMIN — HYDROXYCHLOROQUINE SULFATE 200 MG: 200 TABLET, FILM COATED ORAL at 08:40

## 2023-05-02 RX ADMIN — PANTOPRAZOLE SODIUM 40 MG: 40 TABLET, DELAYED RELEASE ORAL at 08:40

## 2023-05-02 RX ADMIN — HYDROXYCHLOROQUINE SULFATE 200 MG: 200 TABLET, FILM COATED ORAL at 21:35

## 2023-05-02 RX ADMIN — IPRATROPIUM BROMIDE AND ALBUTEROL SULFATE 3 ML: .5; 2.5 SOLUTION RESPIRATORY (INHALATION) at 19:55

## 2023-05-02 RX ADMIN — HEPARIN SODIUM 5000 UNITS: 5000 INJECTION, SOLUTION INTRAVENOUS; SUBCUTANEOUS at 14:19

## 2023-05-02 RX ADMIN — HEPARIN SODIUM 5000 UNITS: 5000 INJECTION, SOLUTION INTRAVENOUS; SUBCUTANEOUS at 21:35

## 2023-05-02 RX ADMIN — METHYLPREDNISOLONE SODIUM SUCCINATE 60 MG: 40 INJECTION, POWDER, LYOPHILIZED, FOR SOLUTION INTRAMUSCULAR; INTRAVENOUS at 05:29

## 2023-05-02 RX ADMIN — ACETAMINOPHEN 325MG 650 MG: 325 TABLET ORAL at 16:12

## 2023-05-02 RX ADMIN — ALPRAZOLAM 0.5 MG: 0.5 TABLET ORAL at 08:40

## 2023-05-02 RX ADMIN — GUAIFENESIN 1200 MG: 600 TABLET, EXTENDED RELEASE ORAL at 08:40

## 2023-05-02 RX ADMIN — AMLODIPINE BESYLATE 5 MG: 5 TABLET ORAL at 08:40

## 2023-05-02 RX ADMIN — Medication 5 MG: at 21:35

## 2023-05-02 NOTE — PLAN OF CARE
Goal Outcome Evaluation:              Outcome Evaluation: VSS, prn morphine given for pain, sleeping well, son at bedside, exertion with movement in bed.  will continue with plan of care.

## 2023-05-02 NOTE — PLAN OF CARE
Goal Outcome Evaluation:  Plan of Care Reviewed With: patient, son        Progress: no change  Outcome Evaluation: Pt. sitting up in bed on HFNC asleep and did not demonstrate any visible signs of discomfort aside from dyspnea.  Spoke with son outside pt room as to not wake pt.  Son expressed intrest in inpt. hospice (Thur or Friday per [facility] hospice liason phone call).  Palliative care to continue to follow for support, POC and ongoing GOC.

## 2023-05-02 NOTE — PLAN OF CARE
Goal Outcome Evaluation:  Plan of Care Reviewed With: patient        Progress: no change   VSS, A/Ox4. Pt on high-flow nasal cannula. Pt continues to desat with minimal exertion. Multiple Bms. PRNs administered for anxiety. Coccyx and bilateral heels blanchable red. Heels elevated and waffle mattress in place, pt encouraged to shift weight in bed. Offered to get pt in chair, pt declined at this time. Currently resting in bed with bed alarms in place. PW in place with adequate UOP.  Problem: Adult Inpatient Plan of Care  Goal: Plan of Care Review  Outcome: Ongoing, Progressing  Flowsheets (Taken 5/2/2023 1520)  Progress: no change  Plan of Care Reviewed With: patient  Goal: Patient-Specific Goal (Individualized)  Outcome: Ongoing, Progressing  Goal: Absence of Hospital-Acquired Illness or Injury  Outcome: Ongoing, Progressing  Intervention: Identify and Manage Fall Risk  Recent Flowsheet Documentation  Taken 5/2/2023 1400 by Velma Calderon, RN  Safety Promotion/Fall Prevention:   clutter free environment maintained   assistive device/personal items within reach   activity supervised   fall prevention program maintained   room organization consistent   safety round/check completed   toileting scheduled   nonskid shoes/slippers when out of bed   lighting adjusted  Taken 5/2/2023 1200 by Velma Calderon, RN  Safety Promotion/Fall Prevention:   clutter free environment maintained   assistive device/personal items within reach   activity supervised   fall prevention program maintained   toileting scheduled   safety round/check completed   room organization consistent   nonskid shoes/slippers when out of bed   lighting adjusted  Taken 5/2/2023 1000 by Velma Calderon, RN  Safety Promotion/Fall Prevention:   clutter free environment maintained   assistive device/personal items within reach   activity supervised   fall prevention program maintained   toileting scheduled   safety round/check completed   room organization  consistent   nonskid shoes/slippers when out of bed   lighting adjusted  Taken 5/2/2023 0800 by Vemla Calderon RN  Safety Promotion/Fall Prevention:   assistive device/personal items within reach   activity supervised   clutter free environment maintained   fall prevention program maintained   toileting scheduled   safety round/check completed   room organization consistent   nonskid shoes/slippers when out of bed   lighting adjusted  Intervention: Prevent Skin Injury  Recent Flowsheet Documentation  Taken 5/2/2023 1400 by Velma Calderon RN  Body Position: position changed independently  Skin Protection:   adhesive use limited   incontinence pads utilized   transparent dressing maintained   tubing/devices free from skin contact  Taken 5/2/2023 1200 by Velma Calderon RN  Body Position: position changed independently  Skin Protection:   adhesive use limited   incontinence pads utilized   transparent dressing maintained   tubing/devices free from skin contact  Taken 5/2/2023 1000 by Velma Calderon RN  Body Position: position changed independently  Skin Protection:   adhesive use limited   incontinence pads utilized   tubing/devices free from skin contact   transparent dressing maintained  Taken 5/2/2023 0800 by Velma Calderon RN  Body Position: position changed independently  Skin Protection:   adhesive use limited   incontinence pads utilized   tubing/devices free from skin contact   transparent dressing maintained  Intervention: Prevent and Manage VTE (Venous Thromboembolism) Risk  Recent Flowsheet Documentation  Taken 5/2/2023 1400 by Velma Calderon RN  Activity Management: activity encouraged  Taken 5/2/2023 1200 by Velma Calderon RN  Activity Management: activity encouraged  Taken 5/2/2023 1000 by Velma Calderon RN  Activity Management: activity encouraged  Taken 5/2/2023 0800 by Velma Calderon RN  Activity Management: activity encouraged  VTE Prevention/Management:   bilateral   sequential compression devices  off  Intervention: Prevent Infection  Recent Flowsheet Documentation  Taken 5/2/2023 1400 by Velma Calderon RN  Infection Prevention:   rest/sleep promoted   visitors restricted/screened   single patient room provided   hand hygiene promoted  Taken 5/2/2023 1200 by Velma Calderon RN  Infection Prevention:   rest/sleep promoted   single patient room provided   visitors restricted/screened   hand hygiene promoted  Taken 5/2/2023 1000 by Velma Calderon RN  Infection Prevention:   rest/sleep promoted   single patient room provided   visitors restricted/screened   hand hygiene promoted  Taken 5/2/2023 0800 by Velma Calderon RN  Infection Prevention:   single patient room provided   rest/sleep promoted   visitors restricted/screened   hand hygiene promoted  Goal: Optimal Comfort and Wellbeing  Outcome: Ongoing, Progressing  Intervention: Provide Person-Centered Care  Recent Flowsheet Documentation  Taken 5/2/2023 0800 by Velma Calderon RN  Trust Relationship/Rapport:   care explained   choices provided  Goal: Readiness for Transition of Care  Outcome: Ongoing, Progressing     Problem: Asthma Comorbidity  Goal: Maintenance of Asthma Control  Outcome: Ongoing, Progressing  Intervention: Maintain Asthma Symptom Control  Recent Flowsheet Documentation  Taken 5/2/2023 1400 by Velma Calderon RN  Medication Review/Management: medications reviewed  Taken 5/2/2023 1200 by Velma Calderon RN  Medication Review/Management: medications reviewed  Taken 5/2/2023 1000 by Velma Calderon RN  Medication Review/Management: medications reviewed  Taken 5/2/2023 0800 by Velma Calderon RN  Medication Review/Management: medications reviewed     Problem: Behavioral Health Comorbidity  Goal: Maintenance of Behavioral Health Symptom Control  Outcome: Ongoing, Progressing  Intervention: Maintain Behavioral Health Symptom Control  Recent Flowsheet Documentation  Taken 5/2/2023 1400 by Velma Calderon RN  Medication Review/Management: medications  reviewed  Taken 5/2/2023 1200 by Velma Calderon RN  Medication Review/Management: medications reviewed  Taken 5/2/2023 1000 by Velma Calderon RN  Medication Review/Management: medications reviewed  Taken 5/2/2023 0800 by Velma Calderon RN  Medication Review/Management: medications reviewed     Problem: COPD (Chronic Obstructive Pulmonary Disease) Comorbidity  Goal: Maintenance of COPD Symptom Control  Outcome: Ongoing, Progressing  Intervention: Maintain COPD-Symptom Control  Recent Flowsheet Documentation  Taken 5/2/2023 1400 by Velma Calderon RN  Medication Review/Management: medications reviewed  Taken 5/2/2023 1200 by Velma Calderon RN  Medication Review/Management: medications reviewed  Taken 5/2/2023 1000 by Velma Calderon RN  Medication Review/Management: medications reviewed  Taken 5/2/2023 0800 by Velma Calderon RN  Medication Review/Management: medications reviewed     Problem: Diabetes Comorbidity  Goal: Blood Glucose Level Within Targeted Range  Outcome: Ongoing, Progressing     Problem: Heart Failure Comorbidity  Goal: Maintenance of Heart Failure Symptom Control  Outcome: Ongoing, Progressing  Intervention: Maintain Heart Failure-Management  Recent Flowsheet Documentation  Taken 5/2/2023 1400 by Velma Calderon RN  Medication Review/Management: medications reviewed  Taken 5/2/2023 1200 by Velma Calderon RN  Medication Review/Management: medications reviewed  Taken 5/2/2023 1000 by Velma Calderon RN  Medication Review/Management: medications reviewed  Taken 5/2/2023 0800 by Velma Calderon RN  Medication Review/Management: medications reviewed     Problem: Hypertension Comorbidity  Goal: Blood Pressure in Desired Range  Outcome: Ongoing, Progressing  Intervention: Maintain Blood Pressure Management  Recent Flowsheet Documentation  Taken 5/2/2023 1400 by Velma Calderon RN  Medication Review/Management: medications reviewed  Taken 5/2/2023 1200 by Velma Calderon RN  Medication Review/Management: medications  reviewed  Taken 5/2/2023 1000 by Velma Calderon RN  Medication Review/Management: medications reviewed  Taken 5/2/2023 0800 by Velma Calderon RN  Medication Review/Management: medications reviewed     Problem: Obstructive Sleep Apnea Risk or Actual Comorbidity Management  Goal: Unobstructed Breathing During Sleep  Outcome: Ongoing, Progressing     Problem: Osteoarthritis Comorbidity  Goal: Maintenance of Osteoarthritis Symptom Control  Outcome: Ongoing, Progressing  Intervention: Maintain Osteoarthritis Symptom Control  Recent Flowsheet Documentation  Taken 5/2/2023 1400 by Velma Calderon RN  Activity Management: activity encouraged  Medication Review/Management: medications reviewed  Taken 5/2/2023 1200 by Velma Calderon RN  Activity Management: activity encouraged  Medication Review/Management: medications reviewed  Taken 5/2/2023 1000 by Velma Calderon RN  Activity Management: activity encouraged  Medication Review/Management: medications reviewed  Taken 5/2/2023 0800 by Velma Calderon RN  Activity Management: activity encouraged  Medication Review/Management: medications reviewed     Problem: Pain Chronic (Persistent) (Comorbidity Management)  Goal: Acceptable Pain Control and Functional Ability  Outcome: Ongoing, Progressing  Intervention: Manage Persistent Pain  Recent Flowsheet Documentation  Taken 5/2/2023 1400 by Velma Calderon RN  Medication Review/Management: medications reviewed  Taken 5/2/2023 1200 by Velma Calderon RN  Medication Review/Management: medications reviewed  Taken 5/2/2023 1000 by Velma Calderon RN  Medication Review/Management: medications reviewed  Taken 5/2/2023 0800 by Velma Calderon RN  Medication Review/Management: medications reviewed  Intervention: Optimize Psychosocial Wellbeing  Recent Flowsheet Documentation  Taken 5/2/2023 0800 by Velma Calderon RN  Diversional Activities:   [employer]   television  Family/Support System Care: support provided     Problem: Seizure Disorder  Comorbidity  Goal: Maintenance of Seizure Control  Outcome: Ongoing, Progressing     Problem: Fall Injury Risk  Goal: Absence of Fall and Fall-Related Injury  Outcome: Ongoing, Progressing  Intervention: Identify and Manage Contributors  Recent Flowsheet Documentation  Taken 5/2/2023 1400 by Velma Calderon RN  Medication Review/Management: medications reviewed  Taken 5/2/2023 1200 by Velma Calderon RN  Medication Review/Management: medications reviewed  Taken 5/2/2023 1000 by Velma Calderon RN  Medication Review/Management: medications reviewed  Taken 5/2/2023 0800 by Velma Calderon RN  Medication Review/Management: medications reviewed  Intervention: Promote Injury-Free Environment  Recent Flowsheet Documentation  Taken 5/2/2023 1400 by Velma Calderon RN  Safety Promotion/Fall Prevention:   clutter free environment maintained   assistive device/personal items within reach   activity supervised   fall prevention program maintained   room organization consistent   safety round/check completed   toileting scheduled   nonskid shoes/slippers when out of bed   lighting adjusted  Taken 5/2/2023 1200 by Velma Calderon RN  Safety Promotion/Fall Prevention:   clutter free environment maintained   assistive device/personal items within reach   activity supervised   fall prevention program maintained   toileting scheduled   safety round/check completed   room organization consistent   nonskid shoes/slippers when out of bed   lighting adjusted  Taken 5/2/2023 1000 by Velma Calderon RN  Safety Promotion/Fall Prevention:   clutter free environment maintained   assistive device/personal items within reach   activity supervised   fall prevention program maintained   toileting scheduled   safety round/check completed   room organization consistent   nonskid shoes/slippers when out of bed   lighting adjusted  Taken 5/2/2023 0800 by Velma Calderon RN  Safety Promotion/Fall Prevention:   assistive device/personal items within reach    activity supervised   clutter free environment maintained   fall prevention program maintained   toileting scheduled   safety round/check completed   room organization consistent   nonskid shoes/slippers when out of bed   lighting adjusted     Problem: Fluid Imbalance (Pneumonia)  Goal: Fluid Balance  Outcome: Ongoing, Progressing     Problem: Infection (Pneumonia)  Goal: Resolution of Infection Signs and Symptoms  Outcome: Ongoing, Progressing     Problem: Respiratory Compromise (Pneumonia)  Goal: Effective Oxygenation and Ventilation  Outcome: Ongoing, Progressing  Intervention: Optimize Oxygenation and Ventilation  Recent Flowsheet Documentation  Taken 5/2/2023 1400 by Velma Calderon RN  Head of Bed (HOB) Positioning: HOB elevated  Taken 5/2/2023 1200 by Velma Calderon RN  Head of Bed (HOB) Positioning: HOB elevated  Taken 5/2/2023 1000 by Velma Calderon RN  Head of Bed (HOB) Positioning: HOB elevated  Taken 5/2/2023 0800 by Velma Calderon RN  Head of Bed (HOB) Positioning: HOB elevated     Problem: Palliative Care  Goal: Enhanced Quality of Life  Outcome: Ongoing, Progressing  Intervention: Optimize Psychosocial Wellbeing  Recent Flowsheet Documentation  Taken 5/2/2023 0800 by Velma Calderon RN  Family/Support System Care: support provided     Problem: Skin Injury Risk Increased  Goal: Skin Health and Integrity  Outcome: Ongoing, Progressing  Intervention: Optimize Skin Protection  Recent Flowsheet Documentation  Taken 5/2/2023 1400 by Velma Calderon RN  Pressure Reduction Techniques:   frequent weight shift encouraged   heels elevated off bed   weight shift assistance provided  Head of Bed (HOB) Positioning: HOB elevated  Pressure Reduction Devices:   specialty bed utilized   heel offloading device utilized  Skin Protection:   adhesive use limited   incontinence pads utilized   transparent dressing maintained   tubing/devices free from skin contact  Taken 5/2/2023 1200 by Velma Calderon RN  Pressure Reduction  Techniques:   frequent weight shift encouraged   heels elevated off bed   weight shift assistance provided  Head of Bed ([facility]) Positioning: [facility] elevated  Pressure Reduction Devices:   pressure-redistributing mattress utilized   heel offloading device utilized  Skin Protection:   adhesive use limited   incontinence pads utilized   transparent dressing maintained   tubing/devices free from skin contact  Taken 5/2/2023 1000 by Velma Calderon RN  Pressure Reduction Techniques:   frequent weight shift encouraged   heels elevated off bed   weight shift assistance provided  Head of Bed ([facility]) Positioning: [facility] elevated  Pressure Reduction Devices:   specialty bed utilized   pressure-redistributing mattress utilized  Skin Protection:   adhesive use limited   incontinence pads utilized   tubing/devices free from skin contact   transparent dressing maintained  Taken 5/2/2023 0800 by Velma Calderon RN  Pressure Reduction Techniques:   frequent weight shift encouraged   heels elevated off bed   weight shift assistance provided  Head of Bed ([facility]) Positioning: [facility] elevated  Pressure Reduction Devices:   pressure-redistributing mattress utilized   heel offloading device utilized  Skin Protection:   adhesive use limited   incontinence pads utilized   tubing/devices free from skin contact   transparent dressing maintained    Will continue plan of care.

## 2023-05-02 NOTE — PROGRESS NOTES
Continued Stay Note  [facility]     Patient Name: Tala Ramsey  MRN: [id number]  Today's Date: 5/2/2023    Admit Date: 4/23/2023    Plan: Undetermined   Discharge Plan     Row Name 05/02/23 1132       Plan    Plan Undetermined    Plan Comments Hospice referral received, chart reviewed, visit made.  Met with pt son in [place] as pt was sleeping and he did not want her disturbed.  Son verbalized that his father had hospice services so pt and family are very familiar with home services.  Son voiced that he understands pt can not dc home on current high flow NC settings and is expecting pt to decline over the week requiring inpatient hospice admission for EOL care.  Son requested hospice follow in the periphery and make visit later in the week.  Inpatient hospice team and palliative team updated.  Please call [phone] with any questions/concerns.    Row Name 05/02/23 1006       Plan    Plan CM update    Plan Comments Patient remains on high flow 02 today. Hospice consult has been ordered for today. CM will continue to follow- derrick [phone]               Discharge Codes    No documentation.               Expected Discharge Date and Time     Expected Discharge Date Expected Discharge Time    May 4, 2023             Bonny Tavera RN

## 2023-05-02 NOTE — DISCHARGE PLACEMENT REQUEST
"Tala Luis (72 y.o. Female)     Date of Birth   1950    Social Security Number       Address   [address] [facility] [address]    Home Phone   [phone]    MRN   [id number]       [facility]    Marital Status                               Admission Date   4/23/23    Admission Type   Emergency    Admitting Provider   Barbara Tong DO    Attending Provider   Barbara Tong DO    Department, Room/Bed   [address], S201/1       Discharge Date       Discharge Disposition       Discharge Destination                               Attending Provider: Barbara Tong DO    Allergies: No Known Allergies    Isolation: None   Infection: None   Code Status: No CPR    Ht: 153.7 cm (60.5\")   Wt: 80.3 kg (177 lb)    Admission Cmt: None   Principal Problem: Acute hypoxemic respiratory failure -likely due to ILD exacerbation [J96.01]                 Active Insurance as of 4/23/2023     Primary Coverage     Payor Plan Insurance Group Employer/Plan Group    HUMANA MEDICARE REPLACEMENT HUMANA MEDICARE REPLACEMENT [id number]     Payor Plan Address Payor Plan Phone Number Payor Plan Fax Number Effective Dates    [address] [address] [phone]  1/1/2015 - None Entered    [facility] [address]       Subscriber Name Subscriber Birth Date Member ID       TALA LUIS 1950 [id number]                 Emergency Contacts      (Rel.) Home Phone Work Phone Mobile Phone    CATHRYN LUIS (Son) [phone] -- [phone]    JEFFERSON LUIS (Son) -- -- [phone]    RODOLFO LUIS (Son) -- -- [phone]            Emergency Contact Information     Name Relation Home Work Mobile    TOBYCATHRYN GUERLINE Son [phone]    JEFFERSON LUIS Son   [phone]    RODOLFO LUIS Son   [phone]          Insurance Information                HUMANA MEDICARE REPLACEMENT/HUMANA MEDICARE REPLACEMENT Phone: [phone]    " "Subscriber: Tala Ramsey Subscriber#: [id number]    Group#: [id number] Precert#: [id number]             History & Physical      Elias Morales MD at 23 1310              [facility] Medicine Services  HISTORY AND PHYSICAL    Patient Name: Tala Ramsey  : 1950  MRN: [id number]  Primary Care Physician: Sg Horne MD  Date of admission: 2023      Subjective    Subjective     Chief Complaint:  Shortness of breath    HPI:  Tala Ramsey is a 72 y.o. female with history of HTN, iron deficiency anemia, RA/inflammatory arthritis, possible lupus and pulmonary fibrosis presents with increasing shortness of breath.  Symptoms started approximately 10 days ago, with Ms Ramsey visiting her PCP for \"ulcers in my mouth\" and throat discomfort.  Amoxicillin prescribed.  The patient revisited her PCP on Tuesday and was prescribed levaquin for possible pneumonia. She was also given a steroid injection. Since then, her shortness of breath has progressed with Ms Ramsey experiencing significant dyspnea with exertion.        Review of Systems   Constitutional: Negative for chills and fever.   HENT: Negative.    Eyes: Negative.    Respiratory: Positive for shortness of breath.    Gastrointestinal: Negative.    Endocrine: Negative.    Genitourinary: Negative.    Musculoskeletal: Positive for arthralgias.   Skin: Negative.    Allergic/Immunologic: Negative.    Neurological: Negative.    Hematological: Negative.    Psychiatric/Behavioral: Negative.           Personal History     Past Medical History:   Diagnosis Date   • Anemia    • Back pain    • Bronchitis    • Hypertension    • Low back pain    • Pulmonary fibrosis    • Rheumatoid arthritis              Past Surgical History:   Procedure Laterality Date   • DILATION AND CURETTAGE, DIAGNOSTIC / THERAPEUTIC         Family History: family history includes Cancer in her father; Diabetes in her son and son; Heart failure in her " mother; Stroke in her mother.     Social History:  reports that she has never smoked. She has never used smokeless tobacco. She reports that she does not drink alcohol and does not use drugs.  Social History     Social History Narrative   • Not on file       Medications:  Available home medication information reviewed.  Medications Prior to Admission   Medication Sig Dispense Refill Last Dose   • acetaminophen (TYLENOL) 325 MG tablet Take 2 tablets by mouth Every 4 (Four) Hours As Needed.      • amLODIPine-valsartan (EXFORGE) 5-160 MG per tablet TAKE 1 A DAY FOR BLOOD PRESSURE      • Calcium-Phosphorus-Vitamin D (CITRACAL +D3 PO) Take 1 tablet by mouth.      • cyclobenzaprine (FLEXERIL) 10 MG tablet Take 1 tablet by mouth 3 (Three) Times a Day As Needed for muscle spasms. 21 tablet 0    • fluticasone (FLONASE) 50 MCG/ACT nasal spray 2 sprays by Each Nare route Daily.      • Humira Pen 40 MG/0.4ML Pen-injector Kit Inject 40 mg under the skin into the appropriate area as directed Every 14 (Fourteen) Days.      • hydroxychloroquine (PLAQUENIL) 200 MG tablet Take  by mouth 2 (Two) Times a Day.      • Ibuprofen 200 MG capsule Take 1 tablet by mouth As Needed.      • Lidocaine Viscous HCl (XYLOCAINE) 2 % solution       • montelukast (SINGULAIR) 10 MG tablet Take 1 tablet by mouth Daily.      • Omega-3 Fatty Acids (fish oil) 1200 MG capsule capsule Take 1 capsule by mouth Daily.      • pantoprazole (PROTONIX) 40 MG EC tablet Take 1 tablet by mouth Daily.      • predniSONE (DELTASONE) 20 MG tablet Take 1.5 tablets by mouth Daily. 11 tablet 0    • simethicone (MYLICON) 125 MG chewable tablet Chew 1 tablet Every 6 (Six) Hours As Needed for Flatulence.      • tiZANidine (ZANAFLEX) 2 MG tablet 1'2-1 in the Am and afternoon, 1-2 at bedtime. as needed      • ferrous gluconate 324 (37.5 FE) MG tablet tablet Take  by mouth Daily With Breakfast.      • montelukast (SINGULAIR) 10 MG tablet Take 1 tablet by mouth Every Night.           No Known Allergies    Objective    Objective     Vital Signs:   Temp:  [97.7 °F (36.5 °C)] 97.7 °F (36.5 °C)  Heart Rate:  [80-91] 83  Resp:  [20] 20  BP: (137-170)/(44-76) 137/58  Flow (L/min):  [2-4] 2       Physical Exam   Constitutional - appears fatigue, in bed  HEENT-NCAT, mucous membranes moist  CV-RRR  Resp-dry crackles  Abd-soft, nontender, nondistended, normoactive bowel sounds  Ext-No lower extremity cyanosis, clubbing or edema bilaterally  Neuro-alert, speech clear, moves all extremities   Psych-normal affect   Skin- No rash on exposed UE or LE bilaterally      Result Review:  I have personally reviewed the results from the time of this admission to 4/23/2023 13:11 EDT and agree with these findings:  [x]  Laboratory list / accordion  [x]  Microbiology  []  Radiology  []  EKG/Telemetry   []  Cardiology/Vascular   []  Pathology  []  Old records  []  Other:  Most notable findings include:         LAB RESULTS:      Lab 04/23/23  1048   WBC 14.06*   HEMOGLOBIN 11.5*   HEMATOCRIT 35.2   PLATELETS 592*   NEUTROS ABS 11.33*   IMMATURE GRANS (ABS) 0.22*   LYMPHS ABS 1.50   MONOS ABS 0.79   EOS ABS 0.14   MCV 89.6   LACTATE 1.1         Lab 04/23/23  1048   SODIUM 129*   POTASSIUM 4.4   CHLORIDE 92*   CO2 23.0   ANION GAP 14.0   BUN 14   CREATININE 0.50*   EGFR 99.2   GLUCOSE 104*   CALCIUM 9.0         Lab 04/23/23  1048   TOTAL PROTEIN 7.4   ALBUMIN 3.5   GLOBULIN 3.9   ALT (SGPT) 10   AST (SGOT) 23   BILIRUBIN 0.2   ALK PHOS 109         Lab 04/23/23  1048   PROBNP 163.6   HSTROP T 8                     Microbiology Results (last 10 days)     Procedure Component Value - Date/Time    COVID PRE-OP / PRE-PROCEDURE SCREENING ORDER (NO ISOLATION) - Swab, Nasopharynx [[id number]]  (Normal) Collected: 04/23/23 1125    Lab Status: Final result Specimen: Swab from Nasopharynx Updated: 04/23/23 1225    Narrative:      The following orders were created for panel order COVID PRE-OP / PRE-PROCEDURE SCREENING ORDER (NO  ISOLATION) - Swab, Nasopharynx.  Procedure                               Abnormality         Status                     ---------                               -----------         ------                     COVID-19, FLU A/B, RSV P...[[id number]]  Normal              Final result                 Please view results for these tests on the individual orders.    COVID-19, FLU A/B, RSV PCR - Swab, Nasopharynx [[id number]]  (Normal) Collected: 04/23/23 1125    Lab Status: Final result Specimen: Swab from Nasopharynx Updated: 04/23/23 1225     COVID19 Not Detected     Influenza A PCR Not Detected     Influenza B PCR Not Detected     RSV, PCR Not Detected    Narrative:      Fact sheet for providers: https://www.fda.gov/media/025121/download    Fact sheet for patients: https://www.fda.gov/media/873111/download    Test performed by PCR.          XR Chest 1 View    Result Date: 4/23/2023  XR CHEST 1 VW Date of Exam: 4/23/2023 10:25 AM EDT Indication: SOA triage protocol Comparison: High-res chest CT 3/20/2023 Findings: There is a background interstitial lung disease with peripheral septal thickening and bronchiectasis lower lobe predominant. There is increased opacity in the left lung likely due to superimposed multifocal pneumonia. There is some airspace opacity in the periphery of the right upper lobe that appears increased as well from previous CT chest. There is osteopenia. Heart size is normal.    Impression: Multifocal airspace opacities left greater than right superimposed on background of chronic interstitial lung disease. Multifocal pneumonia is suspected. Electronically Signed: Shelley Ulloa  4/23/2023 11:01 AM EDT  Workstation ID: [id number]          Assessment & Plan   Assessment & Plan     Active Hospital Problems    Diagnosis  POA   • **Multifocal pneumonia [J18.9]  Yes       Pneumonia  Pulmonary fibrosis  - Obtain CT chest  - check MRSA nares, legionella and strep pneumo urinary antigens, respiratory culture  - CBC,  procalcitionin and CRP am  - continue zosyn and azithromycin  - duonebs  - consider Pulmonary/ID consultations  - Addendum: CT chest reviewed, concern for viral pneumonia.  Initial COVID-19/Influenza/RSV PCR negative.  Will obtain full respiratory PCR panel    RA    HTN    Hyponatremia  - may represent volume depletion vs SIADH  - check serum and urine osmoles, urine sodium, UA  - currently asymptomatic    anemia      DVT prophylaxis:  heparin      CODE STATUS:  DNR -- patient says with her underlying pulmonary fibrosis she would not want CPR or intubation with mechanical ventilation should her medical condition worsen.  Son at bedside during this discussion.  Code Status and Medical Interventions:   Ordered at: 04/23/23 1310     Medical Intervention Limits:    NO intubation (DNI)     Level Of Support Discussed With:    Patient     Code Status (Patient has no pulse and is not breathing):    No CPR (Do Not Attempt to Resuscitate)     Medical Interventions (Patient has pulse or is breathing):    Limited Support       Expected Discharge        Elias Morales MD  04/23/23      Electronically signed by Elias Morales MD at 04/23/23 1726

## 2023-05-02 NOTE — PROGRESS NOTES
[facility] Medicine Services  PROGRESS NOTE    Patient Name: Tala Ramsey  : 1950  MRN: [id number]    Date of Admission: 2023  Primary Care Physician: Sg Horne MD    Subjective   Subjective     CC:  Hypoxia, pneumonia    HPI:  Patient resting in bed. Seen after using bedpan and very tachyonic and labored on high flow.    ROS:  Gen- No fevers, chills  CV- No chest pain, palpitations  Resp- +cough, dyspnea  GI- No N/V/D, +abd pain    Objective   Objective     Vital Signs:   Temp:  [98.2 °F (36.8 °C)-98.6 °F (37 °C)] 98.6 °F (37 °C)  Heart Rate:  [69-93] 84  Resp:  [18-22] 19  BP: (154-177)/(59-88) 172/87  Flow (L/min):  [40] 40     Physical Exam:  Constitutional: No acute distress, awake, alert, appears chronically ill  HENT: NCAT, mucous membranes moist  Respiratory: poor air movement bilaterally with tachypnea, mildly labored, remains on high flow at 70% FiO2 40LPM  Cardiovascular: RRR, no murmurs, rubs, or gallops  Gastrointestinal: soft, nontender, nondistended  Musculoskeletal: No bilateral ankle edema  Psychiatric: flat affect, cooperative  Neurologic: Oriented x 3, overall very weak/deconditioned.  Skin: No rashes      Results Reviewed:  LAB RESULTS:      Lab 23  0945 23  0742 23  0813 23  0705 23  0346 23  0532 23  0532 23   WBC 23.48* 20.94* 21.65* 15.99* 15.91*   < > 23.58*  --    HEMOGLOBIN 11.7* 11.3* 11.3* 10.1* 9.9*   < > 10.8*  --    HEMATOCRIT 36.7 35.2 35.4 31.6* 31.3*   < > 34.0  --    PLATELETS 735* 662* 768* 602* 616*   < > 739*  --    MCV 91.1 92.6 89.2 91.6 91.5   < > 90.9  --    CRP  --   --   --   --  2.55*  --  6.04*  --    D DIMER QUANT  --   --   --   --   --   --   --  1.88*    < > = values in this interval not displayed.         Lab 23  0945 23  0742 23  0813 23  0704 23  0346 23  0532   SODIUM 132* 133* 135* 136 137 136   POTASSIUM 4.5 5.2 4.7  4.4 4.8 4.3   CHLORIDE 94* 95* 95* 98 99 98   CO2 24.0 26.0 25.0 27.0 24.0 23.0   ANION GAP 14.0 12.0 15.0 11.0 14.0 15.0   BUN 23 19 18 22 23 16   CREATININE 0.39* 0.47* 0.53* 0.53* 0.62 0.63   EGFR 105.3 100.7 97.8 97.8 94.2 93.8   GLUCOSE 151* 136* 126* 132* 160* 147*   CALCIUM 8.9 8.7 9.0 8.7 8.6 9.3   MAGNESIUM  --  2.7* 2.6* 2.4 2.5* 2.8*         Lab 04/27/23  0346   TOTAL PROTEIN 5.7*   ALBUMIN 3.1*   GLOBULIN 2.6   ALT (SGPT) 27   AST (SGOT) 17   BILIRUBIN 0.2   ALK PHOS 152*         Lab 04/25/23 2212 04/25/23 1957   PROBNP  --  360.4   HSTROP T 10* 12*                 Lab 04/25/23 1948   PH, ARTERIAL 7.457*   PCO2, ARTERIAL 35.2   PO2 .0   FIO2 100   HCO3 ART 24.8   BASE EXCESS ART 1.2     Brief Urine Lab Results  (Last result in the past 365 days)      Color   Clarity   Blood   Leuk Est   Nitrite   Protein   CREAT   Urine HCG        04/23/23 1757 Yellow   Clear   Trace   Negative   Negative   Negative                 Microbiology Results Abnormal     Procedure Component Value - Date/Time    Blood Culture - Blood, Arm, Right [[id number]]  (Normal) Collected: 04/23/23 1125    Lab Status: Final result Specimen: Blood from Arm, Right Updated: 04/28/23 1145     Blood Culture No growth at 5 days    Blood Culture - Blood, Arm, Left [[id number]]  (Normal) Collected: 04/23/23 1125    Lab Status: Final result Specimen: Blood from Arm, Left Updated: 04/28/23 1145     Blood Culture No growth at 5 days    S. Pneumo Ag Urine or CSF - Urine, Urine, Clean Catch [[id number]]  (Normal) Collected: 04/23/23 1757    Lab Status: Final result Specimen: Urine, Clean Catch Updated: 04/24/23 0949     Strep Pneumo Ag Negative    Legionella Antigen, Urine - Urine, Urine, Clean Catch [[id number]]  (Normal) Collected: 04/23/23 1521    Lab Status: Final result Specimen: Urine, Clean Catch Updated: 04/24/23 0932     LEGIONELLA ANTIGEN, URINE Negative    Respiratory Panel PCR w/COVID-19(SARS-CoV-2) CHARI/MARISSA/APOLINAR/PAD/COR/MAD/AMY  In-House, NP Swab in [facility], 3-4 HR TAT - Swab, Nasopharynx [[id number]]  (Normal) Collected: 04/24/23 0544    Lab Status: Final result Specimen: Swab from Nasopharynx Updated: 04/24/23 0647     ADENOVIRUS, PCR Not Detected     Coronavirus 229E Not Detected     Coronavirus HKU1 Not Detected     Coronavirus NL63 Not Detected     Coronavirus OC43 Not Detected     COVID19 Not Detected     Human Metapneumovirus Not Detected     Human Rhinovirus/Enterovirus Not Detected     Influenza A PCR Not Detected     Influenza B PCR Not Detected     Parainfluenza Virus 1 Not Detected     Parainfluenza Virus 2 Not Detected     Parainfluenza Virus 3 Not Detected     Parainfluenza Virus 4 Not Detected     RSV, PCR Not Detected     Bordetella pertussis pcr Not Detected     Bordetella parapertussis PCR Not Detected     Chlamydophila pneumoniae PCR Not Detected     Mycoplasma pneumo by PCR Not Detected    Narrative:      In the setting of a positive respiratory panel with a viral infection PLUS a negative procalcitonin without other underlying concern for bacterial infection, consider observing off antibiotics or discontinuation of antibiotics and continue supportive care. If the respiratory panel is positive for atypical bacterial infection (Bordetella pertussis, Chlamydophila pneumoniae, or Mycoplasma pneumoniae), consider antibiotic de-escalation to target atypical bacterial infection.    MRSA Screen, PCR (Inpatient) - Swab, Nares [[id number]]  (Normal) Collected: 04/23/23 1540    Lab Status: Final result Specimen: Swab from Nares Updated: 04/23/23 1651     MRSA PCR Negative    Narrative:      The negative predictive value of this diagnostic test is high and should only be used to consider de-escalating anti-MRSA therapy. A positive result may indicate colonization with MRSA and must be correlated clinically.  MRSA Negative    COVID PRE-OP / PRE-PROCEDURE SCREENING ORDER (NO ISOLATION) - Swab, Nasopharynx [[id number]]  (Normal)  Collected: 04/23/23 1125    Lab Status: Final result Specimen: Swab from Nasopharynx Updated: 04/23/23 1225    Narrative:      The following orders were created for panel order COVID PRE-OP / PRE-PROCEDURE SCREENING ORDER (NO ISOLATION) - Swab, Nasopharynx.  Procedure                               Abnormality         Status                     ---------                               -----------         ------                     COVID-19, FLU A/B, RSV P...[[id number]]  Normal              Final result                 Please view results for these tests on the individual orders.    COVID-19, FLU A/B, RSV PCR - Swab, Nasopharynx [[id number]]  (Normal) Collected: 04/23/23 1125    Lab Status: Final result Specimen: Swab from Nasopharynx Updated: 04/23/23 1225     COVID19 Not Detected     Influenza A PCR Not Detected     Influenza B PCR Not Detected     RSV, PCR Not Detected    Narrative:      Fact sheet for providers: https://www.fda.gov/media/757867/download    Fact sheet for patients: https://www.fda.gov/media/123826/download    Test performed by PCR.          XR Chest 1 View    Result Date: 5/1/2023  XR CHEST 1 VW Date of Exam: 5/1/2023 2:40 AM EDT Indication: Respiratory failure, follow up pneumonia Comparison: 4/27/2023. Findings: There are persistent extensive patchy bilateral airspace opacities and interstitial disease. Lung volumes are low. No pneumothorax or pleural effusion. No new lung opacity. Cardiac silhouette is unchanged. Pulmonary vasculature is indistinct.     Impression: Impression: Stable extensive patchy bilateral airspace disease. Electronically Signed: Sagar Conn  5/1/2023 7:51 AM EDT  Workstation ID: [id number]          Current medications:  Scheduled Meds:amLODIPine, 5 mg, Oral, Q24H   And  valsartan, 160 mg, Oral, Q24H  cetirizine, 10 mg, Oral, Daily  fluticasone, 2 spray, Each Nare, Nightly  guaiFENesin, 1,200 mg, Oral, Q12H  heparin (porcine), 5,000 Units, Subcutaneous,  Q8H  hydroxychloroquine, 200 mg, Oral, Q12H  ipratropium-albuterol, 3 mL, Nebulization, 4x Daily - RT  melatonin, 5 mg, Oral, Nightly  methylPREDNISolone sodium succinate, 60 mg, Intravenous, Q12H  montelukast, 10 mg, Oral, Daily  pantoprazole, 40 mg, Oral, Daily  senna-docusate sodium, 2 tablet, Oral, BID  sodium chloride, 10 mL, Intravenous, Q12H  sulfamethoxazole-trimethoprim, 1 tablet, Oral, Once per day on Mon Wed Fri      Continuous Infusions:   PRN Meds:.•  acetaminophen **OR** acetaminophen **OR** acetaminophen  •  ALPRAZolam  •  GI cocktail  •  senna-docusate sodium **AND** polyethylene glycol **AND** bisacodyl **AND** bisacodyl  •  calcium carbonate  •  hydrOXYzine  •  ipratropium-albuterol  •  Magnesium Standard Dose Replacement - Follow Nurse / BPA Driven Protocol  •  Morphine  •  ondansetron **OR** ondansetron  •  simethicone  •  sodium chloride  •  sodium chloride  •  sodium chloride  •  tiZANidine    Assessment & Plan   Assessment & Plan     Active Hospital Problems    Diagnosis  POA   • **Multifocal pneumonia [J18.9]  Yes   • Pulmonary fibrosis [J84.10]  Yes   • ILD (interstitial lung disease) -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        Brief Hospital Course to date:  Tala Ramsey is a 72 y.o. female w/ hx interstitial lung dz, RA (on humira until recently, recent steroid injections) who presents w/ progressive dyspnea, cough over ~10 days, failed levaquin. Hypoxic upon admission. Ct chest w/o contrast w/ bilateral multifocal air space dz. resp pcr panel negative. Had progressive hypoxia evening after admission placed on hi flow canula    Acute hypoxic resp failure, worsening  Pulmonary fibrosis/ILD  Bilateral Pneumonia  -unclear if progession/flare of interstitial lung disease vs infection  -remains on High flow with  difficulty weaning and significant drops with minimal exertion (simply rolling in bed to use bedpan)  -s/p full course of zosyn & azithromycin  -continue 3x weekly bactrim ds (pcp prophylaxis as has been on humira and steroids)  -continue scheduled & PRN duonebs  -Pulm following: completed 3 days of solumedrol (1g daily x 3 days) on 4/26/23; now on solumedrol 60mg bid with plan for prolonged taper  -diuresis PRN as tolerated  -palliative also following  -guarded prognosis at best and I discussed this in detail again with patient and son (see below)    RA  -holding humira and plaquenil     S/p Hyponatremia, improved  -improved    HTN  -continue amlodpine/valsartan    Chronic anemia   -monitor    Goals/limits  -Patient is DNR/DNI (in event of further clinical decompensation patient would NOT want mechanical ventilation; in this case patient would wish to pursue comfort measures;  - I had a long discussion with the patient and her son Acosta at bedside today. We discussed her lack of improvement with ongoing high oxygen requirements. Patient states she would like to mostly focus on comfort and symptom control at this point. We did touch on hospice, although son/patient did not seem quite ready for this they are open to hearing options. I am unsure they could get high flow O2 for her to go home on, although she tells me she would probably rather remain inpatient due to the level of care she is needing with simple tasks, even just with rolling to use bedpan O2 sats drop into low 80's with significant recovery time needed. I will consult hospice for informational meeting.    Expected Discharge Location and Transportation: home  Expected Discharge TBD  Expected Discharge Date: 5/4/2023; Expected Discharge Time:      DVT prophylaxis:  Medical DVT prophylaxis orders are present. sq heparin    AM-PAC 6 Clicks Score (PT): 13 (05/02/23 0800)    CODE STATUS:   Code Status and Medical Interventions:   Ordered at: 04/23/23 1310      Medical Intervention Limits:    NO intubation (DNI)     Level Of Support Discussed With:    Patient     Code Status (Patient has no pulse and is not breathing):    No CPR (Do Not Attempt to Resuscitate)     Medical Interventions (Patient has pulse or is breathing):    Limited Support       Barbara Tong, DO  05/02/23

## 2023-05-03 PROCEDURE — 94664 DEMO&/EVAL PT USE INHALER: CPT

## 2023-05-03 PROCEDURE — 94799 UNLISTED PULMONARY SVC/PX: CPT

## 2023-05-03 PROCEDURE — 99232 SBSQ HOSP IP/OBS MODERATE 35: CPT | Performed by: INTERNAL MEDICINE

## 2023-05-03 PROCEDURE — 25010000002 HEPARIN (PORCINE) PER 1000 UNITS: Performed by: INTERNAL MEDICINE

## 2023-05-03 PROCEDURE — 25010000002 MORPHINE PER 10 MG: Performed by: FAMILY MEDICINE

## 2023-05-03 PROCEDURE — 25010000002 METHYLPREDNISOLONE PER 40 MG

## 2023-05-03 PROCEDURE — 99233 SBSQ HOSP IP/OBS HIGH 50: CPT | Performed by: INTERNAL MEDICINE

## 2023-05-03 RX ORDER — LORAZEPAM 0.5 MG/1
0.5 TABLET ORAL EVERY 6 HOURS PRN
Status: DISCONTINUED | OUTPATIENT
Start: 2023-05-03 | End: 2023-05-04

## 2023-05-03 RX ADMIN — Medication 10 ML: at 09:44

## 2023-05-03 RX ADMIN — HEPARIN SODIUM 5000 UNITS: 5000 INJECTION, SOLUTION INTRAVENOUS; SUBCUTANEOUS at 20:53

## 2023-05-03 RX ADMIN — METHYLPREDNISOLONE SODIUM SUCCINATE 60 MG: 40 INJECTION, POWDER, LYOPHILIZED, FOR SOLUTION INTRAMUSCULAR; INTRAVENOUS at 05:14

## 2023-05-03 RX ADMIN — METHYLPREDNISOLONE SODIUM SUCCINATE 60 MG: 40 INJECTION, POWDER, LYOPHILIZED, FOR SOLUTION INTRAMUSCULAR; INTRAVENOUS at 19:01

## 2023-05-03 RX ADMIN — HEPARIN SODIUM 5000 UNITS: 5000 INJECTION, SOLUTION INTRAVENOUS; SUBCUTANEOUS at 13:17

## 2023-05-03 RX ADMIN — GUAIFENESIN 1200 MG: 600 TABLET, EXTENDED RELEASE ORAL at 20:52

## 2023-05-03 RX ADMIN — GUAIFENESIN 1200 MG: 600 TABLET, EXTENDED RELEASE ORAL at 09:43

## 2023-05-03 RX ADMIN — MONTELUKAST 10 MG: 10 TABLET, FILM COATED ORAL at 09:43

## 2023-05-03 RX ADMIN — AMLODIPINE BESYLATE 5 MG: 5 TABLET ORAL at 09:43

## 2023-05-03 RX ADMIN — IPRATROPIUM BROMIDE AND ALBUTEROL SULFATE 3 ML: .5; 2.5 SOLUTION RESPIRATORY (INHALATION) at 07:51

## 2023-05-03 RX ADMIN — CETIRIZINE HYDROCHLORIDE 10 MG: 10 TABLET, FILM COATED ORAL at 09:43

## 2023-05-03 RX ADMIN — IPRATROPIUM BROMIDE AND ALBUTEROL SULFATE 3 ML: .5; 2.5 SOLUTION RESPIRATORY (INHALATION) at 19:49

## 2023-05-03 RX ADMIN — Medication 10 ML: at 20:52

## 2023-05-03 RX ADMIN — SENNOSIDES AND DOCUSATE SODIUM 2 TABLET: 8.6; 5 TABLET ORAL at 09:43

## 2023-05-03 RX ADMIN — HYDROXYCHLOROQUINE SULFATE 200 MG: 200 TABLET, FILM COATED ORAL at 09:44

## 2023-05-03 RX ADMIN — LORAZEPAM 0.5 MG: 0.5 TABLET ORAL at 08:34

## 2023-05-03 RX ADMIN — IPRATROPIUM BROMIDE AND ALBUTEROL SULFATE 3 ML: .5; 2.5 SOLUTION RESPIRATORY (INHALATION) at 12:47

## 2023-05-03 RX ADMIN — LORAZEPAM 0.5 MG: 0.5 TABLET ORAL at 20:52

## 2023-05-03 RX ADMIN — IPRATROPIUM BROMIDE AND ALBUTEROL SULFATE 3 ML: .5; 2.5 SOLUTION RESPIRATORY (INHALATION) at 15:55

## 2023-05-03 RX ADMIN — VALSARTAN 160 MG: 160 TABLET, FILM COATED ORAL at 09:44

## 2023-05-03 RX ADMIN — Medication 5 MG: at 20:52

## 2023-05-03 RX ADMIN — MORPHINE SULFATE 3 MG: 4 INJECTION, SOLUTION INTRAMUSCULAR; INTRAVENOUS at 22:13

## 2023-05-03 RX ADMIN — ACETAMINOPHEN 325MG 650 MG: 325 TABLET ORAL at 14:52

## 2023-05-03 RX ADMIN — HEPARIN SODIUM 5000 UNITS: 5000 INJECTION, SOLUTION INTRAVENOUS; SUBCUTANEOUS at 05:14

## 2023-05-03 RX ADMIN — SULFAMETHOXAZOLE AND TRIMETHOPRIM 1 TABLET: 800; 160 TABLET ORAL at 09:43

## 2023-05-03 RX ADMIN — ALPRAZOLAM 0.5 MG: 0.5 TABLET ORAL at 13:17

## 2023-05-03 RX ADMIN — HYDROXYCHLOROQUINE SULFATE 200 MG: 200 TABLET, FILM COATED ORAL at 20:52

## 2023-05-03 RX ADMIN — PANTOPRAZOLE SODIUM 40 MG: 40 TABLET, DELAYED RELEASE ORAL at 09:43

## 2023-05-03 NOTE — PROGRESS NOTES
INPATIENT PULMONARY SERVICE  PROGRESS NOTE     Hospital LOS: 10 days    Ms. Tala Ramsey, is followed for a Chief Complaint of:  Chief Complaint   Patient presents with   • Shortness of Breath       Acute hypoxemic respiratory failure -likely due to ILD exacerbation    Multifocal pneumonia    Pulmonary fibrosis    ILD (interstitial lung disease) -likely RA associated ILD with acute exacerbation      Subjective   S     Interval History:  Remains on HFNC. Having some anxiety. Better with Xanax per patient report.        The patient's relevant past medical, surgical and social history were reviewed and updated in Epic as appropriate.      ROS:   Constitutional: Negative for fever.   Respiratory: Positive for dyspnea.   Cardiovascular: Negative for chest pain.   Gastrointestinal: Negative for  nausea, vomiting and diarrhea.     Objective   O     Vitals  Temp  Min: 98.3 °F (36.8 °C)  Max: 98.7 °F (37.1 °C)  BP  Min: 140/53  Max: 175/81  Pulse  Min: 67  Max: 96  Resp  Min: 18  Max: 24  SpO2  Min: 90 %  Max: 98 % Flow (L/min)  Min: 40  Max: 40    I/O 24 HR (7:00 AM-6:59AM):  Intake/Output       05/02/23 0700 - 05/03/23 0659 05/03/23 0700 - 05/04/23 0659    Intake (ml) 680 --    Output (ml) 2350 800    Net (ml) -1670 -800          Medications (Drips):       Physical Examination    Telemetry: Normal sinus rhythm.    Constitutional:  No acute distress.  Conversant. Sitting up in bed on HFNC.    Cardiovascular: Regular rate and rhythm.  No murmurs, rub or gallop.   Respiratory: Normal symmetric chest expansion.  Normal respiratory effort.  Diminished bilaterally. No wheezing.    Abdominal:  Soft. No masses.   Non-tender. No distension.   No hepatosplenomegaly.   Extremities: No digital cyanosis or clubbing.  No peripheral edema.   Neurological:   Alert and Oriented to person, place, and time.   Moves all extremities.     Physical exam reviewed and there has been no change from previous.        Results from last 7 days    Lab Units 05/01/23  0945 04/30/23  0742 04/29/23  0813   WBC 10*3/mm3 23.48* 20.94* 21.65*   HEMOGLOBIN g/dL 11.7* 11.3* 11.3*   MCV fL 91.1 92.6 89.2   PLATELETS 10*3/mm3 735* 662* 768*     Results from last 7 days   Lab Units 05/01/23  0945 04/30/23  0742 04/29/23  0813 04/28/23  0704   SODIUM mmol/L 132* 133* 135* 136   POTASSIUM mmol/L 4.5 5.2 4.7 4.4   CO2 mmol/L 24.0 26.0 25.0 27.0   CREATININE mg/dL 0.39* 0.47* 0.53* 0.53*   MAGNESIUM mg/dL  --  2.7* 2.6* 2.4     Serum creatinine: 0.39 mg/dL (L) 05/01/23 0945  Estimated creatinine clearance: 121.9 mL/min (A)  Results from last 7 days   Lab Units 04/27/23  0346   ALK PHOS U/L 152*   BILIRUBIN mg/dL 0.2   ALT (SGPT) U/L 27   AST (SGOT) U/L 17             Images:     Imaging Results (Last 24 Hours)     ** No results found for the last 24 hours. **          I reviewed the patient's new clinical results.  I reviewed the patient's new imaging results and agree with the interpretation.    Assessment & Plan   A / P     Ms. Ramsey is a 73yo F with a history of RA and possible lupus on Humira and Plaquenil followed by Dr. Weinstein, hiatal hernia, GERD, hypertension, and RA associated lung disease followed by Dr. Tomlinson who presented to [facility] on [facility]/23/23 with bilateral infiltrates/possible pneumonia and was admitted to Hospital Medicine.     She was continued on antibiotics. She was started on IV Solumedrol.    Since admission, she has continued to require HFNC and her worsening is believed secondary to an exacerbation of her underlying lung disease. She completed 3 days of pulse dose steroids on 4/26/23 and remains on Solumedrol 60mg IV q12 hours.     Diet: Regular/House Diet; Texture: Soft to Chew (NDD 3); Soft to Chew: Whole Meat; Fluid Consistency: Thin (IDDSI 0)  Code Status and Medical Interventions:   Ordered at: 04/23/23 1310     Medical Intervention Limits:    NO intubation (DNI)     Level Of Support Discussed With:    Patient     Code Status (Patient has no pulse  and is not breathing):    No CPR (Do Not Attempt to Resuscitate)     Medical Interventions (Patient has pulse or is breathing):    Limited Support       Active Hospital Problems    Diagnosis    • **Acute hypoxemic respiratory failure -likely due to ILD exacerbation    • Pulmonary fibrosis    • ILD (interstitial lung disease) -likely RA associated ILD with acute exacerbation    • Multifocal pneumonia        Assessment / Plan:  1. Wean HFNC as tolerated. Goal oxygen saturation of > 88%.   2. Continue IV Solumedrol.   3. Bactrim for prophylaxis.   4. Completed a course of empiric antibiotics.  5. Palliative Care is following.    Her lack of improvement and inability to wean off HFNC is concerning and we are most likely heading toward comfort measures if she does not have any significant improvement in the next few days.     I discussed the patient's findings and my recommendations with patient and family      Destinee UREÑA Case, DO  Pulmonary and Critical Care Medicine

## 2023-05-03 NOTE — PLAN OF CARE
Goal Outcome Evaluation:              Outcome Evaluation: VSS, no acute distress noted, HFNC, prn morphine given.  Had a large BM, family at bedside.  Will continue with plan of care.

## 2023-05-03 NOTE — PROGRESS NOTES
[facility] Medicine Services  PROGRESS NOTE    Patient Name: Tala Ramsey  : 1950  MRN: [id number]    Date of Admission: 2023  Primary Care Physician: Sg Horne MD    Subjective   Subjective     CC:  Hypoxia, pneumonia    HPI:  Patient  Resting in bed. DIL at bedside. We discussed comfort measures again. Patient notes she is struggling with some anxiety regarding her prognosis and air hunger.    ROS:  Gen- No fevers, chills  CV- No chest pain, palpitations  Resp- +cough, dyspnea  GI- No N/V/D, no abd pain    Objective   Objective     Vital Signs:   Temp:  [98.3 °F (36.8 °C)-98.7 °F (37.1 °C)] 98.4 °F (36.9 °C)  Heart Rate:  [67-96] 67  Resp:  [18-24] 18  BP: (140-175)/(53-81) 160/62  Flow (L/min):  [40] 40     Physical Exam:  Constitutional: No acute distress, awake, alert, appears chronically ill  HENT: NCAT, mucous membranes moist  Respiratory: poor air movement bilaterally with tachypnea, mildly labored, remains on high flow at 70% FiO2 40LPM  Cardiovascular: RRR, no murmurs, rubs, or gallops  Gastrointestinal: soft, nontender, nondistended  Musculoskeletal: No bilateral ankle edema  Psychiatric: flat affect, cooperative  Neurologic: Oriented x 3, overall very weak/deconditioned.  Skin: No rashes    Exam is unchanged from 23      Results Reviewed:  LAB RESULTS:      Lab 23  0945 23  0742 23  0813 23  0705 23  0346   WBC 23.48* 20.94* 21.65* 15.99* 15.91*   HEMOGLOBIN 11.7* 11.3* 11.3* 10.1* 9.9*   HEMATOCRIT 36.7 35.2 35.4 31.6* 31.3*   PLATELETS 735* 662* 768* 602* 616*   MCV 91.1 92.6 89.2 91.6 91.5   CRP  --   --   --   --  2.55*         Lab 23  0945 23  0742 23  0813 23  0704 23  0346   SODIUM 132* 133* 135* 136 137   POTASSIUM 4.5 5.2 4.7 4.4 4.8   CHLORIDE 94* 95* 95* 98 99   CO2 24.0 26.0 25.0 27.0 24.0   ANION GAP 14.0 12.0 15.0 11.0 14.0   BUN 23 19 18 22 23   CREATININE 0.39* 0.47* 0.53*  0.53* 0.62   EGFR 105.3 100.7 97.8 97.8 94.2   GLUCOSE 151* 136* 126* 132* 160*   CALCIUM 8.9 8.7 9.0 8.7 8.6   MAGNESIUM  --  2.7* 2.6* 2.4 2.5*         Lab 04/27/23  0346   TOTAL PROTEIN 5.7*   ALBUMIN 3.1*   GLOBULIN 2.6   ALT (SGPT) 27   AST (SGOT) 17   BILIRUBIN 0.2   ALK PHOS 152*                     Brief Urine Lab Results  (Last result in the past 365 days)      Color   Clarity   Blood   Leuk Est   Nitrite   Protein   CREAT   Urine HCG        04/23/23 1757 Yellow   Clear   Trace   Negative   Negative   Negative                 Microbiology Results Abnormal     Procedure Component Value - Date/Time    Blood Culture - Blood, Arm, Right [[id number]]  (Normal) Collected: 04/23/23 1125    Lab Status: Final result Specimen: Blood from Arm, Right Updated: 04/28/23 1145     Blood Culture No growth at 5 days    Blood Culture - Blood, Arm, Left [[id number]]  (Normal) Collected: 04/23/23 1125    Lab Status: Final result Specimen: Blood from Arm, Left Updated: 04/28/23 1145     Blood Culture No growth at 5 days    S. Pneumo Ag Urine or CSF - Urine, Urine, Clean Catch [[id number]]  (Normal) Collected: 04/23/23 1757    Lab Status: Final result Specimen: Urine, Clean Catch Updated: 04/24/23 0949     Strep Pneumo Ag Negative    Legionella Antigen, Urine - Urine, Urine, Clean Catch [[id number]]  (Normal) Collected: 04/23/23 1757    Lab Status: Final result Specimen: Urine, Clean Catch Updated: 04/24/23 0949     LEGIONELLA ANTIGEN, URINE Negative    Respiratory Panel PCR w/COVID-19(SARS-CoV-2) CHARI/MARISSA/APOLINAR/PAD/COR/MAD/AMY In-House, NP Swab in UTM/VTM, 3-4 HR TAT - Swab, Nasopharynx [[id number]]  (Normal) Collected: 04/24/23 0544    Lab Status: Final result Specimen: Swab from Nasopharynx Updated: 04/24/23 0647     ADENOVIRUS, PCR Not Detected     Coronavirus 229E Not Detected     Coronavirus HKU1 Not Detected     Coronavirus NL63 Not Detected     Coronavirus OC43 Not Detected     COVID19 Not Detected     Human Metapneumovirus Not  Detected     Human Rhinovirus/Enterovirus Not Detected     Influenza A PCR Not Detected     Influenza B PCR Not Detected     Parainfluenza Virus 1 Not Detected     Parainfluenza Virus 2 Not Detected     Parainfluenza Virus 3 Not Detected     Parainfluenza Virus 4 Not Detected     RSV, PCR Not Detected     Bordetella pertussis pcr Not Detected     Bordetella parapertussis PCR Not Detected     Chlamydophila pneumoniae PCR Not Detected     Mycoplasma pneumo by PCR Not Detected    Narrative:      In the setting of a positive respiratory panel with a viral infection PLUS a negative procalcitonin without other underlying concern for bacterial infection, consider observing off antibiotics or discontinuation of antibiotics and continue supportive care. If the respiratory panel is positive for atypical bacterial infection (Bordetella pertussis, Chlamydophila pneumoniae, or Mycoplasma pneumoniae), consider antibiotic de-escalation to target atypical bacterial infection.    MRSA Screen, PCR (Inpatient) - Swab, Nares [[id number]]  (Normal) Collected: 04/23/23 1540    Lab Status: Final result Specimen: Swab from Nares Updated: 04/23/23 1651     MRSA PCR Negative    Narrative:      The negative predictive value of this diagnostic test is high and should only be used to consider de-escalating anti-MRSA therapy. A positive result may indicate colonization with MRSA and must be correlated clinically.  MRSA Negative    COVID PRE-OP / PRE-PROCEDURE SCREENING ORDER (NO ISOLATION) - Swab, Nasopharynx [[id number]]  (Normal) Collected: 04/23/23 1125    Lab Status: Final result Specimen: Swab from Nasopharynx Updated: 04/23/23 1225    Narrative:      The following orders were created for panel order COVID PRE-OP / PRE-PROCEDURE SCREENING ORDER (NO ISOLATION) - Swab, Nasopharynx.  Procedure                               Abnormality         Status                     ---------                               -----------         ------                      COVID-19, FLU A/B, RSV P...[[id number]]  Normal              Final result                 Please view results for these tests on the individual orders.    COVID-19, FLU A/B, RSV PCR - Swab, Nasopharynx [[id number]]  (Normal) Collected: 04/23/23 1125    Lab Status: Final result Specimen: Swab from Nasopharynx Updated: 04/23/23 1225     COVID19 Not Detected     Influenza A PCR Not Detected     Influenza B PCR Not Detected     RSV, PCR Not Detected    Narrative:      Fact sheet for providers: https://www.fda.gov/media/023116/download    Fact sheet for patients: https://www.fda.gov/media/810992/download    Test performed by PCR.          No radiology results from the last 24 hrs        Current medications:  Scheduled Meds:amLODIPine, 5 mg, Oral, Q24H   And  valsartan, 160 mg, Oral, Q24H  cetirizine, 10 mg, Oral, Daily  fluticasone, 2 spray, Each Nare, Nightly  guaiFENesin, 1,200 mg, Oral, Q12H  heparin (porcine), 5,000 Units, Subcutaneous, Q8H  hydroxychloroquine, 200 mg, Oral, Q12H  ipratropium-albuterol, 3 mL, Nebulization, 4x Daily - RT  melatonin, 5 mg, Oral, Nightly  methylPREDNISolone sodium succinate, 60 mg, Intravenous, Q12H  montelukast, 10 mg, Oral, Daily  pantoprazole, 40 mg, Oral, Daily  senna-docusate sodium, 2 tablet, Oral, BID  sodium chloride, 10 mL, Intravenous, Q12H  sulfamethoxazole-trimethoprim, 1 tablet, Oral, Once per day on Mon Wed Fri      Continuous Infusions:   PRN Meds:.•  acetaminophen **OR** acetaminophen **OR** acetaminophen  •  ALPRAZolam  •  GI cocktail  •  senna-docusate sodium **AND** polyethylene glycol **AND** bisacodyl **AND** bisacodyl  •  calcium carbonate  •  hydrOXYzine  •  ipratropium-albuterol  •  LORazepam  •  Magnesium Standard Dose Replacement - Follow Nurse / BPA Driven Protocol  •  Morphine  •  ondansetron **OR** ondansetron  •  simethicone  •  sodium chloride  •  sodium chloride  •  sodium chloride  •  tiZANidine    Assessment & Plan   Assessment & Plan     Active  Hospital Problems    Diagnosis  POA   • **Acute hypoxemic respiratory failure -likely due to ILD exacerbation [J96.01]  Yes   • Pulmonary fibrosis [J84.10]  Yes   • ILD (interstitial lung disease) -likely RA associated ILD with acute exacerbation [J84.9]  Yes   • Multifocal pneumonia [J18.9]  Yes      Resolved Hospital Problems   No resolved problems to display.        Brief Hospital Course to date:  Tala Ramsey is a 72 y.o. female w/ hx interstitial lung dz, RA (on humira until recently, recent steroid injections) who presents w/ progressive dyspnea, cough over ~10 days, failed levaquin. Hypoxic upon admission. Ct chest w/o contrast w/ bilateral multifocal air space dz. resp pcr panel negative. Had progressive hypoxia evening after admission placed on hi flow canula    Acute hypoxic resp failure, worsening  Pulmonary fibrosis/ILD  Bilateral Pneumonia  -likely combination of progession/flare of interstitial lung disease and infection  -remains on High flow with difficulty weaning and significant drops with minimal exertion (simply rolling in bed to use bedpan)  -s/p full course of zosyn & azithromycin  -continue 3x weekly bactrim ds (pcp prophylaxis as has been on humira and steroids)  -continue scheduled & PRN duonebs  -Pulm following: completed 3 days of solumedrol (1g daily x 3 days) on 4/26/23; now on solumedrol 60mg bid with plan for prolonged taper pending goals of care  -diuresis PRN as tolerated  -palliative also following, guarded prognosis at best and I discussed this in detail again with patient and DIL at bedside. Will add low dose PO ativan for anxiety. I did have hospice team come by yesterday and they are following peripherally.     RA  -holding humira and plaquenil     S/p Hyponatremia, improved  -improved    HTN  -continue amlodpine/valsartan    Chronic anemia   -monitor    Goals/limits  -Patient is DNR/DNI (in event of further clinical decompensation patient would NOT want mechanical  ventilation; in this case patient would wish to pursue comfort measures;  - daily GOC discussions ongoing with patient and family, hospice and palliative following.    Expected Discharge Location and Transportation: home  Expected Discharge TBD  Expected Discharge Date: 5/4/2023; Expected Discharge Time:      DVT prophylaxis:  Medical DVT prophylaxis orders are present. sq heparin    AM-PAC 6 Clicks Score (PT): 12 (05/02/23 2000)    CODE STATUS:   Code Status and Medical Interventions:   Ordered at: 04/23/23 1310     Medical Intervention Limits:    NO intubation (DNI)     Level Of Support Discussed With:    Patient     Code Status (Patient has no pulse and is not breathing):    No CPR (Do Not Attempt to Resuscitate)     Medical Interventions (Patient has pulse or is breathing):    Limited Support       Barbara Tong,   05/03/23

## 2023-05-03 NOTE — PLAN OF CARE
Goal Outcome Evaluation:  Plan of Care Reviewed With: patient        Progress: no change  Outcome Evaluation: patient alert and oriented but unable to tolerate any activity at all, becomes SOA with conversation and eating, family at bedside pain medication offered but patient preferred tylenol, anxiety medication administered.

## 2023-05-03 NOTE — CASE MANAGEMENT/SOCIAL WORK
Continued Stay Note  [facility]     Patient Name: Tala Ramsey  MRN: [id number]  Today's Date: 5/3/2023    Admit Date: 4/23/2023    Plan: CM update   Discharge Plan     Row Name 05/03/23 1500       Plan    Plan CM update    Plan Comments Patient is not ready for discharge at this time- She remains on high flow 02 with palliative care and Hospice following - CM will continue to follow and assist with dc planning as needed -derrick [phone]               Discharge Codes    No documentation.               Expected Discharge Date and Time     Expected Discharge Date Expected Discharge Time    May 4, 2023             Derrick Riggs RN

## 2023-05-04 PROCEDURE — 99232 SBSQ HOSP IP/OBS MODERATE 35: CPT | Performed by: INTERNAL MEDICINE

## 2023-05-04 PROCEDURE — 94799 UNLISTED PULMONARY SVC/PX: CPT

## 2023-05-04 PROCEDURE — 25010000002 MORPHINE PER 10 MG: Performed by: FAMILY MEDICINE

## 2023-05-04 PROCEDURE — 25010000002 METHYLPREDNISOLONE PER 40 MG

## 2023-05-04 PROCEDURE — 94664 DEMO&/EVAL PT USE INHALER: CPT

## 2023-05-04 PROCEDURE — 25010000002 HEPARIN (PORCINE) PER 1000 UNITS: Performed by: INTERNAL MEDICINE

## 2023-05-04 RX ORDER — LORAZEPAM 1 MG/1
1 TABLET ORAL EVERY 6 HOURS PRN
Status: DISPENSED | OUTPATIENT
Start: 2023-05-04 | End: 2023-05-14

## 2023-05-04 RX ADMIN — CETIRIZINE HYDROCHLORIDE 10 MG: 10 TABLET, FILM COATED ORAL at 08:46

## 2023-05-04 RX ADMIN — HEPARIN SODIUM 5000 UNITS: 5000 INJECTION, SOLUTION INTRAVENOUS; SUBCUTANEOUS at 13:22

## 2023-05-04 RX ADMIN — IPRATROPIUM BROMIDE AND ALBUTEROL SULFATE 3 ML: .5; 2.5 SOLUTION RESPIRATORY (INHALATION) at 07:20

## 2023-05-04 RX ADMIN — CALCIUM CARBONATE (ANTACID) CHEW TAB 500 MG 2 TABLET: 500 CHEW TAB at 20:58

## 2023-05-04 RX ADMIN — Medication 10 ML: at 20:58

## 2023-05-04 RX ADMIN — VALSARTAN 160 MG: 160 TABLET, FILM COATED ORAL at 08:46

## 2023-05-04 RX ADMIN — HEPARIN SODIUM 5000 UNITS: 5000 INJECTION, SOLUTION INTRAVENOUS; SUBCUTANEOUS at 20:57

## 2023-05-04 RX ADMIN — GUAIFENESIN 1200 MG: 600 TABLET, EXTENDED RELEASE ORAL at 08:46

## 2023-05-04 RX ADMIN — Medication 2 SPRAY: at 20:58

## 2023-05-04 RX ADMIN — METHYLPREDNISOLONE SODIUM SUCCINATE 60 MG: 40 INJECTION, POWDER, LYOPHILIZED, FOR SOLUTION INTRAMUSCULAR; INTRAVENOUS at 05:55

## 2023-05-04 RX ADMIN — Medication 5 MG: at 20:58

## 2023-05-04 RX ADMIN — LORAZEPAM 1 MG: 1 TABLET ORAL at 13:22

## 2023-05-04 RX ADMIN — GUAIFENESIN 1200 MG: 600 TABLET, EXTENDED RELEASE ORAL at 20:58

## 2023-05-04 RX ADMIN — MONTELUKAST 10 MG: 10 TABLET, FILM COATED ORAL at 08:46

## 2023-05-04 RX ADMIN — SENNOSIDES AND DOCUSATE SODIUM 2 TABLET: 8.6; 5 TABLET ORAL at 20:57

## 2023-05-04 RX ADMIN — SIMETHICONE 80 MG: 80 TABLET, CHEWABLE ORAL at 21:55

## 2023-05-04 RX ADMIN — HYDROXYZINE HYDROCHLORIDE 25 MG: 25 TABLET, FILM COATED ORAL at 08:46

## 2023-05-04 RX ADMIN — SENNOSIDES AND DOCUSATE SODIUM 2 TABLET: 8.6; 5 TABLET ORAL at 08:46

## 2023-05-04 RX ADMIN — AMLODIPINE BESYLATE 5 MG: 5 TABLET ORAL at 08:46

## 2023-05-04 RX ADMIN — Medication 10 ML: at 08:46

## 2023-05-04 RX ADMIN — MORPHINE SULFATE 3 MG: 4 INJECTION, SOLUTION INTRAMUSCULAR; INTRAVENOUS at 20:58

## 2023-05-04 RX ADMIN — HYDROXYCHLOROQUINE SULFATE 200 MG: 200 TABLET, FILM COATED ORAL at 20:57

## 2023-05-04 RX ADMIN — PANTOPRAZOLE SODIUM 40 MG: 40 TABLET, DELAYED RELEASE ORAL at 08:46

## 2023-05-04 RX ADMIN — METHYLPREDNISOLONE SODIUM SUCCINATE 60 MG: 40 INJECTION, POWDER, LYOPHILIZED, FOR SOLUTION INTRAMUSCULAR; INTRAVENOUS at 17:29

## 2023-05-04 RX ADMIN — IPRATROPIUM BROMIDE AND ALBUTEROL SULFATE 3 ML: .5; 2.5 SOLUTION RESPIRATORY (INHALATION) at 19:46

## 2023-05-04 RX ADMIN — IPRATROPIUM BROMIDE AND ALBUTEROL SULFATE 3 ML: .5; 2.5 SOLUTION RESPIRATORY (INHALATION) at 15:57

## 2023-05-04 RX ADMIN — HEPARIN SODIUM 5000 UNITS: 5000 INJECTION, SOLUTION INTRAVENOUS; SUBCUTANEOUS at 05:55

## 2023-05-04 RX ADMIN — LORAZEPAM 1 MG: 1 TABLET ORAL at 20:58

## 2023-05-04 RX ADMIN — HYDROXYCHLOROQUINE SULFATE 200 MG: 200 TABLET, FILM COATED ORAL at 08:46

## 2023-05-04 RX ADMIN — IPRATROPIUM BROMIDE AND ALBUTEROL SULFATE 3 ML: .5; 2.5 SOLUTION RESPIRATORY (INHALATION) at 12:07

## 2023-05-04 NOTE — PLAN OF CARE
Problem: Palliative Care  Goal: Enhanced Quality of Life  Intervention: Optimize Psychosocial Wellbeing  Recent Flowsheet Documentation  Taken 5/3/2023 1054 by Isidra Spaulding, MSW  Supportive Measures: (symptom assessment)   active listening utilized   positive reinforcement provided   self-care encouraged   verbalization of feelings encouraged   other (see comments)  Family/Support System Care:   support provided   self-care encouraged  Visit with patient and daughter-in-law at bedside.  Patient remains on HFNC, +anxiety and dyspnea - anxiety improved after ativan, patient resting and recovering at time of visit.  With addition of prn ativan, Palliative MD d/c prn xanax.  Ongoing education on benefit of opioid for management of dyspnea and air hunger - reviewed the cycle of dyspnea and anxiety.  Patient receptive to supportive/empathic presence, active listening.  Palliative Care continues to follow for support and assist with symptom management and plan of care.

## 2023-05-04 NOTE — PLAN OF CARE
Goal Outcome Evaluation:              Outcome Evaluation: VSS, prn anxiety medication and pain medication given, family at bedside, will continue with plan of care.

## 2023-05-04 NOTE — THERAPY DISCHARGE NOTE
Acute Care - Occupational Therapy Discharge  [facility]    Patient Name: Tala Ramsey  : 1950    MRN: [id number]                              Today's Date: 2023       Admit Date: 2023    Visit Dx:     ICD-10-CM ICD-9-CM   1. Multifocal pneumonia  J18.9 486   2. Failure of outpatient treatment  Z78.9 V49.89   3. 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                Mobility/ADL's    No documentation.                Obj/Interventions    No documentation.                Goals/Plan     Row Name 23          Bed Mobility Goal 1 (OT)    Activity/Assistive Device (Bed Mobility Goal 1, OT) sit to supine/supine to sit  -     [place] Level/Cues Needed (Bed Mobility Goal 1, OT) contact guard required  -     Time Frame (Bed Mobility Goal 1, OT) long term goal (LTG);10 days  -     Progress/Outcomes (Bed Mobility Goal 1, OT) goal not met  -     Row Name 23          Transfer Goal 1 (OT)    Activity/Assistive Device (Transfer Goal 1, OT) sit-to-stand/stand-to-sit;commode, bedside without drop arms  -     [place] Level/Cues Needed (Transfer Goal 1, OT) minimum assist (75% or more patient effort)  -     Time Frame (Transfer Goal 1, OT) long term goal (LTG);10 days  -     Progress/Outcome (Transfer Goal 1, OT) goal not met  -     Row Name 23          Toileting Goal 1 (OT)     Activity/Device (Toileting Goal 1, OT) adjust/manage clothing;perform perineal hygiene;commode, bedside without drop arms  -     [place] Level/Cues Needed (Toileting Goal 1, OT) minimum assist (75% or more patient effort)  -     Time Frame (Toileting Goal 1, OT) long term goal (LTG);10 days  -     Progress/Outcome (Toileting Goal 1, OT) goal not met  -           User Key  (r) = Recorded By, (t) = Taken By, (c) = Cosigned By    Initials Name Provider Type     Keira Hay OT Occupational Therapist               Clinical Impression     Row Name 05/04/23 1331          Plan of Care Review    Outcome Evaluation Pt has declined OT 3x in a row. OT will d/c at this time. Please reconsult if pt becomes more appropriate.  -           User Key  (r) = Recorded By, (t) = Taken By, (c) = Cosigned By    Initials Name Provider Type     Keira Hay OT Occupational Therapist               Outcome Measures     Row Name 05/04/23 0800          How much help from another person do you currently need...    Turning from your back to your side while in flat bed without using bedrails? 3  -AR     Moving from lying on back to sitting on the side of a flat bed without bedrails? 2  -AR     Moving to and from a bed to a chair (including a wheelchair)? 3  -AR     Standing up from a chair using your arms (e.g., wheelchair, bedside chair)? 3  -AR     Climbing 3-5 steps with a railing? 3  -AR     To walk in hospital room? 3  -AR     AM-PAC 6 Clicks Score (PT) 17  -AR     Highest level of mobility 5 --> Static standing  -AR           User Key  (r) = Recorded By, (t) = Taken By, (c) = Cosigned By    Initials Name Provider Type    AR Pro Chairez RN Registered Nurse              Occupational Therapy Education     Title: PT OT SLP Therapies (In Progress)     Topic: Occupational Therapy (In Progress)     Point: ADL training (Done)     Description:   Instruct learner(s) on proper safety adaptation and remediation  techniques during self care or transfers.   Instruct in proper use of assistive devices.              Learning Progress Summary           Patient Acceptance, E, VU by AN at 4/25/2023 1543   Family Acceptance, E, VU by AN at 4/25/2023 1543                   Point: Home exercise program (Not Started)     Description:   Instruct learner(s) on appropriate technique for monitoring, assisting and/or progressing therapeutic exercises/activities.              Learner Progress:  Not documented in this visit.          Point: Precautions (Done)     Description:   Instruct learner(s) on prescribed precautions during self-care and functional transfers.              Learning Progress Summary           Patient Acceptance, E, VU by AN at 4/25/2023 1543   Family Acceptance, E, VU by AN at 4/25/2023 1543                   Point: Body mechanics (Done)     Description:   Instruct learner(s) on proper positioning and spine alignment during self-care, functional mobility activities and/or exercises.              Learning Progress Summary           Patient Acceptance, E, VU by AN at 4/25/2023 1543   Family Acceptance, E, VU by AN at 4/25/2023 1543                               User Key     Initials Effective Dates Name Provider Type Discipline    AN 09/21/21 -  Maddison Bean OT Occupational Therapist OT              OT Recommendation and Plan     Plan of Care Review  Outcome Evaluation: Pt has declined OT 3x in a row. OT will d/c at this time. Please reconsult if pt becomes more appropriate.  Outcome Evaluation: Pt has declined OT 3x in a row. OT will d/c at this time. Please reconsult if pt becomes more appropriate.     Time Calculation:                 Keira Hay OT  5/4/2023

## 2023-05-04 NOTE — PLAN OF CARE
Goal Outcome Evaluation:  Plan of Care Reviewed With: patient        Progress: no change  Outcome Evaluation: VSS. Alert and oriented x4. NSR on the montior. Pt remains on high flow NC 55% 40L O2. No complaints of pain or nausea. Pt complaints of anxiety multiple times during shift. PRN's given for anxiety at pt request. Family at bedside. Pt refused therapy. Skin and fall precautions in place. Pt weight shift assitance and turning provided. Pt resting comfortably. No further complaints at this time.

## 2023-05-04 NOTE — PROGRESS NOTES
Palliative Care Progress Note    Date of Admission: 4/23/2023    Subjective: Does report that she slept very well last night.  Family at bedside states that she did have some dyspnea earlier this morning but that seems to have improved.  Current Code Status     Date Active Code Status Order ID Comments User Context       4/23/2023 1310 No CPR (Do Not Attempt to Resuscitate) [id number]  Elias Morales MD Inpatient      Question Answer    Code Status (Patient has no pulse and is not breathing) No CPR (Do Not Attempt to Resuscitate)    Medical Interventions (Patient has pulse or is breathing) Limited Support    Medical Intervention Limits: NO intubation (DNI)    Level Of Support Discussed With Patient              No current facility-administered medications on file prior to encounter.     Current Outpatient Medications on File Prior to Encounter   Medication Sig Dispense Refill   • acetaminophen (TYLENOL) 325 MG tablet Take 2 tablets by mouth Every 4 (Four) Hours As Needed.     • amLODIPine-valsartan (EXFORGE) 5-160 MG per tablet TAKE 1 A DAY FOR BLOOD PRESSURE     • Calcium-Phosphorus-Vitamin D (CITRACAL +D3 PO) Take 1 tablet by mouth.     • cyclobenzaprine (FLEXERIL) 10 MG tablet Take 1 tablet by mouth 3 (Three) Times a Day As Needed for muscle spasms. 21 tablet 0   • fluticasone (FLONASE) 50 MCG/ACT nasal spray 2 sprays by Each Nare route Daily.     • Humira Pen 40 MG/0.4ML Pen-injector Kit Inject 40 mg under the skin into the appropriate area as directed Every 14 (Fourteen) Days.     • hydroxychloroquine (PLAQUENIL) 200 MG tablet Take  by mouth 2 (Two) Times a Day.     • Ibuprofen 200 MG capsule Take 1 tablet by mouth As Needed.     • Lidocaine Viscous HCl (XYLOCAINE) 2 % solution      • montelukast (SINGULAIR) 10 MG tablet Take 1 tablet by mouth Daily.     • Omega-3 Fatty Acids (fish oil) 1200 MG capsule capsule Take 1 capsule by mouth Daily.     • pantoprazole (PROTONIX) 40 MG EC tablet Take 1 tablet by mouth  "Daily.     • predniSONE (DELTASONE) 20 MG tablet Take 1.5 tablets by mouth Daily. 11 tablet 0   • simethicone (MYLICON) 125 MG chewable tablet Chew 1 tablet Every 6 (Six) Hours As Needed for Flatulence.     • tiZANidine (ZANAFLEX) 2 MG tablet 1'2-1 in the Am and afternoon, 1-2 at bedtime. as needed          •  acetaminophen **OR** acetaminophen **OR** acetaminophen  •  GI cocktail  •  senna-docusate sodium **AND** polyethylene glycol **AND** bisacodyl **AND** bisacodyl  •  calcium carbonate  •  hydrOXYzine  •  ipratropium-albuterol  •  LORazepam  •  Magnesium Standard Dose Replacement - Follow Nurse / BPA Driven Protocol  •  Morphine  •  ondansetron **OR** ondansetron  •  simethicone  •  sodium chloride  •  sodium chloride  •  sodium chloride  •  tiZANidine    Objective: /87 (BP Location: Right arm, Patient Position: Lying)   Pulse 84   Temp 98.4 °F (36.9 °C) (Oral)   Resp 21   Ht 153.7 cm (60.5\")   Wt 80.3 kg (177 lb)   SpO2 (!) 88%   BMI 34.00 kg/m²      Intake/Output Summary (Last 24 hours) at 5/4/2023 1132  Last data filed at 5/4/2023 0315  Gross per 24 hour   Intake --   Output 1050 ml   Net -1050 ml     Physical Exam:      General Appearance:    Alert, cooperative, conversational dyspnea noted   Head:    Normocephalic, without obvious abnormality, atraumatic   Eyes:            Lids and lashes normal, conjunctivae and sclerae normal, no   icterus, no pallor, corneas clear, PERRLA   Ears:    Ears appear intact with no abnormalities noted   Throat:   No oral lesions, no thrush, oral mucosa moist   Neck:   No adenopathy, supple, trachea midline, no thyromegaly, no     carotid bruit, no JVD   Back:     No kyphosis present, no scoliosis present, no skin lesions,       erythema or scars, no tenderness to percussion or                   palpation,   range of motion normal   Lungs:     Clear to auscultation,respirations regular, even and                   unlabored    Heart:    Regular rhythm and normal " rate, normal S1 and S2, no            murmur, no gallop, no rub, no click   Breast Exam:    Deferred   Abdomen:     Normal bowel sounds, no masses, no organomegaly, soft        non-tender, non-distended, no guarding, no rebound                 tenderness   Genitalia:    Deferred   Extremities:   Moves all extremities well, no edema, no cyanosis, no              redness   Pulses:   Pulses palpable and equal bilaterally   Skin:   No bleeding, bruising or rash   Lymph nodes:   No palpable adenopathy   Neurologic:   Cranial nerves 2 - 12 grossly intact, sensation intact, DTR        present and equal bilaterally     Results from last 7 days   Lab Units 05/01/23  0945   WBC 10*3/mm3 23.48*   HEMOGLOBIN g/dL 11.7*   HEMATOCRIT % 36.7   PLATELETS 10*3/mm3 735*     Results from last 7 days   Lab Units 05/01/23  0945   SODIUM mmol/L 132*   POTASSIUM mmol/L 4.5   CHLORIDE mmol/L 94*   CO2 mmol/L 24.0   BUN mg/dL 23   CREATININE mg/dL 0.39*   CALCIUM mg/dL 8.9   GLUCOSE mg/dL 151*       Impression: Interstitial lung disease  Pneumonia  Respiratory distress  Dyspnea  Anxiety  Goals of care  Plan: Patient does continue to report some anxiety.  Did talk to her about the possibility of adding Klonopin, however she would like to continue the current symptom regimen for the time being.  Continue the rest of the symptom management regimen as well as following a supporting the patient        Wojciech Castrejon DO  05/04/23  11:32 EDT

## 2023-05-04 NOTE — PROGRESS NOTES
[facility] Medicine Services  PROGRESS NOTE    Patient Name: Tala Ramsey  : 1950  MRN: [id number]    Date of Admission: 2023  Primary Care Physician: Sg Horne MD    Subjective   Subjective     CC:  Hypoxia, pneumonia    HPI:  Patient resting in bed. Had a rough evening last night with some increased anxiety, xanax helped. O2 weaned somewhat this morning and patient having some trouble maintaining her sats.    ROS:  Gen- No fevers, chills  CV- No chest pain, palpitations  Resp- +cough, dyspnea  GI- No N/V/D, no abd pain    Objective   Objective     Vital Signs:   Temp:  [97.6 °F (36.4 °C)-98.7 °F (37.1 °C)] 98.4 °F (36.9 °C)  Heart Rate:  [64-91] 84  Resp:  [18-23] 21  BP: (145-163)/(52-87) 157/87  Flow (L/min):  [40-70] 40     Physical Exam:  Constitutional: No acute distress, awake, alert, appears chronically ill  HENT: NCAT, mucous membranes moist  Respiratory: poor air movement bilaterally with tachypnea, mildly labored, remains on high flow at 65% FiO2 40LPM  Cardiovascular: RRR, no murmurs, rubs, or gallops  Gastrointestinal: soft, nontender, nondistended  Musculoskeletal: No bilateral ankle edema  Psychiatric: flat affect, cooperative  Neurologic: Oriented x 3, overall very weak/deconditioned.  Skin: No rashes    Exam is unchanged from 5/3/23      Results Reviewed:  LAB RESULTS:      Lab 23  0945 23  0742 23  0813 23  0705   WBC 23.48* 20.94* 21.65* 15.99*   HEMOGLOBIN 11.7* 11.3* 11.3* 10.1*   HEMATOCRIT 36.7 35.2 35.4 31.6*   PLATELETS 735* 662* 768* 602*   MCV 91.1 92.6 89.2 91.6         Lab 23  0945 23  0742 23  0813 23  0704   SODIUM 132* 133* 135* 136   POTASSIUM 4.5 5.2 4.7 4.4   CHLORIDE 94* 95* 95* 98   CO2 24.0 26.0 25.0 27.0   ANION GAP 14.0 12.0 15.0 11.0   BUN 23 19 18 22   CREATININE 0.39* 0.47* 0.53* 0.53*   EGFR 105.3 100.7 97.8 97.8   GLUCOSE 151* 136* 126* 132*   CALCIUM 8.9 8.7 9.0 8.7  Lab order placed     MAGNESIUM  --  2.7* 2.6* 2.4                         Brief Urine Lab Results  (Last result in the past 365 days)      Color   Clarity   Blood   Leuk Est   Nitrite   Protein   CREAT   Urine HCG        04/23/23 1757 Yellow   Clear   Trace   Negative   Negative   Negative                 Microbiology Results Abnormal     Procedure Component Value - Date/Time    Blood Culture - Blood, Arm, Right [[id number]]  (Normal) Collected: 04/23/23 1125    Lab Status: Final result Specimen: Blood from Arm, Right Updated: 04/28/23 1145     Blood Culture No growth at 5 days    Blood Culture - Blood, Arm, Left [[id number]]  (Normal) Collected: 04/23/23 1125    Lab Status: Final result Specimen: Blood from Arm, Left Updated: 04/28/23 1145     Blood Culture No growth at 5 days    S. Pneumo Ag Urine or CSF - Urine, Urine, Clean Catch [[id number]]  (Normal) Collected: 04/23/23 1757    Lab Status: Final result Specimen: Urine, Clean Catch Updated: 04/24/23 0949     Strep Pneumo Ag Negative    Legionella Antigen, Urine - Urine, Urine, Clean Catch [[id number]]  (Normal) Collected: 04/23/23 1757    Lab Status: Final result Specimen: Urine, Clean Catch Updated: 04/24/23 0949     LEGIONELLA ANTIGEN, URINE Negative    Respiratory Panel PCR w/COVID-19(SARS-CoV-2) CHARI/MARISSA/APOLINAR/PAD/COR/MAD/AMY In-House, NP Swab in UTM/VTM, 3-4 HR TAT - Swab, Nasopharynx [[id number]]  (Normal) Collected: 04/24/23 0544    Lab Status: Final result Specimen: Swab from Nasopharynx Updated: 04/24/23 0647     ADENOVIRUS, PCR Not Detected     Coronavirus 229E Not Detected     Coronavirus HKU1 Not Detected     Coronavirus NL63 Not Detected     Coronavirus OC43 Not Detected     COVID19 Not Detected     Human Metapneumovirus Not Detected     Human Rhinovirus/Enterovirus Not Detected     Influenza A PCR Not Detected     Influenza B PCR Not Detected     Parainfluenza Virus 1 Not Detected     Parainfluenza Virus 2 Not Detected     Parainfluenza Virus 3 Not Detected      Parainfluenza Virus 4 Not Detected     RSV, PCR Not Detected     Bordetella pertussis pcr Not Detected     Bordetella parapertussis PCR Not Detected     Chlamydophila pneumoniae PCR Not Detected     Mycoplasma pneumo by PCR Not Detected    Narrative:      In the setting of a positive respiratory panel with a viral infection PLUS a negative procalcitonin without other underlying concern for bacterial infection, consider observing off antibiotics or discontinuation of antibiotics and continue supportive care. If the respiratory panel is positive for atypical bacterial infection (Bordetella pertussis, Chlamydophila pneumoniae, or Mycoplasma pneumoniae), consider antibiotic de-escalation to target atypical bacterial infection.    MRSA Screen, PCR (Inpatient) - Swab, Nares [[id number]]  (Normal) Collected: 04/23/23 1540    Lab Status: Final result Specimen: Swab from Nares Updated: 04/23/23 1651     MRSA PCR Negative    Narrative:      The negative predictive value of this diagnostic test is high and should only be used to consider de-escalating anti-MRSA therapy. A positive result may indicate colonization with MRSA and must be correlated clinically.  MRSA Negative    COVID PRE-OP / PRE-PROCEDURE SCREENING ORDER (NO ISOLATION) - Swab, Nasopharynx [[id number]]  (Normal) Collected: 04/23/23 1125    Lab Status: Final result Specimen: Swab from Nasopharynx Updated: 04/23/23 1225    Narrative:      The following orders were created for panel order COVID PRE-OP / PRE-PROCEDURE SCREENING ORDER (NO ISOLATION) - Swab, Nasopharynx.  Procedure                               Abnormality         Status                     ---------                               -----------         ------                     COVID-19, FLU A/B, RSV P...[[id number]]  Normal              Final result                 Please view results for these tests on the individual orders.    COVID-19, FLU A/B, RSV PCR - Swab, Nasopharynx [[id number]]  (Normal)  Collected: 04/23/23 1125    Lab Status: Final result Specimen: Swab from Nasopharynx Updated: 04/23/23 1225     COVID19 Not Detected     Influenza A PCR Not Detected     Influenza B PCR Not Detected     RSV, PCR Not Detected    Narrative:      Fact sheet for providers: https://www.fda.gov/media/347094/download    Fact sheet for patients: https://www.fda.gov/media/937315/download    Test performed by PCR.          No radiology results from the last 24 hrs        Current medications:  Scheduled Meds:amLODIPine, 5 mg, Oral, Q24H   And  valsartan, 160 mg, Oral, Q24H  cetirizine, 10 mg, Oral, Daily  fluticasone, 2 spray, Each Nare, Nightly  guaiFENesin, 1,200 mg, Oral, Q12H  heparin (porcine), 5,000 Units, Subcutaneous, Q8H  hydroxychloroquine, 200 mg, Oral, Q12H  ipratropium-albuterol, 3 mL, Nebulization, 4x Daily - RT  melatonin, 5 mg, Oral, Nightly  methylPREDNISolone sodium succinate, 60 mg, Intravenous, Q12H  montelukast, 10 mg, Oral, Daily  pantoprazole, 40 mg, Oral, Daily  senna-docusate sodium, 2 tablet, Oral, BID  sodium chloride, 10 mL, Intravenous, Q12H  sulfamethoxazole-trimethoprim, 1 tablet, Oral, Once per day on Mon Wed Fri      Continuous Infusions:   PRN Meds:.•  acetaminophen **OR** acetaminophen **OR** acetaminophen  •  GI cocktail  •  senna-docusate sodium **AND** polyethylene glycol **AND** bisacodyl **AND** bisacodyl  •  calcium carbonate  •  hydrOXYzine  •  ipratropium-albuterol  •  LORazepam  •  Magnesium Standard Dose Replacement - Follow Nurse / BPA Driven Protocol  •  Morphine  •  ondansetron **OR** ondansetron  •  simethicone  •  sodium chloride  •  sodium chloride  •  sodium chloride  •  tiZANidine    Assessment & Plan   Assessment & Plan     Active Hospital Problems    Diagnosis  POA   • **Acute hypoxemic respiratory failure -likely due to ILD exacerbation [J96.01]  Yes   • Pulmonary fibrosis [J84.10]  Yes   • ILD (interstitial lung disease) -likely RA associated ILD with acute exacerbation  [J84.9]  Yes   • Multifocal pneumonia [J18.9]  Yes      Resolved Hospital Problems   No resolved problems to display.        Brief Hospital Course to date:  Tala Ramsey is a 72 y.o. female w/ hx interstitial lung dz, RA (on humira until recently, recent steroid injections) who presents w/ progressive dyspnea, cough over ~10 days, failed levaquin. Hypoxic upon admission. Ct chest w/o contrast w/ bilateral multifocal air space dz. resp pcr panel negative. Had progressive hypoxia evening after admission placed on hi flow canula    Acute hypoxic resp failure, worsening  Pulmonary fibrosis/ILD  Bilateral Pneumonia  -likely combination of progession/flare of interstitial lung disease and infection  -remains on High flow with difficulty weaning and significant drops with minimal exertion (simply rolling in bed to use bedpan)  -s/p full course of zosyn & azithromycin  -continue 3x weekly bactrim ds (pcp prophylaxis as has been on humira and steroids)  -continue scheduled & PRN duonebs  -Pulm following: completed 3 days of solumedrol (1g daily x 3 days) on 4/26/23; now on solumedrol 60mg IV BID with plan for prolonged taper pending goals of care  -diuresis PRN as tolerated  -palliative also following, guarded prognosis at best and I discussed this in detail yesterday with patient and DIL at bedside, have also discussed with other family members as well. continue low dose ativan for anxiety. I did have hospice team come by and they are following peripherally.     RA  -holding humira and plaquenil     S/p Hyponatremia, improved  -improved    HTN  -continue amlodpine/valsartan    Chronic anemia   -monitor    Goals/limits  -Patient is DNR/DNI (in event of further clinical decompensation patient would NOT want mechanical ventilation; in this case patient would wish to pursue comfort measures;  - daily GOC discussions ongoing with patient and family, hospice and palliative following.    Expected Discharge Location and  Transportation: home  Expected Discharge TBD  Expected Discharge Date: 5/4/2023; Expected Discharge Time:      DVT prophylaxis:  Medical DVT prophylaxis orders are present. sq heparin    AM-PAC 6 Clicks Score (PT): 17 (05/04/23 0800)    CODE STATUS:   Code Status and Medical Interventions:   Ordered at: 04/23/23 1310     Medical Intervention Limits:    NO intubation (DNI)     Level Of Support Discussed With:    Patient     Code Status (Patient has no pulse and is not breathing):    No CPR (Do Not Attempt to Resuscitate)     Medical Interventions (Patient has pulse or is breathing):    Limited Support       Barbara Tong DO  05/04/23

## 2023-05-04 NOTE — PLAN OF CARE
Goal Outcome Evaluation:  Plan of Care Reviewed With: patient        Progress: no change  Outcome Evaluation: Pt. sitting up in bed on HFNC with daughter-in-law at BS.  Pt. endorsed dyspnea and anxiety this am.  Stated she had just had some medication (atarax) for anxiety.  Pt. still hesitant to try morphine any other time besides bedtime despite education by multiple staff members about how morphine alleviates dyspnea.  Pt. stated her appetite is still not very good.  Inpatient hospice to follow up per son's request tomorrow.  Palliative care to follow for support, POC and ongoing GOC.

## 2023-05-05 LAB
ANION GAP SERPL CALCULATED.3IONS-SCNC: 14 MMOL/L (ref 5–15)
BASOPHILS # BLD AUTO: 0.14 10*3/MM3 (ref 0–0.2)
BASOPHILS NFR BLD AUTO: 0.5 % (ref 0–1.5)
BUN SERPL-MCNC: 19 MG/DL (ref 8–23)
BUN/CREAT SERPL: 46.3 (ref 7–25)
CALCIUM SPEC-SCNC: 9.9 MG/DL (ref 8.6–10.5)
CHLORIDE SERPL-SCNC: 100 MMOL/L (ref 98–107)
CO2 SERPL-SCNC: 22 MMOL/L (ref 22–29)
CREAT SERPL-MCNC: 0.41 MG/DL (ref 0.57–1)
DEPRECATED RDW RBC AUTO: 53.4 FL (ref 37–54)
EGFRCR SERPLBLD CKD-EPI 2021: 104 ML/MIN/1.73
EOSINOPHIL # BLD AUTO: 0.01 10*3/MM3 (ref 0–0.4)
EOSINOPHIL NFR BLD AUTO: 0 % (ref 0.3–6.2)
ERYTHROCYTE [DISTWIDTH] IN BLOOD BY AUTOMATED COUNT: 16.2 % (ref 12.3–15.4)
GLUCOSE SERPL-MCNC: 123 MG/DL (ref 65–99)
HCT VFR BLD AUTO: 38.3 % (ref 34–46.6)
HGB BLD-MCNC: 12.3 G/DL (ref 12–15.9)
IMM GRANULOCYTES # BLD AUTO: 1.5 10*3/MM3 (ref 0–0.05)
IMM GRANULOCYTES NFR BLD AUTO: 5.1 % (ref 0–0.5)
LYMPHOCYTES # BLD AUTO: 1.52 10*3/MM3 (ref 0.7–3.1)
LYMPHOCYTES NFR BLD AUTO: 5.2 % (ref 19.6–45.3)
MCH RBC QN AUTO: 29.3 PG (ref 26.6–33)
MCHC RBC AUTO-ENTMCNC: 32.1 G/DL (ref 31.5–35.7)
MCV RBC AUTO: 91.2 FL (ref 79–97)
MONOCYTES # BLD AUTO: 1.61 10*3/MM3 (ref 0.1–0.9)
MONOCYTES NFR BLD AUTO: 5.5 % (ref 5–12)
NEUTROPHILS NFR BLD AUTO: 24.62 10*3/MM3 (ref 1.7–7)
NEUTROPHILS NFR BLD AUTO: 83.7 % (ref 42.7–76)
NRBC BLD AUTO-RTO: 0 /100 WBC (ref 0–0.2)
PLATELET # BLD AUTO: 575 10*3/MM3 (ref 140–450)
PMV BLD AUTO: 9.2 FL (ref 6–12)
POTASSIUM SERPL-SCNC: 5.4 MMOL/L (ref 3.5–5.2)
RBC # BLD AUTO: 4.2 10*6/MM3 (ref 3.77–5.28)
SODIUM SERPL-SCNC: 136 MMOL/L (ref 136–145)
WBC NRBC COR # BLD: 29.4 10*3/MM3 (ref 3.4–10.8)

## 2023-05-05 PROCEDURE — 80048 BASIC METABOLIC PNL TOTAL CA: CPT | Performed by: INTERNAL MEDICINE

## 2023-05-05 PROCEDURE — 94799 UNLISTED PULMONARY SVC/PX: CPT

## 2023-05-05 PROCEDURE — 25010000002 HEPARIN (PORCINE) PER 1000 UNITS: Performed by: INTERNAL MEDICINE

## 2023-05-05 PROCEDURE — 25010000002 METHYLPREDNISOLONE PER 40 MG

## 2023-05-05 PROCEDURE — 99232 SBSQ HOSP IP/OBS MODERATE 35: CPT | Performed by: INTERNAL MEDICINE

## 2023-05-05 PROCEDURE — 85025 COMPLETE CBC W/AUTO DIFF WBC: CPT | Performed by: INTERNAL MEDICINE

## 2023-05-05 PROCEDURE — 25010000002 MORPHINE PER 10 MG: Performed by: FAMILY MEDICINE

## 2023-05-05 PROCEDURE — 94664 DEMO&/EVAL PT USE INHALER: CPT

## 2023-05-05 RX ORDER — HYDROXYZINE HYDROCHLORIDE 25 MG/1
25 TABLET, FILM COATED ORAL EVERY 6 HOURS PRN
Status: DISCONTINUED | OUTPATIENT
Start: 2023-05-05 | End: 2023-05-25 | Stop reason: HOSPADM

## 2023-05-05 RX ADMIN — IPRATROPIUM BROMIDE AND ALBUTEROL SULFATE 3 ML: .5; 2.5 SOLUTION RESPIRATORY (INHALATION) at 07:04

## 2023-05-05 RX ADMIN — MORPHINE SULFATE 3 MG: 4 INJECTION, SOLUTION INTRAMUSCULAR; INTRAVENOUS at 17:15

## 2023-05-05 RX ADMIN — HYDROXYZINE HYDROCHLORIDE 25 MG: 25 TABLET, FILM COATED ORAL at 08:23

## 2023-05-05 RX ADMIN — CETIRIZINE HYDROCHLORIDE 10 MG: 10 TABLET, FILM COATED ORAL at 08:17

## 2023-05-05 RX ADMIN — SULFAMETHOXAZOLE AND TRIMETHOPRIM 1 TABLET: 800; 160 TABLET ORAL at 08:19

## 2023-05-05 RX ADMIN — MORPHINE SULFATE 3 MG: 4 INJECTION, SOLUTION INTRAMUSCULAR; INTRAVENOUS at 14:42

## 2023-05-05 RX ADMIN — HYDROXYCHLOROQUINE SULFATE 200 MG: 200 TABLET, FILM COATED ORAL at 09:20

## 2023-05-05 RX ADMIN — HYDROXYCHLOROQUINE SULFATE 200 MG: 200 TABLET, FILM COATED ORAL at 20:38

## 2023-05-05 RX ADMIN — IPRATROPIUM BROMIDE AND ALBUTEROL SULFATE 3 ML: .5; 2.5 SOLUTION RESPIRATORY (INHALATION) at 20:13

## 2023-05-05 RX ADMIN — Medication 10 ML: at 20:38

## 2023-05-05 RX ADMIN — LORAZEPAM 1 MG: 1 TABLET ORAL at 18:48

## 2023-05-05 RX ADMIN — Medication 10 ML: at 08:20

## 2023-05-05 RX ADMIN — VALSARTAN 160 MG: 160 TABLET, FILM COATED ORAL at 08:17

## 2023-05-05 RX ADMIN — MORPHINE SULFATE 3 MG: 4 INJECTION, SOLUTION INTRAMUSCULAR; INTRAVENOUS at 22:16

## 2023-05-05 RX ADMIN — HEPARIN SODIUM 5000 UNITS: 5000 INJECTION, SOLUTION INTRAVENOUS; SUBCUTANEOUS at 22:16

## 2023-05-05 RX ADMIN — MONTELUKAST 10 MG: 10 TABLET, FILM COATED ORAL at 08:19

## 2023-05-05 RX ADMIN — METHYLPREDNISOLONE SODIUM SUCCINATE 60 MG: 40 INJECTION, POWDER, LYOPHILIZED, FOR SOLUTION INTRAMUSCULAR; INTRAVENOUS at 17:09

## 2023-05-05 RX ADMIN — Medication 5 MG: at 20:37

## 2023-05-05 RX ADMIN — Medication 2 SPRAY: at 20:38

## 2023-05-05 RX ADMIN — HEPARIN SODIUM 5000 UNITS: 5000 INJECTION, SOLUTION INTRAVENOUS; SUBCUTANEOUS at 05:12

## 2023-05-05 RX ADMIN — GUAIFENESIN 1200 MG: 600 TABLET, EXTENDED RELEASE ORAL at 08:19

## 2023-05-05 RX ADMIN — HEPARIN SODIUM 5000 UNITS: 5000 INJECTION, SOLUTION INTRAVENOUS; SUBCUTANEOUS at 14:38

## 2023-05-05 RX ADMIN — METHYLPREDNISOLONE SODIUM SUCCINATE 60 MG: 40 INJECTION, POWDER, LYOPHILIZED, FOR SOLUTION INTRAMUSCULAR; INTRAVENOUS at 05:12

## 2023-05-05 RX ADMIN — IPRATROPIUM BROMIDE AND ALBUTEROL SULFATE 3 ML: .5; 2.5 SOLUTION RESPIRATORY (INHALATION) at 11:46

## 2023-05-05 RX ADMIN — PANTOPRAZOLE SODIUM 40 MG: 40 TABLET, DELAYED RELEASE ORAL at 08:17

## 2023-05-05 RX ADMIN — SENNOSIDES AND DOCUSATE SODIUM 2 TABLET: 8.6; 5 TABLET ORAL at 08:20

## 2023-05-05 RX ADMIN — AMLODIPINE BESYLATE 5 MG: 5 TABLET ORAL at 08:19

## 2023-05-05 RX ADMIN — LORAZEPAM 1 MG: 1 TABLET ORAL at 11:09

## 2023-05-05 RX ADMIN — GUAIFENESIN 1200 MG: 600 TABLET, EXTENDED RELEASE ORAL at 20:38

## 2023-05-05 NOTE — PROGRESS NOTES
Continued Stay Note  [facility]     Patient Name: Tala Ramsey  MRN: [id number]  Today's Date: 5/5/2023    Admit Date: 4/23/2023    Plan: CM update   Discharge Plan     Row Name 05/05/23 1506       Plan    Plan Comments Hospice met with 3 sons, Kalyan, Sebastian, and Guillermo, in waiting room. Rn discussed meeting in patient room as to speak with patient but zulay relayed that patient with anxiety and that they would relay to patient meeting. They also relayed that the patient requested that they get the information for her. Rn actively listened as Kalyan relayed that per this morning’s visit with pulmonology the goal at present is to attempt to wean to 6L/nc and assess how patient does with this. He relays that the patient has said that she does not want to go home just to die.  Rn discusses hospice, outpatient, inpatient, services provided, high flow oxygen, symptom management, medication, and options for care. Kalyan relays that at present they would like to continue current plan of care and do not want inpatient hospice. Rn offered support, provided contact information. Updated EVELIN Coburn RN Palliative, and April RN [facility]. Updated Dr. Funes and case management via epic chat.    Final Discharge Disposition Code --               Discharge Codes    No documentation.               Expected Discharge Date and Time     Expected Discharge Date Expected Discharge Time    May 7, 2023             Anh Singh RN

## 2023-05-05 NOTE — PLAN OF CARE
Goal Outcome Evaluation:  Plan of Care Reviewed With: family        Progress: no change  Outcome Evaluation: Pt's family met with inpatient Hospice team today, see note. Pt continues to decline to utilize prn morphine for relief of air hunger, except at bedtime; Hospice RN reported that she spoke with family about trying PO morphine, as with slower absorption, may cause less drowsiness. Per Hospice RN Anh, family has opted to continue current POC, including HF O2 (unable to wean thus far), and have declined hospice services at this time. Palliative following for continued support in ongoing GOC/POC discussion, will continue to encourage pt to utilize morphine for relief of air hunger, which may enable weaning of FiO2 to level that can be provided at home.    1300 Palliative IDT meeting: JOYCE PHILLIPS, RN, SW,     After hours, weekends and holidays, contact Palliative Provider by calling [phone]

## 2023-05-05 NOTE — PROGRESS NOTES
Continued Stay Note  [facility]     Patient Name: Tala Ramsey  MRN: [id number]  Today's Date: 5/5/2023    Admit Date: 4/23/2023    Plan: CM update   Discharge Plan     Row Name 05/05/23 1506       Plan    Plan Comments Hospice met with 3 sons, Kalyan, Sebastian, and Guillermo, in waiting room. Rn discussed meeting in patient room as to speak with patient but zulay relayed that patient with anxiety and that they would relay to patient meeting. They also relayed that the patient requested that they get the information for her. Rn actively listened as Kalyan relayed that per this morning’s visit with pulmonology the goal at present is to attempt to wean to 6L/nc and assess how patient does with this. He relays that the patient has said that she does not want to go home just to die.  Rn discusses hospice, outpatient, inpatient, services provided, high flow oxygen, symptom management, medication, and options for care. Kalyan relays that at present they would like to continue current plan of care and do not want inpatient hospice. Rn offered support, provided contact information. Updated EVELIN Coburn RN Palliative, and April RN [facility]. Updated Dr. Funes and case management via epic chat.    Final Discharge Disposition Code 51 - hospice medical facility               Discharge Codes    No documentation.               Expected Discharge Date and Time     Expected Discharge Date Expected Discharge Time    May 7, 2023             Anh Singh RN

## 2023-05-05 NOTE — PROGRESS NOTES
[facility] Medicine Services  PROGRESS NOTE    Patient Name: Tala Ramsey  : 1950  MRN: [id number]    Date of Admission: 2023  Primary Care Physician: Sg Horne MD    Subjective   Subjective     CC:  Hypoxia, pneumonia    HPI:  Patient resting in bed. No family present but notes her son was just with her and stepped out. Patient reports doing ok- having some intertmittent anxiety but fine at the moment. Remains on HFNC with coughing throughout and desat while in room    ROS:  Gen- No fevers, chills  CV- No chest pain, palpitations  Resp- +cough, dyspnea  GI- No N/V/D, no abd pain    Objective   Objective     Vital Signs:   Temp:  [97.7 °F (36.5 °C)-98.4 °F (36.9 °C)] 97.7 °F (36.5 °C)  Heart Rate:  [] 81  Resp:  [20-23] 20  BP: (146-168)/(61-96) 146/73  Flow (L/min):  [40] 40     Physical Exam:  Constitutional: , awake, alert, appears chronically ill  HENT: NCAT, mucous membranes moist  Respiratory: poor air movement bilaterally with tachypnea, mildly labored, remains on high flow at 65% FiO2 40LPM- desat to mid 80s while in room  Cardiovascular: RRR, no murmurs, rubs, or gallops  Gastrointestinal: soft, nontender, nondistended  Musculoskeletal: No bilateral ankle edema  Psychiatric: flat affect, cooperative  Neurologic: Oriented x 3, overall very weak/deconditioned.  Skin: No rashes    Exam is unchanged from 5/3/23      Results Reviewed:  LAB RESULTS:      Lab 23  0530 23  0945 23  0742 23  0813   WBC 29.40* 23.48* 20.94* 21.65*   HEMOGLOBIN 12.3 11.7* 11.3* 11.3*   HEMATOCRIT 38.3 36.7 35.2 35.4   PLATELETS 575* 735* 662* 768*   NEUTROS ABS 24.62*  --   --   --    IMMATURE GRANS (ABS) 1.50*  --   --   --    LYMPHS ABS 1.52  --   --   --    MONOS ABS 1.61*  --   --   --    EOS ABS 0.01  --   --   --    MCV 91.2 91.1 92.6 89.2         Lab 23  0530 23  0945 23  0742 23  0813   SODIUM 136 132* 133* 135*    POTASSIUM 5.4* 4.5 5.2 4.7   CHLORIDE 100 94* 95* 95*   CO2 22.0 24.0 26.0 25.0   ANION GAP 14.0 14.0 12.0 15.0   BUN 19 23 19 18   CREATININE 0.41* 0.39* 0.47* 0.53*   EGFR 104.0 105.3 100.7 97.8   GLUCOSE 123* 151* 136* 126*   CALCIUM 9.9 8.9 8.7 9.0   MAGNESIUM  --   --  2.7* 2.6*                         Brief Urine Lab Results  (Last result in the past 365 days)      Color   Clarity   Blood   Leuk Est   Nitrite   Protein   CREAT   Urine HCG        04/23/23 1757 Yellow   Clear   Trace   Negative   Negative   Negative                 Microbiology Results Abnormal     Procedure Component Value - Date/Time    Blood Culture - Blood, Arm, Right [[id number]]  (Normal) Collected: 04/23/23 1125    Lab Status: Final result Specimen: Blood from Arm, Right Updated: 04/28/23 1145     Blood Culture No growth at 5 days    Blood Culture - Blood, Arm, Left [[id number]]  (Normal) Collected: 04/23/23 1125    Lab Status: Final result Specimen: Blood from Arm, Left Updated: 04/28/23 1145     Blood Culture No growth at 5 days    S. Pneumo Ag Urine or CSF - Urine, Urine, Clean Catch [[id number]]  (Normal) Collected: 04/23/23 1757    Lab Status: Final result Specimen: Urine, Clean Catch Updated: 04/24/23 0949     Strep Pneumo Ag Negative    Legionella Antigen, Urine - Urine, Urine, Clean Catch [[id number]]  (Normal) Collected: 04/23/23 1757    Lab Status: Final result Specimen: Urine, Clean Catch Updated: 04/24/23 0949     LEGIONELLA ANTIGEN, URINE Negative    Respiratory Panel PCR w/COVID-19(SARS-CoV-2) CHARI/MARISSA/APOLINAR/PAD/COR/MAD/AMY In-House, NP Swab in UTM/VTM, 3-4 HR TAT - Swab, Nasopharynx [[id number]]  (Normal) Collected: 04/24/23 0544    Lab Status: Final result Specimen: Swab from Nasopharynx Updated: 04/24/23 0647     ADENOVIRUS, PCR Not Detected     Coronavirus 229E Not Detected     Coronavirus HKU1 Not Detected     Coronavirus NL63 Not Detected     Coronavirus OC43 Not Detected     COVID19 Not Detected     Human Metapneumovirus  Not Detected     Human Rhinovirus/Enterovirus Not Detected     Influenza A PCR Not Detected     Influenza B PCR Not Detected     Parainfluenza Virus 1 Not Detected     Parainfluenza Virus 2 Not Detected     Parainfluenza Virus 3 Not Detected     Parainfluenza Virus 4 Not Detected     RSV, PCR Not Detected     Bordetella pertussis pcr Not Detected     Bordetella parapertussis PCR Not Detected     Chlamydophila pneumoniae PCR Not Detected     Mycoplasma pneumo by PCR Not Detected    Narrative:      In the setting of a positive respiratory panel with a viral infection PLUS a negative procalcitonin without other underlying concern for bacterial infection, consider observing off antibiotics or discontinuation of antibiotics and continue supportive care. If the respiratory panel is positive for atypical bacterial infection (Bordetella pertussis, Chlamydophila pneumoniae, or Mycoplasma pneumoniae), consider antibiotic de-escalation to target atypical bacterial infection.    MRSA Screen, PCR (Inpatient) - Swab, Nares [[id number]]  (Normal) Collected: 04/23/23 1540    Lab Status: Final result Specimen: Swab from Nares Updated: 04/23/23 1651     MRSA PCR Negative    Narrative:      The negative predictive value of this diagnostic test is high and should only be used to consider de-escalating anti-MRSA therapy. A positive result may indicate colonization with MRSA and must be correlated clinically.  MRSA Negative    COVID PRE-OP / PRE-PROCEDURE SCREENING ORDER (NO ISOLATION) - Swab, Nasopharynx [[id number]]  (Normal) Collected: 04/23/23 1125    Lab Status: Final result Specimen: Swab from Nasopharynx Updated: 04/23/23 1225    Narrative:      The following orders were created for panel order COVID PRE-OP / PRE-PROCEDURE SCREENING ORDER (NO ISOLATION) - Swab, Nasopharynx.  Procedure                               Abnormality         Status                     ---------                               -----------         ------                      COVID-19, FLU A/B, RSV P...[[id number]]  Normal              Final result                 Please view results for these tests on the individual orders.    COVID-19, FLU A/B, RSV PCR - Swab, Nasopharynx [[id number]]  (Normal) Collected: 04/23/23 1125    Lab Status: Final result Specimen: Swab from Nasopharynx Updated: 04/23/23 1225     COVID19 Not Detected     Influenza A PCR Not Detected     Influenza B PCR Not Detected     RSV, PCR Not Detected    Narrative:      Fact sheet for providers: https://www.fda.gov/media/842516/download    Fact sheet for patients: https://www.fda.gov/media/798088/download    Test performed by PCR.          No radiology results from the last 24 hrs        Current medications:  Scheduled Meds:amLODIPine, 5 mg, Oral, Q24H   And  valsartan, 160 mg, Oral, Q24H  cetirizine, 10 mg, Oral, Daily  fluticasone, 2 spray, Each Nare, Nightly  guaiFENesin, 1,200 mg, Oral, Q12H  heparin (porcine), 5,000 Units, Subcutaneous, Q8H  hydroxychloroquine, 200 mg, Oral, Q12H  ipratropium-albuterol, 3 mL, Nebulization, 4x Daily - RT  melatonin, 5 mg, Oral, Nightly  methylPREDNISolone sodium succinate, 60 mg, Intravenous, Q12H  montelukast, 10 mg, Oral, Daily  pantoprazole, 40 mg, Oral, Daily  senna-docusate sodium, 2 tablet, Oral, BID  sodium chloride, 10 mL, Intravenous, Q12H  sulfamethoxazole-trimethoprim, 1 tablet, Oral, Once per day on Mon Wed Fri      Continuous Infusions:   PRN Meds:.•  acetaminophen **OR** acetaminophen **OR** acetaminophen  •  GI cocktail  •  senna-docusate sodium **AND** polyethylene glycol **AND** bisacodyl **AND** bisacodyl  •  calcium carbonate  •  ipratropium-albuterol  •  LORazepam  •  Magnesium Standard Dose Replacement - Follow Nurse / BPA Driven Protocol  •  Morphine  •  ondansetron **OR** ondansetron  •  simethicone  •  sodium chloride  •  sodium chloride  •  sodium chloride  •  tiZANidine    Assessment & Plan   Assessment & Plan     Active Hospital Problems     Diagnosis  POA   • **Acute hypoxemic respiratory failure -likely due to ILD exacerbation [J96.01]  Yes   • Pulmonary fibrosis [J84.10]  Yes   • ILD (interstitial lung disease) -likely RA associated ILD with acute exacerbation [J84.9]  Yes   • Multifocal pneumonia [J18.9]  Yes      Resolved Hospital Problems   No resolved problems to display.        Brief Hospital Course to date:  Tala Ramsey is a 72 y.o. female w/ hx interstitial lung dz, RA (on humira until recently, recent steroid injections) who presents w/ progressive dyspnea, cough over ~10 days, failed levaquin. Hypoxic upon admission. Ct chest w/o contrast w/ bilateral multifocal air space dz. resp pcr panel negative. Had progressive hypoxia evening after admission placed on hi flow canula      This patient's problems and plans were partially entered by my partner and updated as appropriate by me 05/05/23.        Acute hypoxic resp failure, worsening  Pulmonary fibrosis/ILD  Bilateral Pneumonia  -likely combination of progession/flare of interstitial lung disease and infection  -remains on High flow with difficulty weaning and significant drops with minimal exertion (simply rolling in bed to use bedpan)  -s/p full course of zosyn & azithromycin  -continue 3x weekly bactrim ds (pcp prophylaxis as has been on humira and steroids)  -continue scheduled & PRN duonebs  -Pulm following: completed 3 days of solumedrol (1g daily x 3 days) on 4/26/23; now on solumedrol 60mg IV BID with plan for prolonged taper pending goals of care  -diuresis PRN as tolerated  -palliative also following, guarded prognosis at best and my partner discussed this in detail yesterday with patient and DIL at bedside, have also discussed with other family members as well. continue low dose ativan for anxiety.Pulmonary has d/w patient regarding hospice and re-eval as patient would prefer to transition to IP hospice than going home with hospice.    RA  -holding humira and plaquenil      S/p Hyponatremia, improved  -improved    HTN  -continue amlodpine/valsartan    Chronic anemia   -monitor    Goals/limits  -Patient is DNR/DNI (in event of further clinical decompensation patient would NOT want mechanical ventilation; in this case patient would wish to pursue comfort measures;  - daily GOC discussions ongoing with patient and family, hospice and palliative following.    Expected Discharge Location and Transportation: home  Expected Discharge TBD  Expected Discharge Date: 5/7/2023; Expected Discharge Time:      DVT prophylaxis:  Medical DVT prophylaxis orders are present. sq heparin    AM-PAC 6 Clicks Score (PT): 14 (05/04/23 2000)    CODE STATUS:   Code Status and Medical Interventions:   Ordered at: 04/23/23 1310     Medical Intervention Limits:    NO intubation (DNI)     Level Of Support Discussed With:    Patient     Code Status (Patient has no pulse and is not breathing):    No CPR (Do Not Attempt to Resuscitate)     Medical Interventions (Patient has pulse or is breathing):    Limited Support       Catalina Funes MD  05/05/23

## 2023-05-05 NOTE — PROGRESS NOTES
INPATIENT PULMONARY SERVICE  PROGRESS NOTE     Hospital LOS: 12 days    Ms. Tala Ramsey, is followed for a Chief Complaint of:  Chief Complaint   Patient presents with   • Shortness of Breath       Acute hypoxemic respiratory failure -likely due to ILD exacerbation    Multifocal pneumonia    Pulmonary fibrosis    ILD (interstitial lung disease) -likely RA associated ILD with acute exacerbation      Subjective   S     Interval History:  Remains on HFNC. Worsened breathing this AM.        The patient's relevant past medical, surgical and social history were reviewed and updated in Epic as appropriate.      ROS:   Constitutional: Negative for fever.   Respiratory: Positive for dyspnea.   Cardiovascular: Negative for chest pain.   Gastrointestinal: Negative for  nausea, vomiting and diarrhea.     Objective   O     Vitals  Temp  Min: 97.7 °F (36.5 °C)  Max: 98.4 °F (36.9 °C)  BP  Min: 146/73  Max: 168/76  Pulse  Min: 67  Max: 107  Resp  Min: 20  Max: 23  SpO2  Min: 89 %  Max: 97 % Flow (L/min)  Min: 40  Max: 40    I/O 24 HR (7:00 AM-6:59AM):  Intake/Output       05/04/23 0700 - 05/05/23 0659 05/05/23 0700 - 05/06/23 0659    Intake (ml) 600 --    Output (ml) 2425 300    Net (ml) -1825 -300          Medications (Drips):       Physical Examination    Telemetry: Normal sinus rhythm.    Constitutional:  No acute distress.  Conversant. Sitting up in bed on HFNC.    Cardiovascular: Regular rate and rhythm.  No murmurs, rub or gallop.   Respiratory: Normal symmetric chest expansion.  Increased respiratory effort.  Diminished bilaterally. No wheezing.    Abdominal:  Soft. No masses.   Non-tender. No distension.   No hepatosplenomegaly.   Extremities: No digital cyanosis or clubbing.  No peripheral edema.   Neurological:   Alert and Oriented to person, place, and time.   Moves all extremities.            Results from last 7 days   Lab Units 05/05/23  0530 05/01/23  0945 04/30/23  0742   WBC 10*3/mm3 29.40* 23.48* 20.94*    HEMOGLOBIN g/dL 12.3 11.7* 11.3*   MCV fL 91.2 91.1 92.6   PLATELETS 10*3/mm3 575* 735* 662*     Results from last 7 days   Lab Units 05/05/23  0530 05/01/23  0945 04/30/23  0742 04/29/23  0813   SODIUM mmol/L 136 132* 133* 135*   POTASSIUM mmol/L 5.4* 4.5 5.2 4.7   CO2 mmol/L 22.0 24.0 26.0 25.0   CREATININE mg/dL 0.41* 0.39* 0.47* 0.53*   MAGNESIUM mg/dL  --   --  2.7* 2.6*     Serum creatinine: 0.41 mg/dL (L) 05/05/23 0530  Estimated creatinine clearance: 115.9 mL/min (A)              Images:     Imaging Results (Last 24 Hours)     ** No results found for the last 24 hours. **          I reviewed the patient's new clinical results.  I reviewed the patient's new imaging results and agree with the interpretation.    Assessment & Plan   A / P     Ms. Ramsey is a 71yo F with a history of RA and possible lupus on Humira and Plaquenil followed by Dr. Weinstein, hiatal hernia, GERD, hypertension, and RA associated lung disease followed by Dr. Tomlinson who presented to [facility] on [facility] with bilateral infiltrates/possible pneumonia and was admitted to Hospital Medicine.     She was continued on antibiotics. She was started on IV Solumedrol.    Since admission, she has continued to require HFNC and her worsening is believed secondary to an exacerbation of her underlying lung disease. She completed 3 days of pulse dose steroids on 4/26/23 and remains on Solumedrol 60mg IV q12 hours.     Diet: Regular/House Diet; Texture: Soft to Chew (NDD 3); Soft to Chew: Whole Meat; Fluid Consistency: Thin (IDDSI 0)  Code Status and Medical Interventions:   Ordered at: 04/23/23 1310     Medical Intervention Limits:    NO intubation (DNI)     Level Of Support Discussed With:    Patient     Code Status (Patient has no pulse and is not breathing):    No CPR (Do Not Attempt to Resuscitate)     Medical Interventions (Patient has pulse or is breathing):    Limited Support       Active Hospital Problems    Diagnosis    • **Acute hypoxemic respiratory  "failure -likely due to ILD exacerbation    • Pulmonary fibrosis    • ILD (interstitial lung disease) -likely RA associated ILD with acute exacerbation    • Multifocal pneumonia        Assessment / Plan:  1. Wean HFNC as tolerated. Goal oxygen saturation of > 88%. I have asked her to let Respiratory know if she wants to try 6L regular nasal cannula today to see if she can tolerate this.   2. Continue IV Solumedrol.   3. Bactrim for prophylaxis.   4. Completed a course of empiric antibiotics.  5. Palliative Care is following.    Her lack of improvement and inability to wean off HFNC is concerning and we are most likely heading toward comfort measures if she does not have any significant improvement in the next few days. She does report that she does not want to go home if it is \"just to die.\" She would rather seek inpatient Hospice if that is which direction things are heading.     I discussed the patient's findings and my recommendations with patient and family      Destinee Denise, DO  Pulmonary and Critical Care Medicine         "

## 2023-05-05 NOTE — CASE MANAGEMENT/SOCIAL WORK
Continued Stay Note  [facility]     Patient Name: Tala Ramsey  MRN: [id number]  Today's Date: 5/5/2023    Admit Date: 4/23/2023    Plan: CM update   Discharge Plan     Row Name 05/05/23 1149       Plan    Plan CM update    Plan Comments Patient is not ready for discharge at this time- Remains on high flow 02. Palliative care and Hospice following - CM will continue to follow -derrick [phone]    Final Discharge Disposition Code 30 - still a patient    Row Name 05/05/23 1019       Plan    Plan Comments Hospice spoke with son, Guillermo via telephone. He is to speak with his brothers and call hospice back. Contact information provided. Please call [phone] with questions/concerns.               Discharge Codes    No documentation.               Expected Discharge Date and Time     Expected Discharge Date Expected Discharge Time    May 7, 2023             Derrick Riggs RN

## 2023-05-05 NOTE — PLAN OF CARE
Goal Outcome Evaluation:  Plan of Care Reviewed With: patient        Progress: no change       VSS. No acute changes. Ms ziegler continues to have increased anxiety with activity. Her lung sounds remain diminished and she is on a Hi-baylee NC at 55% and 40 lpm. Otherwise she rested well through the night.

## 2023-05-05 NOTE — PLAN OF CARE
Goal Outcome Evaluation:  Plan of Care Reviewed With: patient        Progress: no change   VSS. Highflow NC in use. Pt 88-93% 40L 55%. PRNs for anxiety administered. Pt educated on morphine use for dyspnea. Pt agreeable to try IV morphine. IV replaced. Sons at bedside. Skin and fall interventions in place.

## 2023-05-05 NOTE — PROGRESS NOTES
Continued Stay Note  [facility]     Patient Name: Tala Ramsey  MRN: [id number]  Today's Date: 5/5/2023    Admit Date: 4/23/2023    Plan:  update   Discharge Plan     Row Name 05/05/23 1019       Plan    Plan Comments Hospice spoke with son, Guillermo via telephone. He is to speak with his brothers and call hospice back. Contact information provided. Please call [phone] with questions/concerns.               Discharge Codes    No documentation.               Expected Discharge Date and Time     Expected Discharge Date Expected Discharge Time    May 7, 2023             Anh Singh RN

## 2023-05-06 PROCEDURE — 25010000002 METHYLPREDNISOLONE PER 40 MG

## 2023-05-06 PROCEDURE — 94799 UNLISTED PULMONARY SVC/PX: CPT

## 2023-05-06 PROCEDURE — 25010000002 MORPHINE PER 10 MG: Performed by: FAMILY MEDICINE

## 2023-05-06 PROCEDURE — 25010000002 HEPARIN (PORCINE) PER 1000 UNITS: Performed by: INTERNAL MEDICINE

## 2023-05-06 PROCEDURE — 94664 DEMO&/EVAL PT USE INHALER: CPT

## 2023-05-06 PROCEDURE — 99232 SBSQ HOSP IP/OBS MODERATE 35: CPT | Performed by: INTERNAL MEDICINE

## 2023-05-06 RX ORDER — POLYETHYLENE GLYCOL 3350 17 G/17G
17 POWDER, FOR SOLUTION ORAL DAILY PRN
Status: DISCONTINUED | OUTPATIENT
Start: 2023-05-06 | End: 2023-05-25 | Stop reason: HOSPADM

## 2023-05-06 RX ORDER — BISACODYL 10 MG
10 SUPPOSITORY, RECTAL RECTAL DAILY PRN
Status: DISCONTINUED | OUTPATIENT
Start: 2023-05-06 | End: 2023-05-25 | Stop reason: HOSPADM

## 2023-05-06 RX ORDER — AMOXICILLIN 250 MG
2 CAPSULE ORAL NIGHTLY PRN
Status: DISCONTINUED | OUTPATIENT
Start: 2023-05-06 | End: 2023-05-25 | Stop reason: HOSPADM

## 2023-05-06 RX ORDER — BISACODYL 5 MG/1
5 TABLET, DELAYED RELEASE ORAL DAILY PRN
Status: DISCONTINUED | OUTPATIENT
Start: 2023-05-06 | End: 2023-05-25 | Stop reason: HOSPADM

## 2023-05-06 RX ADMIN — HYDROXYZINE HYDROCHLORIDE 25 MG: 25 TABLET, FILM COATED ORAL at 13:10

## 2023-05-06 RX ADMIN — SIMETHICONE 80 MG: 80 TABLET, CHEWABLE ORAL at 12:01

## 2023-05-06 RX ADMIN — METHYLPREDNISOLONE SODIUM SUCCINATE 60 MG: 40 INJECTION, POWDER, LYOPHILIZED, FOR SOLUTION INTRAMUSCULAR; INTRAVENOUS at 17:00

## 2023-05-06 RX ADMIN — GUAIFENESIN 1200 MG: 600 TABLET, EXTENDED RELEASE ORAL at 20:51

## 2023-05-06 RX ADMIN — METHYLPREDNISOLONE SODIUM SUCCINATE 60 MG: 40 INJECTION, POWDER, LYOPHILIZED, FOR SOLUTION INTRAMUSCULAR; INTRAVENOUS at 06:05

## 2023-05-06 RX ADMIN — Medication 10 ML: at 20:51

## 2023-05-06 RX ADMIN — NYSTATIN 500000 UNITS: 100000 SUSPENSION ORAL at 17:01

## 2023-05-06 RX ADMIN — IPRATROPIUM BROMIDE AND ALBUTEROL SULFATE 3 ML: .5; 2.5 SOLUTION RESPIRATORY (INHALATION) at 16:53

## 2023-05-06 RX ADMIN — CETIRIZINE HYDROCHLORIDE 10 MG: 10 TABLET, FILM COATED ORAL at 08:24

## 2023-05-06 RX ADMIN — NYSTATIN 500000 UNITS: 100000 SUSPENSION ORAL at 20:51

## 2023-05-06 RX ADMIN — IPRATROPIUM BROMIDE AND ALBUTEROL SULFATE 3 ML: .5; 2.5 SOLUTION RESPIRATORY (INHALATION) at 19:42

## 2023-05-06 RX ADMIN — PANTOPRAZOLE SODIUM 40 MG: 40 TABLET, DELAYED RELEASE ORAL at 08:24

## 2023-05-06 RX ADMIN — GUAIFENESIN 1200 MG: 600 TABLET, EXTENDED RELEASE ORAL at 08:24

## 2023-05-06 RX ADMIN — SIMETHICONE 80 MG: 80 TABLET, CHEWABLE ORAL at 19:46

## 2023-05-06 RX ADMIN — HYDROXYCHLOROQUINE SULFATE 200 MG: 200 TABLET, FILM COATED ORAL at 20:51

## 2023-05-06 RX ADMIN — LORAZEPAM 1 MG: 1 TABLET ORAL at 16:59

## 2023-05-06 RX ADMIN — IPRATROPIUM BROMIDE AND ALBUTEROL SULFATE 3 ML: .5; 2.5 SOLUTION RESPIRATORY (INHALATION) at 13:42

## 2023-05-06 RX ADMIN — Medication 10 ML: at 08:25

## 2023-05-06 RX ADMIN — Medication 5 MG: at 20:51

## 2023-05-06 RX ADMIN — AMLODIPINE BESYLATE 5 MG: 5 TABLET ORAL at 08:24

## 2023-05-06 RX ADMIN — VALSARTAN 160 MG: 160 TABLET, FILM COATED ORAL at 08:24

## 2023-05-06 RX ADMIN — HEPARIN SODIUM 5000 UNITS: 5000 INJECTION, SOLUTION INTRAVENOUS; SUBCUTANEOUS at 21:00

## 2023-05-06 RX ADMIN — HEPARIN SODIUM 5000 UNITS: 5000 INJECTION, SOLUTION INTRAVENOUS; SUBCUTANEOUS at 06:05

## 2023-05-06 RX ADMIN — MONTELUKAST 10 MG: 10 TABLET, FILM COATED ORAL at 08:24

## 2023-05-06 RX ADMIN — HEPARIN SODIUM 5000 UNITS: 5000 INJECTION, SOLUTION INTRAVENOUS; SUBCUTANEOUS at 13:10

## 2023-05-06 RX ADMIN — MORPHINE SULFATE 3 MG: 4 INJECTION, SOLUTION INTRAMUSCULAR; INTRAVENOUS at 19:47

## 2023-05-06 RX ADMIN — LORAZEPAM 1 MG: 1 TABLET ORAL at 08:32

## 2023-05-06 RX ADMIN — HYDROXYCHLOROQUINE SULFATE 200 MG: 200 TABLET, FILM COATED ORAL at 08:24

## 2023-05-06 NOTE — PLAN OF CARE
Goal Outcome Evaluation:  Plan of Care Reviewed With: patient        Progress: no change  Outcome Evaluation: PT still on highflow; hopeful to wean to 6L tomorrow but still desats with any activity today on high flow. Poor PO intake. Nystatin added per palliative. Ativan and atarax given, tolerated well.

## 2023-05-06 NOTE — PLAN OF CARE
Problem: COPD (Chronic Obstructive Pulmonary Disease) Comorbidity  Goal: Maintenance of COPD Symptom Control  Intervention: Maintain COPD-Symptom Control  Recent Flowsheet Documentation  Taken 5/6/2023 1515 by Rosalinda Kerns RN  Supportive Measures:   active listening utilized   decision-making supported   verbalization of feelings encouraged     Problem: Pain Chronic (Persistent) (Comorbidity Management)  Goal: Acceptable Pain Control and Functional Ability  Intervention: Optimize Psychosocial Wellbeing  Recent Flowsheet Documentation  Taken 5/6/2023 1515 by Rosalinda Kerns RN  Supportive Measures:   active listening utilized   decision-making supported   verbalization of feelings encouraged  Family/Support System Care:   presence promoted   support provided   involvement promoted   Goal Outcome Evaluation:  Plan of Care Reviewed With: patient, son, grandchild(nikolas)        Progress: no change  Outcome Evaluation: Pt is on 50%/50L HFNC. Pt desats with minimal activity. Pt states she is taking morphine and ativan, atarax to control symptoms. Reviewed use. Pt states she slept well last night with melatonin and morphine. Pt reports moderate amt of dyspnea with meds.  Continue to see if pt is able to wean to 6 L pnc in hopes to get home. Hospice following.  Prn lasix for diuresis. Pt states she thinks it helped on the days she took lasix. Pt has active thrush and dry mouth. Nystatin swish and spit ordered to alternate between palliative rinse to keep mouth moist/clean.  Continue palliative support. Pt has had multiple loose bms today. Bowel meds changed to prn. Last senna s was yesterday morning.  Continue to monitor for opioid induced constipation.

## 2023-05-06 NOTE — PROGRESS NOTES
[facility] Medicine Services  PROGRESS NOTE    Patient Name: Tala Ramsey  : 1950  MRN: [id number]    Date of Admission: 2023  Primary Care Physician: Sg Horne MD    Subjective   Subjective     CC:  Hypoxia, pneumonia    HPI:  Patient resting in bed. No family present. Remains on HFNC. No significant changes noted.   Family ongoing meetings with hospice to decide ultimate dispo    ROS:  Gen- No fevers, chills  CV- No chest pain, palpitations  Resp- +cough, +dyspnea  GI- No N/V/D, no abd pain    Objective   Objective     Vital Signs:   Temp:  [97.7 °F (36.5 °C)-98.3 °F (36.8 °C)] 97.7 °F (36.5 °C)  Heart Rate:  [] 86  Resp:  [18-21] 20  BP: (142-177)/(71-98) 177/78  Flow (L/min):  [40-45] 45     Physical Exam:  Constitutional: , awake, alert, appears chronically ill  HENT: NCAT, mucous membranes moist  Respiratory: unchanged on HFNC with poor air movement  Cardiovascular: RRR, no murmurs, rubs, or gallops  Gastrointestinal: soft, nontender, nondistended  Musculoskeletal: No bilateral ankle edema  Psychiatric: flat affect, cooperative  Neurologic: Oriented x 3, overall very weak/deconditioned.  Skin: No rashes        Results Reviewed:  LAB RESULTS:      Lab 23  0530 23  0945 23  0742   WBC 29.40* 23.48* 20.94*   HEMOGLOBIN 12.3 11.7* 11.3*   HEMATOCRIT 38.3 36.7 35.2   PLATELETS 575* 735* 662*   NEUTROS ABS 24.62*  --   --    IMMATURE GRANS (ABS) 1.50*  --   --    LYMPHS ABS 1.52  --   --    MONOS ABS 1.61*  --   --    EOS ABS 0.01  --   --    MCV 91.2 91.1 92.6         Lab 23  0530 23  0945 23  0742   SODIUM 136 132* 133*   POTASSIUM 5.4* 4.5 5.2   CHLORIDE 100 94* 95*   CO2 22.0 24.0 26.0   ANION GAP 14.0 14.0 12.0   BUN 19 23 19   CREATININE 0.41* 0.39* 0.47*   EGFR 104.0 105.3 100.7   GLUCOSE 123* 151* 136*   CALCIUM 9.9 8.9 8.7   MAGNESIUM  --   --  2.7*                         Brief Urine Lab Results  (Last result in  the past 365 days)      Color   Clarity   Blood   Leuk Est   Nitrite   Protein   CREAT   Urine HCG        04/23/23 1757 Yellow   Clear   Trace   Negative   Negative   Negative                 Microbiology Results Abnormal     Procedure Component Value - Date/Time    Blood Culture - Blood, Arm, Right [[id number]]  (Normal) Collected: 04/23/23 1125    Lab Status: Final result Specimen: Blood from Arm, Right Updated: 04/28/23 1145     Blood Culture No growth at 5 days    Blood Culture - Blood, Arm, Left [[id number]]  (Normal) Collected: 04/23/23 1125    Lab Status: Final result Specimen: Blood from Arm, Left Updated: 04/28/23 1145     Blood Culture No growth at 5 days    S. Pneumo Ag Urine or CSF - Urine, Urine, Clean Catch [[id number]]  (Normal) Collected: 04/23/23 1757    Lab Status: Final result Specimen: Urine, Clean Catch Updated: 04/24/23 0949     Strep Pneumo Ag Negative    Legionella Antigen, Urine - Urine, Urine, Clean Catch [[id number]]  (Normal) Collected: 04/23/23 1757    Lab Status: Final result Specimen: Urine, Clean Catch Updated: 04/24/23 0949     LEGIONELLA ANTIGEN, URINE Negative    Respiratory Panel PCR w/COVID-19(SARS-CoV-2) CHARI/MARISSA/APOLINAR/PAD/COR/MAD/AMY In-House, NP Swab in UTM/VTM, 3-4 HR TAT - Swab, Nasopharynx [[id number]]  (Normal) Collected: 04/24/23 0544    Lab Status: Final result Specimen: Swab from Nasopharynx Updated: 04/24/23 0647     ADENOVIRUS, PCR Not Detected     Coronavirus 229E Not Detected     Coronavirus HKU1 Not Detected     Coronavirus NL63 Not Detected     Coronavirus OC43 Not Detected     COVID19 Not Detected     Human Metapneumovirus Not Detected     Human Rhinovirus/Enterovirus Not Detected     Influenza A PCR Not Detected     Influenza B PCR Not Detected     Parainfluenza Virus 1 Not Detected     Parainfluenza Virus 2 Not Detected     Parainfluenza Virus 3 Not Detected     Parainfluenza Virus 4 Not Detected     RSV, PCR Not Detected     Bordetella pertussis pcr Not Detected      Bordetella parapertussis PCR Not Detected     Chlamydophila pneumoniae PCR Not Detected     Mycoplasma pneumo by PCR Not Detected    Narrative:      In the setting of a positive respiratory panel with a viral infection PLUS a negative procalcitonin without other underlying concern for bacterial infection, consider observing off antibiotics or discontinuation of antibiotics and continue supportive care. If the respiratory panel is positive for atypical bacterial infection (Bordetella pertussis, Chlamydophila pneumoniae, or Mycoplasma pneumoniae), consider antibiotic de-escalation to target atypical bacterial infection.    MRSA Screen, PCR (Inpatient) - Swab, Nares [[id number]]  (Normal) Collected: 04/23/23 1540    Lab Status: Final result Specimen: Swab from Nares Updated: 04/23/23 1651     MRSA PCR Negative    Narrative:      The negative predictive value of this diagnostic test is high and should only be used to consider de-escalating anti-MRSA therapy. A positive result may indicate colonization with MRSA and must be correlated clinically.  MRSA Negative    COVID PRE-OP / PRE-PROCEDURE SCREENING ORDER (NO ISOLATION) - Swab, Nasopharynx [[id number]]  (Normal) Collected: 04/23/23 1125    Lab Status: Final result Specimen: Swab from Nasopharynx Updated: 04/23/23 1225    Narrative:      The following orders were created for panel order COVID PRE-OP / PRE-PROCEDURE SCREENING ORDER (NO ISOLATION) - Swab, Nasopharynx.  Procedure                               Abnormality         Status                     ---------                               -----------         ------                     COVID-19, FLU A/B, RSV P...[[id number]]  Normal              Final result                 Please view results for these tests on the individual orders.    COVID-19, FLU A/B, RSV PCR - Swab, Nasopharynx [[id number]]  (Normal) Collected: 04/23/23 1125    Lab Status: Final result Specimen: Swab from Nasopharynx Updated: 04/23/23 1225      COVID19 Not Detected     Influenza A PCR Not Detected     Influenza B PCR Not Detected     RSV, PCR Not Detected    Narrative:      Fact sheet for providers: https://www.fda.gov/media/501512/download    Fact sheet for patients: https://www.fda.gov/media/979249/download    Test performed by PCR.          No radiology results from the last 24 hrs        Current medications:  Scheduled Meds:amLODIPine, 5 mg, Oral, Q24H   And  valsartan, 160 mg, Oral, Q24H  cetirizine, 10 mg, Oral, Daily  fluticasone, 2 spray, Each Nare, Nightly  guaiFENesin, 1,200 mg, Oral, Q12H  heparin (porcine), 5,000 Units, Subcutaneous, Q8H  hydroxychloroquine, 200 mg, Oral, Q12H  ipratropium-albuterol, 3 mL, Nebulization, 4x Daily - RT  melatonin, 5 mg, Oral, Nightly  methylPREDNISolone sodium succinate, 60 mg, Intravenous, Q12H  montelukast, 10 mg, Oral, Daily  pantoprazole, 40 mg, Oral, Daily  senna-docusate sodium, 2 tablet, Oral, BID  sodium chloride, 10 mL, Intravenous, Q12H  sulfamethoxazole-trimethoprim, 1 tablet, Oral, Once per day on Mon Wed Fri      Continuous Infusions:   PRN Meds:.•  acetaminophen **OR** acetaminophen **OR** acetaminophen  •  GI cocktail  •  senna-docusate sodium **AND** polyethylene glycol **AND** bisacodyl **AND** bisacodyl  •  calcium carbonate  •  hydrOXYzine  •  ipratropium-albuterol  •  LORazepam  •  Magnesium Standard Dose Replacement - Follow Nurse / BPA Driven Protocol  •  Morphine  •  ondansetron **OR** ondansetron  •  simethicone  •  sodium chloride  •  sodium chloride  •  sodium chloride  •  tiZANidine    Assessment & Plan   Assessment & Plan     Active Hospital Problems    Diagnosis  POA   • **Acute hypoxemic respiratory failure -likely due to ILD exacerbation [J96.01]  Yes   • Pulmonary fibrosis [J84.10]  Yes   • ILD (interstitial lung disease) -likely RA associated ILD with acute exacerbation [J84.9]  Yes   • Multifocal pneumonia [J18.9]  Yes      Resolved Hospital Problems   No resolved problems to  display.        Brief Hospital Course to date:  aTla Ramsey is a 72 y.o. female w/ hx interstitial lung dz, RA (on humira until recently, recent steroid injections) who presents w/ progressive dyspnea, cough over ~10 days, failed levaquin. Hypoxic upon admission. Ct chest w/o contrast w/ bilateral multifocal air space dz. resp pcr panel negative. Had progressive hypoxia evening after admission placed on hi flow canula      This patient's problems and plans were partially entered by my partner and updated as appropriate by me 05/06/23.        Acute hypoxic resp failure, worsening  Pulmonary fibrosis/ILD  Bilateral Pneumonia  -likely combination of progession/flare of interstitial lung disease and infection  -remains on High flow with difficulty weaning and significant drops with minimal exertion (simply rolling in bed to use bedpan)  -s/p full course of zosyn & azithromycin  -continue 3x weekly bactrim ds (pcp prophylaxis as has been on humira and steroids)  -continue scheduled & PRN duonebs  -Pulm following: completed 3 days of solumedrol (1g daily x 3 days) on 4/26/23; now on solumedrol 60mg IV BID with plan for prolonged taper pending goals of care  -diuresis PRN as tolerated  -palliative also following, guarded prognosis at best and my partner discussed this in detail previously with patient and DIL at bedside, have also discussed with other family members as well. continue low dose ativan for anxiety.Pulmonary has d/w patient regarding hospice and re-eval as patient would prefer to transition to IP hospice than going home with hospice.Hospice is following and ongoing family meetings    RA  -holding humira and plaquenil     S/p Hyponatremia, improved  -improved    HTN  -continue amlodpine/valsartan    Chronic anemia   -monitor    Goals/limits  -Patient is DNR/DNI (in event of further clinical decompensation patient would NOT want mechanical ventilation; in this case patient would wish to pursue comfort  measures;  - daily GOC discussions ongoing with patient and family, hospice and palliative following.    Expected Discharge Location and Transportation: home  Expected Discharge TBD  Expected Discharge Date: 5/7/2023; Expected Discharge Time:      DVT prophylaxis:  Medical DVT prophylaxis orders are present. sq heparin    AM-PAC 6 Clicks Score (PT): 13 (05/05/23 2000)    CODE STATUS:   Code Status and Medical Interventions:   Ordered at: 04/23/23 1310     Medical Intervention Limits:    NO intubation (DNI)     Level Of Support Discussed With:    Patient     Code Status (Patient has no pulse and is not breathing):    No CPR (Do Not Attempt to Resuscitate)     Medical Interventions (Patient has pulse or is breathing):    Limited Support       Catalina Funes MD  05/06/23

## 2023-05-06 NOTE — PLAN OF CARE
Goal Outcome Evaluation:  Plan of Care Reviewed With: patient, son           Outcome Evaluation: Patient had one dose of morphine for air hunger, patient sleep most of the night.Son at bedside atentive to patient. repositioned and adjusted in bed. continue with paliative  suppport and trying to wean her down from O2.

## 2023-05-07 PROCEDURE — 25010000002 METHYLPREDNISOLONE PER 125 MG: Performed by: INTERNAL MEDICINE

## 2023-05-07 PROCEDURE — 94799 UNLISTED PULMONARY SVC/PX: CPT

## 2023-05-07 PROCEDURE — 25010000002 HEPARIN (PORCINE) PER 1000 UNITS: Performed by: INTERNAL MEDICINE

## 2023-05-07 PROCEDURE — 99233 SBSQ HOSP IP/OBS HIGH 50: CPT | Performed by: INTERNAL MEDICINE

## 2023-05-07 PROCEDURE — 94664 DEMO&/EVAL PT USE INHALER: CPT

## 2023-05-07 PROCEDURE — 25010000002 METHYLPREDNISOLONE PER 40 MG

## 2023-05-07 PROCEDURE — 25010000002 MORPHINE PER 10 MG: Performed by: FAMILY MEDICINE

## 2023-05-07 PROCEDURE — 25010000002 FUROSEMIDE PER 20 MG: Performed by: INTERNAL MEDICINE

## 2023-05-07 RX ORDER — METHYLPREDNISOLONE SODIUM SUCCINATE 125 MG/2ML
60 INJECTION, POWDER, LYOPHILIZED, FOR SOLUTION INTRAMUSCULAR; INTRAVENOUS EVERY 12 HOURS
Status: DISCONTINUED | OUTPATIENT
Start: 2023-05-07 | End: 2023-05-07

## 2023-05-07 RX ORDER — WATER 1000 ML/1000ML
2 INJECTION, SOLUTION INTRAVENOUS EVERY 12 HOURS
Status: DISCONTINUED | OUTPATIENT
Start: 2023-05-07 | End: 2023-05-07

## 2023-05-07 RX ORDER — METHYLPREDNISOLONE SODIUM SUCCINATE 40 MG/ML
60 INJECTION, POWDER, LYOPHILIZED, FOR SOLUTION INTRAMUSCULAR; INTRAVENOUS EVERY 12 HOURS
Status: DISCONTINUED | OUTPATIENT
Start: 2023-05-08 | End: 2023-05-08

## 2023-05-07 RX ORDER — ALUMINA, MAGNESIA, AND SIMETHICONE 2400; 2400; 240 MG/30ML; MG/30ML; MG/30ML
30 SUSPENSION ORAL 2 TIMES DAILY PRN
Status: DISCONTINUED | OUTPATIENT
Start: 2023-05-07 | End: 2023-05-07

## 2023-05-07 RX ORDER — LIDOCAINE HYDROCHLORIDE 20 MG/ML
10 SOLUTION OROPHARYNGEAL 2 TIMES DAILY PRN
Status: DISCONTINUED | OUTPATIENT
Start: 2023-05-07 | End: 2023-05-07

## 2023-05-07 RX ORDER — ALUMINA, MAGNESIA, AND SIMETHICONE 2400; 2400; 240 MG/30ML; MG/30ML; MG/30ML
30 SUSPENSION ORAL 2 TIMES DAILY PRN
Status: DISCONTINUED | OUTPATIENT
Start: 2023-05-07 | End: 2023-05-25 | Stop reason: HOSPADM

## 2023-05-07 RX ORDER — LIDOCAINE HYDROCHLORIDE 20 MG/ML
15 SOLUTION OROPHARYNGEAL 2 TIMES DAILY PRN
Status: DISCONTINUED | OUTPATIENT
Start: 2023-05-07 | End: 2023-05-25 | Stop reason: HOSPADM

## 2023-05-07 RX ORDER — FUROSEMIDE 10 MG/ML
40 INJECTION INTRAMUSCULAR; INTRAVENOUS ONCE
Status: COMPLETED | OUTPATIENT
Start: 2023-05-07 | End: 2023-05-07

## 2023-05-07 RX ADMIN — NYSTATIN 500000 UNITS: 100000 SUSPENSION ORAL at 13:12

## 2023-05-07 RX ADMIN — HEPARIN SODIUM 5000 UNITS: 5000 INJECTION, SOLUTION INTRAVENOUS; SUBCUTANEOUS at 14:36

## 2023-05-07 RX ADMIN — VALSARTAN 160 MG: 160 TABLET, FILM COATED ORAL at 08:44

## 2023-05-07 RX ADMIN — IPRATROPIUM BROMIDE AND ALBUTEROL SULFATE 3 ML: .5; 2.5 SOLUTION RESPIRATORY (INHALATION) at 15:20

## 2023-05-07 RX ADMIN — AMLODIPINE BESYLATE 5 MG: 5 TABLET ORAL at 08:44

## 2023-05-07 RX ADMIN — Medication 10 ML: at 08:44

## 2023-05-07 RX ADMIN — HEPARIN SODIUM 5000 UNITS: 5000 INJECTION, SOLUTION INTRAVENOUS; SUBCUTANEOUS at 20:41

## 2023-05-07 RX ADMIN — METHYLPREDNISOLONE SODIUM SUCCINATE 60 MG: 125 INJECTION, POWDER, FOR SOLUTION INTRAMUSCULAR; INTRAVENOUS at 19:20

## 2023-05-07 RX ADMIN — LORAZEPAM 1 MG: 1 TABLET ORAL at 16:03

## 2023-05-07 RX ADMIN — IPRATROPIUM BROMIDE AND ALBUTEROL SULFATE 3 ML: .5; 2.5 SOLUTION RESPIRATORY (INHALATION) at 07:44

## 2023-05-07 RX ADMIN — CETIRIZINE HYDROCHLORIDE 10 MG: 10 TABLET, FILM COATED ORAL at 08:44

## 2023-05-07 RX ADMIN — METHYLPREDNISOLONE SODIUM SUCCINATE 60 MG: 40 INJECTION, POWDER, LYOPHILIZED, FOR SOLUTION INTRAMUSCULAR; INTRAVENOUS at 05:29

## 2023-05-07 RX ADMIN — LORAZEPAM 1 MG: 1 TABLET ORAL at 08:58

## 2023-05-07 RX ADMIN — WATER 2 ML: 1 INJECTION INTRAMUSCULAR; INTRAVENOUS; SUBCUTANEOUS at 19:15

## 2023-05-07 RX ADMIN — HYDROXYCHLOROQUINE SULFATE 200 MG: 200 TABLET, FILM COATED ORAL at 08:45

## 2023-05-07 RX ADMIN — NYSTATIN 500000 UNITS: 100000 SUSPENSION ORAL at 08:45

## 2023-05-07 RX ADMIN — IPRATROPIUM BROMIDE AND ALBUTEROL SULFATE 3 ML: .5; 2.5 SOLUTION RESPIRATORY (INHALATION) at 11:09

## 2023-05-07 RX ADMIN — GUAIFENESIN 1200 MG: 600 TABLET, EXTENDED RELEASE ORAL at 08:44

## 2023-05-07 RX ADMIN — FUROSEMIDE 40 MG: 10 INJECTION, SOLUTION INTRAMUSCULAR; INTRAVENOUS at 08:45

## 2023-05-07 RX ADMIN — LIDOCAINE HYDROCHLORIDE 15 ML: 20 SOLUTION ORAL; TOPICAL at 20:09

## 2023-05-07 RX ADMIN — MORPHINE SULFATE 3 MG: 4 INJECTION, SOLUTION INTRAMUSCULAR; INTRAVENOUS at 20:41

## 2023-05-07 RX ADMIN — PANTOPRAZOLE SODIUM 40 MG: 40 TABLET, DELAYED RELEASE ORAL at 08:44

## 2023-05-07 RX ADMIN — IPRATROPIUM BROMIDE AND ALBUTEROL SULFATE 3 ML: 2.5; .5 SOLUTION RESPIRATORY (INHALATION) at 04:29

## 2023-05-07 RX ADMIN — MONTELUKAST 10 MG: 10 TABLET, FILM COATED ORAL at 08:45

## 2023-05-07 RX ADMIN — MORPHINE SULFATE 3 MG: 4 INJECTION, SOLUTION INTRAMUSCULAR; INTRAVENOUS at 04:08

## 2023-05-07 RX ADMIN — CALCIUM CARBONATE (ANTACID) CHEW TAB 500 MG 2 TABLET: 500 CHEW TAB at 13:16

## 2023-05-07 RX ADMIN — ACETAMINOPHEN 325MG 650 MG: 325 TABLET ORAL at 13:16

## 2023-05-07 RX ADMIN — HEPARIN SODIUM 5000 UNITS: 5000 INJECTION, SOLUTION INTRAVENOUS; SUBCUTANEOUS at 05:29

## 2023-05-07 NOTE — PLAN OF CARE
Goal Outcome Evaluation:  Plan of Care Reviewed With: patient        Progress: no change  Outcome Evaluation: Continues on high flow. Two doses of ativan requested today and meds for heartburn. Very pleasant patient w son at bedside.

## 2023-05-07 NOTE — PLAN OF CARE
Goal Outcome Evaluation:  Plan of Care Reviewed With: patient           Outcome Evaluation: Patient still on highflow, complained of pain and SOA treated with duo-nebs and morphine. Patient alert and oriented, sleep most of the night. Son at bedside.

## 2023-05-07 NOTE — PROGRESS NOTES
[facility] Medicine Services  PROGRESS NOTE    Patient Name: Tala Ramsey  : 1950  MRN: [id number]    Date of Admission: 2023  Primary Care Physician: Sg Horne MD    Subjective   Subjective     CC:  Hypoxia, pneumonia    HPI:  Patient resting in bed. Her son is resting at bedside today. She reports she is doing ok, no new issues. Reports she would like to be able to go home if able. Will trial diuresis today.     ROS:  Gen- No fevers, chills  CV- No chest pain, palpitations  Resp- +cough, +dyspnea  GI- No N/V/D, no abd pain    Objective   Objective     Vital Signs:   Temp:  [97.5 °F (36.4 °C)-98.3 °F (36.8 °C)] 97.6 °F (36.4 °C)  Heart Rate:  [71-95] 91  Resp:  [16-22] 20  BP: (146-171)/(57-78) 155/78  Flow (L/min):  [50] 50     Physical Exam:  Constitutional: , awake, alert, appears chronically ill  HENT: NCAT, mucous membranes moist  Respiratory: unchanged on HFNC with poor air movement  Cardiovascular: RRR, no murmurs, rubs, or gallops  Gastrointestinal: soft, nontender, nondistended  Musculoskeletal: No bilateral ankle edema  Psychiatric: flat affect, cooperative  Neurologic: Oriented x 3, overall very weak/deconditioned.  Skin: No rashes        Results Reviewed:  LAB RESULTS:      Lab 23  0530 23  0945   WBC 29.40* 23.48*   HEMOGLOBIN 12.3 11.7*   HEMATOCRIT 38.3 36.7   PLATELETS 575* 735*   NEUTROS ABS 24.62*  --    IMMATURE GRANS (ABS) 1.50*  --    LYMPHS ABS 1.52  --    MONOS ABS 1.61*  --    EOS ABS 0.01  --    MCV 91.2 91.1         Lab 23  0530 23  0945   SODIUM 136 132*   POTASSIUM 5.4* 4.5   CHLORIDE 100 94*   CO2 22.0 24.0   ANION GAP 14.0 14.0   BUN 19 23   CREATININE 0.41* 0.39*   EGFR 104.0 105.3   GLUCOSE 123* 151*   CALCIUM 9.9 8.9                         Brief Urine Lab Results  (Last result in the past 365 days)      Color   Clarity   Blood   Leuk Est   Nitrite   Protein   CREAT   Urine HCG        23 6964  Yellow   Clear   Trace   Negative   Negative   Negative                 Microbiology Results Abnormal     Procedure Component Value - Date/Time    Blood Culture - Blood, Arm, Right [[id number]]  (Normal) Collected: 04/23/23 1125    Lab Status: Final result Specimen: Blood from Arm, Right Updated: 04/28/23 1145     Blood Culture No growth at 5 days    Blood Culture - Blood, Arm, Left [[id number]]  (Normal) Collected: 04/23/23 1125    Lab Status: Final result Specimen: Blood from Arm, Left Updated: 04/28/23 1145     Blood Culture No growth at 5 days    S. Pneumo Ag Urine or CSF - Urine, Urine, Clean Catch [[id number]]  (Normal) Collected: 04/23/23 1757    Lab Status: Final result Specimen: Urine, Clean Catch Updated: 04/24/23 0949     Strep Pneumo Ag Negative    Legionella Antigen, Urine - Urine, Urine, Clean Catch [[id number]]  (Normal) Collected: 04/23/23 1757    Lab Status: Final result Specimen: Urine, Clean Catch Updated: 04/24/23 0949     LEGIONELLA ANTIGEN, URINE Negative    Respiratory Panel PCR w/COVID-19(SARS-CoV-2) CHARI/MARISSA/APOLINAR/PAD/COR/MAD/AMY In-House, NP Swab in UTM/VTM, 3-4 HR TAT - Swab, Nasopharynx [[id number]]  (Normal) Collected: 04/24/23 0544    Lab Status: Final result Specimen: Swab from Nasopharynx Updated: 04/24/23 0647     ADENOVIRUS, PCR Not Detected     Coronavirus 229E Not Detected     Coronavirus HKU1 Not Detected     Coronavirus NL63 Not Detected     Coronavirus OC43 Not Detected     COVID19 Not Detected     Human Metapneumovirus Not Detected     Human Rhinovirus/Enterovirus Not Detected     Influenza A PCR Not Detected     Influenza B PCR Not Detected     Parainfluenza Virus 1 Not Detected     Parainfluenza Virus 2 Not Detected     Parainfluenza Virus 3 Not Detected     Parainfluenza Virus 4 Not Detected     RSV, PCR Not Detected     Bordetella pertussis pcr Not Detected     Bordetella parapertussis PCR Not Detected     Chlamydophila pneumoniae PCR Not Detected     Mycoplasma pneumo by PCR Not  Detected    Narrative:      In the setting of a positive respiratory panel with a viral infection PLUS a negative procalcitonin without other underlying concern for bacterial infection, consider observing off antibiotics or discontinuation of antibiotics and continue supportive care. If the respiratory panel is positive for atypical bacterial infection (Bordetella pertussis, Chlamydophila pneumoniae, or Mycoplasma pneumoniae), consider antibiotic de-escalation to target atypical bacterial infection.    MRSA Screen, PCR (Inpatient) - Swab, Nares [[id number]]  (Normal) Collected: 04/23/23 1540    Lab Status: Final result Specimen: Swab from Nares Updated: 04/23/23 1651     MRSA PCR Negative    Narrative:      The negative predictive value of this diagnostic test is high and should only be used to consider de-escalating anti-MRSA therapy. A positive result may indicate colonization with MRSA and must be correlated clinically.  MRSA Negative    COVID PRE-OP / PRE-PROCEDURE SCREENING ORDER (NO ISOLATION) - Swab, Nasopharynx [[id number]]  (Normal) Collected: 04/23/23 1125    Lab Status: Final result Specimen: Swab from Nasopharynx Updated: 04/23/23 1225    Narrative:      The following orders were created for panel order COVID PRE-OP / PRE-PROCEDURE SCREENING ORDER (NO ISOLATION) - Swab, Nasopharynx.  Procedure                               Abnormality         Status                     ---------                               -----------         ------                     COVID-19, FLU A/B, RSV P...[[id number]]  Normal              Final result                 Please view results for these tests on the individual orders.    COVID-19, FLU A/B, RSV PCR - Swab, Nasopharynx [[id number]]  (Normal) Collected: 04/23/23 1125    Lab Status: Final result Specimen: Swab from Nasopharynx Updated: 04/23/23 1225     COVID19 Not Detected     Influenza A PCR Not Detected     Influenza B PCR Not Detected     RSV, PCR Not Detected     Narrative:      Fact sheet for providers: https://www.fda.gov/media/102250/download    Fact sheet for patients: https://www.fda.gov/media/839458/download    Test performed by PCR.          No radiology results from the last 24 hrs        Current medications:  Scheduled Meds:amLODIPine, 5 mg, Oral, Q24H   And  valsartan, 160 mg, Oral, Q24H  cetirizine, 10 mg, Oral, Daily  fluticasone, 2 spray, Each Nare, Nightly  guaiFENesin, 1,200 mg, Oral, Q12H  heparin (porcine), 5,000 Units, Subcutaneous, Q8H  hydroxychloroquine, 200 mg, Oral, Q12H  ipratropium-albuterol, 3 mL, Nebulization, 4x Daily - RT  melatonin, 5 mg, Oral, Nightly  methylPREDNISolone sodium succinate, 60 mg, Intravenous, Q12H  montelukast, 10 mg, Oral, Daily  nystatin, 5 mL, Swish & Spit, 4x Daily  palliative care oral rinse, , Mouth/Throat, BID  pantoprazole, 40 mg, Oral, Daily  sodium chloride, 10 mL, Intravenous, Q12H  sulfamethoxazole-trimethoprim, 1 tablet, Oral, Once per day on Mon Wed Fri      Continuous Infusions:   PRN Meds:.•  acetaminophen **OR** acetaminophen **OR** acetaminophen  •  GI cocktail  •  senna-docusate sodium **AND** polyethylene glycol **AND** bisacodyl **AND** bisacodyl  •  calcium carbonate  •  hydrOXYzine  •  ipratropium-albuterol  •  LORazepam  •  Magnesium Standard Dose Replacement - Follow Nurse / BPA Driven Protocol  •  Morphine  •  ondansetron **OR** ondansetron  •  simethicone  •  sodium chloride  •  sodium chloride  •  sodium chloride  •  tiZANidine    Assessment & Plan   Assessment & Plan     Active Hospital Problems    Diagnosis  POA   • **Acute hypoxemic respiratory failure -likely due to ILD exacerbation [J96.01]  Yes   • Pulmonary fibrosis [J84.10]  Yes   • ILD (interstitial lung disease) -likely RA associated ILD with acute exacerbation [J84.9]  Yes   • Multifocal pneumonia [J18.9]  Yes      Resolved Hospital Problems   No resolved problems to display.        Brief Hospital Course to date:  Tala Ramsey is a  72 y.o. female w/ hx interstitial lung dz, RA (on humira until recently, recent steroid injections) who presents w/ progressive dyspnea, cough over ~10 days, failed levaquin. Hypoxic upon admission. Ct chest w/o contrast w/ bilateral multifocal air space dz. resp pcr panel negative. Had progressive hypoxia evening after admission placed on hi flow canula      This patient's problems and plans were partially entered by my partner and updated as appropriate by me 05/07/23.        Acute hypoxic resp failure, worsening  Pulmonary fibrosis/ILD  Bilateral Pneumonia  -likely combination of progession/flare of interstitial lung disease and infection  -remains on High flow with difficulty weaning and significant drops with minimal exertion (simply rolling in bed to use bedpan)  -s/p full course of zosyn & azithromycin  -continue 3x weekly bactrim ds (pcp prophylaxis as has been on humira and steroids)  -continue scheduled & PRN duonebs  -Pulm following: completed 3 days of solumedrol (1g daily x 3 days) on 4/26/23; now on solumedrol 60mg IV BID with plan for prolonged taper pending goals of care  -diuresis PRN as tolerated- will give another IV dose today as patient reports previously helpful   -palliative also following, guarded prognosis at best and my partner discussed this in detail previously with patient and DIL at bedside, have also discussed with other family members as well. continue low dose ativan for anxiety.Pulmonary has d/w patient regarding hospice and re-eval as patient would prefer to transition to IP hospice than going home with hospice.Hospice is following and ongoing family meetings    RA  -holding humira and plaquenil     S/p Hyponatremia, improved  -improved    HTN  -continue amlodpine/valsartan    Chronic anemia   -monitor    Goals/limits  -Patient is DNR/DNI (in event of further clinical decompensation patient would NOT want mechanical ventilation; in this case patient would wish to pursue comfort  measures;  - daily GOC discussions ongoing with patient and family, hospice and palliative following.    Patient would ultimately like to return home with family/hospice but will need to wean off HFNC prior to being able to go hoem    Expected Discharge Location and Transportation: home  Expected Discharge TBD  Expected Discharge Date: 5/12/2023; Expected Discharge Time:      DVT prophylaxis:  Medical DVT prophylaxis orders are present. sq heparin    AM-PAC 6 Clicks Score (PT): 12 (05/07/23 0400)    CODE STATUS:   Code Status and Medical Interventions:   Ordered at: 04/23/23 1310     Medical Intervention Limits:    NO intubation (DNI)     Level Of Support Discussed With:    Patient     Code Status (Patient has no pulse and is not breathing):    No CPR (Do Not Attempt to Resuscitate)     Medical Interventions (Patient has pulse or is breathing):    Limited Support       Catalina Funes MD  05/07/23

## 2023-05-08 LAB
ALBUMIN SERPL-MCNC: 3.7 G/DL (ref 3.5–5.2)
ALBUMIN/GLOB SERPL: 1.5 G/DL
ALP SERPL-CCNC: 69 U/L (ref 39–117)
ALT SERPL W P-5'-P-CCNC: 18 U/L (ref 1–33)
ANION GAP SERPL CALCULATED.3IONS-SCNC: 15 MMOL/L (ref 5–15)
AST SERPL-CCNC: 16 U/L (ref 1–32)
BILIRUB SERPL-MCNC: 0.4 MG/DL (ref 0–1.2)
BUN SERPL-MCNC: 25 MG/DL (ref 8–23)
BUN/CREAT SERPL: 55.6 (ref 7–25)
CALCIUM SPEC-SCNC: 9.2 MG/DL (ref 8.6–10.5)
CHLORIDE SERPL-SCNC: 94 MMOL/L (ref 98–107)
CO2 SERPL-SCNC: 19 MMOL/L (ref 22–29)
CREAT SERPL-MCNC: 0.45 MG/DL (ref 0.57–1)
EGFRCR SERPLBLD CKD-EPI 2021: 101.7 ML/MIN/1.73
GLOBULIN UR ELPH-MCNC: 2.5 GM/DL
GLUCOSE SERPL-MCNC: 118 MG/DL (ref 65–99)
POTASSIUM SERPL-SCNC: 5.8 MMOL/L (ref 3.5–5.2)
PROT SERPL-MCNC: 6.2 G/DL (ref 6–8.5)
SODIUM SERPL-SCNC: 128 MMOL/L (ref 136–145)

## 2023-05-08 PROCEDURE — 94664 DEMO&/EVAL PT USE INHALER: CPT

## 2023-05-08 PROCEDURE — 80053 COMPREHEN METABOLIC PANEL: CPT | Performed by: INTERNAL MEDICINE

## 2023-05-08 PROCEDURE — 94799 UNLISTED PULMONARY SVC/PX: CPT

## 2023-05-08 PROCEDURE — 25010000002 METHYLPREDNISOLONE PER 40 MG: Performed by: INTERNAL MEDICINE

## 2023-05-08 PROCEDURE — 99233 SBSQ HOSP IP/OBS HIGH 50: CPT | Performed by: INTERNAL MEDICINE

## 2023-05-08 PROCEDURE — 25010000002 MORPHINE PER 10 MG: Performed by: FAMILY MEDICINE

## 2023-05-08 PROCEDURE — 99232 SBSQ HOSP IP/OBS MODERATE 35: CPT | Performed by: INTERNAL MEDICINE

## 2023-05-08 PROCEDURE — 25010000002 HEPARIN (PORCINE) PER 1000 UNITS: Performed by: INTERNAL MEDICINE

## 2023-05-08 RX ORDER — METHYLPREDNISOLONE SODIUM SUCCINATE 40 MG/ML
40 INJECTION, POWDER, LYOPHILIZED, FOR SOLUTION INTRAMUSCULAR; INTRAVENOUS EVERY 12 HOURS
Status: DISCONTINUED | OUTPATIENT
Start: 2023-05-08 | End: 2023-05-09 | Stop reason: RX

## 2023-05-08 RX ORDER — MORPHINE SULFATE 20 MG/ML
5 SOLUTION ORAL
Status: DISCONTINUED | OUTPATIENT
Start: 2023-05-08 | End: 2023-05-25 | Stop reason: HOSPADM

## 2023-05-08 RX ADMIN — METHYLPREDNISOLONE SODIUM SUCCINATE 40 MG: 40 INJECTION, POWDER, FOR SOLUTION INTRAMUSCULAR; INTRAVENOUS at 21:11

## 2023-05-08 RX ADMIN — Medication 5 MG: at 21:08

## 2023-05-08 RX ADMIN — HEPARIN SODIUM 5000 UNITS: 5000 INJECTION, SOLUTION INTRAVENOUS; SUBCUTANEOUS at 06:02

## 2023-05-08 RX ADMIN — LORAZEPAM 1 MG: 1 TABLET ORAL at 23:22

## 2023-05-08 RX ADMIN — HYDROXYZINE HYDROCHLORIDE 25 MG: 25 TABLET, FILM COATED ORAL at 23:22

## 2023-05-08 RX ADMIN — MORPHINE SULFATE 3 MG: 4 INJECTION, SOLUTION INTRAMUSCULAR; INTRAVENOUS at 03:29

## 2023-05-08 RX ADMIN — PANTOPRAZOLE SODIUM 40 MG: 40 TABLET, DELAYED RELEASE ORAL at 09:15

## 2023-05-08 RX ADMIN — GUAIFENESIN 1200 MG: 600 TABLET, EXTENDED RELEASE ORAL at 09:16

## 2023-05-08 RX ADMIN — IPRATROPIUM BROMIDE AND ALBUTEROL SULFATE 3 ML: .5; 2.5 SOLUTION RESPIRATORY (INHALATION) at 11:57

## 2023-05-08 RX ADMIN — NYSTATIN 500000 UNITS: 100000 SUSPENSION ORAL at 14:14

## 2023-05-08 RX ADMIN — AMLODIPINE BESYLATE 5 MG: 5 TABLET ORAL at 09:16

## 2023-05-08 RX ADMIN — HYDROXYCHLOROQUINE SULFATE 200 MG: 200 TABLET, FILM COATED ORAL at 10:24

## 2023-05-08 RX ADMIN — IPRATROPIUM BROMIDE AND ALBUTEROL SULFATE 3 ML: .5; 2.5 SOLUTION RESPIRATORY (INHALATION) at 07:18

## 2023-05-08 RX ADMIN — SULFAMETHOXAZOLE AND TRIMETHOPRIM 1 TABLET: 800; 160 TABLET ORAL at 09:15

## 2023-05-08 RX ADMIN — HEPARIN SODIUM 5000 UNITS: 5000 INJECTION, SOLUTION INTRAVENOUS; SUBCUTANEOUS at 14:14

## 2023-05-08 RX ADMIN — WATER 1 ML: 1 INJECTION INTRAMUSCULAR; INTRAVENOUS; SUBCUTANEOUS at 21:11

## 2023-05-08 RX ADMIN — NYSTATIN 500000 UNITS: 100000 SUSPENSION ORAL at 19:00

## 2023-05-08 RX ADMIN — NYSTATIN 500000 UNITS: 100000 SUSPENSION ORAL at 21:07

## 2023-05-08 RX ADMIN — IPRATROPIUM BROMIDE AND ALBUTEROL SULFATE 3 ML: .5; 2.5 SOLUTION RESPIRATORY (INHALATION) at 15:45

## 2023-05-08 RX ADMIN — GUAIFENESIN 1200 MG: 600 TABLET, EXTENDED RELEASE ORAL at 21:08

## 2023-05-08 RX ADMIN — SODIUM ZIRCONIUM CYCLOSILICATE 10 G: 10 POWDER, FOR SUSPENSION ORAL at 14:14

## 2023-05-08 RX ADMIN — MONTELUKAST 10 MG: 10 TABLET, FILM COATED ORAL at 09:16

## 2023-05-08 RX ADMIN — Medication 10 ML: at 21:09

## 2023-05-08 RX ADMIN — IPRATROPIUM BROMIDE AND ALBUTEROL SULFATE 3 ML: .5; 2.5 SOLUTION RESPIRATORY (INHALATION) at 19:21

## 2023-05-08 RX ADMIN — NYSTATIN 500000 UNITS: 100000 SUSPENSION ORAL at 10:24

## 2023-05-08 RX ADMIN — HYDROXYZINE HYDROCHLORIDE 25 MG: 25 TABLET, FILM COATED ORAL at 12:36

## 2023-05-08 RX ADMIN — CETIRIZINE HYDROCHLORIDE 10 MG: 10 TABLET, FILM COATED ORAL at 09:15

## 2023-05-08 RX ADMIN — HYDROXYCHLOROQUINE SULFATE 200 MG: 200 TABLET, FILM COATED ORAL at 21:13

## 2023-05-08 RX ADMIN — METHYLPREDNISOLONE SODIUM SUCCINATE 60 MG: 40 INJECTION, POWDER, LYOPHILIZED, FOR SOLUTION INTRAMUSCULAR; INTRAVENOUS at 09:15

## 2023-05-08 RX ADMIN — MORPHINE SULFATE 3 MG: 4 INJECTION, SOLUTION INTRAMUSCULAR; INTRAVENOUS at 12:36

## 2023-05-08 RX ADMIN — LORAZEPAM 1 MG: 1 TABLET ORAL at 09:15

## 2023-05-08 RX ADMIN — MORPHINE SULFATE 3 MG: 4 INJECTION, SOLUTION INTRAMUSCULAR; INTRAVENOUS at 17:14

## 2023-05-08 RX ADMIN — HEPARIN SODIUM 5000 UNITS: 5000 INJECTION, SOLUTION INTRAVENOUS; SUBCUTANEOUS at 21:12

## 2023-05-08 RX ADMIN — VALSARTAN 160 MG: 160 TABLET, FILM COATED ORAL at 09:15

## 2023-05-08 RX ADMIN — MORPHINE SULFATE 3 MG: 4 INJECTION, SOLUTION INTRAMUSCULAR; INTRAVENOUS at 21:54

## 2023-05-08 NOTE — CASE MANAGEMENT/SOCIAL WORK
Continued Stay Note  [facility]     Patient Name: Tala Ramsey  MRN: [id number]  Today's Date: 5/8/2023    Admit Date: 4/23/2023    Plan: CM Update   Discharge Plan     Row Name 05/08/23 1441       Plan    Plan CM Update    Plan Comments Patient is not yet ready for discharge - she remains on high flow 02 at this time. Palliative and Hospice are following- per palliative care note - they are hoping for patient to return home with Hospice but she will need to be weaned from high flow in order to be manageable at home. CM will continue to follow and assist with any dc planning needs - derrick [phone]               Discharge Codes    No documentation.               Expected Discharge Date and Time     Expected Discharge Date Expected Discharge Time    May 12, 2023             Derrick Riggs RN

## 2023-05-08 NOTE — PLAN OF CARE
"Goal Outcome Evaluation:  Plan of Care Reviewed With: patient, son        Progress: no change  Outcome Evaluation: Pt quite anxious at time of Palliative RN encounter; observable work of breathing, repeatedly asking son if he can pack and carry all of her things to new room. Pt stated that she is anxious about the transport process, afraid riding in the bed will make her sick; also that she feels comfortable with the staff on her current unit. Encouraged pt to allow administration of IV morphine for dyspnea, Atarax for anxiety at that time; pt verbalized agreement, unit RN Yvrose notified. Discussed with pt and son present whether she would feel more comfortable with PO morphine on a more regular basis, for improved and more consistent management of her air hunger; pt stated that she \"might;\" notified Dr. Castrejon, who place order for PO morphine. Pt's oxygen requirement has increased over the past couple days. She has stated that she and her sons would like for her to discharge home with hospice; will need to wean from current HF setting to level manageable at home; advised that taking the morphine more consistently may help to achieve that. Palliative following for continued management of symptoms, support to pt and family.    1300 Palliative IDT meeting: JOYCE FULLER, RN,     After hours, weekends and holidays, contact Palliative Provider by calling [phone]    Problem: Palliative Care  Goal: Enhanced Quality of Life  Outcome: Ongoing, Progressing  Intervention: Maximize Comfort  Flowsheets (Taken 5/8/2023 1415)  Pain Management Interventions: (reviewed use of morphine for air hunger; PO alternative ordered)   pain management plan reviewed with patient/caregiver   other (see comments)  Intervention: Optimize Function  Flowsheets (Taken 5/8/2023 1415)  Fatigue Management: frequent rest breaks encouraged  Sleep/Rest Enhancement:   consistent schedule promoted   family presence promoted  Intervention: Optimize " Psychosocial Wellbeing  Flowsheets (Taken 5/8/2023 1415)  Supportive Measures:   active listening utilized   positive reinforcement provided   relaxation techniques promoted  Family/Support System Care:   caregiver stress acknowledged   self-care encouraged   support provided

## 2023-05-08 NOTE — PLAN OF CARE
Problem: Adult Inpatient Plan of Care  Goal: Plan of Care Review  Outcome: Ongoing, Progressing  Flowsheets (Taken 5/8/2023 0444)  Outcome Evaluation: VSS. High-flow nasal cannula. One episode of desaturation noted during coughing, risk for potential aspiration. APRN notified. Morphine given with improvement. Pt resting through the night. No other concerns at this time. Will continue with the plan of care.   Goal Outcome Evaluation:              Outcome Evaluation: VSS. High-flow nasal cannula. One episode of desaturation noted during coughing, risk for potential aspiration. APRN notified. Morphine given with improvement. Pt resting through the night. No other concerns at this time. Will continue with the plan of care.

## 2023-05-08 NOTE — PROGRESS NOTES
Pulmonary Hospital Follow-up     Hospital:  LOS: 15 days   Ms. Tala Ramsey, 72 y.o. female is followed for:   Acute hypoxemic respiratory failure            History of present illness:      72-year-old female with past medical history of longstanding rheumatoid arthritis and inflammatory arthritis with possible lupus.  Patient states that she is under care of Dr. Weinstein for a number of years.  She also has history of iron deficiency anemia, hiatal hernia and GERD and hypertension.  She also has history of multiple allergies and used to be on allergy shots before but stopped using those.  Patient started having problem with more joint pain and joint stiffness.  She  has been off and on multiple drugs for her inflammatory arthritis.  She was tried on CellCept for little while but she could not tolerate it due to GI side effects.  She subsequently was started on diet therapy and states that things are getting stabilized for little bit and she was not on any treatment for 6 months earlier this year.  She started having more problem with the joint discomfort and was placed on Humira in first part of May.  She has been on Plaquenil for a number of years as well.  She had COVID-19 infection in December but stated that she was not very sick from it and she stayed at home.  Did not require any hospitalization or any steroid treatments.  She is started having pain with deep breathing and pleuritic component few months ago and underwent chest x-ray which was abnormal subsequently led to chest CT scan and found to have pulmonary fibrotic changes.  Patient states that she used to have dry cough after COVID-19 infection but that kind of went away     Patient was evaluated and found to have pulmonary fibrosis but her PFTs were relatively stable and no other untoward findings noted on the CT scan.  We discussed biopsy but patient is not interested in that either. She also was having worsening cough after upper respiratory  infection.  She was treated with amoxicillin for tonsillar infection and her ACE inhibitor was changed to ARB and with that her cough significantly improved.     Patient however presented to hospital on April 23 and was admitted to the hospital with bilateral infiltrates with possible pneumonia and started on antibiotics.  Patient was thought to have acute exacerbation of her ILD and was given pulse dose steroids with 1 g for 3 days followed by prolonged prednisone taper.  Bronchoscopy was considered but thought to be very high risk with underlying ILD.  Patient was started on PJP prophylaxis as well.    Patient is DNR/DNI status.    Subjective   Interval History:  Patient has been on high flow nasal cannula for a number of days now.  Unable to wean and gets really short of breath with any activity and desats easily.  Currently on 65% FiO2 and 45 L flow.  When I walked in the room patient was moving around in the bed and desatted and took a little while to improve on that.                 The patient's past medical, surgical and social history were reviewed and updated in Epic as appropriate.       Objective     Infusions:     Medications:  amLODIPine, 5 mg, Oral, Q24H   And  valsartan, 160 mg, Oral, Q24H  cetirizine, 10 mg, Oral, Daily  fluticasone, 2 spray, Each Nare, Nightly  guaiFENesin, 1,200 mg, Oral, Q12H  heparin (porcine), 5,000 Units, Subcutaneous, Q8H  hydroxychloroquine, 200 mg, Oral, Q12H  ipratropium-albuterol, 3 mL, Nebulization, 4x Daily - RT  melatonin, 5 mg, Oral, Nightly  methylPREDNISolone sodium succinate, 40 mg, Intravenous, Q12H   And  water for injection, 1 mL, Injection, Q12H  montelukast, 10 mg, Oral, Daily  nystatin, 5 mL, Swish & Spit, 4x Daily  palliative care oral rinse, , Mouth/Throat, BID  pantoprazole, 40 mg, Oral, Daily  sodium chloride, 10 mL, Intravenous, Q12H  sodium zirconium cyclosilicate, 10 g, Oral, Once  sulfamethoxazole-trimethoprim, 1 tablet, Oral, Once per day on Mon Wed  Fri        Vital Sign Min/Max for last 24 hours  Temp  Min: 97.5 °F (36.4 °C)  Max: 98.3 °F (36.8 °C)   BP  Min: 134/74  Max: 182/97   Pulse  Min: 63  Max: 113   Resp  Min: 16  Max: 21   SpO2  Min: 86 %  Max: 99 %   Flow (L/min)  Min: 45  Max: 50       Input/Output for last 24 hour shift  05/07 0701 - 05/08 0700  In: 200 [P.O.:200]  Out: 2200 [Urine:2200]      Objective  General Appearance: Awake, alert, in mild resp distress on HFNC  Head:    Atraumatic, Normocephalic, without obvious abnormality   Lungs:   B/L Breath sounds present with decreased breath sounds on bases, no wheezing heard, occ crackles.   Heart: S1 and S2 present, no murmur  Abdomen: Soft, nontender, no guarding or rigidity, bowel sounds positive  Extremities:   no edema, warm to touch.  Pulses: Positive and symmetric.  Neurologic:  Moving all four extremities. Good strength bilaterally.  Psychological: Normal affect, Cooperative    Results from last 7 days   Lab Units 05/05/23  0530   WBC 10*3/mm3 29.40*   HEMOGLOBIN g/dL 12.3   PLATELETS 10*3/mm3 575*     Results from last 7 days   Lab Units 05/08/23  0619 05/05/23  0530   SODIUM mmol/L 128* 136   POTASSIUM mmol/L 5.8* 5.4*   CO2 mmol/L 19.0* 22.0   BUN mg/dL 25* 19   CREATININE mg/dL 0.45* 0.41*   GLUCOSE mg/dL 118* 123*     Estimated Creatinine Clearance: 105.6 mL/min (A) (by C-G formula based on SCr of 0.45 mg/dL (L)).          Images:   Chest x-ray done last week reviewed and showed bilateral patchy opacities and interstitial prominence.  No significant effusions noted.    I reviewed the patient's results and images.     Assessment & Plan   Impression        Acute hypoxemic respiratory failure -likely due to ILD exacerbation    Multifocal pneumonia    Pulmonary fibrosis    ILD (interstitial lung disease) -likely RA associated ILD with acute exacerbation       Plan        1. Pt presented with worsening hypoxia and appears like acute ILD exacerbation.  Patient was treated with pulse dose  steroids and has been on high-dose steroids and antibiotics and no significant improvement.  We will continue current supportive care.  Had extensive discussion with patient and her family about possible causes and  limitations on what more we can do here.  They verbalized understanding.  2.  Patient is developing hyperkalemia wondering if it is from Bactrim use which is used for PJP prophylaxis.  Will hold it for now.  Dose of Lokelma has been given.  We will recheck labs again in the morning.  We will check basic metabolic and renal and CBC in the morning.  3.  Continue to wean FiO2 as tolerated to keep saturation 90% or better.  4.  Oral diet as tolerated.  Watch out for aspiration episodes.  5.  Continue PPI.  DVT prophylaxis to continue.    Check chest x-ray and labs in the morning.  Palliative care team is following.  We will have further discussion with patient's son Guillermo today.      Time spent 35 min (exclusive of procedure time)  including high complexity decision making to assess, manipulate, and support vital organ system failure in this individual who has impairment of one or more vital organ systems such that there is a high probability of imminent or life threatening deterioration in the patient’s condition.      Laurent Tomlinson MD, [facility]  Pulmonary, Critical care and Sleep Medicine

## 2023-05-08 NOTE — PROGRESS NOTES
[facility]  PROGRESS NOTE    Patient Name: Tala Ramsey  : 1950  MRN: [id number]    Date of Admission: 2023  Primary Care Physician: Sg Horne MD    Subjective   Subjective     CC:  Hypoxia, pneumonia    HPI:  Remains on HFNC. One episode of desat noted by nursing staff overnight. Patient reports that she was drinking wonton soup from cup during episode. Son present at bedside. D/w them plan of care. Patient will remain on comfort diet at this time    ROS:  Gen- No fevers, chills  CV- No chest pain, palpitations  Resp- +cough, +dyspnea  GI- No N/V/D, no abd pain    Objective   Objective     Vital Signs:   Temp:  [97.6 °F (36.4 °C)-98.3 °F (36.8 °C)] 98.3 °F (36.8 °C)  Heart Rate:  [] 98  Resp:  [16-21] 18  BP: (134-182)/() 182/97  Flow (L/min):  [50] 50     Physical Exam:  Constitutional: , awake, alert, appears chronically ill  HENT: NCAT, mucous membranes moist  Respiratory:on HFNC with poor air movement  Cardiovascular: RRR, no murmurs, rubs, or gallops  Gastrointestinal: soft, nontender, nondistended  Musculoskeletal: No bilateral ankle edema  Psychiatric: flat affect, cooperative  Neurologic: Oriented x 3, overall very weak/deconditioned.  Skin: No rashes        Results Reviewed:  LAB RESULTS:      Lab 23  0530 23  0945   WBC 29.40* 23.48*   HEMOGLOBIN 12.3 11.7*   HEMATOCRIT 38.3 36.7   PLATELETS 575* 735*   NEUTROS ABS 24.62*  --    IMMATURE GRANS (ABS) 1.50*  --    LYMPHS ABS 1.52  --    MONOS ABS 1.61*  --    EOS ABS 0.01  --    MCV 91.2 91.1         Lab 23  0530 23  0945   SODIUM 136 132*   POTASSIUM 5.4* 4.5   CHLORIDE 100 94*   CO2 22.0 24.0   ANION GAP 14.0 14.0   BUN 19 23   CREATININE 0.41* 0.39*   EGFR 104.0 105.3   GLUCOSE 123* 151*   CALCIUM 9.9 8.9                         Brief Urine Lab Results  (Last result in the past 365 days)      Color   Clarity   Blood   Leuk Est   Nitrite   Protein    CREAT   Urine HCG        04/23/23 1757 Yellow   Clear   Trace   Negative   Negative   Negative                 Microbiology Results Abnormal     Procedure Component Value - Date/Time    Blood Culture - Blood, Arm, Right [[id number]]  (Normal) Collected: 04/23/23 1125    Lab Status: Final result Specimen: Blood from Arm, Right Updated: 04/28/23 1145     Blood Culture No growth at 5 days    Blood Culture - Blood, Arm, Left [[id number]]  (Normal) Collected: 04/23/23 1125    Lab Status: Final result Specimen: Blood from Arm, Left Updated: 04/28/23 1145     Blood Culture No growth at 5 days    S. Pneumo Ag Urine or CSF - Urine, Urine, Clean Catch [[id number]]  (Normal) Collected: 04/23/23 1757    Lab Status: Final result Specimen: Urine, Clean Catch Updated: 04/24/23 0949     Strep Pneumo Ag Negative    Legionella Antigen, Urine - Urine, Urine, Clean Catch [[id number]]  (Normal) Collected: 04/23/23 1757    Lab Status: Final result Specimen: Urine, Clean Catch Updated: 04/24/23 0949     LEGIONELLA ANTIGEN, URINE Negative    Respiratory Panel PCR w/COVID-19(SARS-CoV-2) CHARI/MARISSA/APOLINAR/PAD/COR/MAD/AMY In-House, NP Swab in UTM/VTM, 3-4 HR TAT - Swab, Nasopharynx [[id number]]  (Normal) Collected: 04/24/23 0544    Lab Status: Final result Specimen: Swab from Nasopharynx Updated: 04/24/23 0647     ADENOVIRUS, PCR Not Detected     Coronavirus 229E Not Detected     Coronavirus HKU1 Not Detected     Coronavirus NL63 Not Detected     Coronavirus OC43 Not Detected     COVID19 Not Detected     Human Metapneumovirus Not Detected     Human Rhinovirus/Enterovirus Not Detected     Influenza A PCR Not Detected     Influenza B PCR Not Detected     Parainfluenza Virus 1 Not Detected     Parainfluenza Virus 2 Not Detected     Parainfluenza Virus 3 Not Detected     Parainfluenza Virus 4 Not Detected     RSV, PCR Not Detected     Bordetella pertussis pcr Not Detected     Bordetella parapertussis PCR Not Detected     Chlamydophila pneumoniae PCR Not  Detected     Mycoplasma pneumo by PCR Not Detected    Narrative:      In the setting of a positive respiratory panel with a viral infection PLUS a negative procalcitonin without other underlying concern for bacterial infection, consider observing off antibiotics or discontinuation of antibiotics and continue supportive care. If the respiratory panel is positive for atypical bacterial infection (Bordetella pertussis, Chlamydophila pneumoniae, or Mycoplasma pneumoniae), consider antibiotic de-escalation to target atypical bacterial infection.    MRSA Screen, PCR (Inpatient) - Swab, Nares [[id number]]  (Normal) Collected: 04/23/23 1540    Lab Status: Final result Specimen: Swab from Nares Updated: 04/23/23 1651     MRSA PCR Negative    Narrative:      The negative predictive value of this diagnostic test is high and should only be used to consider de-escalating anti-MRSA therapy. A positive result may indicate colonization with MRSA and must be correlated clinically.  MRSA Negative    COVID PRE-OP / PRE-PROCEDURE SCREENING ORDER (NO ISOLATION) - Swab, Nasopharynx [[id number]]  (Normal) Collected: 04/23/23 1125    Lab Status: Final result Specimen: Swab from Nasopharynx Updated: 04/23/23 1225    Narrative:      The following orders were created for panel order COVID PRE-OP / PRE-PROCEDURE SCREENING ORDER (NO ISOLATION) - Swab, Nasopharynx.  Procedure                               Abnormality         Status                     ---------                               -----------         ------                     COVID-19, FLU A/B, RSV P...[[id number]]  Normal              Final result                 Please view results for these tests on the individual orders.    COVID-19, FLU A/B, RSV PCR - Swab, Nasopharynx [[id number]]  (Normal) Collected: 04/23/23 1125    Lab Status: Final result Specimen: Swab from Nasopharynx Updated: 04/23/23 1225     COVID19 Not Detected     Influenza A PCR Not Detected     Influenza B PCR Not  Detected     RSV, PCR Not Detected    Narrative:      Fact sheet for providers: https://www.fda.gov/media/110587/download    Fact sheet for patients: https://www.fda.gov/media/424473/download    Test performed by PCR.          No radiology results from the last 24 hrs        Current medications:  Scheduled Meds:amLODIPine, 5 mg, Oral, Q24H   And  valsartan, 160 mg, Oral, Q24H  cetirizine, 10 mg, Oral, Daily  fluticasone, 2 spray, Each Nare, Nightly  guaiFENesin, 1,200 mg, Oral, Q12H  heparin (porcine), 5,000 Units, Subcutaneous, Q8H  hydroxychloroquine, 200 mg, Oral, Q12H  ipratropium-albuterol, 3 mL, Nebulization, 4x Daily - RT  melatonin, 5 mg, Oral, Nightly  methylPREDNISolone sodium succinate, 60 mg, Intravenous, Q12H   And  water for injection, 2 mL, Injection, Q12H  montelukast, 10 mg, Oral, Daily  nystatin, 5 mL, Swish & Spit, 4x Daily  palliative care oral rinse, , Mouth/Throat, BID  pantoprazole, 40 mg, Oral, Daily  sodium chloride, 10 mL, Intravenous, Q12H  sulfamethoxazole-trimethoprim, 1 tablet, Oral, Once per day on Mon Wed Fri      Continuous Infusions:   PRN Meds:.•  acetaminophen **OR** acetaminophen **OR** acetaminophen  •  aluminum-magnesium hydroxide-simethicone **AND** Lidocaine Viscous HCl  •  senna-docusate sodium **AND** polyethylene glycol **AND** bisacodyl **AND** bisacodyl  •  calcium carbonate  •  hydrOXYzine  •  ipratropium-albuterol  •  LORazepam  •  Magnesium Standard Dose Replacement - Follow Nurse / BPA Driven Protocol  •  Morphine  •  ondansetron **OR** ondansetron  •  simethicone  •  sodium chloride  •  sodium chloride  •  sodium chloride  •  tiZANidine    Assessment & Plan   Assessment & Plan     Active Hospital Problems    Diagnosis  POA   • **Acute hypoxemic respiratory failure -likely due to ILD exacerbation [J96.01]  Yes   • Pulmonary fibrosis [J84.10]  Yes   • ILD (interstitial lung disease) -likely RA associated ILD with acute exacerbation [J84.9]  Yes   • Multifocal  pneumonia [J18.9]  Yes      Resolved Hospital Problems   No resolved problems to display.        Brief Hospital Course to date:  Tala Ramsey is a 72 y.o. female w/ hx interstitial lung dz, RA (on humira until recently, recent steroid injections) who presents w/ progressive dyspnea, cough over ~10 days, failed levaquin. Hypoxic upon admission. Ct chest w/o contrast w/ bilateral multifocal air space dz. resp pcr panel negative. Had progressive hypoxia evening after admission placed on hi flow canula      This patient's problems and plans were partially entered by my partner and updated as appropriate by me 05/08/23.        Acute hypoxic resp failure, worsening  Pulmonary fibrosis/ILD  Bilateral Pneumonia  -likely combination of progession/flare of interstitial lung disease and infection  -remains on High flow with difficulty weaning and significant drops with minimal exertion (simply rolling in bed to use bedpan)  -s/p full course of zosyn & azithromycin  -continue 3x weekly bactrim ds (pcp prophylaxis as has been on humira and steroids)  -continue scheduled & PRN duonebs  -Pulm following: completed 3 days of solumedrol (1g daily x 3 days) on 4/26/23; now on solumedrol 60mg IV BID with plan for prolonged taper pending goals of care-- will taper to 40 BID today   -diuresis PRN as tolerated- did give IV diuresis 5/6 and 7 without much improvement so will hold for today   -palliative also following, guarded prognosis at best and my partner discussed this in detail previously with patient and DIL at bedside, have also discussed with other family members as well. continue low dose ativan for anxiety.Pulmonary has d/w patient regarding hospice and re-eval as patient would prefer to transition to IP hospice than going home with hospice.Hospice is following and ongoing family meetings  -episode of desat overnight with concern for possible aspiration. D/w patient and son at bedside deferring on SLP eval and allowing  comfort diet given our GOC, all in agreement     RA  -holding humira and plaquenil     S/p Hyponatremia, improved  -improved    HTN  -continue amlodpine/valsartan    Chronic anemia   -monitor    Goals/limits  -Patient is DNR/DNI (in event of further clinical decompensation patient would NOT want mechanical ventilation; in this case patient would wish to pursue comfort measures;  - daily GOC discussions ongoing with patient and family, hospice and palliative following.    Patient would ultimately like to return home with family/hospice but will need to wean off HFNC prior to being able to go hoem    Expected Discharge Location and Transportation: home  Expected Discharge TBD  Expected Discharge Date: 5/12/2023; Expected Discharge Time:      DVT prophylaxis:  Medical DVT prophylaxis orders are present. sq heparin    AM-PAC 6 Clicks Score (PT): 10 (05/07/23 2057)    CODE STATUS:   Code Status and Medical Interventions:   Ordered at: 04/23/23 1310     Medical Intervention Limits:    NO intubation (DNI)     Level Of Support Discussed With:    Patient     Code Status (Patient has no pulse and is not breathing):    No CPR (Do Not Attempt to Resuscitate)     Medical Interventions (Patient has pulse or is breathing):    Limited Support       Catalina Funes MD  05/08/23

## 2023-05-09 ENCOUNTER — APPOINTMENT (OUTPATIENT)
Dept: GENERAL RADIOLOGY | Facility: HOSPITAL | Age: 73
End: 2023-05-09
Payer: MEDICARE

## 2023-05-09 LAB
ANION GAP SERPL CALCULATED.3IONS-SCNC: 11 MMOL/L (ref 5–15)
BASOPHILS # BLD AUTO: 0.1 10*3/MM3 (ref 0–0.2)
BASOPHILS NFR BLD AUTO: 0.5 % (ref 0–1.5)
BUN SERPL-MCNC: 27 MG/DL (ref 8–23)
BUN/CREAT SERPL: 60 (ref 7–25)
CALCIUM SPEC-SCNC: 9.2 MG/DL (ref 8.6–10.5)
CHLORIDE SERPL-SCNC: 94 MMOL/L (ref 98–107)
CO2 SERPL-SCNC: 22 MMOL/L (ref 22–29)
CREAT SERPL-MCNC: 0.45 MG/DL (ref 0.57–1)
DEPRECATED RDW RBC AUTO: 53.5 FL (ref 37–54)
EGFRCR SERPLBLD CKD-EPI 2021: 101.7 ML/MIN/1.73
EOSINOPHIL # BLD AUTO: 0 10*3/MM3 (ref 0–0.4)
EOSINOPHIL NFR BLD AUTO: 0 % (ref 0.3–6.2)
ERYTHROCYTE [DISTWIDTH] IN BLOOD BY AUTOMATED COUNT: 16.4 % (ref 12.3–15.4)
GLUCOSE BLDC GLUCOMTR-MCNC: 313 MG/DL (ref 70–130)
GLUCOSE SERPL-MCNC: 120 MG/DL (ref 65–99)
HCT VFR BLD AUTO: 39 % (ref 34–46.6)
HGB BLD-MCNC: 13.1 G/DL (ref 12–15.9)
IMM GRANULOCYTES # BLD AUTO: 0.87 10*3/MM3 (ref 0–0.05)
IMM GRANULOCYTES NFR BLD AUTO: 4.1 % (ref 0–0.5)
LYMPHOCYTES # BLD AUTO: 1.38 10*3/MM3 (ref 0.7–3.1)
LYMPHOCYTES NFR BLD AUTO: 6.5 % (ref 19.6–45.3)
MCH RBC QN AUTO: 30 PG (ref 26.6–33)
MCHC RBC AUTO-ENTMCNC: 33.6 G/DL (ref 31.5–35.7)
MCV RBC AUTO: 89.2 FL (ref 79–97)
MONOCYTES # BLD AUTO: 1.21 10*3/MM3 (ref 0.1–0.9)
MONOCYTES NFR BLD AUTO: 5.7 % (ref 5–12)
NEUTROPHILS NFR BLD AUTO: 17.54 10*3/MM3 (ref 1.7–7)
NEUTROPHILS NFR BLD AUTO: 83.2 % (ref 42.7–76)
NRBC BLD AUTO-RTO: 0 /100 WBC (ref 0–0.2)
PLATELET # BLD AUTO: 382 10*3/MM3 (ref 140–450)
PMV BLD AUTO: 9.8 FL (ref 6–12)
POTASSIUM SERPL-SCNC: 5.3 MMOL/L (ref 3.5–5.2)
POTASSIUM SERPL-SCNC: 6 MMOL/L (ref 3.5–5.2)
RBC # BLD AUTO: 4.37 10*6/MM3 (ref 3.77–5.28)
SODIUM SERPL-SCNC: 127 MMOL/L (ref 136–145)
WBC NRBC COR # BLD: 21.1 10*3/MM3 (ref 3.4–10.8)

## 2023-05-09 PROCEDURE — 25010000002 METHYLPREDNISOLONE PER 40 MG: Performed by: INTERNAL MEDICINE

## 2023-05-09 PROCEDURE — 94761 N-INVAS EAR/PLS OXIMETRY MLT: CPT

## 2023-05-09 PROCEDURE — 63710000001 PREDNISONE PER 5 MG: Performed by: INTERNAL MEDICINE

## 2023-05-09 PROCEDURE — 63710000001 PREDNISONE PER 1 MG: Performed by: INTERNAL MEDICINE

## 2023-05-09 PROCEDURE — 25010000002 HEPARIN (PORCINE) PER 1000 UNITS: Performed by: INTERNAL MEDICINE

## 2023-05-09 PROCEDURE — 94799 UNLISTED PULMONARY SVC/PX: CPT

## 2023-05-09 PROCEDURE — 94664 DEMO&/EVAL PT USE INHALER: CPT

## 2023-05-09 PROCEDURE — 99233 SBSQ HOSP IP/OBS HIGH 50: CPT | Performed by: INTERNAL MEDICINE

## 2023-05-09 PROCEDURE — 25010000002 CALCIUM GLUCONATE-NACL 1-0.675 GM/50ML-% SOLUTION: Performed by: INTERNAL MEDICINE

## 2023-05-09 PROCEDURE — 25010000002 MORPHINE PER 10 MG: Performed by: FAMILY MEDICINE

## 2023-05-09 PROCEDURE — 80048 BASIC METABOLIC PNL TOTAL CA: CPT | Performed by: INTERNAL MEDICINE

## 2023-05-09 PROCEDURE — 82948 REAGENT STRIP/BLOOD GLUCOSE: CPT

## 2023-05-09 PROCEDURE — 63710000001 INSULIN REGULAR HUMAN PER 5 UNITS: Performed by: INTERNAL MEDICINE

## 2023-05-09 PROCEDURE — 71045 X-RAY EXAM CHEST 1 VIEW: CPT

## 2023-05-09 PROCEDURE — 84132 ASSAY OF SERUM POTASSIUM: CPT | Performed by: INTERNAL MEDICINE

## 2023-05-09 PROCEDURE — 85025 COMPLETE CBC W/AUTO DIFF WBC: CPT | Performed by: INTERNAL MEDICINE

## 2023-05-09 RX ORDER — CALCIUM GLUCONATE 20 MG/ML
1 INJECTION, SOLUTION INTRAVENOUS ONCE
Status: COMPLETED | OUTPATIENT
Start: 2023-05-09 | End: 2023-05-09

## 2023-05-09 RX ORDER — IBUPROFEN 600 MG/1
1 TABLET ORAL
Status: DISCONTINUED | OUTPATIENT
Start: 2023-05-09 | End: 2023-05-19

## 2023-05-09 RX ORDER — DEXTROSE MONOHYDRATE 25 G/50ML
50 INJECTION, SOLUTION INTRAVENOUS ONCE
Status: COMPLETED | OUTPATIENT
Start: 2023-05-09 | End: 2023-05-09

## 2023-05-09 RX ORDER — CYCLOBENZAPRINE HCL 10 MG
5 TABLET ORAL 3 TIMES DAILY PRN
Status: DISCONTINUED | OUTPATIENT
Start: 2023-05-09 | End: 2023-05-20

## 2023-05-09 RX ORDER — INSULIN LISPRO 100 [IU]/ML
0-7 INJECTION, SOLUTION INTRAVENOUS; SUBCUTANEOUS
Status: DISCONTINUED | OUTPATIENT
Start: 2023-05-09 | End: 2023-05-19

## 2023-05-09 RX ORDER — NICOTINE POLACRILEX 4 MG
15 LOZENGE BUCCAL
Status: DISCONTINUED | OUTPATIENT
Start: 2023-05-09 | End: 2023-05-19

## 2023-05-09 RX ORDER — DEXTROSE MONOHYDRATE 25 G/50ML
25 INJECTION, SOLUTION INTRAVENOUS
Status: DISCONTINUED | OUTPATIENT
Start: 2023-05-09 | End: 2023-05-19

## 2023-05-09 RX ADMIN — HEPARIN SODIUM 5000 UNITS: 5000 INJECTION, SOLUTION INTRAVENOUS; SUBCUTANEOUS at 14:12

## 2023-05-09 RX ADMIN — PANTOPRAZOLE SODIUM 40 MG: 40 TABLET, DELAYED RELEASE ORAL at 08:16

## 2023-05-09 RX ADMIN — GUAIFENESIN 1200 MG: 600 TABLET, EXTENDED RELEASE ORAL at 20:54

## 2023-05-09 RX ADMIN — NYSTATIN 500000 UNITS: 100000 SUSPENSION ORAL at 08:16

## 2023-05-09 RX ADMIN — WATER 1 ML: 1 INJECTION INTRAMUSCULAR; INTRAVENOUS; SUBCUTANEOUS at 13:00

## 2023-05-09 RX ADMIN — MORPHINE SULFATE 5 MG: 100 SOLUTION ORAL at 20:55

## 2023-05-09 RX ADMIN — PREDNISONE 30 MG: 20 TABLET ORAL at 17:18

## 2023-05-09 RX ADMIN — Medication 10 ML: at 21:18

## 2023-05-09 RX ADMIN — METHYLPREDNISOLONE SODIUM SUCCINATE 40 MG: 40 INJECTION, POWDER, FOR SOLUTION INTRAMUSCULAR; INTRAVENOUS at 13:00

## 2023-05-09 RX ADMIN — NYSTATIN 500000 UNITS: 100000 SUSPENSION ORAL at 21:14

## 2023-05-09 RX ADMIN — SODIUM ZIRCONIUM CYCLOSILICATE 10 G: 10 POWDER, FOR SUSPENSION ORAL at 15:00

## 2023-05-09 RX ADMIN — MORPHINE SULFATE 5 MG: 100 SOLUTION ORAL at 17:18

## 2023-05-09 RX ADMIN — IPRATROPIUM BROMIDE AND ALBUTEROL SULFATE 3 ML: .5; 2.5 SOLUTION RESPIRATORY (INHALATION) at 11:55

## 2023-05-09 RX ADMIN — DEXTROSE MONOHYDRATE 50 ML: 25 INJECTION, SOLUTION INTRAVENOUS at 12:30

## 2023-05-09 RX ADMIN — NYSTATIN 500000 UNITS: 100000 SUSPENSION ORAL at 17:05

## 2023-05-09 RX ADMIN — SODIUM ZIRCONIUM CYCLOSILICATE 10 G: 10 POWDER, FOR SUSPENSION ORAL at 20:54

## 2023-05-09 RX ADMIN — Medication 5 MG: at 20:54

## 2023-05-09 RX ADMIN — SODIUM ZIRCONIUM CYCLOSILICATE 10 G: 10 POWDER, FOR SUSPENSION ORAL at 19:08

## 2023-05-09 RX ADMIN — SENNOSIDES AND DOCUSATE SODIUM 2 TABLET: 50; 8.6 TABLET ORAL at 21:14

## 2023-05-09 RX ADMIN — LORAZEPAM 1 MG: 1 TABLET ORAL at 09:12

## 2023-05-09 RX ADMIN — GUAIFENESIN 1200 MG: 600 TABLET, EXTENDED RELEASE ORAL at 08:16

## 2023-05-09 RX ADMIN — SODIUM ZIRCONIUM CYCLOSILICATE 10 G: 10 POWDER, FOR SUSPENSION ORAL at 12:30

## 2023-05-09 RX ADMIN — INSULIN HUMAN 10 UNITS: 100 INJECTION, SOLUTION PARENTERAL at 12:30

## 2023-05-09 RX ADMIN — VALSARTAN 160 MG: 160 TABLET, FILM COATED ORAL at 08:17

## 2023-05-09 RX ADMIN — Medication 10 ML: at 08:20

## 2023-05-09 RX ADMIN — CETIRIZINE HYDROCHLORIDE 10 MG: 10 TABLET, FILM COATED ORAL at 08:17

## 2023-05-09 RX ADMIN — TIZANIDINE 2 MG: 4 TABLET ORAL at 09:12

## 2023-05-09 RX ADMIN — HYDROXYCHLOROQUINE SULFATE 200 MG: 200 TABLET, FILM COATED ORAL at 20:54

## 2023-05-09 RX ADMIN — MONTELUKAST 10 MG: 10 TABLET, FILM COATED ORAL at 08:16

## 2023-05-09 RX ADMIN — IPRATROPIUM BROMIDE AND ALBUTEROL SULFATE 3 ML: .5; 2.5 SOLUTION RESPIRATORY (INHALATION) at 16:05

## 2023-05-09 RX ADMIN — IPRATROPIUM BROMIDE AND ALBUTEROL SULFATE 3 ML: .5; 2.5 SOLUTION RESPIRATORY (INHALATION) at 20:47

## 2023-05-09 RX ADMIN — HYDROXYCHLOROQUINE SULFATE 200 MG: 200 TABLET, FILM COATED ORAL at 09:07

## 2023-05-09 RX ADMIN — AMLODIPINE BESYLATE 5 MG: 5 TABLET ORAL at 08:17

## 2023-05-09 RX ADMIN — NYSTATIN 500000 UNITS: 100000 SUSPENSION ORAL at 13:00

## 2023-05-09 RX ADMIN — MORPHINE SULFATE 5 MG: 100 SOLUTION ORAL at 12:56

## 2023-05-09 RX ADMIN — PREDNISONE 30 MG: 20 TABLET ORAL at 14:12

## 2023-05-09 RX ADMIN — HEPARIN SODIUM 5000 UNITS: 5000 INJECTION, SOLUTION INTRAVENOUS; SUBCUTANEOUS at 21:23

## 2023-05-09 RX ADMIN — MORPHINE SULFATE 3 MG: 4 INJECTION, SOLUTION INTRAMUSCULAR; INTRAVENOUS at 01:48

## 2023-05-09 RX ADMIN — IPRATROPIUM BROMIDE AND ALBUTEROL SULFATE 3 ML: .5; 2.5 SOLUTION RESPIRATORY (INHALATION) at 08:20

## 2023-05-09 RX ADMIN — CALCIUM GLUCONATE 1 G: 20 INJECTION, SOLUTION INTRAVENOUS at 12:30

## 2023-05-09 RX ADMIN — HEPARIN SODIUM 5000 UNITS: 5000 INJECTION, SOLUTION INTRAVENOUS; SUBCUTANEOUS at 05:46

## 2023-05-09 RX ADMIN — LORAZEPAM 1 MG: 1 TABLET ORAL at 17:18

## 2023-05-09 NOTE — PROGRESS NOTES
[facility] Medicine Services  PROGRESS NOTE    Patient Name: Tala Ramsey  : 1950  MRN: [id number]    Date of Admission: 2023  Primary Care Physician: Sg Horne MD    Subjective   Subjective     CC:  Hypoxia, pneumonia    HPI:  Pt notes neck and back pain from being in current position in bed.  Remains on high flow.  Son is at bedside and expresses concern about pt not being monitored closely with continuous pulse ox and nursing station not having up to the minute checks on her oxygen.  I attempted to explain to him that, given the goals of care (which I thought were home with hospice), continuous pulse ox would be of no benefit.  Pt verbalized that HFNC is as invasive as she would want to be- given this, even if her O2 sats were to drop, the most we would do would be to increase her HFNC to 100%FiO2.  Son still does not seem to understand this and wants patient more closely monitored.      ROS:  Gen- No fevers, chills  CV- No chest pain, palpitations  Resp- +cough, +dyspnea  GI- No N/V/D, no abd pain    Objective   Objective     Vital Signs:   Temp:  [97.4 °F (36.3 °C)-98 °F (36.7 °C)] 97.9 °F (36.6 °C)  Heart Rate:  [63-95] 76  Resp:  [18-20] 20  BP: (135-156)/(56-98) 153/73  Flow (L/min):  [45-50] 45     Physical Exam:  Constitutional: , awake, alert, appears chronically ill  HENT: NCAT, mucous membranes moist  Respiratory:on HFNC with poor air movement, tachypneic   Cardiovascular: RRR, no murmurs, rubs, or gallops  Gastrointestinal: soft, nontender, nondistended  Musculoskeletal: No bilateral ankle edema  Psychiatric: flat affect, cooperative  Neurologic: Oriented x 3, overall very weak/deconditioned.  Skin: No rashes        Results Reviewed:  LAB RESULTS:      Lab 23  0758 23  0530   WBC 21.10* 29.40*   HEMOGLOBIN 13.1 12.3   HEMATOCRIT 39.0 38.3   PLATELETS 382 575*   NEUTROS ABS 17.54* 24.62*   IMMATURE GRANS (ABS) 0.87* 1.50*   LYMPHS ABS  1.38 1.52   MONOS ABS 1.21* 1.61*   EOS ABS 0.00 0.01   MCV 89.2 91.2         Lab 05/09/23  0758 05/08/23  0619 05/05/23  0530   SODIUM 127* 128* 136   POTASSIUM 6.0* 5.8* 5.4*   CHLORIDE 94* 94* 100   CO2 22.0 19.0* 22.0   ANION GAP 11.0 15.0 14.0   BUN 27* 25* 19   CREATININE 0.45* 0.45* 0.41*   EGFR 101.7 101.7 104.0   GLUCOSE 120* 118* 123*   CALCIUM 9.2 9.2 9.9         Lab 05/08/23  0619   TOTAL PROTEIN 6.2   ALBUMIN 3.7   GLOBULIN 2.5   ALT (SGPT) 18   AST (SGOT) 16   BILIRUBIN 0.4   ALK PHOS 69                     Brief Urine Lab Results  (Last result in the past 365 days)      Color   Clarity   Blood   Leuk Est   Nitrite   Protein   CREAT   Urine HCG        04/23/23 1757 Yellow   Clear   Trace   Negative   Negative   Negative                 Microbiology Results Abnormal     Procedure Component Value - Date/Time    Blood Culture - Blood, Arm, Right [[id number]]  (Normal) Collected: 04/23/23 1125    Lab Status: Final result Specimen: Blood from Arm, Right Updated: 04/28/23 1145     Blood Culture No growth at 5 days    Blood Culture - Blood, Arm, Left [[id number]]  (Normal) Collected: 04/23/23 1125    Lab Status: Final result Specimen: Blood from Arm, Left Updated: 04/28/23 1145     Blood Culture No growth at 5 days    S. Pneumo Ag Urine or CSF - Urine, Urine, Clean Catch [[id number]]  (Normal) Collected: 04/23/23 1757    Lab Status: Final result Specimen: Urine, Clean Catch Updated: 04/24/23 0949     Strep Pneumo Ag Negative    Legionella Antigen, Urine - Urine, Urine, Clean Catch [[id number]]  (Normal) Collected: 04/23/23 1757    Lab Status: Final result Specimen: Urine, Clean Catch Updated: 04/24/23 0949     LEGIONELLA ANTIGEN, URINE Negative    Respiratory Panel PCR w/COVID-19(SARS-CoV-2) CHARI/MARISSA/APOLINAR/PAD/COR/MAD/AMY In-House, NP Swab in UTM/VTM, 3-4 HR TAT - Swab, Nasopharynx [[id number]]  (Normal) Collected: 04/24/23 0544    Lab Status: Final result Specimen: Swab from Nasopharynx Updated: 04/24/23 0601      ADENOVIRUS, PCR Not Detected     Coronavirus 229E Not Detected     Coronavirus HKU1 Not Detected     Coronavirus NL63 Not Detected     Coronavirus OC43 Not Detected     COVID19 Not Detected     Human Metapneumovirus Not Detected     Human Rhinovirus/Enterovirus Not Detected     Influenza A PCR Not Detected     Influenza B PCR Not Detected     Parainfluenza Virus 1 Not Detected     Parainfluenza Virus 2 Not Detected     Parainfluenza Virus 3 Not Detected     Parainfluenza Virus 4 Not Detected     RSV, PCR Not Detected     Bordetella pertussis pcr Not Detected     Bordetella parapertussis PCR Not Detected     Chlamydophila pneumoniae PCR Not Detected     Mycoplasma pneumo by PCR Not Detected    Narrative:      In the setting of a positive respiratory panel with a viral infection PLUS a negative procalcitonin without other underlying concern for bacterial infection, consider observing off antibiotics or discontinuation of antibiotics and continue supportive care. If the respiratory panel is positive for atypical bacterial infection (Bordetella pertussis, Chlamydophila pneumoniae, or Mycoplasma pneumoniae), consider antibiotic de-escalation to target atypical bacterial infection.    MRSA Screen, PCR (Inpatient) - Swab, Nares [[id number]]  (Normal) Collected: 04/23/23 1540    Lab Status: Final result Specimen: Swab from Nares Updated: 04/23/23 1651     MRSA PCR Negative    Narrative:      The negative predictive value of this diagnostic test is high and should only be used to consider de-escalating anti-MRSA therapy. A positive result may indicate colonization with MRSA and must be correlated clinically.  MRSA Negative    COVID PRE-OP / PRE-PROCEDURE SCREENING ORDER (NO ISOLATION) - Swab, Nasopharynx [[id number]]  (Normal) Collected: 04/23/23 1125    Lab Status: Final result Specimen: Swab from Nasopharynx Updated: 04/23/23 1225    Narrative:      The following orders were created for panel order COVID PRE-OP /  PRE-PROCEDURE SCREENING ORDER (NO ISOLATION) - Swab, Nasopharynx.  Procedure                               Abnormality         Status                     ---------                               -----------         ------                     COVID-19, FLU A/B, RSV P...[[id number]]  Normal              Final result                 Please view results for these tests on the individual orders.    COVID-19, FLU A/B, RSV PCR - Swab, Nasopharynx [[id number]]  (Normal) Collected: 04/23/23 1125    Lab Status: Final result Specimen: Swab from Nasopharynx Updated: 04/23/23 1225     COVID19 Not Detected     Influenza A PCR Not Detected     Influenza B PCR Not Detected     RSV, PCR Not Detected    Narrative:      Fact sheet for providers: https://www.fda.gov/media/739641/download    Fact sheet for patients: https://www.fda.gov/media/261758/download    Test performed by PCR.          XR Chest 1 View    Result Date: 5/9/2023  XR CHEST 1 VW Date of Exam: 5/9/2023 2:22 AM EDT Indication: Resp failure Comparison: May 1, 2023 Findings: The heart not definitely enlarged. There are bilateral pulmonary infiltrates with slight improvement superimposed on some underlying interstitial lung disease. There are no large pleural effusions.     Impression: Impression: 1.There has been slight improvement in the multifocal pulmonary infiltrates. There are interstitial changes in the lower lungs that could reflect more chronic interstitial lung disease. Electronically Signed: David Ochoa  5/9/2023 7:17 AM EDT  Workstation ID: [id number]          Current medications:  Scheduled Meds:amLODIPine, 5 mg, Oral, Q24H   And  valsartan, 160 mg, Oral, Q24H  cetirizine, 10 mg, Oral, Daily  fluticasone, 2 spray, Each Nare, Nightly  guaiFENesin, 1,200 mg, Oral, Q12H  heparin (porcine), 5,000 Units, Subcutaneous, Q8H  hydroxychloroquine, 200 mg, Oral, Q12H  ipratropium-albuterol, 3 mL, Nebulization, 4x Daily - RT  melatonin, 5 mg, Oral, Nightly  methylPREDNISolone  sodium succinate, 40 mg, Intravenous, Q12H   And  water for injection, 1 mL, Injection, Q12H  montelukast, 10 mg, Oral, Daily  nystatin, 5 mL, Swish & Spit, 4x Daily  palliative care oral rinse, , Mouth/Throat, BID  pantoprazole, 40 mg, Oral, Daily  sodium chloride, 10 mL, Intravenous, Q12H  sulfamethoxazole-trimethoprim, 1 tablet, Oral, Once per day on Mon Wed Fri      Continuous Infusions:   PRN Meds:.•  acetaminophen **OR** acetaminophen **OR** acetaminophen  •  aluminum-magnesium hydroxide-simethicone **AND** Lidocaine Viscous HCl  •  senna-docusate sodium **AND** polyethylene glycol **AND** bisacodyl **AND** bisacodyl  •  calcium carbonate  •  hydrOXYzine  •  ipratropium-albuterol  •  LORazepam  •  Magnesium Standard Dose Replacement - Follow Nurse / BPA Driven Protocol  •  morphine  •  Morphine  •  ondansetron **OR** ondansetron  •  simethicone  •  sodium chloride  •  sodium chloride  •  sodium chloride  •  tiZANidine    Assessment & Plan   Assessment & Plan     Active Hospital Problems    Diagnosis  POA   • **Acute hypoxemic respiratory failure -likely due to ILD exacerbation [J96.01]  Yes   • Pulmonary fibrosis [J84.10]  Yes   • ILD (interstitial lung disease) -likely RA associated ILD with acute exacerbation [J84.9]  Yes   • Multifocal pneumonia [J18.9]  Yes      Resolved Hospital Problems   No resolved problems to display.        Brief Hospital Course to date:  Tala Ramsey is a 72 y.o. female w/ hx interstitial lung dz, RA (on humira until recently, recent steroid injections) who presents w/ progressive dyspnea, cough over ~10 days failing outpatient Levaquin. Pt was found to be hypoxic upon admission. She had progressive hypoxia evening after admission placed on hi flow canula       This patient's problems and plans were partially entered by my partner and updated as appropriate by me 05/09/23.      Acute hypoxic resp failure, worsening  Pulmonary fibrosis/ILD  Bilateral Pneumonia  -likely  combination of progession/flare of interstitial lung disease and infection  -remains on High flow with difficulty weaning and significant drops with minimal exertion (simply rolling in bed to use bedpan)  -s/p full course of zosyn & azithromycin  -hold 3x weekly bactrim ds (pcp prophylaxis as has been on humira and steroids) due to worsening hyperkalemia  -continue scheduled & PRN duonebs  -Pulm following: completed 3 days of solumedrol (1g daily x 3 days) on 4/26/23; now on solumedrol 60mg IV BID with plan for prolonged taper pending goals of care-- will taper to 40 BID today   -diuresis PRN as tolerated- did give IV diuresis 5/6 and 7 without much improvement so will hold for today   -palliative also following, guarded prognosis at best and my partner discussed this in detail previously with patient and DIL at bedside, have also discussed with other family members as well. continue low dose ativan for anxiety.Pulmonary has d/w patient regarding hospice and re-eval as patient would prefer to transition to IP hospice than going home with hospice.Hospice is following and ongoing family meetings. Son at bedside this morning does not seem to comprehend current goals of the patient, I tried to  him, but he was not open to any mention of Hospice this morning.  Will see if Palliative/Hospice team can talk to him some more.  -episode of desat overnight 5/7-5/8 with concern for possible aspiration. D/w patient and son at bedside deferring on SLP eval and allowing comfort diet given our GOC, all in agreement     Hyperkalemia  -- K+ of 6 this am after getting Lokelma x 1 yesterday.  Will repeat Lokelma, give IV calcium gluconate and IV insulin/dextrose per hyperkalemia regimen.  -- repeat K+ at 3 pm and repeat BMP in am    RA  -holding humira  -- continue plaquenil     Hyponatremia  -- worse today      HTN  -continue amlodpine  -- hold valsartan due to worsening hyperkalemia    Chronic anemia    -monitor    Goals/limits  -Patient is DNR/DNI (in event of further clinical decompensation patient would NOT want mechanical ventilation; in this case patient would wish to pursue comfort measures;  - daily GOC discussions ongoing with patient and family, hospice and palliative following.  - long discussion with patient and son at bedside again this am. Son who is here this am does not seem to be on board with comfort based care. Hopefully hospice team can guide him better than I  -- add muscle relaxer for neck/back muscle spasms    Patient would ultimately like to return home with family/hospice but will need to wean off HFNC prior to being able to go home    Total time spent: 45 minutes  Time spent includes time reviewing chart, face-to-face time, counseling patient/family/caregiver, ordering medications/tests/procedures, communicating with other health care professionals, documenting clinical information in the electronic health record, and coordination of care.       Expected Discharge Location and Transportation: home  Expected Discharge TBD  Expected Discharge Date: 5/12/2023; Expected Discharge Time:      DVT prophylaxis:  Medical DVT prophylaxis orders are present. sq heparin    AM-PAC 6 Clicks Score (PT): 12 (05/08/23 2000)    CODE STATUS:   Code Status and Medical Interventions:   Ordered at: 04/23/23 1310     Medical Intervention Limits:    NO intubation (DNI)     Level Of Support Discussed With:    Patient     Code Status (Patient has no pulse and is not breathing):    No CPR (Do Not Attempt to Resuscitate)     Medical Interventions (Patient has pulse or is breathing):    Limited Support       Bette Altamirano MD  05/09/23

## 2023-05-09 NOTE — PROGRESS NOTES
Pulmonary Hospital Follow-up     Hospital:  LOS: 16 days   Ms. Tala Ramsey, 72 y.o. female is followed for:   Acute hypoxemic respiratory failure            History of present illness:      72-year-old female with past medical history of longstanding rheumatoid arthritis and inflammatory arthritis with possible lupus.  Patient states that she is under care of Dr. Weinstein for a number of years.  She also has history of iron deficiency anemia, hiatal hernia and GERD and hypertension.  She also has history of multiple allergies and used to be on allergy shots before but stopped using those.  Patient started having problem with more joint pain and joint stiffness.  She  has been off and on multiple drugs for her inflammatory arthritis.  She was tried on CellCept for little while but she could not tolerate it due to GI side effects.  She subsequently was started on diet therapy and states that things are getting stabilized for little bit and she was not on any treatment for 6 months earlier this year.  She started having more problem with the joint discomfort and was placed on Humira in first part of May.  She has been on Plaquenil for a number of years as well.  She had COVID-19 infection in December but stated that she was not very sick from it and she stayed at home.  Did not require any hospitalization or any steroid treatments.  She is started having pain with deep breathing and pleuritic component few months ago and underwent chest x-ray which was abnormal subsequently led to chest CT scan and found to have pulmonary fibrotic changes.  Patient states that she used to have dry cough after COVID-19 infection but that kind of went away     Patient was evaluated and found to have pulmonary fibrosis but her PFTs were relatively stable and no other untoward findings noted on the CT scan.  We discussed biopsy but patient is not interested in that either. She also was having worsening cough after upper respiratory  infection.  She was treated with amoxicillin for tonsillar infection and her ACE inhibitor was changed to ARB and with that her cough significantly improved.     Patient however presented to hospital on April 23 and was admitted to the hospital with bilateral infiltrates with possible pneumonia and started on antibiotics.  Patient was thought to have acute exacerbation of her ILD and was given pulse dose steroids with 1 g for 3 days followed by prolonged prednisone taper.  Bronchoscopy was considered but thought to be very high risk with underlying ILD.  Patient was started on PJP prophylaxis as well.    Patient is DNR/DNI status.    Subjective   Interval History:  Patient had a lot of discomfort last night in the shoulder and back area.  Today morning doing little better.  I was able to wean FiO2 to 50% while resting in bed and she was still saturating 94%.  Denies any other acute issues.  Denies any significant cough currently.                 The patient's past medical, surgical and social history were reviewed and updated in Epic as appropriate.       Objective     Infusions:     Medications:  amLODIPine, 5 mg, Oral, Q24H  calcium gluconate, 1 g, Intravenous, Once  cetirizine, 10 mg, Oral, Daily  dextrose, 50 mL, Intravenous, Once   And  insulin regular, 10 Units, Intravenous, Once  fluticasone, 2 spray, Each Nare, Nightly  guaiFENesin, 1,200 mg, Oral, Q12H  heparin (porcine), 5,000 Units, Subcutaneous, Q8H  hydroxychloroquine, 200 mg, Oral, Q12H  ipratropium-albuterol, 3 mL, Nebulization, 4x Daily - RT  melatonin, 5 mg, Oral, Nightly  methylPREDNISolone sodium succinate, 40 mg, Intravenous, Q12H   And  water for injection, 1 mL, Injection, Q12H  montelukast, 10 mg, Oral, Daily  nystatin, 5 mL, Swish & Spit, 4x Daily  palliative care oral rinse, , Mouth/Throat, BID  pantoprazole, 40 mg, Oral, Daily  sodium chloride, 10 mL, Intravenous, Q12H  sodium zirconium cyclosilicate, 10 g, Oral, Once        Vital Sign  "Min/Max for last 24 hours  Temp  Min: 97.2 °F (36.2 °C)  Max: 98 °F (36.7 °C)   BP  Min: 135/56  Max: 153/73   Pulse  Min: 63  Max: 90   Resp  Min: 18  Max: 20   SpO2  Min: 91 %  Max: 97 %   Flow (L/min)  Min: 45  Max: 50       Input/Output for last 24 hour shift  05/08 0701 - 05/09 0700  In: -   Out: 1800 [Urine:1800]      Objective:  Vital signs: (most recent): Blood pressure 148/74, pulse 87, temperature 97.2 °F (36.2 °C), resp. rate 20, height 153.7 cm (60.5\"), weight 80.3 kg (177 lb), SpO2 91 %, not currently breastfeeding.            General Appearance: Awake, alert, in no acute resp distress on HFNC  Lungs:   B/L Breath sounds present with decreased breath sounds on bases, no wheezing heard, occ crackles.   Heart: S1 and S2 present, no murmur  Abdomen: Soft, nontender, no guarding or rigidity, bowel sounds positive  Extremities:   no edema, warm to touch.  Neurologic:  Moving all four extremities. Good strength bilaterally.  Psychological: Normal affect, Cooperative    Results from last 7 days   Lab Units 05/09/23  0758 05/05/23  0530   WBC 10*3/mm3 21.10* 29.40*   HEMOGLOBIN g/dL 13.1 12.3   PLATELETS 10*3/mm3 382 575*     Results from last 7 days   Lab Units 05/09/23  0758 05/08/23  0619 05/05/23  0530   SODIUM mmol/L 127* 128* 136   POTASSIUM mmol/L 6.0* 5.8* 5.4*   CO2 mmol/L 22.0 19.0* 22.0   BUN mg/dL 27* 25* 19   CREATININE mg/dL 0.45* 0.45* 0.41*   GLUCOSE mg/dL 120* 118* 123*     Estimated Creatinine Clearance: 105.6 mL/min (A) (by C-G formula based on SCr of 0.45 mg/dL (L)).          Images:   Chest x-ray done and reviewed personally.  Improvement noted in bilateral interstitial lung disease.    I reviewed the patient's results and images.     Assessment & Plan   Impression        Acute hypoxemic respiratory failure -likely due to ILD exacerbation    Multifocal pneumonia    Pulmonary fibrosis    ILD (interstitial lung disease) -likely RA associated ILD with acute exacerbation       Plan        1. Pt " presented with worsening hypoxia and appears like acute ILD exacerbation.  Patient was treated with pulse dose steroids and has been on high-dose steroids and antibiotics.  Patient is off antibiotics for now.  WBC count improving.  We will continue current supportive care.  Had extensive discussion with patient and her family about possible causes and  limitations on what more we can do here.  They verbalized understanding.  Chest x-ray showing slight improvement.  I was able to wean down FiO2 to 50% currently.  We will continue to wean as tolerated and monitor closely.  2.  Patient with worsening hyperkalemia.  Potassium level is 6 today.  We will give 3 doses of Lokelma today and recheck again in the morning.  Bactrim is on hold for now.  Once hyperkalemia improves will rechallenge she has high risk of pneumocystis infection otherwise may need to consider alternate prophylaxis.  Sodium level is also low.  Patient is not drinking too much water either.  Will trend for now.  3.  Continue to wean FiO2 as tolerated to keep saturation 90% or better.  Once down to 40% FiO2 may be able to switch to regular nasal cannula.  4.  Oral diet as tolerated.  Watch out for aspiration episodes.  5.  Continue PPI.  DVT prophylaxis.    Palliative care team is following.  I had extensive discussion with patient's son yesterday as well as today.  They verbalized understanding that patient hermilo with guarded prognosis but showing some improvement.  Discussed a long road ahead with likely inpatient rehab if patient does well and able to maintain on nasal cannula oxygen.  Discussed that patient will be extremely weak coming out of this.  Side effects of medications reviewed and risk of secondary infection is very high as well.  There are no further questions at this point.    Time spent 38 min (exclusive of procedure time)  including high complexity decision making to assess, manipulate, and support vital organ system failure in this  individual who has impairment of one or more vital organ systems such that there is a high probability of imminent or life threatening deterioration in the patient’s condition.      Laurent Tomlinson MD, [facility]  Pulmonary, Critical care and Sleep Medicine

## 2023-05-09 NOTE — PLAN OF CARE
"Goal Outcome Evaluation:  Plan of Care Reviewed With: patient, family        Progress: no change     Transferred to . [place] in place. IV patent. HF NC @ 60% and 50lpm maintained. Repositioned pt but pt did not want to offload hips even though it was \"uncomfortable\". PRNs given for dyspnea. Advised about PO morphine solution but pt said she \"wanted to start new medication tomorrow\". Awaiting further orders.   "

## 2023-05-09 NOTE — NURSING NOTE
WOC consulted for specialty bed:    Sensory Perception: 4-->no impairment  Moisture: 3-->occasionally moist  Activity: 2-->chairfast  Mobility: 2-->very limited  Nutrition: 2-->probably inadequate  Friction and Shear: 2-->potential problem  Nura Score: 15 (05/09/23 0800)    ANANDA ordered.    Implement pressure injury prevention measures if not already completed.    WOC will sign off.      Ruperto Guerin RN, BSN, CCRN, CWOCN  Wound, Ostomy and Continence (WOC) Department  [facility]

## 2023-05-09 NOTE — PLAN OF CARE
"Goal Outcome Evaluation:  Plan of Care Reviewed With: patient, son        Progress: improving  Outcome Evaluation: Pt more calm today than yesterday; reported that she likes her new room and the staff on the unit. Pt did take a few doses of morphine overnight for dyspnea, reported that it did help her feel less short of air. Reviewed absorption and length of effect of oral morphine as compared to IV route, advised taking it in anticipation of activities that typically cause air hunger may help her tolerate them without becoming breathless. Pt agreeable to trying the oral morphine today; son present encouraged and supported pt in this. Notified unit RN of pt request for oral morphine. Provided orange sherbet to cool the mouth prior, as pt was quite concerned about the medication tasting bad. Suggested any cold foods, ice chips before medication; may also try very sweet, such as syrup, or salty, like a saltine cracker, after the medication to nullify the taste. Recommeneded to take the medication 30 minutes to an hour prior to planned activity, monitor ability to tolerate as compared to without the morphine. Palliative following for continued management of symptoms, support to pt and family in GOC/POC discussion. Hope to be able to wean pt to level of O2 that would allow her to be at home for some period of time, as she has stated that she does not want to \"get home just to die.\"    1300 Palliative IDT meeting: JOYCE PHILLIPS, RN, ,     After hours, weekends and holidays, contact Palliative Provider by calling [phone]    Problem: Palliative Care  Goal: Enhanced Quality of Life  Outcome: Ongoing, Progressing  Intervention: Maximize Comfort  Flowsheets (Taken 5/9/2023 1632)  Pain Management Interventions: (pt willing to try oral morphine preemptively for DUMONT)   pain management plan reviewed with patient/caregiver   other (see comments)  Intervention: Optimize Function  Flowsheets (Taken 5/9/2023 " 7628)  Sleep/Rest Enhancement:   consistent schedule promoted   family presence promoted   natural light exposure provided  Intervention: Optimize Psychosocial Wellbeing  Flowsheets (Taken 5/9/2023 163)  Supportive Measures:   active listening utilized   positive reinforcement provided   problem-solving facilitated   verbalization of feelings encouraged

## 2023-05-09 NOTE — PLAN OF CARE
Goal Outcome Evaluation:  Plan of Care Reviewed With: patient, caregiver        Progress: no change  Outcome Evaluation: VSS on 45 HF NC, patent IV in place. Bedrest occuring as to SOA when active. Specialty bed ordered and pt transfered to bed. PRN Oral morphine administered Q4 hrs. Potassium elevated and protocol given with redraw scheduled. Patent purewick in place to low wall suction and absorbant pads in use. Poor oral intake with adequate UOP. Continue POC and report as needed.

## 2023-05-09 NOTE — PLAN OF CARE
Goal Outcome Evaluation:  Plan of Care Reviewed With: patient        Progress: no change       Patient continues on HF NC; sats maintaing on cont. pulse ox. Sons at bedside-- very attentive. Pt complaints of pain and dyspnea; PRNs administered. Readjusted in bed multiple times per pt request but turned down supportive aids. Patient very anxious.

## 2023-05-10 LAB
ANION GAP SERPL CALCULATED.3IONS-SCNC: 11 MMOL/L (ref 5–15)
BUN SERPL-MCNC: 24 MG/DL (ref 8–23)
BUN/CREAT SERPL: 52.2 (ref 7–25)
CALCIUM SPEC-SCNC: 9.1 MG/DL (ref 8.6–10.5)
CHLORIDE SERPL-SCNC: 91 MMOL/L (ref 98–107)
CO2 SERPL-SCNC: 22 MMOL/L (ref 22–29)
CREAT SERPL-MCNC: 0.46 MG/DL (ref 0.57–1)
EGFRCR SERPLBLD CKD-EPI 2021: 101.2 ML/MIN/1.73
GLUCOSE BLDC GLUCOMTR-MCNC: 110 MG/DL (ref 70–130)
GLUCOSE BLDC GLUCOMTR-MCNC: 119 MG/DL (ref 70–130)
GLUCOSE BLDC GLUCOMTR-MCNC: 137 MG/DL (ref 70–130)
GLUCOSE SERPL-MCNC: 114 MG/DL (ref 65–99)
POTASSIUM SERPL-SCNC: 5 MMOL/L (ref 3.5–5.2)
SODIUM SERPL-SCNC: 124 MMOL/L (ref 136–145)

## 2023-05-10 PROCEDURE — 25010000002 HEPARIN (PORCINE) PER 1000 UNITS: Performed by: INTERNAL MEDICINE

## 2023-05-10 PROCEDURE — 82948 REAGENT STRIP/BLOOD GLUCOSE: CPT

## 2023-05-10 PROCEDURE — 63710000001 PREDNISONE PER 5 MG: Performed by: INTERNAL MEDICINE

## 2023-05-10 PROCEDURE — 80048 BASIC METABOLIC PNL TOTAL CA: CPT | Performed by: INTERNAL MEDICINE

## 2023-05-10 PROCEDURE — 94761 N-INVAS EAR/PLS OXIMETRY MLT: CPT

## 2023-05-10 PROCEDURE — 63710000001 PREDNISONE PER 1 MG: Performed by: INTERNAL MEDICINE

## 2023-05-10 PROCEDURE — 99232 SBSQ HOSP IP/OBS MODERATE 35: CPT | Performed by: INTERNAL MEDICINE

## 2023-05-10 PROCEDURE — 99232 SBSQ HOSP IP/OBS MODERATE 35: CPT | Performed by: NURSE PRACTITIONER

## 2023-05-10 PROCEDURE — 94799 UNLISTED PULMONARY SVC/PX: CPT

## 2023-05-10 RX ORDER — NYSTATIN 100000 [USP'U]/G
POWDER TOPICAL EVERY 12 HOURS SCHEDULED
Status: DISCONTINUED | OUTPATIENT
Start: 2023-05-10 | End: 2023-05-25 | Stop reason: HOSPADM

## 2023-05-10 RX ORDER — SODIUM CHLORIDE 1 G/1
1 TABLET ORAL 2 TIMES DAILY WITH MEALS
Status: DISCONTINUED | OUTPATIENT
Start: 2023-05-10 | End: 2023-05-25 | Stop reason: HOSPADM

## 2023-05-10 RX ORDER — NYSTATIN 100000 [USP'U]/G
POWDER TOPICAL ONCE
Status: DISCONTINUED | OUTPATIENT
Start: 2023-05-10 | End: 2023-05-15

## 2023-05-10 RX ADMIN — Medication: at 22:24

## 2023-05-10 RX ADMIN — ACETAMINOPHEN 325MG 650 MG: 325 TABLET ORAL at 16:23

## 2023-05-10 RX ADMIN — MORPHINE SULFATE 5 MG: 100 SOLUTION ORAL at 03:59

## 2023-05-10 RX ADMIN — HYDROXYCHLOROQUINE SULFATE 200 MG: 200 TABLET, FILM COATED ORAL at 20:38

## 2023-05-10 RX ADMIN — NYSTATIN 500000 UNITS: 100000 SUSPENSION ORAL at 18:25

## 2023-05-10 RX ADMIN — PREDNISONE 30 MG: 20 TABLET ORAL at 18:25

## 2023-05-10 RX ADMIN — PANTOPRAZOLE SODIUM 40 MG: 40 TABLET, DELAYED RELEASE ORAL at 08:41

## 2023-05-10 RX ADMIN — CETIRIZINE HYDROCHLORIDE 10 MG: 10 TABLET, FILM COATED ORAL at 08:41

## 2023-05-10 RX ADMIN — SODIUM CHLORIDE 1 G: 1 TABLET ORAL at 18:25

## 2023-05-10 RX ADMIN — LORAZEPAM 1 MG: 1 TABLET ORAL at 18:47

## 2023-05-10 RX ADMIN — HYDROXYCHLOROQUINE SULFATE 200 MG: 200 TABLET, FILM COATED ORAL at 08:41

## 2023-05-10 RX ADMIN — NYSTATIN 500000 UNITS: 100000 SUSPENSION ORAL at 08:41

## 2023-05-10 RX ADMIN — NYSTATIN 500000 UNITS: 100000 SUSPENSION ORAL at 12:59

## 2023-05-10 RX ADMIN — IPRATROPIUM BROMIDE AND ALBUTEROL SULFATE 3 ML: .5; 2.5 SOLUTION RESPIRATORY (INHALATION) at 21:03

## 2023-05-10 RX ADMIN — HEPARIN SODIUM 5000 UNITS: 5000 INJECTION, SOLUTION INTRAVENOUS; SUBCUTANEOUS at 22:21

## 2023-05-10 RX ADMIN — MONTELUKAST 10 MG: 10 TABLET, FILM COATED ORAL at 08:41

## 2023-05-10 RX ADMIN — IPRATROPIUM BROMIDE AND ALBUTEROL SULFATE 3 ML: .5; 2.5 SOLUTION RESPIRATORY (INHALATION) at 13:30

## 2023-05-10 RX ADMIN — IPRATROPIUM BROMIDE AND ALBUTEROL SULFATE 3 ML: .5; 2.5 SOLUTION RESPIRATORY (INHALATION) at 09:00

## 2023-05-10 RX ADMIN — Medication 5 MG: at 20:38

## 2023-05-10 RX ADMIN — MORPHINE SULFATE 5 MG: 100 SOLUTION ORAL at 21:27

## 2023-05-10 RX ADMIN — LORAZEPAM 1 MG: 1 TABLET ORAL at 10:44

## 2023-05-10 RX ADMIN — PREDNISONE 30 MG: 20 TABLET ORAL at 08:41

## 2023-05-10 RX ADMIN — CALCIUM CARBONATE (ANTACID) CHEW TAB 500 MG 2 TABLET: 500 CHEW TAB at 11:25

## 2023-05-10 RX ADMIN — LORAZEPAM 1 MG: 1 TABLET ORAL at 04:04

## 2023-05-10 RX ADMIN — IPRATROPIUM BROMIDE AND ALBUTEROL SULFATE 3 ML: .5; 2.5 SOLUTION RESPIRATORY (INHALATION) at 17:20

## 2023-05-10 RX ADMIN — NYSTATIN: 100000 POWDER TOPICAL at 20:39

## 2023-05-10 RX ADMIN — Medication 10 ML: at 08:45

## 2023-05-10 RX ADMIN — AMLODIPINE BESYLATE 5 MG: 5 TABLET ORAL at 08:41

## 2023-05-10 RX ADMIN — GUAIFENESIN 1200 MG: 600 TABLET, EXTENDED RELEASE ORAL at 08:41

## 2023-05-10 RX ADMIN — HEPARIN SODIUM 5000 UNITS: 5000 INJECTION, SOLUTION INTRAVENOUS; SUBCUTANEOUS at 06:11

## 2023-05-10 RX ADMIN — GUAIFENESIN 1200 MG: 600 TABLET, EXTENDED RELEASE ORAL at 20:38

## 2023-05-10 RX ADMIN — NYSTATIN 500000 UNITS: 100000 SUSPENSION ORAL at 20:39

## 2023-05-10 NOTE — PLAN OF CARE
Goal Outcome Evaluation:  Plan of Care Reviewed With: patient           Outcome Evaluation: VSS. Pt remains on high flow NC 40L @ 55% Pt given oral morphine per orders. Pt has purwick in place. Daughter in law stayed with pt last night. Pt slept majority of night. Given ativan this am for anxiety per orders. POC continued.

## 2023-05-10 NOTE — CASE MANAGEMENT/SOCIAL WORK
Continued Stay Note  [facility]     Patient Name: Tala Ramsey  MRN: [id number]  Today's Date: 5/10/2023    Admit Date: 4/23/2023    Plan: Ongoing   Discharge Plan     Row Name 05/10/23 1145       Plan    Plan Ongoing    Plan Comments SW’er met patient and guest at bedside. Attempting to wean patients’ oxygen off high flow. Palliative is following. CM continue to follow for discharge planning needs. Plan ongoing    Final Discharge Disposition Code 01 - home or self-care               Discharge Codes    No documentation.               Expected Discharge Date and Time     Expected Discharge Date Expected Discharge Time    May 12, 2023             BASSAM Stanley (Kay)

## 2023-05-10 NOTE — PLAN OF CARE
Goal Outcome Evaluation:  Plan of Care Reviewed With: patient        Progress: no change  Outcome Evaluation: high flow NC, RN tums and tylenol given, no c/o n/v, resting well, family at bedside

## 2023-05-10 NOTE — PROGRESS NOTES
Palliative Care Progress Note    Date of Admission: 4/23/2023    Subjective: Does report that she slept very well last night.  Family at bedside states that she did have some dyspnea earlier this morning but that seems to have improved.  Current Code Status     Date Active Code Status Order ID Comments User Context       4/23/2023 1310 No CPR (Do Not Attempt to Resuscitate) [id number]  Elias Morales MD Inpatient      Question Answer    Code Status (Patient has no pulse and is not breathing) No CPR (Do Not Attempt to Resuscitate)    Medical Interventions (Patient has pulse or is breathing) Limited Support    Medical Intervention Limits: NO intubation (DNI)    Level Of Support Discussed With Patient              No current facility-administered medications on file prior to encounter.     Current Outpatient Medications on File Prior to Encounter   Medication Sig Dispense Refill   • acetaminophen (TYLENOL) 325 MG tablet Take 2 tablets by mouth Every 4 (Four) Hours As Needed.     • amLODIPine-valsartan (EXFORGE) 5-160 MG per tablet TAKE 1 A DAY FOR BLOOD PRESSURE     • Calcium-Phosphorus-Vitamin D (CITRACAL +D3 PO) Take 1 tablet by mouth.     • cyclobenzaprine (FLEXERIL) 10 MG tablet Take 1 tablet by mouth 3 (Three) Times a Day As Needed for muscle spasms. 21 tablet 0   • fluticasone (FLONASE) 50 MCG/ACT nasal spray 2 sprays by Each Nare route Daily.     • Humira Pen 40 MG/0.4ML Pen-injector Kit Inject 40 mg under the skin into the appropriate area as directed Every 14 (Fourteen) Days.     • hydroxychloroquine (PLAQUENIL) 200 MG tablet Take  by mouth 2 (Two) Times a Day.     • Ibuprofen 200 MG capsule Take 1 tablet by mouth As Needed.     • Lidocaine Viscous HCl (XYLOCAINE) 2 % solution      • montelukast (SINGULAIR) 10 MG tablet Take 1 tablet by mouth Daily.     • Omega-3 Fatty Acids (fish oil) 1200 MG capsule capsule Take 1 capsule by mouth Daily.     • pantoprazole (PROTONIX) 40 MG EC tablet Take 1 tablet by mouth  "Daily.     • predniSONE (DELTASONE) 20 MG tablet Take 1.5 tablets by mouth Daily. 11 tablet 0   • simethicone (MYLICON) 125 MG chewable tablet Chew 1 tablet Every 6 (Six) Hours As Needed for Flatulence.     • tiZANidine (ZANAFLEX) 2 MG tablet 1'2-1 in the Am and afternoon, 1-2 at bedtime. as needed          •  acetaminophen **OR** acetaminophen **OR** acetaminophen  •  aluminum-magnesium hydroxide-simethicone **AND** Lidocaine Viscous HCl  •  senna-docusate sodium **AND** polyethylene glycol **AND** bisacodyl **AND** bisacodyl  •  calcium carbonate  •  cyclobenzaprine  •  dextrose  •  dextrose  •  glucagon (human recombinant)  •  hydrOXYzine  •  ipratropium-albuterol  •  LORazepam  •  Magnesium Standard Dose Replacement - Follow Nurse / BPA Driven Protocol  •  morphine  •  Morphine  •  ondansetron **OR** ondansetron  •  simethicone  •  sodium chloride  •  sodium chloride  •  sodium chloride    Objective: /74 (BP Location: Right arm, Patient Position: Lying)   Pulse 89   Temp 97.6 °F (36.4 °C) (Temporal)   Resp 17   Ht 153.7 cm (60.5\")   Wt 80.3 kg (177 lb)   SpO2 92%   BMI 34.00 kg/m²      Intake/Output Summary (Last 24 hours) at 5/10/2023 1130  Last data filed at 5/10/2023 1127  Gross per 24 hour   Intake 480 ml   Output 1550 ml   Net -1070 ml     Physical Exam:      General Appearance:    Alert, cooperative, conversational dyspnea noted   Head:    Normocephalic, without obvious abnormality, atraumatic   Eyes:            Lids and lashes normal, conjunctivae and sclerae normal, no   icterus, no pallor, corneas clear, PERRLA   Ears:    Ears appear intact with no abnormalities noted   Throat:   No oral lesions, no thrush, oral mucosa moist   Neck:   No adenopathy, supple, trachea midline, no thyromegaly, no     carotid bruit, no JVD   Back:     No kyphosis present, no scoliosis present, no skin lesions,       erythema or scars, no tenderness to percussion or                   palpation,   range of motion " normal   Lungs:     Clear to auscultation,respirations regular, even and                   unlabored    Heart:    Regular rhythm and normal rate, normal S1 and S2, no            murmur, no gallop, no rub, no click   Breast Exam:    Deferred   Abdomen:     Normal bowel sounds, no masses, no organomegaly, soft        non-tender, non-distended, no guarding, no rebound                 tenderness   Genitalia:    Deferred   Extremities:   Moves all extremities well, no edema, no cyanosis, no              redness   Pulses:   Pulses palpable and equal bilaterally   Skin:   No bleeding, bruising or rash   Lymph nodes:   No palpable adenopathy   Neurologic:   Cranial nerves 2 - 12 grossly intact, sensation intact, DTR        present and equal bilaterally     Results from last 7 days   Lab Units 05/09/23  0758   WBC 10*3/mm3 21.10*   HEMOGLOBIN g/dL 13.1   HEMATOCRIT % 39.0   PLATELETS 10*3/mm3 382     Results from last 7 days   Lab Units 05/10/23  0734 05/09/23  0758 05/08/23  0619   SODIUM mmol/L 124*   < > 128*   POTASSIUM mmol/L 5.0   < > 5.8*   CHLORIDE mmol/L 91*   < > 94*   CO2 mmol/L 22.0   < > 19.0*   BUN mg/dL 24*   < > 25*   CREATININE mg/dL 0.46*   < > 0.45*   CALCIUM mg/dL 9.1   < > 9.2   BILIRUBIN mg/dL  --   --  0.4   ALK PHOS U/L  --   --  69   ALT (SGPT) U/L  --   --  18   AST (SGOT) U/L  --   --  16   GLUCOSE mg/dL 114*   < > 118*    < > = values in this interval not displayed.       Impression: Interstitial lung disease  Pneumonia  Respiratory distress  Dyspnea  Anxiety  Goals of care  Plan: Palliative medicine will continue to follow and support however we can.  No change to patient's symptomatic regimen as her symptoms seem to be as controlled as we can have them.  Plan appears to be to continue to watch the patient to see if she is able to ultimately titrate off of the high flow oxygen.      Wojciech Castrejon DO  05/10/23  11:30 EDT

## 2023-05-10 NOTE — PROGRESS NOTES
[facility] Medicine Services  PROGRESS NOTE    Patient Name: Tala Ramsey  : 1950  MRN: [id number]    Date of Admission: 2023  Primary Care Physician: Sg Horne MD    Subjective   Subjective     CC:  Hypoxia, pneumonia    HPI:  Patient seen resting up in bed awake.  Oxygen periodically but sats dropped into the 80s quickly.  Remains on 40% high flow.  Still having slight labored breathing especially when talking.  Shortness of air with exertion.  Hopes to go home with hospice once she can get off of high flow.  No new issues    ROS:  Gen- No fevers, chills  CV- No chest pain, palpitations  Resp- +cough, +dyspnea  GI- No N/V/D, no abd pain    Objective   Objective     Vital Signs:   Temp:  [96.5 °F (35.8 °C)-97.9 °F (36.6 °C)] 97.6 °F (36.4 °C)  Heart Rate:  [78-91] 89  Resp:  [16-21] 17  BP: (113-159)/(74-84) 121/74  Flow (L/min):  [40-50] 40     Physical Exam:  Constitutional: No acute distress, awake, alert. Chronically ill appearing., very deconditioned.  Family at    HENT: NCAT, mucous membranes moist  Respiratory: Very diminished lung sounds and poor air mvmt, on 40% HF oxygen.  Continuous pulse ox in place   Cardiovascular: RRR, no murmurs, rubs, or gallops  Gastrointestinal: Positive bowel sounds, soft, nontender, nondistended. Obese  Musculoskeletal: trace bilateral ankle edema. OCAMPO spont   Psychiatric: Appropriate affect, cooperative  Neurologic: Oriented x 3, strength symmetric in all extremities, Cranial Nerves grossly intact to confrontation, speech clear. Follows commands   Skin: No rashes      Results Reviewed:  LAB RESULTS:      Lab 23  0758 23  0530   WBC 21.10* 29.40*   HEMOGLOBIN 13.1 12.3   HEMATOCRIT 39.0 38.3   PLATELETS 382 575*   NEUTROS ABS 17.54* 24.62*   IMMATURE GRANS (ABS) 0.87* 1.50*   LYMPHS ABS 1.38 1.52   MONOS ABS 1.21* 1.61*   EOS ABS 0.00 0.01   MCV 89.2 91.2         Lab 05/10/23  0734 23  1608 23  0758  05/08/23  0619 05/05/23  0530   SODIUM 124*  --  127* 128* 136   POTASSIUM 5.0 5.3* 6.0* 5.8* 5.4*   CHLORIDE 91*  --  94* 94* 100   CO2 22.0  --  22.0 19.0* 22.0   ANION GAP 11.0  --  11.0 15.0 14.0   BUN 24*  --  27* 25* 19   CREATININE 0.46*  --  0.45* 0.45* 0.41*   EGFR 101.2  --  101.7 101.7 104.0   GLUCOSE 114*  --  120* 118* 123*   CALCIUM 9.1  --  9.2 9.2 9.9         Lab 05/08/23  0619   TOTAL PROTEIN 6.2   ALBUMIN 3.7   GLOBULIN 2.5   ALT (SGPT) 18   AST (SGOT) 16   BILIRUBIN 0.4   ALK PHOS 69                     Brief Urine Lab Results  (Last result in the past 365 days)      Color   Clarity   Blood   Leuk Est   Nitrite   Protein   CREAT   Urine HCG        04/23/23 1757 Yellow   Clear   Trace   Negative   Negative   Negative                 Microbiology Results Abnormal     Procedure Component Value - Date/Time    Blood Culture - Blood, Arm, Right [[id number]]  (Normal) Collected: 04/23/23 1125    Lab Status: Final result Specimen: Blood from Arm, Right Updated: 04/28/23 1145     Blood Culture No growth at 5 days    Blood Culture - Blood, Arm, Left [[id number]]  (Normal) Collected: 04/23/23 1125    Lab Status: Final result Specimen: Blood from Arm, Left Updated: 04/28/23 1145     Blood Culture No growth at 5 days    S. Pneumo Ag Urine or CSF - Urine, Urine, Clean Catch [[id number]]  (Normal) Collected: 04/23/23 1757    Lab Status: Final result Specimen: Urine, Clean Catch Updated: 04/24/23 0949     Strep Pneumo Ag Negative    Legionella Antigen, Urine - Urine, Urine, Clean Catch [[id number]]  (Normal) Collected: 04/23/23 1757    Lab Status: Final result Specimen: Urine, Clean Catch Updated: 04/24/23 0949     LEGIONELLA ANTIGEN, URINE Negative    Respiratory Panel PCR w/COVID-19(SARS-CoV-2) CHARI/MARISSA/APOLINAR/PAD/COR/MAD/AMY In-House, NP Swab in [facility], 3-4 HR TAT - Swab, Nasopharynx [[id number]]  (Normal) Collected: 04/24/23 0544    Lab Status: Final result Specimen: Swab from Nasopharynx Updated: 04/24/23 0603      ADENOVIRUS, PCR Not Detected     Coronavirus 229E Not Detected     Coronavirus HKU1 Not Detected     Coronavirus NL63 Not Detected     Coronavirus OC43 Not Detected     COVID19 Not Detected     Human Metapneumovirus Not Detected     Human Rhinovirus/Enterovirus Not Detected     Influenza A PCR Not Detected     Influenza B PCR Not Detected     Parainfluenza Virus 1 Not Detected     Parainfluenza Virus 2 Not Detected     Parainfluenza Virus 3 Not Detected     Parainfluenza Virus 4 Not Detected     RSV, PCR Not Detected     Bordetella pertussis pcr Not Detected     Bordetella parapertussis PCR Not Detected     Chlamydophila pneumoniae PCR Not Detected     Mycoplasma pneumo by PCR Not Detected    Narrative:      In the setting of a positive respiratory panel with a viral infection PLUS a negative procalcitonin without other underlying concern for bacterial infection, consider observing off antibiotics or discontinuation of antibiotics and continue supportive care. If the respiratory panel is positive for atypical bacterial infection (Bordetella pertussis, Chlamydophila pneumoniae, or Mycoplasma pneumoniae), consider antibiotic de-escalation to target atypical bacterial infection.    MRSA Screen, PCR (Inpatient) - Swab, Nares [[id number]]  (Normal) Collected: 04/23/23 1540    Lab Status: Final result Specimen: Swab from Nares Updated: 04/23/23 1651     MRSA PCR Negative    Narrative:      The negative predictive value of this diagnostic test is high and should only be used to consider de-escalating anti-MRSA therapy. A positive result may indicate colonization with MRSA and must be correlated clinically.  MRSA Negative    COVID PRE-OP / PRE-PROCEDURE SCREENING ORDER (NO ISOLATION) - Swab, Nasopharynx [[id number]]  (Normal) Collected: 04/23/23 1125    Lab Status: Final result Specimen: Swab from Nasopharynx Updated: 04/23/23 1225    Narrative:      The following orders were created for panel order COVID PRE-OP /  PRE-PROCEDURE SCREENING ORDER (NO ISOLATION) - Swab, Nasopharynx.  Procedure                               Abnormality         Status                     ---------                               -----------         ------                     COVID-19, FLU A/B, RSV P...[[id number]]  Normal              Final result                 Please view results for these tests on the individual orders.    COVID-19, FLU A/B, RSV PCR - Swab, Nasopharynx [[id number]]  (Normal) Collected: 04/23/23 1125    Lab Status: Final result Specimen: Swab from Nasopharynx Updated: 04/23/23 1225     COVID19 Not Detected     Influenza A PCR Not Detected     Influenza B PCR Not Detected     RSV, PCR Not Detected    Narrative:      Fact sheet for providers: https://www.fda.gov/media/779898/download    Fact sheet for patients: https://www.fda.gov/media/084428/download    Test performed by PCR.          XR Chest 1 View    Result Date: 5/9/2023  XR CHEST 1 VW Date of Exam: 5/9/2023 2:22 AM EDT Indication: Resp failure Comparison: May 1, 2023 Findings: The heart not definitely enlarged. There are bilateral pulmonary infiltrates with slight improvement superimposed on some underlying interstitial lung disease. There are no large pleural effusions.     Impression: Impression: 1.There has been slight improvement in the multifocal pulmonary infiltrates. There are interstitial changes in the lower lungs that could reflect more chronic interstitial lung disease. Electronically Signed: David Ochoa  5/9/2023 7:17 AM EDT  Workstation ID: [id number]          Current medications:  Scheduled Meds:amLODIPine, 5 mg, Oral, Q24H  cetirizine, 10 mg, Oral, Daily  fluticasone, 2 spray, Each Nare, Nightly  guaiFENesin, 1,200 mg, Oral, Q12H  heparin (porcine), 5,000 Units, Subcutaneous, Q8H  hydroxychloroquine, 200 mg, Oral, Q12H  insulin lispro, 0-7 Units, Subcutaneous, TID AC  ipratropium-albuterol, 3 mL, Nebulization, 4x Daily - RT  melatonin, 5 mg, Oral,  Nightly  montelukast, 10 mg, Oral, Daily  nystatin, 5 mL, Swish & Spit, 4x Daily  palliative care oral rinse, , Mouth/Throat, BID  pantoprazole, 40 mg, Oral, Daily  predniSONE, 30 mg, Oral, BID With Meals  sodium chloride, 10 mL, Intravenous, Q12H  sodium chloride, 1 g, Oral, BID With Meals      Continuous Infusions:   PRN Meds:.•  acetaminophen **OR** acetaminophen **OR** acetaminophen  •  aluminum-magnesium hydroxide-simethicone **AND** Lidocaine Viscous HCl  •  senna-docusate sodium **AND** polyethylene glycol **AND** bisacodyl **AND** bisacodyl  •  calcium carbonate  •  cyclobenzaprine  •  dextrose  •  dextrose  •  glucagon (human recombinant)  •  hydrOXYzine  •  ipratropium-albuterol  •  LORazepam  •  Magnesium Standard Dose Replacement - Follow Nurse / BPA Driven Protocol  •  morphine  •  Morphine  •  ondansetron **OR** ondansetron  •  simethicone  •  sodium chloride  •  sodium chloride  •  sodium chloride    Assessment & Plan   Assessment & Plan     Active Hospital Problems    Diagnosis  POA   • **Acute hypoxemic respiratory failure -likely due to ILD exacerbation [J96.01]  Yes   • Pulmonary fibrosis [J84.10]  Yes   • ILD (interstitial lung disease) -likely RA associated ILD with acute exacerbation [J84.9]  Yes   • Multifocal pneumonia [J18.9]  Yes      Resolved Hospital Problems   No resolved problems to display.        Brief Hospital Course to date:  Tala Ramsey is a 72 y.o. female w/ hx interstitial lung dz, RA (on humira until recently, recent steroid injections) who presents w/ progressive dyspnea, cough over ~10 days failing outpatient Levaquin. Pt was found to be hypoxic upon admission. She had progressive hypoxia evening after admission placed on hi flow canula       This patient's problems and plans were partially entered by my partner and updated as appropriate by me 05/10/23.      Acute hypoxic resp failure, worsening  Pulmonary fibrosis/ILD  Bilateral Pneumonia  -likely combination of  progession/flare of interstitial lung disease and infection  -remains on High flow with difficulty weaning and significant drops with minimal exertion (simply rolling in bed to use bedpan)  -s/p full course of zosyn & azithromycin  -hold 3x weekly bactrim ds (pcp prophylaxis as has been on humira and steroids) due to worsening hyperkalemia  -continue scheduled & PRN duonebs  -Pulm following: completed IV solumedrol yest 5/9 and transitioned po prednisone.  Continue   -diuresis PRN as tolerated- did give IV diuresis 5/6 and 7 without much improvement so holding.   -palliative also following, guarded prognosis at best and my partner discussed this in detail previously with patient and DIL at bedside, have also discussed with other family members as well. continue low dose ativan for anxiety.Pulmonary has d/w patient regarding hospice and re-eval as patient would prefer to transition to IP hospice than going home with hospice.Hospice is following and ongoing family meetings. Son at bedside this morning does not seem to comprehend current goals of the patient, I tried to  him, but he was not open to any mention of Hospice this morning.    -episode of desat overnight 5/7-5/8 with concern for possible aspiration. Partner D/w patient and son at bedside deferring on SLP eval and allowing comfort diet given our GOC, all in agreement   --remains on 40% HF.      Hyponatremia  Poor po intake  --No IV access.  Full support but no CPR/intubation.  Sodium continues to drop.  Pt wishes to keep IV out if at all possible   --add salt tabs bid for now and encourage po intake.    --Avis am labs     Hyperkalemia  -- K+ of 6 this am after getting Lokelma x 1 5/8, and repeated Lokelma, IV calcium gluconate and IV insulin/dextrose per hyperkalemia regimen yesterday.  -- repeat K+ This am nl at 5.0  --am labs     RA  -holding humira  -- continue plaquenil     HTN  -continue amlodpine  -- hold valsartan due to worsening  hyperkalemia  --bp stable     Chronic anemia   -monitor    Goals/limits  -Patient is DNR/DNI (in event of further clinical decompensation patient would NOT want mechanical ventilation; in this case patient would wish to pursue comfort measures;  - daily GOC discussions ongoing with patient and family, hospice and palliative following.  - prior long discussion with patient and son at bedside 5/9.  Son who was there did not seem to be on board with comfort based care. Hopefully hospice team can guide him better  -- prior added muscle relaxer for neck/back muscle spasms    Patient would ultimately like to return home with family/hospice but will need to wean off HFNC prior to being able to go home      Expected Discharge Location and Transportation: home  Expected Discharge TBD  Expected Discharge Date: 5/12/2023; Expected Discharge Time:      DVT prophylaxis:  Medical DVT prophylaxis orders are present. sq heparin    AM-PAC 6 Clicks Score (PT): 12 (05/10/23 0800)    CODE STATUS:   Code Status and Medical Interventions:   Ordered at: 04/23/23 1310     Medical Intervention Limits:    NO intubation (DNI)     Level Of Support Discussed With:    Patient     Code Status (Patient has no pulse and is not breathing):    No CPR (Do Not Attempt to Resuscitate)     Medical Interventions (Patient has pulse or is breathing):    Limited Support       Gloria Nicole, APRN  05/10/23

## 2023-05-10 NOTE — PROGRESS NOTES
"                    Clinical Nutrition       Patient Name: Tala Ramsey  YOB: 1950  MRN: [id number]  Date of Encounter: 05/10/23 13:43 EDT  Admission date: 4/23/2023    Continues with poor intakes, is eating minimally. Open to several options for managing:  Diet changed to chop, meat/potatoes with gravy for soa and difficulty chewing (discussed with pt can change back to regular if desired)  Boost in am, Magic cup L/D  Breeze in evening, hopeful will also help with oral morphine taste aversion  Discussed with pt will need to include supps in fluid restriction, this is indicated as she is taking in minimal PO including water.   Discussed with pt to include these interventions as tolerated prioritizing comfort as in line with [facility]    Reason for Visit   Follow-up protocol    EMR Reviewed     EMR Reviewed: yes     Admission Diagnosis:  Multifocal pneumonia [J18.9]    Problem List:    Acute hypoxemic respiratory failure -likely due to ILD exacerbation    Multifocal pneumonia    Pulmonary fibrosis    ILD (interstitial lung disease) -likely RA associated ILD with acute exacerbation      Anthropometric      Height: 153.7 cm (60.5\")  Last Filed Weight: Weight: 80.3 kg (177 lb) (04/23/23 1014)  Weight Method: Stated  BMI: BMI (Calculated): 34  BMI classification: Obese Class I: 30-34.9kg/m2   IBW:  102lbs    UBW: 177lbs  Weight change:   No documented weight change    Nutrition Focused Physical Exam     Date: 4/24    NFPE completed, patient does not meet criteria for MSA at this time.      Reported/Observed/Food/Nutrition Related - Comments     5/10) Visited with pt and dtr at bedside and discussed nutrition at length as she continues with poor intakes. She is managing the best she can and understands importance nutrition, but struggling with mechanical act of eating r/t SOA and difficulty/pain with chewing at this time. She was open to several options for supplementing intakes, discussed will need to " include in fluid restriction. Agreeable to diet downgrade to assist. She denied further needs or nutritional concerns at this time, in understanding and appreciative of education.     ) Pt up in bed with HFNC in place - able to provide some weight/nutrition hx. ? Reliable hxn. Endorsed a moderate appetite however recent decline in intake 2/2 acute illness and oral ulcers. Denies difficulty chewing/swallowing - however endorsed being SOB during breakfast. Discussed soft to chew diet, agreeable. Dislikes ONS - however reports drinking Glucerna as needed.  Denies N/V/D. NKFA     Current Nutrition Prescription     Diet: Regular/House Diet, Fluid Restriction (240 mL/tray) Diets; 1000 mL/day; Texture: Soft to Chew (NDD 3); Soft to Chew: Chopped Meat; Fluid Consistency: Thin (IDDSI 0)  Orders Placed This Encounter      Dietary Nutrition Supplements Magic Cup      Dietary Nutrition Supplements Boost Breeze (Clear Liquid); peach      Dietary Nutrition Supplements Boost Plus; vanilla      DIET MESSAGE Gravy all meats and with mash potatoes      Average Intake from [place]% X 20 meals documented    Nutrition Diagnosis     Problem Predicted suboptimal energy intake   Etiology SOA   Signs/Symptoms Reported difficulty with PO intake.    Status:    Actions     Follow treatment progress, Care plan reviewed, Advise alternate selection, Advised available snacks, Menu adjusted, Encourage intake, Supplement provided, Education Provdied   Diet changed to chop, meat/potatoes with gravy for soa and difficulty chewing (discussed with pt can change back to regular if desired)  Boost in am, Magic cup L/D  Breeze in evening, hopeful will also help with oral morphine taste aversion  Discussed with pt will need to include supps in fluid restriction, this is indicated as she is taking in minimal PO including water.   Discussed with pt to include these interventions as tolerated prioritizing comfort as in line with GOC    Monitor Per  Protocol      Yamilex Vickers RD  Time Spent: 35min

## 2023-05-10 NOTE — PROGRESS NOTES
Pulmonary Hospital Follow-up     Hospital:  LOS: 17 days   Ms. Tala Ramsey, 72 y.o. female is followed for:   Acute hypoxemic respiratory failure            History of present illness:      72-year-old female with past medical history of longstanding rheumatoid arthritis and inflammatory arthritis with possible lupus.  Patient states that she is under care of Dr. Weinstein for a number of years.  She also has history of iron deficiency anemia, hiatal hernia and GERD and hypertension.  She also has history of multiple allergies and used to be on allergy shots before but stopped using those.  Patient started having problem with more joint pain and joint stiffness.  She  has been off and on multiple drugs for her inflammatory arthritis.  She was tried on CellCept for little while but she could not tolerate it due to GI side effects.  She subsequently was started on diet therapy and states that things are getting stabilized for little bit and she was not on any treatment for 6 months earlier this year.  She started having more problem with the joint discomfort and was placed on Humira in first part of May.  She has been on Plaquenil for a number of years as well.  She had COVID-19 infection in December but stated that she was not very sick from it and she stayed at home.  Did not require any hospitalization or any steroid treatments.  She is started having pain with deep breathing and pleuritic component few months ago and underwent chest x-ray which was abnormal subsequently led to chest CT scan and found to have pulmonary fibrotic changes.  Patient states that she used to have dry cough after COVID-19 infection but that kind of went away     Patient was evaluated and found to have pulmonary fibrosis but her PFTs were relatively stable and no other untoward findings noted on the CT scan.  We discussed biopsy but patient is not interested in that either. She also was having worsening cough after upper respiratory  infection.  She was treated with amoxicillin for tonsillar infection and her ACE inhibitor was changed to ARB and with that her cough significantly improved.     Patient however presented to hospital on April 23 and was admitted to the hospital with bilateral infiltrates with possible pneumonia and started on antibiotics.  Patient was thought to have acute exacerbation of her ILD and was given pulse dose steroids with 1 g for 3 days followed by prolonged prednisone taper.  Bronchoscopy was considered but thought to be very high risk with underlying ILD.  Patient was started on PJP prophylaxis as well.    Patient is DNR/DNI status.    Subjective   Interval History:  Patient states that she had a good night last night.  She remains on 45 to 50% high flow nasal cannula and saturating 92%.  Denies any other acute issues currently.               The patient's past medical, surgical and social history were reviewed and updated in Epic as appropriate.       Objective     Infusions:     Medications:  amLODIPine, 5 mg, Oral, Q24H  cetirizine, 10 mg, Oral, Daily  fluticasone, 2 spray, Each Nare, Nightly  guaiFENesin, 1,200 mg, Oral, Q12H  heparin (porcine), 5,000 Units, Subcutaneous, Q8H  hydroxychloroquine, 200 mg, Oral, Q12H  insulin lispro, 0-7 Units, Subcutaneous, TID AC  ipratropium-albuterol, 3 mL, Nebulization, 4x Daily - RT  melatonin, 5 mg, Oral, Nightly  montelukast, 10 mg, Oral, Daily  nystatin, 5 mL, Swish & Spit, 4x Daily  palliative care oral rinse, , Mouth/Throat, Q8H  pantoprazole, 40 mg, Oral, Daily  predniSONE, 30 mg, Oral, BID With Meals  sodium chloride, 10 mL, Intravenous, Q12H  sodium chloride, 1 g, Oral, BID With Meals        Vital Sign Min/Max for last 24 hours  Temp  Min: 96.5 °F (35.8 °C)  Max: 97.9 °F (36.6 °C)   BP  Min: 113/81  Max: 159/84   Pulse  Min: 78  Max: 91   Resp  Min: 16  Max: 21   SpO2  Min: 92 %  Max: 98 %   Flow (L/min)  Min: 40  Max: 50       Input/Output for last 24 hour  "shift  05/09 0701 - 05/10 0700  In: 920 [P.O.:920]  Out: 1400 [Urine:1400]      Objective:  Vital signs: (most recent): Blood pressure 121/74, pulse 89, temperature 97.6 °F (36.4 °C), temperature source Temporal, resp. rate 17, height 153.7 cm (60.5\"), weight 80.3 kg (177 lb), SpO2 92 %, not currently breastfeeding.            General Appearance: Awake, alert, in no acute resp distress on HFNC  Lungs:   B/L Breath sounds present with decreased breath sounds on bases, no wheezing heard, occ crackles.   Heart: S1 and S2 present, no murmur  Extremities:   no edema, warm to touch.  Neurologic:  Moving all four extremities. Good strength bilaterally.  Psychological: Normal affect, Cooperative    Results from last 7 days   Lab Units 05/09/23  0758 05/05/23  0530   WBC 10*3/mm3 21.10* 29.40*   HEMOGLOBIN g/dL 13.1 12.3   PLATELETS 10*3/mm3 382 575*     Results from last 7 days   Lab Units 05/10/23  0734 05/09/23  1608 05/09/23  0758 05/08/23  0619   SODIUM mmol/L 124*  --  127* 128*   POTASSIUM mmol/L 5.0 5.3* 6.0* 5.8*   CO2 mmol/L 22.0  --  22.0 19.0*   BUN mg/dL 24*  --  27* 25*   CREATININE mg/dL 0.46*  --  0.45* 0.45*   GLUCOSE mg/dL 114*  --  120* 118*     Estimated Creatinine Clearance: 103.3 mL/min (A) (by C-G formula based on SCr of 0.46 mg/dL (L)).          Images:   Chest x-ray done yesterday reviewed personally.  Improvement noted in bilateral interstitial lung disease.    I reviewed the patient's results and images.     Assessment & Plan   Impression        Acute hypoxemic respiratory failure -likely due to ILD exacerbation    Multifocal pneumonia    Pulmonary fibrosis    ILD (interstitial lung disease) -likely RA associated ILD with acute exacerbation       Plan        1. Pt presented with worsening hypoxia and appears like acute ILD exacerbation.  Patient was treated with pulse dose steroids and has been on high-dose steroids and antibiotics.  Patient is off antibiotics for now.  WBC count improving.  We " will continue current supportive care.  Had extensive discussion with patient and her family about possible causes and  limitations on what more we can do here.  They verbalized understanding.  Chest x-ray showing slight improvement.  We will continue working to wean FiO2 as tolerated.  Keep saturation 90% or better.  If she stabilizes on this much oxygen then I will attempt on 6 L nasal cannula tomorrow.  2.  Hyperkalemia improved overnight.  Sodium level is further down.  Will place patient on 1 L fluid restriction.  We are keeping of Bactrim for PJP prophylaxis for now.  If potassium level remains stable we will rechallenge.  3.  Oral diet as tolerated.  Watch out for aspiration episodes.  4.  Continue PPI.  DVT prophylaxis.    Palliative care team is following.  Family is aware of guarded prognosis but are hopeful that patient will continue to improve.  Long-term prognosis is not very good.  Patient is DNR/DNI status.      Laurent Tomlinson MD, [facility]  Pulmonary, Critical care and Sleep Medicine

## 2023-05-11 LAB
ANION GAP SERPL CALCULATED.3IONS-SCNC: 10 MMOL/L (ref 5–15)
BASOPHILS # BLD AUTO: 0.08 10*3/MM3 (ref 0–0.2)
BASOPHILS NFR BLD AUTO: 0.4 % (ref 0–1.5)
BUN SERPL-MCNC: 23 MG/DL (ref 8–23)
BUN/CREAT SERPL: 45.1 (ref 7–25)
CALCIUM SPEC-SCNC: 9.1 MG/DL (ref 8.6–10.5)
CHLORIDE SERPL-SCNC: 94 MMOL/L (ref 98–107)
CO2 SERPL-SCNC: 22 MMOL/L (ref 22–29)
CREAT SERPL-MCNC: 0.51 MG/DL (ref 0.57–1)
DEPRECATED RDW RBC AUTO: 51.9 FL (ref 37–54)
EGFRCR SERPLBLD CKD-EPI 2021: 98.7 ML/MIN/1.73
EOSINOPHIL # BLD AUTO: 0.01 10*3/MM3 (ref 0–0.4)
EOSINOPHIL NFR BLD AUTO: 0 % (ref 0.3–6.2)
ERYTHROCYTE [DISTWIDTH] IN BLOOD BY AUTOMATED COUNT: 16.3 % (ref 12.3–15.4)
GLUCOSE BLDC GLUCOMTR-MCNC: 100 MG/DL (ref 70–130)
GLUCOSE BLDC GLUCOMTR-MCNC: 107 MG/DL (ref 70–130)
GLUCOSE BLDC GLUCOMTR-MCNC: 160 MG/DL (ref 70–130)
GLUCOSE SERPL-MCNC: 102 MG/DL (ref 65–99)
HCT VFR BLD AUTO: 39.4 % (ref 34–46.6)
HGB BLD-MCNC: 13.4 G/DL (ref 12–15.9)
IMM GRANULOCYTES # BLD AUTO: 0.57 10*3/MM3 (ref 0–0.05)
IMM GRANULOCYTES NFR BLD AUTO: 2.7 % (ref 0–0.5)
LYMPHOCYTES # BLD AUTO: 1.61 10*3/MM3 (ref 0.7–3.1)
LYMPHOCYTES NFR BLD AUTO: 7.7 % (ref 19.6–45.3)
MCH RBC QN AUTO: 29.9 PG (ref 26.6–33)
MCHC RBC AUTO-ENTMCNC: 34 G/DL (ref 31.5–35.7)
MCV RBC AUTO: 87.9 FL (ref 79–97)
MONOCYTES # BLD AUTO: 1.47 10*3/MM3 (ref 0.1–0.9)
MONOCYTES NFR BLD AUTO: 7 % (ref 5–12)
NEUTROPHILS NFR BLD AUTO: 17.19 10*3/MM3 (ref 1.7–7)
NEUTROPHILS NFR BLD AUTO: 82.2 % (ref 42.7–76)
NRBC BLD AUTO-RTO: 0 /100 WBC (ref 0–0.2)
PLATELET # BLD AUTO: 328 10*3/MM3 (ref 140–450)
PMV BLD AUTO: 10.1 FL (ref 6–12)
POTASSIUM SERPL-SCNC: 4.7 MMOL/L (ref 3.5–5.2)
RBC # BLD AUTO: 4.48 10*6/MM3 (ref 3.77–5.28)
SODIUM SERPL-SCNC: 126 MMOL/L (ref 136–145)
WBC NRBC COR # BLD: 20.93 10*3/MM3 (ref 3.4–10.8)

## 2023-05-11 PROCEDURE — 80048 BASIC METABOLIC PNL TOTAL CA: CPT | Performed by: NURSE PRACTITIONER

## 2023-05-11 PROCEDURE — 63710000001 PREDNISONE PER 5 MG: Performed by: INTERNAL MEDICINE

## 2023-05-11 PROCEDURE — 85025 COMPLETE CBC W/AUTO DIFF WBC: CPT | Performed by: NURSE PRACTITIONER

## 2023-05-11 PROCEDURE — 82948 REAGENT STRIP/BLOOD GLUCOSE: CPT

## 2023-05-11 PROCEDURE — 94799 UNLISTED PULMONARY SVC/PX: CPT

## 2023-05-11 PROCEDURE — 63710000001 PREDNISONE PER 1 MG: Performed by: INTERNAL MEDICINE

## 2023-05-11 PROCEDURE — 25010000002 HEPARIN (PORCINE) PER 1000 UNITS: Performed by: INTERNAL MEDICINE

## 2023-05-11 PROCEDURE — 99232 SBSQ HOSP IP/OBS MODERATE 35: CPT | Performed by: NURSE PRACTITIONER

## 2023-05-11 PROCEDURE — 63710000001 INSULIN LISPRO (HUMAN) PER 5 UNITS: Performed by: INTERNAL MEDICINE

## 2023-05-11 PROCEDURE — 99232 SBSQ HOSP IP/OBS MODERATE 35: CPT | Performed by: INTERNAL MEDICINE

## 2023-05-11 PROCEDURE — 94761 N-INVAS EAR/PLS OXIMETRY MLT: CPT

## 2023-05-11 RX ORDER — SULFAMETHOXAZOLE AND TRIMETHOPRIM 800; 160 MG/1; MG/1
1 TABLET ORAL 3 TIMES WEEKLY
Status: COMPLETED | OUTPATIENT
Start: 2023-05-12 | End: 2023-05-24

## 2023-05-11 RX ADMIN — IPRATROPIUM BROMIDE AND ALBUTEROL SULFATE 3 ML: .5; 2.5 SOLUTION RESPIRATORY (INHALATION) at 16:16

## 2023-05-11 RX ADMIN — IPRATROPIUM BROMIDE AND ALBUTEROL SULFATE 3 ML: .5; 2.5 SOLUTION RESPIRATORY (INHALATION) at 10:38

## 2023-05-11 RX ADMIN — NYSTATIN: 100000 POWDER TOPICAL at 20:28

## 2023-05-11 RX ADMIN — Medication: at 22:08

## 2023-05-11 RX ADMIN — GUAIFENESIN 1200 MG: 600 TABLET, EXTENDED RELEASE ORAL at 20:27

## 2023-05-11 RX ADMIN — NYSTATIN 500000 UNITS: 100000 SUSPENSION ORAL at 09:26

## 2023-05-11 RX ADMIN — MORPHINE SULFATE 5 MG: 100 SOLUTION ORAL at 12:23

## 2023-05-11 RX ADMIN — Medication: at 05:30

## 2023-05-11 RX ADMIN — GUAIFENESIN 1200 MG: 600 TABLET, EXTENDED RELEASE ORAL at 09:26

## 2023-05-11 RX ADMIN — IPRATROPIUM BROMIDE AND ALBUTEROL SULFATE 3 ML: .5; 2.5 SOLUTION RESPIRATORY (INHALATION) at 08:30

## 2023-05-11 RX ADMIN — HEPARIN SODIUM 5000 UNITS: 5000 INJECTION, SOLUTION INTRAVENOUS; SUBCUTANEOUS at 05:28

## 2023-05-11 RX ADMIN — HYDROXYCHLOROQUINE SULFATE 200 MG: 200 TABLET, FILM COATED ORAL at 09:26

## 2023-05-11 RX ADMIN — SODIUM CHLORIDE 1 G: 1 TABLET ORAL at 17:26

## 2023-05-11 RX ADMIN — NYSTATIN: 100000 POWDER TOPICAL at 09:28

## 2023-05-11 RX ADMIN — MORPHINE SULFATE 5 MG: 100 SOLUTION ORAL at 21:02

## 2023-05-11 RX ADMIN — Medication 5 MG: at 20:27

## 2023-05-11 RX ADMIN — NYSTATIN 500000 UNITS: 100000 SUSPENSION ORAL at 12:01

## 2023-05-11 RX ADMIN — NYSTATIN 500000 UNITS: 100000 SUSPENSION ORAL at 20:26

## 2023-05-11 RX ADMIN — AMLODIPINE BESYLATE 5 MG: 5 TABLET ORAL at 09:31

## 2023-05-11 RX ADMIN — HEPARIN SODIUM 5000 UNITS: 5000 INJECTION, SOLUTION INTRAVENOUS; SUBCUTANEOUS at 13:43

## 2023-05-11 RX ADMIN — CETIRIZINE HYDROCHLORIDE 10 MG: 10 TABLET, FILM COATED ORAL at 09:26

## 2023-05-11 RX ADMIN — INSULIN LISPRO 2 UNITS: 100 INJECTION, SOLUTION INTRAVENOUS; SUBCUTANEOUS at 17:15

## 2023-05-11 RX ADMIN — LORAZEPAM 1 MG: 1 TABLET ORAL at 02:25

## 2023-05-11 RX ADMIN — PREDNISONE 30 MG: 20 TABLET ORAL at 17:15

## 2023-05-11 RX ADMIN — HEPARIN SODIUM 5000 UNITS: 5000 INJECTION, SOLUTION INTRAVENOUS; SUBCUTANEOUS at 22:05

## 2023-05-11 RX ADMIN — LORAZEPAM 1 MG: 1 TABLET ORAL at 20:26

## 2023-05-11 RX ADMIN — HYDROXYCHLOROQUINE SULFATE 200 MG: 200 TABLET, FILM COATED ORAL at 20:43

## 2023-05-11 RX ADMIN — IPRATROPIUM BROMIDE AND ALBUTEROL SULFATE 3 ML: .5; 2.5 SOLUTION RESPIRATORY (INHALATION) at 19:15

## 2023-05-11 RX ADMIN — SODIUM CHLORIDE 1 G: 1 TABLET ORAL at 09:26

## 2023-05-11 RX ADMIN — NYSTATIN 500000 UNITS: 100000 SUSPENSION ORAL at 17:15

## 2023-05-11 RX ADMIN — Medication: at 13:43

## 2023-05-11 RX ADMIN — MONTELUKAST 10 MG: 10 TABLET, FILM COATED ORAL at 09:26

## 2023-05-11 RX ADMIN — PANTOPRAZOLE SODIUM 40 MG: 40 TABLET, DELAYED RELEASE ORAL at 09:26

## 2023-05-11 RX ADMIN — PREDNISONE 30 MG: 20 TABLET ORAL at 09:26

## 2023-05-11 RX ADMIN — LORAZEPAM 1 MG: 1 TABLET ORAL at 09:26

## 2023-05-11 NOTE — PROGRESS NOTES
[facility] Medicine Services  PROGRESS NOTE    Patient Name: Tala Ramsey  : 1950  MRN: [id number]    Date of Admission: 2023  Primary Care Physician: Sg Horne MD    Subjective   Subjective     CC:  Hypoxia, pneumonia    HPI:  Seen resting up in bed awake.  Family at bedside.  Patient states she had a rough morning.  Oxygen needs increased.  Now on 50% high flow oxygen.  Still feels very anxious.  Decreased appetite.  Awaiting pulmonary rounds to discuss further plans.    ROS:  Gen- No fevers, chills  CV- No chest pain, palpitations  Resp- +cough, +dyspnea  GI- No N/V/D, no abd pain    Objective   Objective     Vital Signs:   Temp:  [96.4 °F (35.8 °C)-97.8 °F (36.6 °C)] 97.1 °F (36.2 °C)  Heart Rate:  [] 100  Resp:  [16-20] 17  BP: (130-160)/(64-93) 134/79  Flow (L/min):  [40-50] 50     Physical Exam:  Constitutional: No acute distress, awake, alert. Chronically ill appearing.  Resting up in bed.  Very deconditioned.  Family at bs   HENT: NCAT, mucous membranes moist  Respiratory: Very diminished lung sounds and poor air mvmt, on 50% HF oxygen.  Continuous pulse ox in place   Cardiovascular: RRR to mild sinus tach, no murmurs, rubs, or gallops  Gastrointestinal: Positive bowel sounds, soft, nontender, nondistended. Obese  Musculoskeletal: trace bilateral ankle edema, stable. OCAMPO spont   Psychiatric: Appropriate affect, cooperative but mildly anxious.  Neurologic: Oriented x 3, strength symmetric in all extremities, Cranial Nerves grossly intact to confrontation, speech clear. Follows commands   Skin: No rashes      Results Reviewed:  LAB RESULTS:      Lab 23  0733 23  0758 23  0530   WBC 20.93* 21.10* 29.40*   HEMOGLOBIN 13.4 13.1 12.3   HEMATOCRIT 39.4 39.0 38.3   PLATELETS 328 382 575*   NEUTROS ABS 17.19* 17.54* 24.62*   IMMATURE GRANS (ABS) 0.57* 0.87* 1.50*   LYMPHS ABS 1.61 1.38 1.52   MONOS ABS 1.47* 1.21* 1.61*   EOS ABS 0.01 0.00  0.01   MCV 87.9 89.2 91.2         Lab 05/11/23  0733 05/10/23  0734 05/09/23  1608 05/09/23  0758 05/08/23  0619 05/05/23  0530   SODIUM 126* 124*  --  127* 128* 136   POTASSIUM 4.7 5.0 5.3* 6.0* 5.8* 5.4*   CHLORIDE 94* 91*  --  94* 94* 100   CO2 22.0 22.0  --  22.0 19.0* 22.0   ANION GAP 10.0 11.0  --  11.0 15.0 14.0   BUN 23 24*  --  27* 25* 19   CREATININE 0.51* 0.46*  --  0.45* 0.45* 0.41*   EGFR 98.7 101.2  --  101.7 101.7 104.0   GLUCOSE 102* 114*  --  120* 118* 123*   CALCIUM 9.1 9.1  --  9.2 9.2 9.9         Lab 05/08/23  0619   TOTAL PROTEIN 6.2   ALBUMIN 3.7   GLOBULIN 2.5   ALT (SGPT) 18   AST (SGOT) 16   BILIRUBIN 0.4   ALK PHOS 69                     Brief Urine Lab Results  (Last result in the past 365 days)      Color   Clarity   Blood   Leuk Est   Nitrite   Protein   CREAT   Urine HCG        04/23/23 1757 Yellow   Clear   Trace   Negative   Negative   Negative                 Microbiology Results Abnormal     Procedure Component Value - Date/Time    Blood Culture - Blood, Arm, Right [[id number]]  (Normal) Collected: 04/23/23 1125    Lab Status: Final result Specimen: Blood from Arm, Right Updated: 04/28/23 1145     Blood Culture No growth at 5 days    Blood Culture - Blood, Arm, Left [[id number]]  (Normal) Collected: 04/23/23 1125    Lab Status: Final result Specimen: Blood from Arm, Left Updated: 04/28/23 1145     Blood Culture No growth at 5 days    S. Pneumo Ag Urine or CSF - Urine, Urine, Clean Catch [[id number]]  (Normal) Collected: 04/23/23 1757    Lab Status: Final result Specimen: Urine, Clean Catch Updated: 04/24/23 0949     Strep Pneumo Ag Negative    Legionella Antigen, Urine - Urine, Urine, Clean Catch [[id number]]  (Normal) Collected: 04/23/23 1209    Lab Status: Final result Specimen: Urine, Clean Catch Updated: 04/24/23 0919     LEGIONELLA ANTIGEN, URINE Negative    Respiratory Panel PCR w/COVID-19(SARS-CoV-2) CHARI/MARISSA/APOLINAR/PAD/COR/MAD/AMY In-House, NP Swab in UTM/VTM, 3-4 HR TAT - Swab,  Nasopharynx [[id number]]  (Normal) Collected: 04/24/23 0544    Lab Status: Final result Specimen: Swab from Nasopharynx Updated: 04/24/23 0647     ADENOVIRUS, PCR Not Detected     Coronavirus 229E Not Detected     Coronavirus HKU1 Not Detected     Coronavirus NL63 Not Detected     Coronavirus OC43 Not Detected     COVID19 Not Detected     Human Metapneumovirus Not Detected     Human Rhinovirus/Enterovirus Not Detected     Influenza A PCR Not Detected     Influenza B PCR Not Detected     Parainfluenza Virus 1 Not Detected     Parainfluenza Virus 2 Not Detected     Parainfluenza Virus 3 Not Detected     Parainfluenza Virus 4 Not Detected     RSV, PCR Not Detected     Bordetella pertussis pcr Not Detected     Bordetella parapertussis PCR Not Detected     Chlamydophila pneumoniae PCR Not Detected     Mycoplasma pneumo by PCR Not Detected    Narrative:      In the setting of a positive respiratory panel with a viral infection PLUS a negative procalcitonin without other underlying concern for bacterial infection, consider observing off antibiotics or discontinuation of antibiotics and continue supportive care. If the respiratory panel is positive for atypical bacterial infection (Bordetella pertussis, Chlamydophila pneumoniae, or Mycoplasma pneumoniae), consider antibiotic de-escalation to target atypical bacterial infection.    MRSA Screen, PCR (Inpatient) - Swab, Nares [[id number]]  (Normal) Collected: 04/23/23 1540    Lab Status: Final result Specimen: Swab from Nares Updated: 04/23/23 1651     MRSA PCR Negative    Narrative:      The negative predictive value of this diagnostic test is high and should only be used to consider de-escalating anti-MRSA therapy. A positive result may indicate colonization with MRSA and must be correlated clinically.  MRSA Negative    COVID PRE-OP / PRE-PROCEDURE SCREENING ORDER (NO ISOLATION) - Swab, Nasopharynx [[id number]]  (Normal) Collected: 04/23/23 1125    Lab Status: Final result  Specimen: Swab from Nasopharynx Updated: 04/23/23 1225    Narrative:      The following orders were created for panel order COVID PRE-OP / PRE-PROCEDURE SCREENING ORDER (NO ISOLATION) - Swab, Nasopharynx.  Procedure                               Abnormality         Status                     ---------                               -----------         ------                     COVID-19, FLU A/B, RSV P...[[id number]]  Normal              Final result                 Please view results for these tests on the individual orders.    COVID-19, FLU A/B, RSV PCR - Swab, Nasopharynx [[id number]]  (Normal) Collected: 04/23/23 1125    Lab Status: Final result Specimen: Swab from Nasopharynx Updated: 04/23/23 1225     COVID19 Not Detected     Influenza A PCR Not Detected     Influenza B PCR Not Detected     RSV, PCR Not Detected    Narrative:      Fact sheet for providers: https://www.fda.gov/media/754497/download    Fact sheet for patients: https://www.fda.gov/media/456138/download    Test performed by PCR.          No radiology results from the last 24 hrs        Current medications:  Scheduled Meds:amLODIPine, 5 mg, Oral, Q24H  cetirizine, 10 mg, Oral, Daily  fluticasone, 2 spray, Each Nare, Nightly  guaiFENesin, 1,200 mg, Oral, Q12H  heparin (porcine), 5,000 Units, Subcutaneous, Q8H  hydroxychloroquine, 200 mg, Oral, Q12H  insulin lispro, 0-7 Units, Subcutaneous, TID AC  ipratropium-albuterol, 3 mL, Nebulization, 4x Daily - RT  melatonin, 5 mg, Oral, Nightly  montelukast, 10 mg, Oral, Daily  nystatin, 5 mL, Swish & Spit, 4x Daily  nystatin, , Topical, Q12H  nystatin, , Topical, Once  palliative care oral rinse, , Mouth/Throat, Q8H  pantoprazole, 40 mg, Oral, Daily  predniSONE, 30 mg, Oral, BID With Meals  sodium chloride, 10 mL, Intravenous, Q12H  sodium chloride, 1 g, Oral, BID With Meals      Continuous Infusions:   PRN Meds:.•  acetaminophen **OR** acetaminophen **OR** acetaminophen  •  aluminum-magnesium  hydroxide-simethicone **AND** Lidocaine Viscous HCl  •  senna-docusate sodium **AND** polyethylene glycol **AND** bisacodyl **AND** bisacodyl  •  calcium carbonate  •  cyclobenzaprine  •  dextrose  •  dextrose  •  glucagon (human recombinant)  •  hydrOXYzine  •  ipratropium-albuterol  •  LORazepam  •  Magnesium Standard Dose Replacement - Follow Nurse / BPA Driven Protocol  •  morphine  •  ondansetron **OR** ondansetron  •  simethicone  •  sodium chloride  •  sodium chloride  •  sodium chloride    Assessment & Plan   Assessment & Plan     Active Hospital Problems    Diagnosis  POA   • **Acute hypoxemic respiratory failure -likely due to ILD exacerbation [J96.01]  Yes   • Pulmonary fibrosis [J84.10]  Yes   • ILD (interstitial lung disease) -likely RA associated ILD with acute exacerbation [J84.9]  Yes   • Multifocal pneumonia [J18.9]  Yes      Resolved Hospital Problems   No resolved problems to display.        Brief Hospital Course to date:  Tala Ramsey is a 72 y.o. female w/ hx interstitial lung dz, RA (on humira until recently, recent steroid injections) who presents w/ progressive dyspnea, cough over ~10 days failing outpatient Levaquin. Pt was found to be hypoxic upon admission. She had progressive hypoxia evening after admission placed on hi flow canula       This patient's problems and plans were partially entered by my partner and updated as appropriate by me 05/11/23.    Assessment/Plan:      Acute hypoxic resp failure, worsening  Pulmonary fibrosis/ILD  Bilateral Pneumonia  -likely combination of progession/flare of interstitial lung disease and infection  -remains on High flow with difficulty weaning and significant drops with minimal exertion (simply rolling in bed to use bedpan)  -s/p full course of zosyn & azithromycin  -hold 3x weekly bactrim ds (pcp prophylaxis as has been on humira and steroids) due to worsening hyperkalemia  -continue scheduled & PRN duonebs  -Pulm following: completed IV  solumedrol yest 5/9 and transitioned po prednisone.  Continue   -diuresis PRN as tolerated- did give IV diuresis 5/6 and 7 without much improvement so holding.   -palliative also following, guarded prognosis at best and my partner discussed this in detail previously with patient and DIL at bedside, have also discussed with other family members as well. continue low dose ativan for anxiety.Pulmonary has d/w patient regarding hospice and re-eval as patient would prefer to transition to IP hospice than going home with hospice.Hospice is following and ongoing family meetings. Son at bedside this morning does not seem to comprehend current goals of the patient, I tried to  him, but he was not open to any mention of Hospice this morning.    -episode of desat overnight 5/7-5/8 with concern for possible aspiration. Partner D/w patient and son at bedside deferring on SLP eval and allowing comfort diet given our GOC, all in agreement   -- Oxygen needs increased earlier this morning.  Now requiring 50% high flow.  Slightly more anxious.    Hyponatremia  Poor po intake  --No IV access.  Full support but no CPR/intubation.  Sodium continues to drop.  Pt wishes to keep IV out if at all possible   --added salt tabs bid yesterday 5/10.  Sodium slightly improved this morning at 126.  Continue current regimen.    Hyperkalemia  -- K+ of 6 this am after getting Lokelma x 1 5/8, and repeated Lokelma, IV calcium gluconate and IV insulin/dextrose per hyperkalemia regimen yesterday.  --am labs     RA  -holding humira  -- continue plaquenil     HTN  -continue amlodpine  -- hold valsartan due to worsening hyperkalemia  --bp stable     Chronic anemia   -monitor    Goals/limits  -Patient is DNR/DNI (in event of further clinical decompensation patient would NOT want mechanical ventilation; in this case patient would wish to pursue comfort measures;  - daily GOC discussions ongoing with patient and family, hospice and palliative  following.  - prior long discussion with patient and son at bedside 5/9.  Son who was there did not seem to be on board with comfort based care. Hopefully hospice team can guide him better  -- prior added muscle relaxer for neck/back muscle spasms    Patient would ultimately like to return home with family/hospice but will need to wean off HFNC prior to being able to go home      Expected Discharge Location and Transportation: home  Expected Discharge TBD  Expected Discharge Date: 5/12/2023; Expected Discharge Time:      DVT prophylaxis:  Medical DVT prophylaxis orders are present. sq heparin    AM-PAC 6 Clicks Score (PT): 12 (05/11/23 0800)    CODE STATUS:   Code Status and Medical Interventions:   Ordered at: 04/23/23 1310     Medical Intervention Limits:    NO intubation (DNI)     Level Of Support Discussed With:    Patient     Code Status (Patient has no pulse and is not breathing):    No CPR (Do Not Attempt to Resuscitate)     Medical Interventions (Patient has pulse or is breathing):    Limited Support       Gloria Nicole, APRN  05/11/23

## 2023-05-11 NOTE — PLAN OF CARE
Problem: Adult Inpatient Plan of Care  Goal: Optimal Comfort and Wellbeing  Outcome: Ongoing, Progressing  Intervention: Provide Person-Centered Care  Recent Flowsheet Documentation  Taken 5/11/2023 0800 by Markus Ashton RN  Trust Relationship/Rapport:   care explained   emotional support provided   choices provided   Goal Outcome Evaluation:  Plan of Care Reviewed With: patient, family        Progress: no change       Complaints of increased anxiety and increased work of breathing noted; PRNs given. Purewick in place. Family at bedside. Premedicated prior to bath today for increased comfort. High flow oxygen remains in place. Awaiting dc plans

## 2023-05-11 NOTE — PROGRESS NOTES
Pulmonary Hospital Follow-up     Hospital:  LOS: 18 days   Ms. Tala Ramsey, 72 y.o. female is followed for:   Acute hypoxemic respiratory failure            History of present illness:      72-year-old female with past medical history of longstanding rheumatoid arthritis and inflammatory arthritis with possible lupus.  Patient states that she is under care of Dr. Weinstein for a number of years.  She also has history of iron deficiency anemia, hiatal hernia and GERD and hypertension.  She also has history of multiple allergies and used to be on allergy shots before but stopped using those.  Patient started having problem with more joint pain and joint stiffness.  She  has been off and on multiple drugs for her inflammatory arthritis.  She was tried on CellCept for little while but she could not tolerate it due to GI side effects.  She subsequently was started on diet therapy and states that things are getting stabilized for little bit and she was not on any treatment for 6 months earlier this year.  She started having more problem with the joint discomfort and was placed on Humira in first part of May.  She has been on Plaquenil for a number of years as well.  She had COVID-19 infection in December but stated that she was not very sick from it and she stayed at home.  Did not require any hospitalization or any steroid treatments.  She is started having pain with deep breathing and pleuritic component few months ago and underwent chest x-ray which was abnormal subsequently led to chest CT scan and found to have pulmonary fibrotic changes.  Patient states that she used to have dry cough after COVID-19 infection but that kind of went away     Patient was evaluated and found to have pulmonary fibrosis but her PFTs were relatively stable and no other untoward findings noted on the CT scan.  We discussed biopsy but patient is not interested in that either. She also was having worsening cough after upper respiratory  infection.  She was treated with amoxicillin for tonsillar infection and her ACE inhibitor was changed to ARB and with that her cough significantly improved.     Patient however presented to hospital on April 23 and was admitted to the hospital with bilateral infiltrates with possible pneumonia and started on antibiotics.  Patient was thought to have acute exacerbation of her ILD and was given pulse dose steroids with 1 g for 3 days followed by prolonged prednisone taper.  Bronchoscopy was considered but thought to be very high risk with underlying ILD.  Patient was started on PJP prophylaxis as well.    Patient is DNR/DNI status.    Subjective   Interval History:  Patient had episodes of anxiety yesterday and required higher FiO2 last night.  Today morning feeling better.  Started on as needed Ativan and that seems to be working well.  No other acute issues.  I was able to wean down FiO2 to 50% and 45 L flow while in the room and she is still saturating 93 to 94%.             The patient's past medical, surgical and social history were reviewed and updated in Epic as appropriate.       Objective     Infusions:     Medications:  amLODIPine, 5 mg, Oral, Q24H  cetirizine, 10 mg, Oral, Daily  fluticasone, 2 spray, Each Nare, Nightly  guaiFENesin, 1,200 mg, Oral, Q12H  heparin (porcine), 5,000 Units, Subcutaneous, Q8H  hydroxychloroquine, 200 mg, Oral, Q12H  insulin lispro, 0-7 Units, Subcutaneous, TID AC  ipratropium-albuterol, 3 mL, Nebulization, 4x Daily - RT  melatonin, 5 mg, Oral, Nightly  montelukast, 10 mg, Oral, Daily  nystatin, 5 mL, Swish & Spit, 4x Daily  nystatin, , Topical, Q12H  nystatin, , Topical, Once  palliative care oral rinse, , Mouth/Throat, Q8H  pantoprazole, 40 mg, Oral, Daily  predniSONE, 30 mg, Oral, BID With Meals  sodium chloride, 10 mL, Intravenous, Q12H  sodium chloride, 1 g, Oral, BID With Meals        Vital Sign Min/Max for last 24 hours  Temp  Min: 96.4 °F (35.8 °C)  Max: 97.8 °F (36.6  "°C)   BP  Min: 130/93  Max: 160/80   Pulse  Min: 87  Max: 123   Resp  Min: 16  Max: 20   SpO2  Min: 90 %  Max: 98 %   Flow (L/min)  Min: 40  Max: 50       Input/Output for last 24 hour shift  05/10 0701 - 05/11 0700  In: 240 [P.O.:240]  Out: 1250 [Urine:1250]      Objective:  Vital signs: (most recent): Blood pressure 134/79, pulse 100, temperature 97.1 °F (36.2 °C), temperature source Temporal, resp. rate 17, height 153.7 cm (60.5\"), weight 80.3 kg (177 lb), SpO2 90 %, not currently breastfeeding.            General Appearance: Awake, alert, in no acute resp distress on HFNC  Lungs:   B/L Breath sounds present with decreased breath sounds on bases, no wheezing heard, occ crackles.   Heart: S1 and S2 present, no murmur  Extremities:   no edema, warm to touch.  Neurologic:  Moving all four extremities. Good strength bilaterally.  Psychological: Normal affect, Cooperative    Results from last 7 days   Lab Units 05/11/23  0733 05/09/23  0758 05/05/23  0530   WBC 10*3/mm3 20.93* 21.10* 29.40*   HEMOGLOBIN g/dL 13.4 13.1 12.3   PLATELETS 10*3/mm3 328 382 575*     Results from last 7 days   Lab Units 05/11/23  0733 05/10/23  0734 05/09/23  1608 05/09/23  0758   SODIUM mmol/L 126* 124*  --  127*   POTASSIUM mmol/L 4.7 5.0 5.3* 6.0*   CO2 mmol/L 22.0 22.0  --  22.0   BUN mg/dL 23 24*  --  27*   CREATININE mg/dL 0.51* 0.46*  --  0.45*   GLUCOSE mg/dL 102* 114*  --  120*     Estimated Creatinine Clearance: 93.2 mL/min (A) (by C-G formula based on SCr of 0.51 mg/dL (L)).          Images:   Chest x-ray done yesterday reviewed personally.  Improvement noted in bilateral interstitial lung disease.    I reviewed the patient's results and images.     Assessment & Plan   Impression        Acute hypoxemic respiratory failure -likely due to ILD exacerbation    Multifocal pneumonia    Pulmonary fibrosis    ILD (interstitial lung disease) -likely RA associated ILD with acute exacerbation       Plan        1. Pt presented with worsening " hypoxia and appears like acute ILD exacerbation.  Patient was treated with pulse dose steroids and has been on high-dose steroids and antibiotics.  Patient is off antibiotics for now.  We will continue attempts to wean FiO2 as tolerated.  Chest x-ray showing improvement.    2.  Hyperkalemia improved.  Sodium level improving with salt tablets and fluid restriction.  Continue monitoring.  I will go ahead and start patient back on Bactrim prophylaxis as this will be at high risk of pneumocystis.  If develops hyperkalemia or other complications again then will go with alternate prophylaxis.  3.  Oral diet as tolerated.  Watch out for aspiration episodes.  4.  Continue PPI.  DVT prophylaxis.    Palliative care team is following.  Family is aware of guarded prognosis but are hopeful that patient will continue to improve.  Long-term prognosis is not very good.  Patient is DNR/DNI status.      Laurent Tomlinson MD, [facility]  Pulmonary, Critical care and Sleep Medicine

## 2023-05-11 NOTE — PLAN OF CARE
Problem: Pain Chronic (Persistent) (Comorbidity Management)  Goal: Acceptable Pain Control and Functional Ability  Intervention: Optimize Psychosocial Wellbeing  Recent Flowsheet Documentation  Taken 5/11/2023 1427 by Rosalinda Kerns RN  Supportive Measures: (palliative IDT: RN, JOYCE, , , SW)   active listening utilized   verbalization of feelings encouraged   goal-setting facilitated   other (see comments)  Family/Support System Care:   presence promoted   support provided   involvement promoted     Problem: Palliative Care  Goal: Enhanced Quality of Life  Intervention: Optimize Psychosocial Wellbeing  Flowsheets (Taken 5/11/2023 1427)  Supportive Measures: (palliative IDT: RN, JOYCE, , , SW)   active listening utilized   verbalization of feelings encouraged   goal-setting facilitated   other (see comments)  Family/Support System Care:   presence promoted   support provided   involvement promoted     Problem: Adult Inpatient Plan of Care  Goal: Plan of Care Review  Outcome: Ongoing, Progressing  Flowsheets (Taken 5/11/2023 1427)  Progress: no change  Plan of Care Reviewed With:   patient   family  Outcome Evaluation: Pt continues on HFNC. Pt prefers to take ativan during the day for dyspnea/anxiety and morphine at night for dyspnea.  Pt gets extremely dyspneic with laying flat and having a bath.  Premedicate with morphine prior to bath for dyspnea. Pt states it makes her sleepy and she prefers to take after bathing. Suggested trying before bathing for better tolerance of dyspnea related to activity. Pt agreeable.  Still trying to wean from HFNC for possible home with hospice. Continue palliative support and symptom control. Pt's dtr in law present.   Goal Outcome Evaluation:  Plan of Care Reviewed With: patient, family        Progress: no change  Outcome Evaluation: Pt continues on HFNC. Pt prefers to take ativan during the day for dyspnea/anxiety and morphine at night for dyspnea.  Pt gets  extremely dyspneic with laying flat and having a bath.  Premedicate with morphine prior to bath for dyspnea. Pt states it makes her sleepy and she prefers to take after bathing. Suggested trying before bathing for better tolerance of dyspnea related to activity. Pt agreeable.  Still trying to wean from HFNC for possible home with hospice. Continue palliative support and symptom control. Pt's dtr in law present.

## 2023-05-11 NOTE — PLAN OF CARE
Goal Outcome Evaluation:  Plan of Care Reviewed With: patient, family           Outcome Evaluation: Pt O2 sats 85-88% during early part of shift. High flow oxygen adjusted to 55L and 65% per RT. Pt tolerated better wiht sats around 92-98% while sleeping. Pt given prn ativan and morphine per request. Purwick in place. No other complaints or needs voiced.

## 2023-05-12 LAB
ANION GAP SERPL CALCULATED.3IONS-SCNC: 11 MMOL/L (ref 5–15)
BUN SERPL-MCNC: 21 MG/DL (ref 8–23)
BUN/CREAT SERPL: 51.2 (ref 7–25)
CALCIUM SPEC-SCNC: 8.8 MG/DL (ref 8.6–10.5)
CHLORIDE SERPL-SCNC: 95 MMOL/L (ref 98–107)
CO2 SERPL-SCNC: 22 MMOL/L (ref 22–29)
CREAT SERPL-MCNC: 0.41 MG/DL (ref 0.57–1)
EGFRCR SERPLBLD CKD-EPI 2021: 104 ML/MIN/1.73
GLUCOSE BLDC GLUCOMTR-MCNC: 110 MG/DL (ref 70–130)
GLUCOSE BLDC GLUCOMTR-MCNC: 142 MG/DL (ref 70–130)
GLUCOSE BLDC GLUCOMTR-MCNC: 95 MG/DL (ref 70–130)
GLUCOSE SERPL-MCNC: 99 MG/DL (ref 65–99)
POTASSIUM SERPL-SCNC: 4.9 MMOL/L (ref 3.5–5.2)
SODIUM SERPL-SCNC: 128 MMOL/L (ref 136–145)

## 2023-05-12 PROCEDURE — 94799 UNLISTED PULMONARY SVC/PX: CPT

## 2023-05-12 PROCEDURE — 25010000002 HEPARIN (PORCINE) PER 1000 UNITS: Performed by: INTERNAL MEDICINE

## 2023-05-12 PROCEDURE — 94664 DEMO&/EVAL PT USE INHALER: CPT

## 2023-05-12 PROCEDURE — 94761 N-INVAS EAR/PLS OXIMETRY MLT: CPT

## 2023-05-12 PROCEDURE — 63710000001 PREDNISONE PER 1 MG: Performed by: INTERNAL MEDICINE

## 2023-05-12 PROCEDURE — 82948 REAGENT STRIP/BLOOD GLUCOSE: CPT

## 2023-05-12 PROCEDURE — 99231 SBSQ HOSP IP/OBS SF/LOW 25: CPT | Performed by: FAMILY MEDICINE

## 2023-05-12 PROCEDURE — 99232 SBSQ HOSP IP/OBS MODERATE 35: CPT | Performed by: INTERNAL MEDICINE

## 2023-05-12 PROCEDURE — 80048 BASIC METABOLIC PNL TOTAL CA: CPT | Performed by: INTERNAL MEDICINE

## 2023-05-12 PROCEDURE — 63710000001 PREDNISONE PER 5 MG: Performed by: INTERNAL MEDICINE

## 2023-05-12 RX ADMIN — LORAZEPAM 1 MG: 1 TABLET ORAL at 09:15

## 2023-05-12 RX ADMIN — PREDNISONE 30 MG: 20 TABLET ORAL at 18:02

## 2023-05-12 RX ADMIN — Medication: at 06:13

## 2023-05-12 RX ADMIN — SODIUM CHLORIDE 1 G: 1 TABLET ORAL at 08:51

## 2023-05-12 RX ADMIN — IPRATROPIUM BROMIDE AND ALBUTEROL SULFATE 3 ML: .5; 2.5 SOLUTION RESPIRATORY (INHALATION) at 11:23

## 2023-05-12 RX ADMIN — PANTOPRAZOLE SODIUM 40 MG: 40 TABLET, DELAYED RELEASE ORAL at 08:49

## 2023-05-12 RX ADMIN — Medication: at 13:00

## 2023-05-12 RX ADMIN — CETIRIZINE HYDROCHLORIDE 10 MG: 10 TABLET, FILM COATED ORAL at 08:50

## 2023-05-12 RX ADMIN — MONTELUKAST 10 MG: 10 TABLET, FILM COATED ORAL at 08:49

## 2023-05-12 RX ADMIN — HYDROXYCHLOROQUINE SULFATE 200 MG: 200 TABLET, FILM COATED ORAL at 21:25

## 2023-05-12 RX ADMIN — NYSTATIN 500000 UNITS: 100000 SUSPENSION ORAL at 13:00

## 2023-05-12 RX ADMIN — HYDROXYCHLOROQUINE SULFATE 200 MG: 200 TABLET, FILM COATED ORAL at 08:49

## 2023-05-12 RX ADMIN — Medication: at 21:29

## 2023-05-12 RX ADMIN — NYSTATIN: 100000 POWDER TOPICAL at 08:49

## 2023-05-12 RX ADMIN — IPRATROPIUM BROMIDE AND ALBUTEROL SULFATE 3 ML: .5; 2.5 SOLUTION RESPIRATORY (INHALATION) at 15:22

## 2023-05-12 RX ADMIN — AMLODIPINE BESYLATE 5 MG: 5 TABLET ORAL at 08:50

## 2023-05-12 RX ADMIN — SULFAMETHOXAZOLE AND TRIMETHOPRIM 1 TABLET: 800; 160 TABLET ORAL at 08:50

## 2023-05-12 RX ADMIN — HEPARIN SODIUM 5000 UNITS: 5000 INJECTION, SOLUTION INTRAVENOUS; SUBCUTANEOUS at 06:11

## 2023-05-12 RX ADMIN — IPRATROPIUM BROMIDE AND ALBUTEROL SULFATE 3 ML: .5; 2.5 SOLUTION RESPIRATORY (INHALATION) at 07:32

## 2023-05-12 RX ADMIN — NYSTATIN 500000 UNITS: 100000 SUSPENSION ORAL at 08:50

## 2023-05-12 RX ADMIN — MORPHINE SULFATE 5 MG: 100 SOLUTION ORAL at 21:26

## 2023-05-12 RX ADMIN — HYDROXYZINE HYDROCHLORIDE 25 MG: 25 TABLET, FILM COATED ORAL at 13:10

## 2023-05-12 RX ADMIN — HEPARIN SODIUM 5000 UNITS: 5000 INJECTION, SOLUTION INTRAVENOUS; SUBCUTANEOUS at 15:30

## 2023-05-12 RX ADMIN — HEPARIN SODIUM 5000 UNITS: 5000 INJECTION, SOLUTION INTRAVENOUS; SUBCUTANEOUS at 21:36

## 2023-05-12 RX ADMIN — HYDROXYZINE HYDROCHLORIDE 25 MG: 25 TABLET, FILM COATED ORAL at 06:24

## 2023-05-12 RX ADMIN — PREDNISONE 30 MG: 20 TABLET ORAL at 08:49

## 2023-05-12 RX ADMIN — Medication 5 MG: at 21:26

## 2023-05-12 RX ADMIN — GUAIFENESIN 1200 MG: 600 TABLET, EXTENDED RELEASE ORAL at 08:50

## 2023-05-12 RX ADMIN — NYSTATIN 500000 UNITS: 100000 SUSPENSION ORAL at 21:26

## 2023-05-12 RX ADMIN — SODIUM CHLORIDE 1 G: 1 TABLET ORAL at 18:02

## 2023-05-12 RX ADMIN — NYSTATIN: 100000 POWDER TOPICAL at 21:36

## 2023-05-12 RX ADMIN — IPRATROPIUM BROMIDE AND ALBUTEROL SULFATE 3 ML: .5; 2.5 SOLUTION RESPIRATORY (INHALATION) at 18:58

## 2023-05-12 RX ADMIN — GUAIFENESIN 1200 MG: 600 TABLET, EXTENDED RELEASE ORAL at 21:26

## 2023-05-12 NOTE — PLAN OF CARE
Goal Outcome Evaluation:  Plan of Care Reviewed With: patient, son           Outcome Evaluation: Physical therapy discharge note. Patient declined PT treatment today due to not feeling well. Patient has been attempted multiple times for physical therapy, at this time PT will complete consult. Please re-consult if needs should arise.

## 2023-05-12 NOTE — CASE MANAGEMENT/SOCIAL WORK
Continued Stay Note  [facility]     Patient Name: Tala Ramsey  MRN: [id number]  Today's Date: 5/12/2023    Admit Date: 4/23/2023    Plan: ongoing   Discharge Plan     Row Name 05/12/23 0751       Plan    Plan ongoing    Plan Comments attempting to wean FiO2 as tolerated. Palliative is following. CM to follow for d/c needs.    Final Discharge Disposition Code 30 - still a patient               Discharge Codes    No documentation.               Expected Discharge Date and Time     Expected Discharge Date Expected Discharge Time    May 12, 2023             BASSAM Bejarano

## 2023-05-12 NOTE — THERAPY DISCHARGE NOTE
Patient Name: Tala Ramsey  : 1950    MRN: [id number]                              Today's Date: 2023       Admit Date: 2023    Visit Dx:     ICD-10-CM ICD-9-CM   1. Multifocal pneumonia  J18.9 486   2. Failure of outpatient treatment  Z78.9 V49.89   3. Hypoxia  R09.02 799.02     Patient Active Problem List   Diagnosis   • Lumbar herniated disc   • Multifocal pneumonia   • Pulmonary fibrosis   • ILD (interstitial lung disease) -likely RA associated ILD with acute exacerbation   • Acute hypoxemic respiratory failure -likely due to ILD exacerbation     Past Medical History:   Diagnosis Date   • Anemia    • Back pain    • Bronchitis    • Hypertension    • Low back pain    • Pulmonary fibrosis    • Rheumatoid arthritis      Past Surgical History:   Procedure Laterality Date   • DILATION AND CURETTAGE, DIAGNOSTIC / THERAPEUTIC        General Information     Row Name 23 1542          Physical Therapy Time and Intention    Document Type discharge evaluation/summary  -ML     Mode of Treatment physical therapy  -ML     Row Name 23 1542          General Information    Patient Profile Reviewed yes  -ML           User Key  (r) = Recorded By, (t) = Taken By, (c) = Cosigned By    Initials Name Provider Type    ML Irma Duenas Physical Therapist               Mobility    No documentation.                Obj/Interventions    No documentation.                Goals/Plan     Row Name 23 1545          Bed Mobility Goal 1 (PT)    Progress/Outcomes (Bed Mobility Goal 1, PT) goal no longer appropriate;medical status inhibited participation  -ML     Row Name 23 1545          Transfer Goal 1 (PT)    Progress/Outcome (Transfer Goal 1, PT) goal no longer appropriate;medical status inhibited participation  -ML     Row Name 23 1545          Gait Training Goal 1 (PT)    Progress/Outcome (Gait Training Goal 1, PT) goal no longer appropriate;medical status inhibited participation  -ML            User Key  (r) = Recorded By, (t) = Taken By, (c) = Cosigned By    Initials Name Provider Type    Irma Quezada Physical Therapist               Clinical Impression     Row Name 05/12/23 4962          Plan of Care Review    Plan of Care Reviewed With patient;son  -ML     Outcome Evaluation Physical therapy discharge note. Patient declined PT treatment today due to not feeling well. Patient has been attempted multiple times for physical therapy, at this time PT will complete consult. Please re-consult if needs should arise.  -ML           User Key  (r) = Recorded By, (t) = Taken By, (c) = Cosigned By    Initials Name Provider Type    ML Irma Duenas Physical Therapist               Outcome Measures    No documentation.               Physical Therapy Education     Title: PT OT SLP Therapies (In Progress)     Topic: Physical Therapy (In Progress)     Point: Mobility training (Done)     Learning Progress Summary           Patient Acceptance, E, VU by JOSE at 5/5/2023 0447    Acceptance, E,D, VU,NR by AB at 5/1/2023 1537    Acceptance, E, NR by KG at 4/28/2023 1306    Acceptance, E, NR by KG at 4/25/2023 1402                   Point: Home exercise program (In Progress)     Learning Progress Summary           Patient Acceptance, E, NR by KG at 4/28/2023 1306                   Point: Body mechanics (Done)     Learning Progress Summary           Patient Acceptance, E, VU by JOSE at 5/5/2023 0447    Acceptance, E,D, VU,NR by AB at 5/1/2023 1537    Acceptance, E, NR by KG at 4/28/2023 1306    Acceptance, E, NR by KG at 4/25/2023 1402                   Point: Precautions (Done)     Learning Progress Summary           Patient Acceptance, E, VU by JOSE at 5/5/2023 0447    Acceptance, E,D, VU,NR by AB at 5/1/2023 1537    Acceptance, E, NR by KG at 4/28/2023 1306    Acceptance, E, NR by KG at 4/25/2023 1402                               User Key     Initials Effective Dates Name Provider Type Jammie GARCIA 06/16/21 -  Bishop  Jake PANDEY, RN Registered Nurse Nurse     05/22/20 -  Cici Salas, PT Physical Therapist PT    AB 09/22/22 -  Oumou Phelps, PT Physical Therapist PT              PT Recommendation and Plan     Plan of Care Reviewed With: patient, son  Outcome Evaluation: Physical therapy discharge note. Patient declined PT treatment today due to not feeling well. Patient has been attempted multiple times for physical therapy, at this time PT will complete consult. Please re-consult if needs should arise.     Time Calculation:    PT Charges     Row Name 05/12/23 1547             Time Calculation    Start Time 1535  -ML      PT Received On 05/12/23  -ML            User Key  (r) = Recorded By, (t) = Taken By, (c) = Cosigned By    Initials Name Provider Type    Irma Quezada Physical Therapist                  PT G-Codes  Outcome Measure Options: AM-PAC 6 Clicks Basic Mobility (PT)  AM-PAC 6 Clicks Score (PT): 12  AM-PAC 6 Clicks Score (OT): 16    PT Discharge Summary  Reason for Discharge:  (patient declined multiple times/medical status limiting participation)  Outcomes Achieved: Unable to tolerate or actively participate in therapy    Irma Duenas  5/12/2023

## 2023-05-12 NOTE — PROGRESS NOTES
Pulmonary Hospital Follow-up     Hospital:  LOS: 19 days   Ms. Tala Ramsey, 72 y.o. female is followed for:   Acute hypoxemic respiratory failure            History of present illness:      72-year-old female with past medical history of longstanding rheumatoid arthritis and inflammatory arthritis with possible lupus.  Patient states that she is under care of Dr. Weinstein for a number of years.  She also has history of iron deficiency anemia, hiatal hernia and GERD and hypertension.  She also has history of multiple allergies and used to be on allergy shots before but stopped using those.  Patient started having problem with more joint pain and joint stiffness.  She  has been off and on multiple drugs for her inflammatory arthritis.  She was tried on CellCept for little while but she could not tolerate it due to GI side effects.  She subsequently was started on diet therapy and states that things are getting stabilized for little bit and she was not on any treatment for 6 months earlier this year.  She started having more problem with the joint discomfort and was placed on Humira in first part of May.  She has been on Plaquenil for a number of years as well.  She had COVID-19 infection in December but stated that she was not very sick from it and she stayed at home.  Did not require any hospitalization or any steroid treatments.  She is started having pain with deep breathing and pleuritic component few months ago and underwent chest x-ray which was abnormal subsequently led to chest CT scan and found to have pulmonary fibrotic changes.  Patient states that she used to have dry cough after COVID-19 infection but that kind of went away     Patient was evaluated and found to have pulmonary fibrosis but her PFTs were relatively stable and no other untoward findings noted on the CT scan.  We discussed biopsy but patient is not interested in that either. She also was having worsening cough after upper respiratory  infection.  She was treated with amoxicillin for tonsillar infection and her ACE inhibitor was changed to ARB and with that her cough significantly improved.     Patient however presented to hospital on April 23 and was admitted to the hospital with bilateral infiltrates with possible pneumonia and started on antibiotics.  Patient was thought to have acute exacerbation of her ILD and was given pulse dose steroids with 1 g for 3 days followed by prolonged prednisone taper.  Bronchoscopy was considered but thought to be very high risk with underlying ILD.  Patient was started on PJP prophylaxis as well.    Patient is DNR/DNI status.    Subjective   Interval History:  Patient states that she did better last night.  Palliative care team is following and managing anxiety medications.  Currently on 45% FiO2 and 45 L flow.  Saturating 93 to 94%.  Not ready to transition to regular nasal cannula currently.  Denies any other cough symptoms.  Tolerating Bactrim okay for now  Potassium level is acceptable.             The patient's past medical, surgical and social history were reviewed and updated in Epic as appropriate.       Objective     Infusions:     Medications:  amLODIPine, 5 mg, Oral, Q24H  cetirizine, 10 mg, Oral, Daily  fluticasone, 2 spray, Each Nare, Nightly  guaiFENesin, 1,200 mg, Oral, Q12H  heparin (porcine), 5,000 Units, Subcutaneous, Q8H  hydroxychloroquine, 200 mg, Oral, Q12H  insulin lispro, 0-7 Units, Subcutaneous, TID AC  ipratropium-albuterol, 3 mL, Nebulization, 4x Daily - RT  melatonin, 5 mg, Oral, Nightly  montelukast, 10 mg, Oral, Daily  nystatin, 5 mL, Swish & Spit, 4x Daily  nystatin, , Topical, Q12H  nystatin, , Topical, Once  palliative care oral rinse, , Mouth/Throat, Q8H  pantoprazole, 40 mg, Oral, Daily  predniSONE, 30 mg, Oral, BID With Meals  sodium chloride, 10 mL, Intravenous, Q12H  sodium chloride, 1 g, Oral, BID With Meals  sulfamethoxazole-trimethoprim, 1 tablet, Oral, Once per day on  "Mon Wed Fri        Vital Sign Min/Max for last 24 hours  Temp  Min: 97 °F (36.1 °C)  Max: 97.5 °F (36.4 °C)   BP  Min: 124/78  Max: 153/85   Pulse  Min: 72  Max: 96   Resp  Min: 16  Max: 32   SpO2  Min: 91 %  Max: 97 %   Flow (L/min)  Min: 45  Max: 50       Input/Output for last 24 hour shift  05/11 0701 - 05/12 0700  In: 1080 [P.O.:1080]  Out: 1375 [Urine:1375]      Objective:  Vital signs: (most recent): Blood pressure 124/78, pulse 89, temperature 97 °F (36.1 °C), temperature source Temporal, resp. rate (!) 32, height 153.7 cm (60.5\"), weight 80.3 kg (177 lb), SpO2 91 %, not currently breastfeeding.            General Appearance: Awake, alert, in no acute resp distress on HFNC  Lungs:   B/L Breath sounds present with decreased breath sounds on bases, no wheezing heard, occ crackles.   Heart: S1 and S2 present, no murmur  Extremities:   no edema, warm to touch.  Neurologic:  Moving all four extremities. Good strength bilaterally.  Psychological: Normal affect, Cooperative    Results from last 7 days   Lab Units 05/11/23  0733 05/09/23  0758   WBC 10*3/mm3 20.93* 21.10*   HEMOGLOBIN g/dL 13.4 13.1   PLATELETS 10*3/mm3 328 382     Results from last 7 days   Lab Units 05/12/23  0616 05/11/23  0733 05/10/23  0734   SODIUM mmol/L 128* 126* 124*   POTASSIUM mmol/L 4.9 4.7 5.0   CO2 mmol/L 22.0 22.0 22.0   BUN mg/dL 21 23 24*   CREATININE mg/dL 0.41* 0.51* 0.46*   GLUCOSE mg/dL 99 102* 114*     Estimated Creatinine Clearance: 115.9 mL/min (A) (by C-G formula based on SCr of 0.41 mg/dL (L)).          Images:   Chest x-ray done yesterday reviewed personally.  Improvement noted in bilateral interstitial lung disease.    I reviewed the patient's results and images.     Assessment & Plan   Impression        Acute hypoxemic respiratory failure -likely due to ILD exacerbation    Multifocal pneumonia    Pulmonary fibrosis    ILD (interstitial lung disease) -likely RA associated ILD with acute exacerbation       Plan        1. Pt " presented with worsening hypoxia and appears like acute ILD exacerbation.  Patient was treated with pulse dose steroids and has been on high-dose steroids and antibiotics.  Patient is off antibiotics for now.  Continue attempts to wean FiO2.  Chest x-ray showing improvement couple days ago.  We will recheck again next week.  2.  Hyperkalemia improved.  Sodium level improving with salt tablets.  Continue close monitoring.  I resumed Bactrim prophylaxis and so far tolerating okay.  Will trend potassium level   3.  Oral diet as tolerated.  Watch out for aspiration episodes.  4.  Continue PPI.  DVT prophylaxis.    Palliative care team is following.  Family is aware of guarded prognosis but are hopeful that patient will continue to improve.  Long-term prognosis is not very good.  Patient is DNR/DNI status.      Laurent Tomlinson MD, [facility]  Pulmonary, Critical care and Sleep Medicine

## 2023-05-12 NOTE — PLAN OF CARE
Goal Outcome Evaluation:  Plan of Care Reviewed With: patient, family        Progress: no change  Outcome Evaluation: HFNC in use and is tolerating well. Pt sleeping. Son at bedside. Purwick changed earlier in the shift along with linen change. Pt given PO Ativan and liquid Morphine per request earlier in the night. No other needs or complaints voiced. POC continued.

## 2023-05-12 NOTE — PLAN OF CARE
Problem: COPD (Chronic Obstructive Pulmonary Disease) Comorbidity  Goal: Maintenance of COPD Symptom Control  Intervention: Maintain COPD-Symptom Control  Recent Flowsheet Documentation  Taken 5/12/2023 1710 by Rosalinda Kerns RN  Supportive Measures:   active listening utilized   decision-making supported   goal-setting facilitated   verbalization of feelings encouraged     Problem: Pain Chronic (Persistent) (Comorbidity Management)  Goal: Acceptable Pain Control and Functional Ability  Intervention: Optimize Psychosocial Wellbeing  Recent Flowsheet Documentation  Taken 5/12/2023 1710 by Rosalinda Kerns RN  Supportive Measures:   active listening utilized   decision-making supported   goal-setting facilitated   verbalization of feelings encouraged  Family/Support System Care:   presence promoted   support provided   involvement promoted     Problem: Palliative Care  Goal: Enhanced Quality of Life  Intervention: Optimize Psychosocial Wellbeing  Flowsheets (Taken 5/12/2023 1710)  Supportive Measures:   active listening utilized   decision-making supported   goal-setting facilitated   verbalization of feelings encouraged  Family/Support System Care:   presence promoted   support provided   involvement promoted     Problem: Pain Chronic (Persistent) (Comorbidity Management)  Goal: Acceptable Pain Control and Functional Ability  Intervention: Optimize Psychosocial Wellbeing  Recent Flowsheet Documentation  Taken 5/12/2023 1710 by Rosalinda Kerns RN  Supportive Measures: (palliative IDT: RN, APRN, DO)   active listening utilized   decision-making supported   goal-setting facilitated   verbalization of feelings encouraged   other (see comments)  Family/Support System Care:   presence promoted   support provided   involvement promoted     Problem: Palliative Care  Goal: Enhanced Quality of Life  Intervention: Optimize Psychosocial Wellbeing  5/12/2023 1710 by Rosalinda Kerns RN  Flowsheets (Taken 5/12/2023  1710)  Supportive Measures: (palliative IDT: JOYCE SANTIAGO, )   active listening utilized   decision-making supported   goal-setting facilitated   verbalization of feelings encouraged   other (see comments)  5/12/2023 1710 by Rosalinda Kerns RN  Flowsheets (Taken 5/12/2023 1710)  Supportive Measures: (palliative IDT: JOYCE SANTIAGO, )   active listening utilized   decision-making supported   goal-setting facilitated   verbalization of feelings encouraged   other (see comments)  Family/Support System Care:   presence promoted   support provided   involvement promoted   Goal Outcome Evaluation:  Plan of Care Reviewed With: patient, son        Progress: no change  Outcome Evaluation: Pt is on less HFNC today 45%/45L and having some dyspnea and anxiety managed with prn ativan and morphine. Pt has resigned that she has unfinished business that she is not going to be able to accomplish. Pt feels safe and less anxious here in the hospital. 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  Discussed hospice and managing symptoms at home if pt is able to transition to 6 L.  Son asked about [facility]. Pt is not able to do therapy due to level of dyspnea. Pt requires premedication for bathing which has helped. Pt is not a rehab candidate.  Pt may need trial of 6 L pnc with premed to see if she can tolerate and will continue to address goals. Pt has not had a bm in several days. Miralax effective for pt at home. Encouraged her to take to avoid constipation.

## 2023-05-12 NOTE — PROGRESS NOTES
[facility]  PROGRESS NOTE    Patient Name: Tala Ramsey  : 1950  MRN: [id number]    Date of Admission: 2023  Primary Care Physician: Sg Horne MD    Subjective   Subjective     CC:  Hypoxia, pneumonia    HPI:  Pt is still short of breath. She says her back hurt.     ROS:  Gen- No fevers, chills  CV- No chest pain, palpitations  Resp- No cough, dyspnea  GI- No N/V/D, abd pain        Objective   Objective     Vital Signs:   Temp:  [97 °F (36.1 °C)-97.5 °F (36.4 °C)] 97 °F (36.1 °C)  Heart Rate:  [72-96] 72  Resp:  [16-28] 28  BP: (124-153)/(61-85) 124/78  Flow (L/min):  [45-50] 45     Physical Exam:  Constitutional: No acute distress, awake, in some discomfort   HENT: NCAT, mucous membranes moist  Respiratory: respiratory mildly labored on high flow   Cardiovascular: RRR, no murmurs, rubs, or gallops  Gastrointestinal: r, nondistended  Musculoskeletal: No bilateral ankle edema  Psychiatric: Appropriate affect, cooperative  Neurologic: Oriented x 3,speech clear  Skin: No rashes        Results Reviewed:  LAB RESULTS:      Lab 23  0733 23  0758   WBC 20.93* 21.10*   HEMOGLOBIN 13.4 13.1   HEMATOCRIT 39.4 39.0   PLATELETS 328 382   NEUTROS ABS 17.19* 17.54*   IMMATURE GRANS (ABS) 0.57* 0.87*   LYMPHS ABS 1.61 1.38   MONOS ABS 1.47* 1.21*   EOS ABS 0.01 0.00   MCV 87.9 89.2         Lab 23  0616 23  0733 05/10/23  0734 23  1608 23  0758 23  0619   SODIUM 128* 126* 124*  --  127* 128*   POTASSIUM 4.9 4.7 5.0 5.3* 6.0* 5.8*   CHLORIDE 95* 94* 91*  --  94* 94*   CO2 22.0 22.0 22.0  --  22.0 19.0*   ANION GAP 11.0 10.0 11.0  --  11.0 15.0   BUN 21 23 24*  --  27* 25*   CREATININE 0.41* 0.51* 0.46*  --  0.45* 0.45*   EGFR 104.0 98.7 101.2  --  101.7 101.7   GLUCOSE 99 102* 114*  --  120* 118*   CALCIUM 8.8 9.1 9.1  --  9.2 9.2         Lab 23  0619   TOTAL PROTEIN 6.2   ALBUMIN 3.7   GLOBULIN 2.5   ALT (SGPT) 18    AST (SGOT) 16   BILIRUBIN 0.4   ALK PHOS 69                     Brief Urine Lab Results  (Last result in the past 365 days)      Color   Clarity   Blood   Leuk Est   Nitrite   Protein   CREAT   Urine HCG        04/23/23 1757 Yellow   Clear   Trace   Negative   Negative   Negative                 Microbiology Results Abnormal     Procedure Component Value - Date/Time    Blood Culture - Blood, Arm, Right [[id number]]  (Normal) Collected: 04/23/23 1125    Lab Status: Final result Specimen: Blood from Arm, Right Updated: 04/28/23 1145     Blood Culture No growth at 5 days    Blood Culture - Blood, Arm, Left [[id number]]  (Normal) Collected: 04/23/23 1125    Lab Status: Final result Specimen: Blood from Arm, Left Updated: 04/28/23 1145     Blood Culture No growth at 5 days    S. Pneumo Ag Urine or CSF - Urine, Urine, Clean Catch [[id number]]  (Normal) Collected: 04/23/23 1757    Lab Status: Final result Specimen: Urine, Clean Catch Updated: 04/24/23 0949     Strep Pneumo Ag Negative    Legionella Antigen, Urine - Urine, Urine, Clean Catch [[id number]]  (Normal) Collected: 04/23/23 1757    Lab Status: Final result Specimen: Urine, Clean Catch Updated: 04/24/23 0949     LEGIONELLA ANTIGEN, URINE Negative    Respiratory Panel PCR w/COVID-19(SARS-CoV-2) CHARI/MARISSA/APOLINAR/PAD/COR/MAD/AMY In-House, NP Swab in UTM/VTM, 3-4 HR TAT - Swab, Nasopharynx [[id number]]  (Normal) Collected: 04/24/23 0544    Lab Status: Final result Specimen: Swab from Nasopharynx Updated: 04/24/23 0647     ADENOVIRUS, PCR Not Detected     Coronavirus 229E Not Detected     Coronavirus HKU1 Not Detected     Coronavirus NL63 Not Detected     Coronavirus OC43 Not Detected     COVID19 Not Detected     Human Metapneumovirus Not Detected     Human Rhinovirus/Enterovirus Not Detected     Influenza A PCR Not Detected     Influenza B PCR Not Detected     Parainfluenza Virus 1 Not Detected     Parainfluenza Virus 2 Not Detected     Parainfluenza Virus 3 Not Detected      Parainfluenza Virus 4 Not Detected     RSV, PCR Not Detected     Bordetella pertussis pcr Not Detected     Bordetella parapertussis PCR Not Detected     Chlamydophila pneumoniae PCR Not Detected     Mycoplasma pneumo by PCR Not Detected    Narrative:      In the setting of a positive respiratory panel with a viral infection PLUS a negative procalcitonin without other underlying concern for bacterial infection, consider observing off antibiotics or discontinuation of antibiotics and continue supportive care. If the respiratory panel is positive for atypical bacterial infection (Bordetella pertussis, Chlamydophila pneumoniae, or Mycoplasma pneumoniae), consider antibiotic de-escalation to target atypical bacterial infection.    MRSA Screen, PCR (Inpatient) - Swab, Nares [[id number]]  (Normal) Collected: 04/23/23 1540    Lab Status: Final result Specimen: Swab from Nares Updated: 04/23/23 1651     MRSA PCR Negative    Narrative:      The negative predictive value of this diagnostic test is high and should only be used to consider de-escalating anti-MRSA therapy. A positive result may indicate colonization with MRSA and must be correlated clinically.  MRSA Negative    COVID PRE-OP / PRE-PROCEDURE SCREENING ORDER (NO ISOLATION) - Swab, Nasopharynx [[id number]]  (Normal) Collected: 04/23/23 1125    Lab Status: Final result Specimen: Swab from Nasopharynx Updated: 04/23/23 1225    Narrative:      The following orders were created for panel order COVID PRE-OP / PRE-PROCEDURE SCREENING ORDER (NO ISOLATION) - Swab, Nasopharynx.  Procedure                               Abnormality         Status                     ---------                               -----------         ------                     COVID-19, FLU A/B, RSV P...[[id number]]  Normal              Final result                 Please view results for these tests on the individual orders.    COVID-19, FLU A/B, RSV PCR - Swab, Nasopharynx [[id number]]  (Normal)  Collected: 04/23/23 1125    Lab Status: Final result Specimen: Swab from Nasopharynx Updated: 04/23/23 1225     COVID19 Not Detected     Influenza A PCR Not Detected     Influenza B PCR Not Detected     RSV, PCR Not Detected    Narrative:      Fact sheet for providers: https://www.fda.gov/media/519402/download    Fact sheet for patients: https://www.fda.gov/media/942341/download    Test performed by PCR.          No radiology results from the last 24 hrs        Current medications:  Scheduled Meds:amLODIPine, 5 mg, Oral, Q24H  cetirizine, 10 mg, Oral, Daily  fluticasone, 2 spray, Each Nare, Nightly  guaiFENesin, 1,200 mg, Oral, Q12H  heparin (porcine), 5,000 Units, Subcutaneous, Q8H  hydroxychloroquine, 200 mg, Oral, Q12H  insulin lispro, 0-7 Units, Subcutaneous, TID AC  ipratropium-albuterol, 3 mL, Nebulization, 4x Daily - RT  melatonin, 5 mg, Oral, Nightly  montelukast, 10 mg, Oral, Daily  nystatin, 5 mL, Swish & Spit, 4x Daily  nystatin, , Topical, Q12H  nystatin, , Topical, Once  palliative care oral rinse, , Mouth/Throat, Q8H  pantoprazole, 40 mg, Oral, Daily  predniSONE, 30 mg, Oral, BID With Meals  sodium chloride, 10 mL, Intravenous, Q12H  sodium chloride, 1 g, Oral, BID With Meals  sulfamethoxazole-trimethoprim, 1 tablet, Oral, Once per day on Mon Wed Fri      Continuous Infusions:   PRN Meds:.•  acetaminophen **OR** acetaminophen **OR** acetaminophen  •  aluminum-magnesium hydroxide-simethicone **AND** Lidocaine Viscous HCl  •  senna-docusate sodium **AND** polyethylene glycol **AND** bisacodyl **AND** bisacodyl  •  calcium carbonate  •  cyclobenzaprine  •  dextrose  •  dextrose  •  glucagon (human recombinant)  •  hydrOXYzine  •  ipratropium-albuterol  •  LORazepam  •  Magnesium Standard Dose Replacement - Follow Nurse / BPA Driven Protocol  •  morphine  •  ondansetron **OR** ondansetron  •  simethicone  •  sodium chloride  •  sodium chloride  •  sodium chloride    Assessment & Plan   Assessment & Plan      Active Hospital Problems    Diagnosis  POA   • **Acute hypoxemic respiratory failure -likely due to ILD exacerbation [J96.01]  Yes   • Pulmonary fibrosis [J84.10]  Yes   • ILD (interstitial lung disease) -likely RA associated ILD with acute exacerbation [J84.9]  Yes   • Multifocal pneumonia [J18.9]  Yes      Resolved Hospital Problems   No resolved problems to display.        Brief Hospital Course to date:  Tala Ramsey is a 72 y.o. female w/ hx interstitial lung dz, RA (on humira until recently, recent steroid injections) who presents w/ progressive dyspnea, cough over ~10 days failing outpatient Levaquin. Pt was found to be hypoxic upon admission. She had progressive hypoxia evening after admission placed on hi flow canula       This patient's problems and plans were partially entered by my partner and updated as appropriate by me 05/12/23.    Assessment/Plan:      Acute hypoxic resp failure, worsening  Pulmonary fibrosis/ILD  Bilateral Pneumonia  -likely combination of progession/flare of interstitial lung disease and infection  -remains on High flow with difficulty weaning and significant drops with minimal exertion (simply rolling in bed to use bedpan)  -s/p full course of zosyn & azithromycin  -hold 3x weekly bactrim ds (pcp prophylaxis as has been on humira and steroids) due to worsening hyperkalemia  -continue scheduled & PRN duonebs  -Pulm following: completed IV solumedrol 5/9 and transitioned po prednisone.  Continue   -diuresis PRN as tolerated- did give IV diuresis 5/6 and 7 without much improvement so holding.   -palliative also following, if pt can not come off high flow she wants inpatient hospice, discussing with son GEOFFREY if she is able to tolerate NC       Hyponatremia  Poor po intake  --No IV access.  Full support but no CPR/intubation.  Sodium continues to drop.  Pt wishes to keep IV out if at all possible   --added salt tabs bid 5/10.   Up to 128   Hyperkalemia  -- K+ now down to 4.8  after lokelma      RA  -holding humira  -- continue plaquenil     HTN  -continue amlodpine  -- hold valsartan due to worsening hyperkalemia  --bp stable     Chronic anemia   -monitor    Goals/limits  -Patient is DNR/DNI (in event of further clinical decompensation patient would NOT want mechanical ventilation; in this case patient would wish to pursue comfort measures;        Expected Discharge Location and Transportation: home  Expected Discharge TBD  Expected Discharge Date: 5/12/2023; Expected Discharge Time:      DVT prophylaxis:  Medical DVT prophylaxis orders are present. sq heparin    AM-PAC 6 Clicks Score (PT): 12 (05/11/23 0800)    CODE STATUS:   Code Status and Medical Interventions:   Ordered at: 04/23/23 1310     Medical Intervention Limits:    NO intubation (DNI)     Level Of Support Discussed With:    Patient     Code Status (Patient has no pulse and is not breathing):    No CPR (Do Not Attempt to Resuscitate)     Medical Interventions (Patient has pulse or is breathing):    Limited Support       Catalina Beck, DO  05/12/23

## 2023-05-13 LAB
GLUCOSE BLDC GLUCOMTR-MCNC: 101 MG/DL (ref 70–130)
GLUCOSE BLDC GLUCOMTR-MCNC: 109 MG/DL (ref 70–130)
GLUCOSE BLDC GLUCOMTR-MCNC: 115 MG/DL (ref 70–130)

## 2023-05-13 PROCEDURE — 99232 SBSQ HOSP IP/OBS MODERATE 35: CPT | Performed by: FAMILY MEDICINE

## 2023-05-13 PROCEDURE — 63710000001 PREDNISONE PER 5 MG: Performed by: INTERNAL MEDICINE

## 2023-05-13 PROCEDURE — 25010000002 HEPARIN (PORCINE) PER 1000 UNITS: Performed by: INTERNAL MEDICINE

## 2023-05-13 PROCEDURE — 82948 REAGENT STRIP/BLOOD GLUCOSE: CPT

## 2023-05-13 PROCEDURE — 63710000001 PREDNISONE PER 1 MG: Performed by: INTERNAL MEDICINE

## 2023-05-13 PROCEDURE — 99232 SBSQ HOSP IP/OBS MODERATE 35: CPT | Performed by: INTERNAL MEDICINE

## 2023-05-13 PROCEDURE — 94799 UNLISTED PULMONARY SVC/PX: CPT

## 2023-05-13 RX ADMIN — LORAZEPAM 1 MG: 1 TABLET ORAL at 12:35

## 2023-05-13 RX ADMIN — NYSTATIN 500000 UNITS: 100000 SUSPENSION ORAL at 08:25

## 2023-05-13 RX ADMIN — PREDNISONE 30 MG: 20 TABLET ORAL at 17:13

## 2023-05-13 RX ADMIN — HEPARIN SODIUM 5000 UNITS: 5000 INJECTION, SOLUTION INTRAVENOUS; SUBCUTANEOUS at 14:09

## 2023-05-13 RX ADMIN — SODIUM CHLORIDE 1 G: 1 TABLET ORAL at 17:13

## 2023-05-13 RX ADMIN — PREDNISONE 30 MG: 20 TABLET ORAL at 08:24

## 2023-05-13 RX ADMIN — SODIUM CHLORIDE 1 G: 1 TABLET ORAL at 08:25

## 2023-05-13 RX ADMIN — IPRATROPIUM BROMIDE AND ALBUTEROL SULFATE 3 ML: .5; 2.5 SOLUTION RESPIRATORY (INHALATION) at 19:05

## 2023-05-13 RX ADMIN — NYSTATIN: 100000 POWDER TOPICAL at 20:48

## 2023-05-13 RX ADMIN — LORAZEPAM 1 MG: 1 TABLET ORAL at 03:29

## 2023-05-13 RX ADMIN — NYSTATIN: 100000 POWDER TOPICAL at 08:29

## 2023-05-13 RX ADMIN — PANTOPRAZOLE SODIUM 40 MG: 40 TABLET, DELAYED RELEASE ORAL at 08:24

## 2023-05-13 RX ADMIN — HYDROXYCHLOROQUINE SULFATE 200 MG: 200 TABLET, FILM COATED ORAL at 08:33

## 2023-05-13 RX ADMIN — MONTELUKAST 10 MG: 10 TABLET, FILM COATED ORAL at 08:24

## 2023-05-13 RX ADMIN — MORPHINE SULFATE 5 MG: 100 SOLUTION ORAL at 20:48

## 2023-05-13 RX ADMIN — Medication: at 21:01

## 2023-05-13 RX ADMIN — HEPARIN SODIUM 5000 UNITS: 5000 INJECTION, SOLUTION INTRAVENOUS; SUBCUTANEOUS at 05:22

## 2023-05-13 RX ADMIN — HEPARIN SODIUM 5000 UNITS: 5000 INJECTION, SOLUTION INTRAVENOUS; SUBCUTANEOUS at 21:01

## 2023-05-13 RX ADMIN — LORAZEPAM 1 MG: 1 TABLET ORAL at 18:01

## 2023-05-13 RX ADMIN — Medication 5 MG: at 20:47

## 2023-05-13 RX ADMIN — Medication: at 05:22

## 2023-05-13 RX ADMIN — AMLODIPINE BESYLATE 5 MG: 5 TABLET ORAL at 08:24

## 2023-05-13 RX ADMIN — GUAIFENESIN 1200 MG: 600 TABLET, EXTENDED RELEASE ORAL at 08:24

## 2023-05-13 RX ADMIN — CETIRIZINE HYDROCHLORIDE 10 MG: 10 TABLET, FILM COATED ORAL at 08:24

## 2023-05-13 RX ADMIN — IPRATROPIUM BROMIDE AND ALBUTEROL SULFATE 3 ML: .5; 2.5 SOLUTION RESPIRATORY (INHALATION) at 12:30

## 2023-05-13 RX ADMIN — NYSTATIN 500000 UNITS: 100000 SUSPENSION ORAL at 12:36

## 2023-05-13 RX ADMIN — GUAIFENESIN 1200 MG: 600 TABLET, EXTENDED RELEASE ORAL at 20:47

## 2023-05-13 RX ADMIN — HYDROXYCHLOROQUINE SULFATE 200 MG: 200 TABLET, FILM COATED ORAL at 20:47

## 2023-05-13 NOTE — PROGRESS NOTES
[facility] Medicine Services  PROGRESS NOTE    Patient Name: Tala Ramsey  : 1950  MRN: [id number]    Date of Admission: 2023  Primary Care Physician: Sg Horne MD    Subjective   Subjective     CC:  Hypoxia, pneumonia    HPI:  Pt states she feels that she is not getting better. She feels the shortness of breath come and go. Not currently in pain     ROS:  Gen- No fevers, chills  CV- No chest pain, palpitations  Resp- No cough, dyspnea  GI- No N/V/D, abd pain            Objective   Objective     Vital Signs:   Temp:  [97.1 °F (36.2 °C)-98.1 °F (36.7 °C)] 97.1 °F (36.2 °C)  Heart Rate:  [81-92] 82  Resp:  [14-28] 14  BP: (106-132)/(63-80) 106/80  Flow (L/min):  [45] 45     Physical Exam:  Constitutional: ill appearing female in NAD   HENT: NCAT, mucous membranes moist  Respiratory: respiratory mildly labored on high flow   Cardiovascular: RRR, no murmurs, rubs, or gallops  Gastrointestinal: r, nondistended  Musculoskeletal: No bilateral ankle edema  Psychiatric: Appropriate affect, cooperative  Neurologic: Oriented x 3,speech clear  Skin: No rashes        Results Reviewed:  LAB RESULTS:      Lab 23  0733 23  0758   WBC 20.93* 21.10*   HEMOGLOBIN 13.4 13.1   HEMATOCRIT 39.4 39.0   PLATELETS 328 382   NEUTROS ABS 17.19* 17.54*   IMMATURE GRANS (ABS) 0.57* 0.87*   LYMPHS ABS 1.61 1.38   MONOS ABS 1.47* 1.21*   EOS ABS 0.01 0.00   MCV 87.9 89.2         Lab 23  0616 23  0733 05/10/23  0734 23  1608 23  0758 23  0619   SODIUM 128* 126* 124*  --  127* 128*   POTASSIUM 4.9 4.7 5.0 5.3* 6.0* 5.8*   CHLORIDE 95* 94* 91*  --  94* 94*   CO2 22.0 22.0 22.0  --  22.0 19.0*   ANION GAP 11.0 10.0 11.0  --  11.0 15.0   BUN 21 23 24*  --  27* 25*   CREATININE 0.41* 0.51* 0.46*  --  0.45* 0.45*   EGFR 104.0 98.7 101.2  --  101.7 101.7   GLUCOSE 99 102* 114*  --  120* 118*   CALCIUM 8.8 9.1 9.1  --  9.2 9.2         Lab 23  0619   TOTAL  PROTEIN 6.2   ALBUMIN 3.7   GLOBULIN 2.5   ALT (SGPT) 18   AST (SGOT) 16   BILIRUBIN 0.4   ALK PHOS 69                     Brief Urine Lab Results  (Last result in the past 365 days)      Color   Clarity   Blood   Leuk Est   Nitrite   Protein   CREAT   Urine HCG        04/23/23 1757 Yellow   Clear   Trace   Negative   Negative   Negative                 Microbiology Results Abnormal     Procedure Component Value - Date/Time    Blood Culture - Blood, Arm, Right [[id number]]  (Normal) Collected: 04/23/23 1125    Lab Status: Final result Specimen: Blood from Arm, Right Updated: 04/28/23 1145     Blood Culture No growth at 5 days    Blood Culture - Blood, Arm, Left [[id number]]  (Normal) Collected: 04/23/23 1125    Lab Status: Final result Specimen: Blood from Arm, Left Updated: 04/28/23 1145     Blood Culture No growth at 5 days    S. Pneumo Ag Urine or CSF - Urine, Urine, Clean Catch [[id number]]  (Normal) Collected: 04/23/23 1757    Lab Status: Final result Specimen: Urine, Clean Catch Updated: 04/24/23 0949     Strep Pneumo Ag Negative    Legionella Antigen, Urine - Urine, Urine, Clean Catch [[id number]]  (Normal) Collected: 04/23/23 1757    Lab Status: Final result Specimen: Urine, Clean Catch Updated: 04/24/23 0949     LEGIONELLA ANTIGEN, URINE Negative    Respiratory Panel PCR w/COVID-19(SARS-CoV-2) CHARI/MARISSA/APOLINAR/PAD/COR/MAD/AMY In-House, NP Swab in UTM/VTM, 3-4 HR TAT - Swab, Nasopharynx [[id number]]  (Normal) Collected: 04/24/23 0544    Lab Status: Final result Specimen: Swab from Nasopharynx Updated: 04/24/23 0647     ADENOVIRUS, PCR Not Detected     Coronavirus 229E Not Detected     Coronavirus HKU1 Not Detected     Coronavirus NL63 Not Detected     Coronavirus OC43 Not Detected     COVID19 Not Detected     Human Metapneumovirus Not Detected     Human Rhinovirus/Enterovirus Not Detected     Influenza A PCR Not Detected     Influenza B PCR Not Detected     Parainfluenza Virus 1 Not Detected     Parainfluenza Virus 2  Not Detected     Parainfluenza Virus 3 Not Detected     Parainfluenza Virus 4 Not Detected     RSV, PCR Not Detected     Bordetella pertussis pcr Not Detected     Bordetella parapertussis PCR Not Detected     Chlamydophila pneumoniae PCR Not Detected     Mycoplasma pneumo by PCR Not Detected    Narrative:      In the setting of a positive respiratory panel with a viral infection PLUS a negative procalcitonin without other underlying concern for bacterial infection, consider observing off antibiotics or discontinuation of antibiotics and continue supportive care. If the respiratory panel is positive for atypical bacterial infection (Bordetella pertussis, Chlamydophila pneumoniae, or Mycoplasma pneumoniae), consider antibiotic de-escalation to target atypical bacterial infection.    MRSA Screen, PCR (Inpatient) - Swab, Nares [[id number]]  (Normal) Collected: 04/23/23 1540    Lab Status: Final result Specimen: Swab from Nares Updated: 04/23/23 1651     MRSA PCR Negative    Narrative:      The negative predictive value of this diagnostic test is high and should only be used to consider de-escalating anti-MRSA therapy. A positive result may indicate colonization with MRSA and must be correlated clinically.  MRSA Negative    COVID PRE-OP / PRE-PROCEDURE SCREENING ORDER (NO ISOLATION) - Swab, Nasopharynx [[id number]]  (Normal) Collected: 04/23/23 1125    Lab Status: Final result Specimen: Swab from Nasopharynx Updated: 04/23/23 1225    Narrative:      The following orders were created for panel order COVID PRE-OP / PRE-PROCEDURE SCREENING ORDER (NO ISOLATION) - Swab, Nasopharynx.  Procedure                               Abnormality         Status                     ---------                               -----------         ------                     COVID-19, FLU A/B, RSV P...[[id number]]  Normal              Final result                 Please view results for these tests on the individual orders.    COVID-19, FLU A/B, RSV  PCR - Swab, Nasopharynx [[id number]]  (Normal) Collected: 04/23/23 1125    Lab Status: Final result Specimen: Swab from Nasopharynx Updated: 04/23/23 1225     COVID19 Not Detected     Influenza A PCR Not Detected     Influenza B PCR Not Detected     RSV, PCR Not Detected    Narrative:      Fact sheet for providers: https://www.fda.gov/media/905925/download    Fact sheet for patients: https://www.fda.gov/media/072586/download    Test performed by PCR.          No radiology results from the last 24 hrs        Current medications:  Scheduled Meds:amLODIPine, 5 mg, Oral, Q24H  cetirizine, 10 mg, Oral, Daily  fluticasone, 2 spray, Each Nare, Nightly  guaiFENesin, 1,200 mg, Oral, Q12H  heparin (porcine), 5,000 Units, Subcutaneous, Q8H  hydroxychloroquine, 200 mg, Oral, Q12H  insulin lispro, 0-7 Units, Subcutaneous, TID AC  ipratropium-albuterol, 3 mL, Nebulization, 4x Daily - RT  melatonin, 5 mg, Oral, Nightly  montelukast, 10 mg, Oral, Daily  nystatin, 5 mL, Swish & Spit, 4x Daily  nystatin, , Topical, Q12H  nystatin, , Topical, Once  palliative care oral rinse, , Mouth/Throat, Q8H  pantoprazole, 40 mg, Oral, Daily  predniSONE, 30 mg, Oral, BID With Meals  sodium chloride, 10 mL, Intravenous, Q12H  sodium chloride, 1 g, Oral, BID With Meals  sulfamethoxazole-trimethoprim, 1 tablet, Oral, Once per day on Mon Wed Fri      Continuous Infusions:   PRN Meds:.•  acetaminophen **OR** acetaminophen **OR** acetaminophen  •  aluminum-magnesium hydroxide-simethicone **AND** Lidocaine Viscous HCl  •  senna-docusate sodium **AND** polyethylene glycol **AND** bisacodyl **AND** bisacodyl  •  calcium carbonate  •  cyclobenzaprine  •  dextrose  •  dextrose  •  glucagon (human recombinant)  •  hydrOXYzine  •  ipratropium-albuterol  •  LORazepam  •  Magnesium Standard Dose Replacement - Follow Nurse / BPA Driven Protocol  •  morphine  •  ondansetron **OR** ondansetron  •  simethicone  •  sodium chloride  •  sodium chloride  •  sodium  chloride    Assessment & Plan   Assessment & Plan     Active Hospital Problems    Diagnosis  POA   • **Acute hypoxemic respiratory failure -likely due to ILD exacerbation [J96.01]  Yes   • Pulmonary fibrosis [J84.10]  Yes   • ILD (interstitial lung disease) -likely RA associated ILD with acute exacerbation [J84.9]  Yes   • Multifocal pneumonia [J18.9]  Yes      Resolved Hospital Problems   No resolved problems to display.        Brief Hospital Course to date:  Tala Ramsey is a 72 y.o. female w/ hx interstitial lung dz, RA (on humira until recently, recent steroid injections) who presents w/ progressive dyspnea, cough over ~10 days failing outpatient Levaquin. Pt was found to be hypoxic upon admission. She had progressive hypoxia evening after admission placed on hi flow canula       This patient's problems and plans were partially entered by my partner and updated as appropriate by me 05/13/23.    Assessment/Plan:      Acute hypoxic resp failure, worsening  Pulmonary fibrosis/ILD  Bilateral Pneumonia  -likely combination of progession/flare of interstitial lung disease and infection  -remains on High flow with difficulty weaning and significant drops with minimal exertion (simply rolling in bed to use bedpan)  -s/p full course of zosyn & azithromycin  -hold 3x weekly bactrim ds (pcp prophylaxis as has been on humira and steroids) due to worsening hyperkalemia  -continue scheduled & PRN duonebs  -Pulm following: completed IV solumedrol 5/9 and transitioned po prednisone.  Continue   -diuresis PRN as tolerated- did give IV diuresis 5/6 and 7 without much improvement so holding.   -palliative also following, if pt can not come off high flow she wants inpatient hospice, discussed with son GEOFFREY if she is able to tolerate NC       Hyponatremia  Poor po intake  --No IV access.  Full support but no CPR/intubation.  Sodium continues to drop.  Pt wishes to keep IV out if at all possible   --added salt tabs bid 5/10.    Up to 128   Hyperkalemia  -- K+ now down to 4.8 after lokelma      RA  -holding humira  -- continue plaquenil     HTN  -continue amlodpine  -- hold valsartan due to worsening hyperkalemia  --bp stable -may not tolerate valsartan anyway     Chronic anemia   -monitor    Goals/limits  -Patient is DNR/DNI (in event of further clinical decompensation patient would NOT want mechanical ventilation; in this case patient would wish to pursue comfort measures;     Patient states she would like to focus on comfort. Before being placed in comfort measures, she wants to meet with all of her children. Son bedside states that he will try and get brothers together in next couple of days.         Expected Discharge Location and Transportation: home vs inpatient hospice   Expected Discharge TBD  Expected Discharge Date: 5/15/2023; Expected Discharge Time:      DVT prophylaxis:  Medical DVT prophylaxis orders are present. sq heparin    AM-PAC 6 Clicks Score (PT): 11 (05/13/23 0800)    CODE STATUS:   Code Status and Medical Interventions:   Ordered at: 04/23/23 1310     Medical Intervention Limits:    NO intubation (DNI)     Level Of Support Discussed With:    Patient     Code Status (Patient has no pulse and is not breathing):    No CPR (Do Not Attempt to Resuscitate)     Medical Interventions (Patient has pulse or is breathing):    Limited Support       Catalina Beck, DO  05/13/23

## 2023-05-13 NOTE — PROGRESS NOTES
Pulmonary Hospital Follow-up     Hospital:  LOS: 20 days   Ms. Tala Ramsey, 72 y.o. female is followed for:   Acute hypoxemic respiratory failure            History of present illness:      72-year-old female with past medical history of longstanding rheumatoid arthritis and inflammatory arthritis with possible lupus.  Patient states that she is under care of Dr. Weinstein for a number of years.  She also has history of iron deficiency anemia, hiatal hernia and GERD and hypertension.  She also has history of multiple allergies and used to be on allergy shots before but stopped using those.  Patient started having problem with more joint pain and joint stiffness.  She  has been off and on multiple drugs for her inflammatory arthritis.  She was tried on CellCept for little while but she could not tolerate it due to GI side effects.  She subsequently was started on diet therapy and states that things are getting stabilized for little bit and she was not on any treatment for 6 months earlier this year.  She started having more problem with the joint discomfort and was placed on Humira in first part of May.  She has been on Plaquenil for a number of years as well.  She had COVID-19 infection in December but stated that she was not very sick from it and she stayed at home.  Did not require any hospitalization or any steroid treatments.  She is started having pain with deep breathing and pleuritic component few months ago and underwent chest x-ray which was abnormal subsequently led to chest CT scan and found to have pulmonary fibrotic changes.  Patient states that she used to have dry cough after COVID-19 infection but that kind of went away     Patient was evaluated and found to have pulmonary fibrosis but her PFTs were relatively stable and no other untoward findings noted on the CT scan.  We discussed biopsy but patient is not interested in that either. She also was having worsening cough after upper respiratory  infection.  She was treated with amoxicillin for tonsillar infection and her ACE inhibitor was changed to ARB and with that her cough significantly improved.     Patient however presented to hospital on April 23 and was admitted to the hospital with bilateral infiltrates with possible pneumonia and started on antibiotics.  Patient was thought to have acute exacerbation of her ILD and was given pulse dose steroids with 1 g for 3 days followed by prolonged prednisone taper.  Bronchoscopy was considered but thought to be very high risk with underlying ILD.  Patient was started on PJP prophylaxis as well.    Patient is DNR/DNI status.    Subjective   Interval History:  Patient states that she slept better last night.  Remains on 40% FiO2 on high flow nasal cannula.  Appears less dyspneic.  We will try on nasal cannula oxygen today.             The patient's past medical, surgical and social history were reviewed and updated in Epic as appropriate.       Objective     Infusions:     Medications:  amLODIPine, 5 mg, Oral, Q24H  cetirizine, 10 mg, Oral, Daily  fluticasone, 2 spray, Each Nare, Nightly  guaiFENesin, 1,200 mg, Oral, Q12H  heparin (porcine), 5,000 Units, Subcutaneous, Q8H  hydroxychloroquine, 200 mg, Oral, Q12H  insulin lispro, 0-7 Units, Subcutaneous, TID AC  ipratropium-albuterol, 3 mL, Nebulization, 4x Daily - RT  melatonin, 5 mg, Oral, Nightly  montelukast, 10 mg, Oral, Daily  nystatin, 5 mL, Swish & Spit, 4x Daily  nystatin, , Topical, Q12H  nystatin, , Topical, Once  palliative care oral rinse, , Mouth/Throat, Q8H  pantoprazole, 40 mg, Oral, Daily  predniSONE, 30 mg, Oral, BID With Meals  sodium chloride, 10 mL, Intravenous, Q12H  sodium chloride, 1 g, Oral, BID With Meals  sulfamethoxazole-trimethoprim, 1 tablet, Oral, Once per day on Mon Wed Fri        Vital Sign Min/Max for last 24 hours  Temp  Min: 97.1 °F (36.2 °C)  Max: 98.1 °F (36.7 °C)   BP  Min: 106/80  Max: 132/63   Pulse  Min: 81  Max: 92  "  Resp  Min: 14  Max: 18   SpO2  Min: 94 %  Max: 94 %   Flow (L/min)  Min: 45  Max: 45       Input/Output for last 24 hour shift  05/12 0701 - 05/13 0700  In: 240 [P.O.:240]  Out: 1200 [Urine:1200]      Objective:  Vital signs: (most recent): Blood pressure 106/80, pulse 82, temperature 97.1 °F (36.2 °C), temperature source Temporal, resp. rate 14, height 153.7 cm (60.5\"), weight 80.3 kg (177 lb), SpO2 94 %, not currently breastfeeding.            General Appearance: Awake, alert, in no acute resp distress on HFNC  Lungs:   B/L Breath sounds present with decreased breath sounds on bases, no wheezing heard, occ crackles.   Heart: S1 and S2 present, no murmur  Extremities:   no edema, warm to touch.  Neurologic:  Moving all four extremities. Good strength bilaterally.  Psychological: Normal affect, Cooperative    Results from last 7 days   Lab Units 05/11/23  0733 05/09/23  0758   WBC 10*3/mm3 20.93* 21.10*   HEMOGLOBIN g/dL 13.4 13.1   PLATELETS 10*3/mm3 328 382     Results from last 7 days   Lab Units 05/12/23  0616 05/11/23  0733 05/10/23  0734   SODIUM mmol/L 128* 126* 124*   POTASSIUM mmol/L 4.9 4.7 5.0   CO2 mmol/L 22.0 22.0 22.0   BUN mg/dL 21 23 24*   CREATININE mg/dL 0.41* 0.51* 0.46*   GLUCOSE mg/dL 99 102* 114*     Estimated Creatinine Clearance: 115.9 mL/min (A) (by C-G formula based on SCr of 0.41 mg/dL (L)).          Images:   Chest x-ray done yesterday reviewed personally.  Improvement noted in bilateral interstitial lung disease.    I reviewed the patient's results and images.     Assessment & Plan   Impression        Acute hypoxemic respiratory failure -likely due to ILD exacerbation    Multifocal pneumonia    Pulmonary fibrosis    ILD (interstitial lung disease) -likely RA associated ILD with acute exacerbation       Plan        1. Pt presented with worsening hypoxia and appears like acute ILD exacerbation.  Patient was treated with pulse dose steroids and has been on high-dose steroids and " antibiotics.  Patient is off antibiotics for now.  Continue attempts to wean FiO2.  Discussed with nursing staff that we will try patient on regular nasal cannula at 6 L and see how she tolerates that.  Still has very poor reserves from pulmonary standpoint.  2.  Hyperkalemia improved.  Sodium level improving with salt tablets.  Continue close monitoring.  Bactrim prophylaxis was resumed.  Will recheck basic metabolic panel again in the morning.    3.  Oral diet as tolerated.  Watch out for aspiration episodes.  4.  Continue PPI.  DVT prophylaxis.    Palliative care team is following.  Family is aware of guarded prognosis but are hopeful that patient will continue to improve.  Long-term prognosis is not very good.  I spent time talking to patient's son today.  Discussed that patient has shown some improvement from few days ago but overall prognosis remains guarded.  We will see if she can get down to regular nasal cannula but she has a long road ahead of her in terms of recovery from this acute illness and to see if she will even get back to her baseline where she was before coming into the hospital.  Discussed that all these questions are unknown and I cannot answer these correctly at this point and only time will tell.  Patient is DNR/DNI status. Family is going to be talking to hospice team as well.       Laurent Tomlinson MD, [facility]  Pulmonary, Critical care and Sleep Medicine

## 2023-05-13 NOTE — PLAN OF CARE
Goal Outcome Evaluation:  Plan of Care Reviewed With: patient        Progress: no change                Problem: Adult Inpatient Plan of Care  Goal: Optimal Comfort and Wellbeing  Outcome: Ongoing, Progressing  Intervention: Provide Person-Centered Care  Recent Flowsheet Documentation  Taken 5/12/2023 2000 by Schilder, Claire, RN  Trust Relationship/Rapport:   care explained   thoughts/feelings acknowledged   questions answered       Patient tolerating HF NC. Family at bedside. PRNs administered for dyspnea and anxiety; effective intervention per patient. Repositioned for comfort. Pills whole. PW in place. Awaiting DC plans.

## 2023-05-13 NOTE — PLAN OF CARE
Problem: Palliative Care  Goal: Enhanced Quality of Life  5/13/2023 1227 by Ronna Turner, RN  Outcome: Ongoing, Progressing  5/13/2023 1226 by Ronna Turner RN  Outcome: Ongoing, Progressing  Intervention: Promote Advance Care Planning  Flowsheets (Taken 5/9/2023 1600 by Bigstaff, Kristianna, RN)  Life Transition/Adjustment: palliative care discussed  Intervention: Maximize Comfort  Flowsheets (Taken 5/13/2023 1227)  Pain Management Interventions:  • pillow support provided  • position adjusted  • quiet environment facilitated  • care clustered  Intervention: Optimize Function  Flowsheets  Taken 5/13/2023 1227 by Ronna Turner RN  Fatigue Management:  • frequent rest breaks encouraged  • activity schedule adjusted  Taken 5/11/2023 2000 by Savanah Briscoe RN  Sensory Stimulation Regulation:  • care clustered  • quiet environment promoted  Taken 5/9/2023 1800 by Daya Magaña RN  Sleep/Rest Enhancement: consistent schedule promoted  Intervention: Optimize Psychosocial Wellbeing  Flowsheets (Taken 5/13/2023 1227)  Supportive Measures:  • decision-making supported  • active listening utilized  • verbalization of feelings encouraged  Family/Support System Care:  • support provided  • presence promoted   Goal Outcome Evaluation:       Patient seen at bedside.  Patients family at bed side.  Patient stated she rested well last night and that she was taking her medication to help with her breathing trying to before it gets bad.  Stated breathing was a little better today.  And she was able to eat some for breakfast that she took her time and ate slow.  No further needs or questions at this time.

## 2023-05-13 NOTE — PLAN OF CARE
Goal Outcome Evaluation:  Plan of Care Reviewed With: patient, family        Progress: improving  Outcome Evaluation: VSS. Weaned to 6L n/c with humidification. Not able to tolerate much physical activity but feeling comfortable at rest with no reported shortness of breath or increased work of breathing. Fair oral intake today. Purewick in place with good output. Bed seems to be broken, will look into ordering a new one so the patient can sit more upright. IS instruction provided. PRN anxiety meds given. No further needs identified at this time.

## 2023-05-14 LAB
ANION GAP SERPL CALCULATED.3IONS-SCNC: 10 MMOL/L (ref 5–15)
BUN SERPL-MCNC: 17 MG/DL (ref 8–23)
BUN/CREAT SERPL: 45.9 (ref 7–25)
CALCIUM SPEC-SCNC: 9.1 MG/DL (ref 8.6–10.5)
CHLORIDE SERPL-SCNC: 97 MMOL/L (ref 98–107)
CO2 SERPL-SCNC: 22 MMOL/L (ref 22–29)
CREAT SERPL-MCNC: 0.37 MG/DL (ref 0.57–1)
EGFRCR SERPLBLD CKD-EPI 2021: 106.6 ML/MIN/1.73
GLUCOSE BLDC GLUCOMTR-MCNC: 110 MG/DL (ref 70–130)
GLUCOSE BLDC GLUCOMTR-MCNC: 143 MG/DL (ref 70–130)
GLUCOSE BLDC GLUCOMTR-MCNC: 168 MG/DL (ref 70–130)
GLUCOSE SERPL-MCNC: 120 MG/DL (ref 65–99)
POTASSIUM SERPL-SCNC: 4.8 MMOL/L (ref 3.5–5.2)
SODIUM SERPL-SCNC: 129 MMOL/L (ref 136–145)

## 2023-05-14 PROCEDURE — 94664 DEMO&/EVAL PT USE INHALER: CPT

## 2023-05-14 PROCEDURE — 94799 UNLISTED PULMONARY SVC/PX: CPT

## 2023-05-14 PROCEDURE — 63710000001 PREDNISONE PER 5 MG: Performed by: INTERNAL MEDICINE

## 2023-05-14 PROCEDURE — 82948 REAGENT STRIP/BLOOD GLUCOSE: CPT

## 2023-05-14 PROCEDURE — 25010000002 HEPARIN (PORCINE) PER 1000 UNITS: Performed by: INTERNAL MEDICINE

## 2023-05-14 PROCEDURE — 63710000001 PREDNISONE PER 1 MG: Performed by: INTERNAL MEDICINE

## 2023-05-14 PROCEDURE — 80048 BASIC METABOLIC PNL TOTAL CA: CPT | Performed by: INTERNAL MEDICINE

## 2023-05-14 PROCEDURE — 63710000001 INSULIN LISPRO (HUMAN) PER 5 UNITS: Performed by: INTERNAL MEDICINE

## 2023-05-14 PROCEDURE — 99232 SBSQ HOSP IP/OBS MODERATE 35: CPT | Performed by: INTERNAL MEDICINE

## 2023-05-14 PROCEDURE — 99232 SBSQ HOSP IP/OBS MODERATE 35: CPT | Performed by: NURSE PRACTITIONER

## 2023-05-14 PROCEDURE — 94761 N-INVAS EAR/PLS OXIMETRY MLT: CPT

## 2023-05-14 RX ORDER — LORAZEPAM 1 MG/1
1 TABLET ORAL EVERY 6 HOURS PRN
Status: DISPENSED | OUTPATIENT
Start: 2023-05-14 | End: 2023-05-20

## 2023-05-14 RX ADMIN — MORPHINE SULFATE 5 MG: 100 SOLUTION ORAL at 21:32

## 2023-05-14 RX ADMIN — HEPARIN SODIUM 5000 UNITS: 5000 INJECTION, SOLUTION INTRAVENOUS; SUBCUTANEOUS at 13:49

## 2023-05-14 RX ADMIN — PREDNISONE 30 MG: 20 TABLET ORAL at 08:58

## 2023-05-14 RX ADMIN — NYSTATIN: 100000 POWDER TOPICAL at 08:58

## 2023-05-14 RX ADMIN — AMLODIPINE BESYLATE 5 MG: 5 TABLET ORAL at 08:58

## 2023-05-14 RX ADMIN — LORAZEPAM 1 MG: 1 TABLET ORAL at 01:50

## 2023-05-14 RX ADMIN — Medication 5 MG: at 21:15

## 2023-05-14 RX ADMIN — IPRATROPIUM BROMIDE AND ALBUTEROL SULFATE 3 ML: .5; 2.5 SOLUTION RESPIRATORY (INHALATION) at 12:53

## 2023-05-14 RX ADMIN — IPRATROPIUM BROMIDE AND ALBUTEROL SULFATE 3 ML: .5; 2.5 SOLUTION RESPIRATORY (INHALATION) at 16:54

## 2023-05-14 RX ADMIN — GUAIFENESIN 1200 MG: 600 TABLET, EXTENDED RELEASE ORAL at 08:57

## 2023-05-14 RX ADMIN — INSULIN LISPRO 2 UNITS: 100 INJECTION, SOLUTION INTRAVENOUS; SUBCUTANEOUS at 12:01

## 2023-05-14 RX ADMIN — NYSTATIN: 100000 POWDER TOPICAL at 21:15

## 2023-05-14 RX ADMIN — HYDROXYZINE HYDROCHLORIDE 25 MG: 25 TABLET, FILM COATED ORAL at 21:33

## 2023-05-14 RX ADMIN — PANTOPRAZOLE SODIUM 40 MG: 40 TABLET, DELAYED RELEASE ORAL at 08:58

## 2023-05-14 RX ADMIN — IPRATROPIUM BROMIDE AND ALBUTEROL SULFATE 3 ML: .5; 2.5 SOLUTION RESPIRATORY (INHALATION) at 20:07

## 2023-05-14 RX ADMIN — Medication: at 13:49

## 2023-05-14 RX ADMIN — HEPARIN SODIUM 5000 UNITS: 5000 INJECTION, SOLUTION INTRAVENOUS; SUBCUTANEOUS at 21:16

## 2023-05-14 RX ADMIN — IPRATROPIUM BROMIDE AND ALBUTEROL SULFATE 3 ML: .5; 2.5 SOLUTION RESPIRATORY (INHALATION) at 08:38

## 2023-05-14 RX ADMIN — SODIUM CHLORIDE 1 G: 1 TABLET ORAL at 08:58

## 2023-05-14 RX ADMIN — SODIUM CHLORIDE 1 G: 1 TABLET ORAL at 17:32

## 2023-05-14 RX ADMIN — HYDROXYZINE HYDROCHLORIDE 25 MG: 25 TABLET, FILM COATED ORAL at 15:00

## 2023-05-14 RX ADMIN — CETIRIZINE HYDROCHLORIDE 10 MG: 10 TABLET, FILM COATED ORAL at 08:58

## 2023-05-14 RX ADMIN — CALCIUM CARBONATE (ANTACID) CHEW TAB 500 MG 2 TABLET: 500 CHEW TAB at 01:55

## 2023-05-14 RX ADMIN — HYDROXYCHLOROQUINE SULFATE 200 MG: 200 TABLET, FILM COATED ORAL at 21:15

## 2023-05-14 RX ADMIN — Medication: at 05:19

## 2023-05-14 RX ADMIN — LORAZEPAM 1 MG: 1 TABLET ORAL at 17:32

## 2023-05-14 RX ADMIN — LORAZEPAM 1 MG: 1 TABLET ORAL at 10:16

## 2023-05-14 RX ADMIN — HEPARIN SODIUM 5000 UNITS: 5000 INJECTION, SOLUTION INTRAVENOUS; SUBCUTANEOUS at 05:19

## 2023-05-14 RX ADMIN — GUAIFENESIN 1200 MG: 600 TABLET, EXTENDED RELEASE ORAL at 21:15

## 2023-05-14 RX ADMIN — Medication: at 21:16

## 2023-05-14 RX ADMIN — PREDNISONE 30 MG: 20 TABLET ORAL at 17:32

## 2023-05-14 RX ADMIN — MONTELUKAST 10 MG: 10 TABLET, FILM COATED ORAL at 08:58

## 2023-05-14 RX ADMIN — HYDROXYCHLOROQUINE SULFATE 200 MG: 200 TABLET, FILM COATED ORAL at 08:58

## 2023-05-14 NOTE — PLAN OF CARE
Problem: Adult Inpatient Plan of Care  Goal: Optimal Comfort and Wellbeing  Outcome: Ongoing, Progressing  Intervention: Provide Person-Centered Care  Recent Flowsheet Documentation  Taken 5/14/2023 0800 by Markus Ashton, RN  Trust Relationship/Rapport:   care explained   choices provided   Goal Outcome Evaluation:  Plan of Care Reviewed With: patient, family        Progress: improving            6L NC maintained. Pt requesting PRNS for anxiety. Family at bedside. Purewick in place. Plans to meet with Hospice tomorrow to discuss dc plans.

## 2023-05-14 NOTE — PROGRESS NOTES
Pulmonary Hospital Follow-up     Hospital:  LOS: 21 days   Ms. Tala Ramsey, 72 y.o. female is followed for:   Acute hypoxemic respiratory failure            History of present illness:      72-year-old female with past medical history of longstanding rheumatoid arthritis and inflammatory arthritis with possible lupus.  Patient states that she is under care of Dr. Weinstein for a number of years.  She also has history of iron deficiency anemia, hiatal hernia and GERD and hypertension.  She also has history of multiple allergies and used to be on allergy shots before but stopped using those.  Patient started having problem with more joint pain and joint stiffness.  She  has been off and on multiple drugs for her inflammatory arthritis.  She was tried on CellCept for little while but she could not tolerate it due to GI side effects.  She subsequently was started on diet therapy and states that things are getting stabilized for little bit and she was not on any treatment for 6 months earlier this year.  She started having more problem with the joint discomfort and was placed on Humira in first part of May.  She has been on Plaquenil for a number of years as well.  She had COVID-19 infection in December but stated that she was not very sick from it and she stayed at home.  Did not require any hospitalization or any steroid treatments.  She is started having pain with deep breathing and pleuritic component few months ago and underwent chest x-ray which was abnormal subsequently led to chest CT scan and found to have pulmonary fibrotic changes.  Patient states that she used to have dry cough after COVID-19 infection but that kind of went away     Patient was evaluated and found to have pulmonary fibrosis but her PFTs were relatively stable and no other untoward findings noted on the CT scan.  We discussed biopsy but patient is not interested in that either. She also was having worsening cough after upper respiratory  infection.  She was treated with amoxicillin for tonsillar infection and her ACE inhibitor was changed to ARB and with that her cough significantly improved.     Patient however presented to hospital on April 23 and was admitted to the hospital with bilateral infiltrates with possible pneumonia and started on antibiotics.  Patient was thought to have acute exacerbation of her ILD and was given pulse dose steroids with 1 g for 3 days followed by prolonged prednisone taper.  Bronchoscopy was considered but thought to be very high risk with underlying ILD.  Patient was started on PJP prophylaxis as well.    Patient is DNR/DNI status.    Subjective   Interval History:  Patient was transitioned to 6 L nasal cannula yesterday and remained on regular nasal cannula overnight.  Not requiring high flow nasal cannula currently.  Currently on 6 L nasal cannula but just finished taking a bath.  Less dyspneic.             The patient's past medical, surgical and social history were reviewed and updated in Epic as appropriate.       Objective     Infusions:     Medications:  amLODIPine, 5 mg, Oral, Q24H  cetirizine, 10 mg, Oral, Daily  fluticasone, 2 spray, Each Nare, Nightly  guaiFENesin, 1,200 mg, Oral, Q12H  heparin (porcine), 5,000 Units, Subcutaneous, Q8H  hydroxychloroquine, 200 mg, Oral, Q12H  insulin lispro, 0-7 Units, Subcutaneous, TID AC  ipratropium-albuterol, 3 mL, Nebulization, 4x Daily - RT  melatonin, 5 mg, Oral, Nightly  montelukast, 10 mg, Oral, Daily  nystatin, , Topical, Q12H  nystatin, , Topical, Once  palliative care oral rinse, , Mouth/Throat, Q8H  pantoprazole, 40 mg, Oral, Daily  predniSONE, 30 mg, Oral, BID With Meals  sodium chloride, 10 mL, Intravenous, Q12H  sodium chloride, 1 g, Oral, BID With Meals  sulfamethoxazole-trimethoprim, 1 tablet, Oral, Once per day on Mon Wed Fri        Vital Sign Min/Max for last 24 hours  Temp  Min: 97.6 °F (36.4 °C)  Max: 98 °F (36.7 °C)   BP  Min: 138/83  Max: 140/86  "  Pulse  Min: 77  Max: 169   Resp  Min: 14  Max: 20   SpO2  Min: 92 %  Max: 97 %   Flow (L/min)  Min: 5  Max: 6       Input/Output for last 24 hour shift  05/13 0701 - 05/14 0700  In: 320 [P.O.:320]  Out: 1720 [Urine:1720]      Objective:  Vital signs: (most recent): Blood pressure 140/86, pulse 90, temperature 97.6 °F (36.4 °C), temperature source Temporal, resp. rate 14, height 153.7 cm (60.5\"), weight 80.3 kg (177 lb), SpO2 97 %, not currently breastfeeding.            General Appearance: Awake, alert, in no acute resp distress on high regular nasal cannula C  Lungs:   B/L Breath sounds present with decreased breath sounds on bases, no wheezing heard, occ crackles.   Heart: S1 and S2 present, no murmur  Extremities:   no edema, warm to touch.  Neurologic:  Moving all four extremities. Good strength bilaterally.  Psychological: Normal affect, Cooperative    Results from last 7 days   Lab Units 05/11/23  0733 05/09/23  0758   WBC 10*3/mm3 20.93* 21.10*   HEMOGLOBIN g/dL 13.4 13.1   PLATELETS 10*3/mm3 328 382     Results from last 7 days   Lab Units 05/14/23  0643 05/12/23  0616 05/11/23  0733   SODIUM mmol/L 129* 128* 126*   POTASSIUM mmol/L 4.8 4.9 4.7   CO2 mmol/L 22.0 22.0 22.0   BUN mg/dL 17 21 23   CREATININE mg/dL 0.37* 0.41* 0.51*   GLUCOSE mg/dL 120* 99 102*     Estimated Creatinine Clearance: 128.5 mL/min (A) (by C-G formula based on SCr of 0.37 mg/dL (L)).          Images:   None new    I reviewed the patient's results and images.     Assessment & Plan   Impression        Acute hypoxemic respiratory failure -likely due to ILD exacerbation    Multifocal pneumonia    Pulmonary fibrosis    ILD (interstitial lung disease) -likely RA associated ILD with acute exacerbation       Plan        1. Pt presented with worsening hypoxia and appears like acute ILD exacerbation.  Patient was treated with pulse dose steroids and has been on high-dose steroids and antibiotics.  Patient is off antibiotics for now.  No " fevers noted currently.  Steroid dose is being decreased and currently on 30 mg twice daily.  2.  Hyperkalemia improved.  Sodium level improving with salt tablets.  Continue close monitoring.  Bactrim prophylaxis was resumed.  Potassium level remains stable.  Renal function is stable as well.  3.  Oral diet as tolerated.    4.  Continue PPI and DVT prophylaxis.    Palliative care team is following.  Family is aware of guarded prognosis but are hopeful that patient will continue to improve.  Long-term prognosis is not very good.  I had lengthy discussion with patient and family today.  Discussed that number wise patient is doing little better as she is requiring less oxygen now and this is a point where we can move on towards physical therapy and rehab potential.  Discussed that it will take a long time for patient to bounce back after this prolonged illness and given the fact that she will still have significant impairment in lung function due to underlying pulmonary fibrosis.  Discussed that how much damage there is flareup has done only time will tell.  However given that she has shown stability and improvement in her oxygenation we can continue to treat her and have physical therapy get involved.  Patient at this point told me that today she does not think she has fight left in her and she does not think she wants to go through all the physical therapy and rehab.  She has decided that she wants to go the hospice route and does not want any active treatments.  She wants to have a meeting with palliative and hospice and all other physicians tomorrow.  I am covering ICU but I will be available.  To visit tomorrow whenever that meeting is arranged.  Family mention to me that they are getting mixed messages regarding patient's prognosis.  I tried to clarify that everybody is looking at it from neuron angles and I am looking at from pulmonary standpoint and we can say that patient has shown improvement in her acute  flareup and then we like to give every possible chance for patient to recover and hopefully get back to their baseline level but again it will take all the steps of working with physical therapy and going through the rehab before she even make it back to home and if she has any other flareups in between then that would definitely set her back.  They were comfortable with our discussion.  Gave the option that we could try to work with the patient with physical therapy and try to see she can participate in progress and if that does not work then decision can be made anytime regarding hospice.  They understand their options and we will meet up again to discuss it further    Laurent Tomlinson MD, [facility]  Pulmonary, Critical care and Sleep Medicine

## 2023-05-14 NOTE — PLAN OF CARE
Goal Outcome Evaluation:  Plan of Care Reviewed With: patient        Progress: no change              Patient tolerating 6L NC tonight dropping only with exertion but able to maintain stable sats when encouraged to deep breathe. PRN medications administered for anxiety and dyspnea. Pills whole. PW in place. Family at bedside.

## 2023-05-14 NOTE — PROGRESS NOTES
Continued Stay Note  [facility]     Patient Name: Tala Ramsey  MRN: [id number]  Today's Date: 5/14/2023    Admit Date: 4/23/2023    Plan: Hospice   Discharge Plan     Row Name 05/14/23 1223       Plan    Plan Hospice    Plan Comments  Home Liaisons will see family Monday for a meeting. Time will be set Monday AM. If any questions or concerns please Call [phone]               Discharge Codes    No documentation.               Expected Discharge Date and Time     Expected Discharge Date Expected Discharge Time    May 15, 2023             Mackenzie Garsia RN

## 2023-05-14 NOTE — PROGRESS NOTES
[facility] Medicine Services  PROGRESS NOTE    Patient Name: Tala Ramsey  : 1950  MRN: [id number]    Date of Admission: 2023  Primary Care Physician: Sg Horne MD    Subjective   Subjective     CC:  Hypoxia, pneumonia    HPI:  Patient sitting up in bed. Reports breathing is better at rest, completely still and not talking. Fair amount of dyspnea with movement, speech and eating. Unable to get out of bed due to extreme soa/ anxiety    ROS:  Gen- No fevers, chills  CV- No chest pain, palpitations  Resp- No cough, + dyspnea  GI- No N/V/D, abd pain            Objective   Objective     Vital Signs:   Temp:  [97.6 °F (36.4 °C)-98 °F (36.7 °C)] 97.6 °F (36.4 °C)  Heart Rate:  [] 81  Resp:  [14-20] 14  BP: (138-140)/(83-86) 140/86  Flow (L/min):  [5-6] 5     Physical Exam:  Constitutional:chronically ill appearing. Sitting up in bed  HENT: NCAT, mucous membranes dry  Respiratory: Clear to auscultation bilaterally but diminished, respiratory effort labored with speech  Cardiovascular: RRR  Gastrointestinal: Positive bowel sounds, soft, nontender, nondistended  Musculoskeletal: No bilateral ankle edema  Psychiatric: Appropriate affect, cooperative  Neurologic: Oriented x 3, strength symmetric in all extremities, Cranial Nerves grossly intact to confrontation, speech clear  Skin: No rashes          Results Reviewed:  LAB RESULTS:      Lab 23  0733 23  0758   WBC 20.93* 21.10*   HEMOGLOBIN 13.4 13.1   HEMATOCRIT 39.4 39.0   PLATELETS 328 382   NEUTROS ABS 17.19* 17.54*   IMMATURE GRANS (ABS) 0.57* 0.87*   LYMPHS ABS 1.61 1.38   MONOS ABS 1.47* 1.21*   EOS ABS 0.01 0.00   MCV 87.9 89.2         Lab 23  0643 23  0616 23  0733 05/10/23  0734 23  1608 23  0758   SODIUM 129* 128* 126* 124*  --  127*   POTASSIUM 4.8 4.9 4.7 5.0 5.3* 6.0*   CHLORIDE 97* 95* 94* 91*  --  94*   CO2 22.0 22.0 22.0 22.0  --  22.0   ANION GAP 10.0 11.0  10.0 11.0  --  11.0   BUN 17 21 23 24*  --  27*   CREATININE 0.37* 0.41* 0.51* 0.46*  --  0.45*   EGFR 106.6 104.0 98.7 101.2  --  101.7   GLUCOSE 120* 99 102* 114*  --  120*   CALCIUM 9.1 8.8 9.1 9.1  --  9.2         Lab 05/08/23  0619   TOTAL PROTEIN 6.2   ALBUMIN 3.7   GLOBULIN 2.5   ALT (SGPT) 18   AST (SGOT) 16   BILIRUBIN 0.4   ALK PHOS 69                     Brief Urine Lab Results  (Last result in the past 365 days)      Color   Clarity   Blood   Leuk Est   Nitrite   Protein   CREAT   Urine HCG        04/23/23 1757 Yellow   Clear   Trace   Negative   Negative   Negative                 Microbiology Results Abnormal     Procedure Component Value - Date/Time    Blood Culture - Blood, Arm, Right [[id number]]  (Normal) Collected: 04/23/23 1125    Lab Status: Final result Specimen: Blood from Arm, Right Updated: 04/28/23 1145     Blood Culture No growth at 5 days    Blood Culture - Blood, Arm, Left [[id number]]  (Normal) Collected: 04/23/23 1125    Lab Status: Final result Specimen: Blood from Arm, Left Updated: 04/28/23 1145     Blood Culture No growth at 5 days    S. Pneumo Ag Urine or CSF - Urine, Urine, Clean Catch [[id number]]  (Normal) Collected: 04/23/23 1757    Lab Status: Final result Specimen: Urine, Clean Catch Updated: 04/24/23 0949     Strep Pneumo Ag Negative    Legionella Antigen, Urine - Urine, Urine, Clean Catch [[id number]]  (Normal) Collected: 04/23/23 1757    Lab Status: Final result Specimen: Urine, Clean Catch Updated: 04/24/23 0949     LEGIONELLA ANTIGEN, URINE Negative    Respiratory Panel PCR w/COVID-19(SARS-CoV-2) CHARI/MARISSA/APOLINAR/PAD/COR/MAD/AMY In-House, NP Swab in UTM/VTM, 3-4 HR TAT - Swab, Nasopharynx [[id number]]  (Normal) Collected: 04/24/23 0544    Lab Status: Final result Specimen: Swab from Nasopharynx Updated: 04/24/23 0647     ADENOVIRUS, PCR Not Detected     Coronavirus 229E Not Detected     Coronavirus HKU1 Not Detected     Coronavirus NL63 Not Detected     Coronavirus OC43 Not  Detected     COVID19 Not Detected     Human Metapneumovirus Not Detected     Human Rhinovirus/Enterovirus Not Detected     Influenza A PCR Not Detected     Influenza B PCR Not Detected     Parainfluenza Virus 1 Not Detected     Parainfluenza Virus 2 Not Detected     Parainfluenza Virus 3 Not Detected     Parainfluenza Virus 4 Not Detected     RSV, PCR Not Detected     Bordetella pertussis pcr Not Detected     Bordetella parapertussis PCR Not Detected     Chlamydophila pneumoniae PCR Not Detected     Mycoplasma pneumo by PCR Not Detected    Narrative:      In the setting of a positive respiratory panel with a viral infection PLUS a negative procalcitonin without other underlying concern for bacterial infection, consider observing off antibiotics or discontinuation of antibiotics and continue supportive care. If the respiratory panel is positive for atypical bacterial infection (Bordetella pertussis, Chlamydophila pneumoniae, or Mycoplasma pneumoniae), consider antibiotic de-escalation to target atypical bacterial infection.    MRSA Screen, PCR (Inpatient) - Swab, Nares [[id number]]  (Normal) Collected: 04/23/23 1540    Lab Status: Final result Specimen: Swab from Nares Updated: 04/23/23 1651     MRSA PCR Negative    Narrative:      The negative predictive value of this diagnostic test is high and should only be used to consider de-escalating anti-MRSA therapy. A positive result may indicate colonization with MRSA and must be correlated clinically.  MRSA Negative    COVID PRE-OP / PRE-PROCEDURE SCREENING ORDER (NO ISOLATION) - Swab, Nasopharynx [[id number]]  (Normal) Collected: 04/23/23 1125    Lab Status: Final result Specimen: Swab from Nasopharynx Updated: 04/23/23 1225    Narrative:      The following orders were created for panel order COVID PRE-OP / PRE-PROCEDURE SCREENING ORDER (NO ISOLATION) - Swab, Nasopharynx.  Procedure                               Abnormality         Status                     ---------                                -----------         ------                     COVID-19, FLU A/B, RSV P...[[id number]]  Normal              Final result                 Please view results for these tests on the individual orders.    COVID-19, FLU A/B, RSV PCR - Swab, Nasopharynx [[id number]]  (Normal) Collected: 04/23/23 1125    Lab Status: Final result Specimen: Swab from Nasopharynx Updated: 04/23/23 1225     COVID19 Not Detected     Influenza A PCR Not Detected     Influenza B PCR Not Detected     RSV, PCR Not Detected    Narrative:      Fact sheet for providers: https://www.fda.gov/media/977956/download    Fact sheet for patients: https://www.fda.gov/media/119643/download    Test performed by PCR.          No radiology results from the last 24 hrs        Current medications:  Scheduled Meds:amLODIPine, 5 mg, Oral, Q24H  cetirizine, 10 mg, Oral, Daily  fluticasone, 2 spray, Each Nare, Nightly  guaiFENesin, 1,200 mg, Oral, Q12H  heparin (porcine), 5,000 Units, Subcutaneous, Q8H  hydroxychloroquine, 200 mg, Oral, Q12H  insulin lispro, 0-7 Units, Subcutaneous, TID AC  ipratropium-albuterol, 3 mL, Nebulization, 4x Daily - RT  melatonin, 5 mg, Oral, Nightly  montelukast, 10 mg, Oral, Daily  nystatin, , Topical, Q12H  nystatin, , Topical, Once  palliative care oral rinse, , Mouth/Throat, Q8H  pantoprazole, 40 mg, Oral, Daily  predniSONE, 30 mg, Oral, BID With Meals  sodium chloride, 10 mL, Intravenous, Q12H  sodium chloride, 1 g, Oral, BID With Meals  sulfamethoxazole-trimethoprim, 1 tablet, Oral, Once per day on Mon Wed Fri      Continuous Infusions:   PRN Meds:.•  acetaminophen **OR** acetaminophen **OR** acetaminophen  •  aluminum-magnesium hydroxide-simethicone **AND** Lidocaine Viscous HCl  •  senna-docusate sodium **AND** polyethylene glycol **AND** bisacodyl **AND** bisacodyl  •  calcium carbonate  •  cyclobenzaprine  •  dextrose  •  dextrose  •  glucagon (human recombinant)  •  hydrOXYzine  •  ipratropium-albuterol  •   LORazepam  •  Magnesium Standard Dose Replacement - Follow Nurse / BPA Driven Protocol  •  morphine  •  ondansetron **OR** ondansetron  •  simethicone  •  sodium chloride  •  sodium chloride  •  sodium chloride    Assessment & Plan   Assessment & Plan     Active Hospital Problems    Diagnosis  POA   • **Acute hypoxemic respiratory failure -likely due to ILD exacerbation [J96.01]  Yes   • Pulmonary fibrosis [J84.10]  Yes   • ILD (interstitial lung disease) -likely RA associated ILD with acute exacerbation [J84.9]  Yes   • Multifocal pneumonia [J18.9]  Yes      Resolved Hospital Problems   No resolved problems to display.        Brief Hospital Course to date:  Tala Ramsey is a 72 y.o. female w/ hx interstitial lung dz, RA (on humira until recently, recent steroid injections) who presents w/ progressive dyspnea, cough over ~10 days failing outpatient Levaquin. Pt was found to be hypoxic upon admission. She had progressive hypoxia evening after admission placed on hi flow canula       This patient's problems and plans were partially entered by my partner and updated as appropriate by me 05/14/23.    Assessment/Plan:      Acute hypoxic resp failure, worsening  Pulmonary fibrosis/ILD  Bilateral Pneumonia  -likely combination of progession/flare of interstitial lung disease and infection  -remains on High flow with difficulty weaning and significant drops with minimal exertion - currently on 6LNC with rest. Noted laboring with speech and O2 sats 88% with effort  -s/p full course of zosyn & azithromycin  -3x weekly bactrim ds (pcp prophylaxis as has been on humira and steroids)   -continue scheduled & PRN duonebs  -Pulm following: completed IV solumedrol 5/9 and transitioned po prednisone.  Continue 30mg bid  -s/p IV diuresis 5/6 and 7 without much improvement so holding.  - Palliative following- continue morphine and ativan prn. Discussed GOC and patient would like to meet with hospice, possibly tomorrow after she  discusses care with family today    Hyponatremia  Poor po intake  --No IV access.  Full support but no CPR/intubation.  Sodium continues to drop.  Pt wishes to keep IV out if at all possible   --added salt tabs bid 5/10.   Up to 129    Hyperkalemia  -- K+ now down to 4.8   - s/p lokelma      RA  -holding humira  -- continue plaquenil     HTN  -continue amlodpine  -- hold valsartan due to worsening hyperkalemia  --bp stable -off valsartan    Chronic anemia   -monitor    Goals/limits  -Patient is DNR/DNI (in event of further clinical decompensation patient would NOT want mechanical ventilation; in this case patient would wish to pursue comfort measures)    Patient states she would like to focus on comfort. She plans to meet with family/ children today and would like to meet with hospice early this week/ Monday for meeting        Expected Discharge Location and Transportation: home vs inpatient hospice   Expected Discharge TBD  Expected Discharge Date: 5/15/2023; Expected Discharge Time:      DVT prophylaxis:  Medical DVT prophylaxis orders are present. sq heparin    AM-PAC 6 Clicks Score (PT): 11 (05/14/23 0800)    CODE STATUS:   Code Status and Medical Interventions:   Ordered at: 04/23/23 1310     Medical Intervention Limits:    NO intubation (DNI)     Level Of Support Discussed With:    Patient     Code Status (Patient has no pulse and is not breathing):    No CPR (Do Not Attempt to Resuscitate)     Medical Interventions (Patient has pulse or is breathing):    Limited Support       Consuelo Urbina, APRN  05/14/23

## 2023-05-15 LAB
GLUCOSE BLDC GLUCOMTR-MCNC: 100 MG/DL (ref 70–130)
GLUCOSE BLDC GLUCOMTR-MCNC: 125 MG/DL (ref 70–130)
GLUCOSE BLDC GLUCOMTR-MCNC: 139 MG/DL (ref 70–130)

## 2023-05-15 PROCEDURE — 94799 UNLISTED PULMONARY SVC/PX: CPT

## 2023-05-15 PROCEDURE — 25010000002 HEPARIN (PORCINE) PER 1000 UNITS: Performed by: INTERNAL MEDICINE

## 2023-05-15 PROCEDURE — 99232 SBSQ HOSP IP/OBS MODERATE 35: CPT | Performed by: INTERNAL MEDICINE

## 2023-05-15 PROCEDURE — 94761 N-INVAS EAR/PLS OXIMETRY MLT: CPT

## 2023-05-15 PROCEDURE — 94664 DEMO&/EVAL PT USE INHALER: CPT

## 2023-05-15 PROCEDURE — 63710000001 PREDNISONE PER 1 MG: Performed by: INTERNAL MEDICINE

## 2023-05-15 PROCEDURE — 63710000001 PREDNISONE PER 5 MG: Performed by: INTERNAL MEDICINE

## 2023-05-15 PROCEDURE — 82948 REAGENT STRIP/BLOOD GLUCOSE: CPT

## 2023-05-15 RX ORDER — PREDNISONE 20 MG/1
40 TABLET ORAL
Status: DISCONTINUED | OUTPATIENT
Start: 2023-05-16 | End: 2023-05-21

## 2023-05-15 RX ADMIN — CALCIUM CARBONATE (ANTACID) CHEW TAB 500 MG 2 TABLET: 500 CHEW TAB at 14:54

## 2023-05-15 RX ADMIN — MORPHINE SULFATE 5 MG: 100 SOLUTION ORAL at 03:54

## 2023-05-15 RX ADMIN — IPRATROPIUM BROMIDE AND ALBUTEROL SULFATE 3 ML: .5; 2.5 SOLUTION RESPIRATORY (INHALATION) at 16:11

## 2023-05-15 RX ADMIN — HEPARIN SODIUM 5000 UNITS: 5000 INJECTION, SOLUTION INTRAVENOUS; SUBCUTANEOUS at 21:07

## 2023-05-15 RX ADMIN — MORPHINE SULFATE 5 MG: 100 SOLUTION ORAL at 14:55

## 2023-05-15 RX ADMIN — CALCIUM CARBONATE (ANTACID) CHEW TAB 500 MG 2 TABLET: 500 CHEW TAB at 22:43

## 2023-05-15 RX ADMIN — SODIUM CHLORIDE 1 G: 1 TABLET ORAL at 18:30

## 2023-05-15 RX ADMIN — IPRATROPIUM BROMIDE AND ALBUTEROL SULFATE 3 ML: .5; 2.5 SOLUTION RESPIRATORY (INHALATION) at 13:25

## 2023-05-15 RX ADMIN — HEPARIN SODIUM 5000 UNITS: 5000 INJECTION, SOLUTION INTRAVENOUS; SUBCUTANEOUS at 05:54

## 2023-05-15 RX ADMIN — GUAIFENESIN 1200 MG: 600 TABLET, EXTENDED RELEASE ORAL at 21:07

## 2023-05-15 RX ADMIN — PREDNISONE 30 MG: 20 TABLET ORAL at 17:27

## 2023-05-15 RX ADMIN — NYSTATIN: 100000 POWDER TOPICAL at 21:08

## 2023-05-15 RX ADMIN — LORAZEPAM 1 MG: 1 TABLET ORAL at 10:06

## 2023-05-15 RX ADMIN — PANTOPRAZOLE SODIUM 40 MG: 40 TABLET, DELAYED RELEASE ORAL at 08:12

## 2023-05-15 RX ADMIN — HYDROXYCHLOROQUINE SULFATE 200 MG: 200 TABLET, FILM COATED ORAL at 08:21

## 2023-05-15 RX ADMIN — HEPARIN SODIUM 5000 UNITS: 5000 INJECTION, SOLUTION INTRAVENOUS; SUBCUTANEOUS at 14:42

## 2023-05-15 RX ADMIN — Medication 5 MG: at 21:07

## 2023-05-15 RX ADMIN — MONTELUKAST 10 MG: 10 TABLET, FILM COATED ORAL at 08:13

## 2023-05-15 RX ADMIN — MORPHINE SULFATE 5 MG: 100 SOLUTION ORAL at 12:13

## 2023-05-15 RX ADMIN — SODIUM CHLORIDE 1 G: 1 TABLET ORAL at 08:21

## 2023-05-15 RX ADMIN — IPRATROPIUM BROMIDE AND ALBUTEROL SULFATE 3 ML: .5; 2.5 SOLUTION RESPIRATORY (INHALATION) at 09:14

## 2023-05-15 RX ADMIN — Medication: at 21:08

## 2023-05-15 RX ADMIN — NYSTATIN: 100000 POWDER TOPICAL at 08:14

## 2023-05-15 RX ADMIN — HYDROXYCHLOROQUINE SULFATE 200 MG: 200 TABLET, FILM COATED ORAL at 21:07

## 2023-05-15 RX ADMIN — MORPHINE SULFATE 5 MG: 100 SOLUTION ORAL at 18:39

## 2023-05-15 RX ADMIN — GUAIFENESIN 1200 MG: 600 TABLET, EXTENDED RELEASE ORAL at 08:12

## 2023-05-15 RX ADMIN — LORAZEPAM 1 MG: 1 TABLET ORAL at 17:27

## 2023-05-15 RX ADMIN — SULFAMETHOXAZOLE AND TRIMETHOPRIM 1 TABLET: 800; 160 TABLET ORAL at 08:12

## 2023-05-15 RX ADMIN — HYDROXYZINE HYDROCHLORIDE 25 MG: 25 TABLET, FILM COATED ORAL at 22:43

## 2023-05-15 RX ADMIN — IPRATROPIUM BROMIDE AND ALBUTEROL SULFATE 3 ML: .5; 2.5 SOLUTION RESPIRATORY (INHALATION) at 20:20

## 2023-05-15 RX ADMIN — MORPHINE SULFATE 5 MG: 100 SOLUTION ORAL at 22:43

## 2023-05-15 RX ADMIN — Medication: at 05:55

## 2023-05-15 RX ADMIN — CALCIUM CARBONATE (ANTACID) CHEW TAB 500 MG 2 TABLET: 500 CHEW TAB at 03:54

## 2023-05-15 RX ADMIN — PREDNISONE 30 MG: 20 TABLET ORAL at 08:12

## 2023-05-15 RX ADMIN — AMLODIPINE BESYLATE 5 MG: 5 TABLET ORAL at 08:12

## 2023-05-15 RX ADMIN — CETIRIZINE HYDROCHLORIDE 10 MG: 10 TABLET, FILM COATED ORAL at 08:12

## 2023-05-15 RX ADMIN — Medication: at 14:42

## 2023-05-15 NOTE — SIGNIFICANT NOTE
Participated in family care conference along with palliative care team and hospice team.  Options discussed.  Patient understands that she is not a candidate for inpatient hospice.  Outpatient hospice in a facility will be an option.  Case management will talk to them about that option.  Patient does not want to leave hospital and wants to stay here but.  Palliative care and hospice team discussed that there is not an option available at this point.  I also discussed with patient that we do not participate in hastening the natural process of dying and we do not think she is dying at this point.  She was able to understand that.  She however is not ready to participate in rehab.  Family will meet with case management to discuss further options and discharge planning.    From pulmonary standpoint will take the liberty to decrease her steroids to 40 mg a day and then I will recommend decreasing steroids by 5 mg every week until she comes down to 10 mg a day and then she can continue that on daily basis.  When she is below  20 mg of prednisone then she can stop taking PCP prophylaxis.  Nothing further to add from pulmonary standpoint.  Patient changes her mind about treatment options then we will be happy to revisit and I can see her as outpatient in pulmonary clinic.

## 2023-05-15 NOTE — PROGRESS NOTES
[facility] Medicine Services  PROGRESS NOTE    Patient Name: Tala Ramsey  : 1950  MRN: [id number]    Date of Admission: 2023  Primary Care Physician: Sg Horne MD    Subjective   Subjective     CC:  Hypoxia, pneumonia    HPI:  Pt has been doing well on 6L BNC. One of her sons is at bedside and another is on speaker phone with him. They want to know when meeting is today with providers.     ROS:  Gen- No fevers, chills  CV- No chest pain, palpitations  Resp- No cough, + dyspnea  GI- No N/V/D, abd pain            Objective   Objective     Vital Signs:   Temp:  [96.9 °F (36.1 °C)-97 °F (36.1 °C)] 96.9 °F (36.1 °C)  Heart Rate:  [72-90] 72  Resp:  [16-18] 18  BP: (129-148)/(66-83) 129/66  Flow (L/min):  [5-6] 6     Physical Exam:  Constitutional:chronically ill appearing. Sitting up in bed, eating breakfast and getting choked at times during conversation  HENT: NCAT, mucous membranes dry  Respiratory: Clear to auscultation bilaterally but diminished, on 6L BNC  Cardiovascular: RRR  Gastrointestinal: Positive bowel sounds, soft, nontender, nondistended  Musculoskeletal: No bilateral ankle edema  Psychiatric: Appropriate affect, cooperative  Neurologic: Oriented x 3, strength symmetric in all extremities, Cranial Nerves grossly intact to confrontation, speech clear  Skin: No rashes          Results Reviewed:  LAB RESULTS:      Lab 23  0733 23  0758   WBC 20.93* 21.10*   HEMOGLOBIN 13.4 13.1   HEMATOCRIT 39.4 39.0   PLATELETS 328 382   NEUTROS ABS 17.19* 17.54*   IMMATURE GRANS (ABS) 0.57* 0.87*   LYMPHS ABS 1.61 1.38   MONOS ABS 1.47* 1.21*   EOS ABS 0.01 0.00   MCV 87.9 89.2         Lab 23  0643 23  0616 23  0733 05/10/23  0734 23  1608 23  0758   SODIUM 129* 128* 126* 124*  --  127*   POTASSIUM 4.8 4.9 4.7 5.0 5.3* 6.0*   CHLORIDE 97* 95* 94* 91*  --  94*   CO2 22.0 22.0 22.0 22.0  --  22.0   ANION GAP 10.0 11.0 10.0 11.0   --  11.0   BUN 17 21 23 24*  --  27*   CREATININE 0.37* 0.41* 0.51* 0.46*  --  0.45*   EGFR 106.6 104.0 98.7 101.2  --  101.7   GLUCOSE 120* 99 102* 114*  --  120*   CALCIUM 9.1 8.8 9.1 9.1  --  9.2                         Brief Urine Lab Results  (Last result in the past 365 days)      Color   Clarity   Blood   Leuk Est   Nitrite   Protein   CREAT   Urine HCG        04/23/23 1757 Yellow   Clear   Trace   Negative   Negative   Negative                 Microbiology Results Abnormal     Procedure Component Value - Date/Time    Blood Culture - Blood, Arm, Right [[id number]]  (Normal) Collected: 04/23/23 1125    Lab Status: Final result Specimen: Blood from Arm, Right Updated: 04/28/23 1145     Blood Culture No growth at 5 days    Blood Culture - Blood, Arm, Left [[id number]]  (Normal) Collected: 04/23/23 1125    Lab Status: Final result Specimen: Blood from Arm, Left Updated: 04/28/23 1145     Blood Culture No growth at 5 days    S. Pneumo Ag Urine or CSF - Urine, Urine, Clean Catch [[id number]]  (Normal) Collected: 04/23/23 1757    Lab Status: Final result Specimen: Urine, Clean Catch Updated: 04/24/23 0949     Strep Pneumo Ag Negative    Legionella Antigen, Urine - Urine, Urine, Clean Catch [[id number]]  (Normal) Collected: 04/23/23 1757    Lab Status: Final result Specimen: Urine, Clean Catch Updated: 04/24/23 0949     LEGIONELLA ANTIGEN, URINE Negative    Respiratory Panel PCR w/COVID-19(SARS-CoV-2) CHARI/MARISSA/APOLINAR/PAD/COR/MAD/AMY In-House, NP Swab in UTM/VTM, 3-4 HR TAT - Swab, Nasopharynx [[id number]]  (Normal) Collected: 04/24/23 0544    Lab Status: Final result Specimen: Swab from Nasopharynx Updated: 04/24/23 0647     ADENOVIRUS, PCR Not Detected     Coronavirus 229E Not Detected     Coronavirus HKU1 Not Detected     Coronavirus NL63 Not Detected     Coronavirus OC43 Not Detected     COVID19 Not Detected     Human Metapneumovirus Not Detected     Human Rhinovirus/Enterovirus Not Detected     Influenza A PCR Not  Detected     Influenza B PCR Not Detected     Parainfluenza Virus 1 Not Detected     Parainfluenza Virus 2 Not Detected     Parainfluenza Virus 3 Not Detected     Parainfluenza Virus 4 Not Detected     RSV, PCR Not Detected     Bordetella pertussis pcr Not Detected     Bordetella parapertussis PCR Not Detected     Chlamydophila pneumoniae PCR Not Detected     Mycoplasma pneumo by PCR Not Detected    Narrative:      In the setting of a positive respiratory panel with a viral infection PLUS a negative procalcitonin without other underlying concern for bacterial infection, consider observing off antibiotics or discontinuation of antibiotics and continue supportive care. If the respiratory panel is positive for atypical bacterial infection (Bordetella pertussis, Chlamydophila pneumoniae, or Mycoplasma pneumoniae), consider antibiotic de-escalation to target atypical bacterial infection.    MRSA Screen, PCR (Inpatient) - Swab, Nares [[id number]]  (Normal) Collected: 04/23/23 1540    Lab Status: Final result Specimen: Swab from Nares Updated: 04/23/23 1651     MRSA PCR Negative    Narrative:      The negative predictive value of this diagnostic test is high and should only be used to consider de-escalating anti-MRSA therapy. A positive result may indicate colonization with MRSA and must be correlated clinically.  MRSA Negative    COVID PRE-OP / PRE-PROCEDURE SCREENING ORDER (NO ISOLATION) - Swab, Nasopharynx [[id number]]  (Normal) Collected: 04/23/23 1125    Lab Status: Final result Specimen: Swab from Nasopharynx Updated: 04/23/23 1225    Narrative:      The following orders were created for panel order COVID PRE-OP / PRE-PROCEDURE SCREENING ORDER (NO ISOLATION) - Swab, Nasopharynx.  Procedure                               Abnormality         Status                     ---------                               -----------         ------                     COVID-19, FLU A/B, RSV P...[[id number]]  Normal              Final  result                 Please view results for these tests on the individual orders.    COVID-19, FLU A/B, RSV PCR - Swab, Nasopharynx [[id number]]  (Normal) Collected: 04/23/23 1125    Lab Status: Final result Specimen: Swab from Nasopharynx Updated: 04/23/23 1225     COVID19 Not Detected     Influenza A PCR Not Detected     Influenza B PCR Not Detected     RSV, PCR Not Detected    Narrative:      Fact sheet for providers: https://www.fda.gov/media/127945/download    Fact sheet for patients: https://www.fda.gov/media/537689/download    Test performed by PCR.          No radiology results from the last 24 hrs        Current medications:  Scheduled Meds:amLODIPine, 5 mg, Oral, Q24H  cetirizine, 10 mg, Oral, Daily  fluticasone, 2 spray, Each Nare, Nightly  guaiFENesin, 1,200 mg, Oral, Q12H  heparin (porcine), 5,000 Units, Subcutaneous, Q8H  hydroxychloroquine, 200 mg, Oral, Q12H  insulin lispro, 0-7 Units, Subcutaneous, TID AC  ipratropium-albuterol, 3 mL, Nebulization, 4x Daily - RT  melatonin, 5 mg, Oral, Nightly  montelukast, 10 mg, Oral, Daily  nystatin, , Topical, Q12H  nystatin, , Topical, Once  palliative care oral rinse, , Mouth/Throat, Q8H  pantoprazole, 40 mg, Oral, Daily  predniSONE, 30 mg, Oral, BID With Meals  sodium chloride, 10 mL, Intravenous, Q12H  sodium chloride, 1 g, Oral, BID With Meals  sulfamethoxazole-trimethoprim, 1 tablet, Oral, Once per day on Mon Wed Fri      Continuous Infusions:   PRN Meds:.•  acetaminophen **OR** acetaminophen **OR** acetaminophen  •  aluminum-magnesium hydroxide-simethicone **AND** Lidocaine Viscous HCl  •  senna-docusate sodium **AND** polyethylene glycol **AND** bisacodyl **AND** bisacodyl  •  calcium carbonate  •  cyclobenzaprine  •  dextrose  •  dextrose  •  glucagon (human recombinant)  •  hydrOXYzine  •  ipratropium-albuterol  •  LORazepam  •  Magnesium Standard Dose Replacement - Follow Nurse / BPA Driven Protocol  •  morphine  •  ondansetron **OR** ondansetron  •   simethicone  •  sodium chloride  •  sodium chloride  •  sodium chloride    Assessment & Plan   Assessment & Plan     Active Hospital Problems    Diagnosis  POA   • **Acute hypoxemic respiratory failure -likely due to ILD exacerbation [J96.01]  Yes   • Pulmonary fibrosis [J84.10]  Yes   • ILD (interstitial lung disease) -likely RA associated ILD with acute exacerbation [J84.9]  Yes   • Multifocal pneumonia [J18.9]  Yes      Resolved Hospital Problems   No resolved problems to display.        Brief Hospital Course to date:  Tala Ramsey is a 72 y.o. female w/ hx interstitial lung dz, RA (on humira until recently, recent steroid injections) who presents w/ progressive dyspnea, cough over ~10 days failing outpatient Levaquin. Pt was found to be hypoxic upon admission. She had progressive hypoxia evening after admission placed on hi flow canula       This patient's problems and plans were partially entered by my partner and updated as appropriate by me 05/15/23.    Assessment/Plan:      Acute hypoxic resp failure, worsening  Pulmonary fibrosis/ILD  Bilateral Pneumonia  -likely combination of progession/flare of interstitial lung disease and infection  -remains on High flow with difficulty weaning and significant drops with minimal exertion - currently on 6LNC with rest. Noted laboring with speech and O2 sats 88% with effort  -s/p full course of zosyn & azithromycin  -3x weekly bactrim ds (pcp prophylaxis as has been on humira and steroids)   -continue scheduled & PRN duonebs  -Pulm following: completed IV solumedrol 5/9 and transitioned po prednisone.  Continue 30mg bid  -s/p IV diuresis 5/6 and 7 without much improvement so holding.  - Palliative following- continue morphine and ativan prn. Discussed GOC and patient would like to meet with hospice, palliative, and Pulm today.  Meeting planned for around 2 pm per my discussion with Laxmi Carrion and Davi    Hyponatremia  Poor po intake  --No IV access.  Full  support but no CPR/intubation.  Sodium continues to drop.  Pt wishes to keep IV out if at all possible   --added salt tabs bid 5/10.   Up to 129    Hyperkalemia  -- K+ now down to 4.8   - s/p lokelma      RA  -holding humira  -- continue plaquenil     HTN  -continue amlodpine  -- hold valsartan due to worsening hyperkalemia  --bp stable -off valsartan    Chronic anemia   -monitor    Goals/limits  -Patient is DNR/DNI (in event of further clinical decompensation patient would NOT want mechanical ventilation; in this case patient would wish to pursue comfort measures)    Patient states she would like to focus on comfort. She plans to meet with family/ children and would like to meet with hospice today        Expected Discharge Location and Transportation: home vs inpatient hospice   Expected Discharge TBD  Expected Discharge Date: 5/15/2023; Expected Discharge Time:      DVT prophylaxis:  Medical DVT prophylaxis orders are present. sq heparin    AM-PAC 6 Clicks Score (PT): 11 (05/14/23 5505)    CODE STATUS:   Code Status and Medical Interventions:   Ordered at: 04/23/23 1310     Medical Intervention Limits:    NO intubation (DNI)     Level Of Support Discussed With:    Patient     Code Status (Patient has no pulse and is not breathing):    No CPR (Do Not Attempt to Resuscitate)     Medical Interventions (Patient has pulse or is breathing):    Limited Support       Bette Altamirano MD  05/15/23

## 2023-05-15 NOTE — PLAN OF CARE
Problem: Adult Inpatient Plan of Care  Goal: Plan of Care Review  Outcome: Ongoing, Progressing  Goal: Patient-Specific Goal (Individualized)  Outcome: Ongoing, Progressing  Goal: Absence of Hospital-Acquired Illness or Injury  Outcome: Ongoing, Progressing  Intervention: Identify and Manage Fall Risk  Recent Flowsheet Documentation  Taken 5/15/2023 0000 by Rachele Marion RN  Safety Promotion/Fall Prevention:   assistive device/personal items within reach   clutter free environment maintained   safety round/check completed  Taken 5/14/2023 2200 by Rachele Marion RN  Safety Promotion/Fall Prevention:   safety round/check completed   assistive device/personal items within reach   clutter free environment maintained   fall prevention program maintained  Intervention: Prevent Skin Injury  Recent Flowsheet Documentation  Taken 5/15/2023 0000 by Rachele Marion RN  Body Position:   neutral body alignment   neutral head position   sitting up in bed  Skin Protection: incontinence pads utilized  Taken 5/14/2023 2200 by Rachele Marion RN  Body Position:   sitting up in bed   lower extremity elevated  Taken 5/14/2023 2115 by Rachele Marion RN  Skin Protection: incontinence pads utilized  Intervention: Prevent and Manage VTE (Venous Thromboembolism) Risk  Recent Flowsheet Documentation  Taken 5/15/2023 0000 by Rachele Marion RN  Activity Management: bedrest  Taken 5/14/2023 2200 by Rachele Marion RN  Activity Management: bedrest  Intervention: Prevent Infection  Recent Flowsheet Documentation  Taken 5/15/2023 0000 by Rachele Marion RN  Infection Prevention: rest/sleep promoted  Taken 5/14/2023 2200 by Rachele Marion RN  Infection Prevention: rest/sleep promoted  Taken 5/14/2023 2115 by Rachele Marion RN  Infection Prevention:   hand hygiene promoted   rest/sleep promoted   single patient room provided  Goal: Optimal Comfort and Wellbeing  Outcome: Ongoing, Progressing  Intervention: Provide Person-Centered Care  Recent Flowsheet  Documentation  Taken 5/14/2023 2115 by Rachele Marion RN  Trust Relationship/Rapport:   care explained   choices provided   questions answered  Goal: Readiness for Transition of Care  Outcome: Ongoing, Progressing     Problem: Asthma Comorbidity  Goal: Maintenance of Asthma Control  Outcome: Ongoing, Progressing  Intervention: Maintain Asthma Symptom Control  Recent Flowsheet Documentation  Taken 5/15/2023 0000 by Rachele Marion RN  Medication Review/Management: medications reviewed  Taken 5/14/2023 2200 by Rachele Marion RN  Medication Review/Management: medications reviewed  Taken 5/14/2023 2115 by Rachele Marion RN  Medication Review/Management: medications reviewed     Problem: Behavioral Health Comorbidity  Goal: Maintenance of Behavioral Health Symptom Control  Outcome: Ongoing, Progressing  Intervention: Maintain Behavioral Health Symptom Control  Recent Flowsheet Documentation  Taken 5/15/2023 0000 by Rachele Marion RN  Medication Review/Management: medications reviewed  Taken 5/14/2023 2200 by Rachele Marion RN  Medication Review/Management: medications reviewed  Taken 5/14/2023 2115 by Rachele Marion RN  Medication Review/Management: medications reviewed     Problem: COPD (Chronic Obstructive Pulmonary Disease) Comorbidity  Goal: Maintenance of COPD Symptom Control  Outcome: Ongoing, Progressing  Intervention: Maintain COPD-Symptom Control  Recent Flowsheet Documentation  Taken 5/15/2023 0000 by Rachele Marion RN  Medication Review/Management: medications reviewed  Taken 5/14/2023 2200 by Rachele Marion RN  Medication Review/Management: medications reviewed  Taken 5/14/2023 2115 by Rachele Marion RN  Medication Review/Management: medications reviewed     Problem: Diabetes Comorbidity  Goal: Blood Glucose Level Within Targeted Range  Outcome: Ongoing, Progressing     Problem: Heart Failure Comorbidity  Goal: Maintenance of Heart Failure Symptom Control  Outcome: Ongoing, Progressing  Intervention: Maintain Heart  Failure-Management  Recent Flowsheet Documentation  Taken 5/15/2023 0000 by Rachele Marion RN  Medication Review/Management: medications reviewed  Taken 5/14/2023 2200 by Rachele Marion RN  Medication Review/Management: medications reviewed  Taken 5/14/2023 2115 by Rachele Marion RN  Medication Review/Management: medications reviewed     Problem: Hypertension Comorbidity  Goal: Blood Pressure in Desired Range  Outcome: Ongoing, Progressing  Intervention: Maintain Blood Pressure Management  Recent Flowsheet Documentation  Taken 5/15/2023 0000 by Rachele Marion RN  Medication Review/Management: medications reviewed  Taken 5/14/2023 2200 by Rachele Marion RN  Medication Review/Management: medications reviewed  Taken 5/14/2023 2115 by Rachele Marion RN  Medication Review/Management: medications reviewed     Problem: Obstructive Sleep Apnea Risk or Actual Comorbidity Management  Goal: Unobstructed Breathing During Sleep  Outcome: Ongoing, Progressing     Problem: Osteoarthritis Comorbidity  Goal: Maintenance of Osteoarthritis Symptom Control  Outcome: Ongoing, Progressing  Intervention: Maintain Osteoarthritis Symptom Control  Recent Flowsheet Documentation  Taken 5/15/2023 0000 by Rachele Marion RN  Activity Management: bedrest  Medication Review/Management: medications reviewed  Taken 5/14/2023 2200 by Rachele Marion RN  Activity Management: bedrest  Medication Review/Management: medications reviewed  Taken 5/14/2023 2115 by Rachele Marion RN  Medication Review/Management: medications reviewed     Problem: Pain Chronic (Persistent) (Comorbidity Management)  Goal: Acceptable Pain Control and Functional Ability  Outcome: Ongoing, Progressing  Intervention: Manage Persistent Pain  Recent Flowsheet Documentation  Taken 5/15/2023 0000 by Rachele Marion RN  Medication Review/Management: medications reviewed  Taken 5/14/2023 2200 by Rachele Marion RN  Medication Review/Management: medications reviewed  Taken 5/14/2023 2115 by Doni  JACK Jeffrey  Medication Review/Management: medications reviewed  Intervention: Optimize Psychosocial Wellbeing  Recent Flowsheet Documentation  Taken 5/14/2023 2115 by Rachele Marion RN  Family/Support System Care: support provided     Problem: Seizure Disorder Comorbidity  Goal: Maintenance of Seizure Control  Outcome: Ongoing, Progressing     Problem: Fall Injury Risk  Goal: Absence of Fall and Fall-Related Injury  Outcome: Ongoing, Progressing  Intervention: Identify and Manage Contributors  Recent Flowsheet Documentation  Taken 5/15/2023 0000 by Rachele Marion RN  Medication Review/Management: medications reviewed  Taken 5/14/2023 2200 by Rachele Marion RN  Medication Review/Management: medications reviewed  Taken 5/14/2023 2115 by Rachele Marion RN  Medication Review/Management: medications reviewed  Self-Care Promotion: meal set-up provided  Intervention: Promote Injury-Free Environment  Recent Flowsheet Documentation  Taken 5/15/2023 0000 by Rachele Marion RN  Safety Promotion/Fall Prevention:   assistive device/personal items within reach   clutter free environment maintained   safety round/check completed  Taken 5/14/2023 2200 by Rachele Marion RN  Safety Promotion/Fall Prevention:   safety round/check completed   assistive device/personal items within reach   clutter free environment maintained   fall prevention program maintained     Problem: Fluid Imbalance (Pneumonia)  Goal: Fluid Balance  Outcome: Ongoing, Progressing  Intervention: Monitor and Manage Fluid Balance  Recent Flowsheet Documentation  Taken 5/14/2023 2115 by Rachele Marion RN  Fluid/Electrolyte Management: fluids provided     Problem: Infection (Pneumonia)  Goal: Resolution of Infection Signs and Symptoms  Outcome: Ongoing, Progressing     Problem: Respiratory Compromise (Pneumonia)  Goal: Effective Oxygenation and Ventilation  Outcome: Ongoing, Progressing     Problem: Palliative Care  Goal: Enhanced Quality of Life  Outcome: Ongoing,  Progressing  Intervention: Maximize Comfort  Recent Flowsheet Documentation  Taken 5/14/2023 2115 by Rachele Marion RN  Oral Care: oral rinse provided  Intervention: Optimize Function  Recent Flowsheet Documentation  Taken 5/14/2023 2115 by Rachele Marion RN  Sensory Stimulation Regulation:   care clustered   lighting decreased   quiet environment promoted  Intervention: Optimize Psychosocial Wellbeing  Recent Flowsheet Documentation  Taken 5/14/2023 2115 by Rachele Marion RN  Family/Support System Care: support provided     Problem: Skin Injury Risk Increased  Goal: Skin Health and Integrity  Outcome: Ongoing, Progressing  Intervention: Optimize Skin Protection  Recent Flowsheet Documentation  Taken 5/15/2023 0000 by Rachele Marion RN  Pressure Reduction Techniques:   frequent weight shift encouraged   pressure points protected  Pressure Reduction Devices:   positioning supports utilized   specialty bed utilized  Skin Protection: incontinence pads utilized  Taken 5/14/2023 2200 by Rachele Marion RN  Pressure Reduction Techniques:   heels elevated off bed   pressure points protected  Pressure Reduction Devices:   specialty bed utilized   positioning supports utilized  Taken 5/14/2023 2115 by Rachele Marion RN  Pressure Reduction Techniques:   heels elevated off bed   weight shift assistance provided  Pressure Reduction Devices:   specialty bed utilized   positioning supports utilized  Skin Protection: incontinence pads utilized   Goal Outcome Evaluation:

## 2023-05-15 NOTE — PLAN OF CARE
Problem: Adult Inpatient Plan of Care  Goal: Plan of Care Review  Outcome: Ongoing, Progressing  Flowsheets  Taken 5/15/2023 1510 by Nell Quigley, RN Extern  Outcome Evaluation: Pt on continuous O2NC 6L. Pt has increased work of breathing w/ talking, eating, and activity. New oral morphine order adequate for managing c/o back pain and air hunger. PRN Ativan given once for anxiety prior to meeting regarding transition to hospice care. Purewick and bedpan still adequate for elimination management. Pt still A&Ox4. Pt toleratingn diet fairly (air hunger is pt's primary complaint around meals). Pt repositioned to allow for easier work of breathing. No sliding scale insulin required so far this shift. Sons are still present at bedside and are attentative and participating in the patient's care- education given to both patient and family as needed. (Pended)  Taken 5/15/2023 1433 by Nataly Aparicio RN  Progress: improving  Plan of Care Reviewed With:   patient   son   Goal Outcome Evaluation:              Outcome Evaluation: (P) Pt on continuous O2NC 6L. Pt has increased work of breathing w/ talking, eating, and activity. New oral morphine order adequate for managing c/o back pain and air hunger. PRN Ativan given once for anxiety prior to meeting regarding transition to hospice care. Purewick and bedpan still adequate for elimination management. Pt still A&Ox4. Pt toleratingn diet fairly (air hunger is pt's primary complaint around meals). Pt repositioned to allow for easier work of breathing. No sliding scale insulin required so far this shift. Sons are still present at bedside and are attentative and participating in the patient's care- education given to both patient and family as needed.

## 2023-05-15 NOTE — PROGRESS NOTES
"Continued Stay Note  [facility]     Patient Name: Tala Ramsey  MRN: [id number]  Today's Date: 5/15/2023    Admit Date: 4/23/2023    Plan: facility placement   Discharge Plan     Row Name 05/15/23 1511       Plan    Plan facility placement    Plan Comments Hospice referral received, chart reviewed.  Met with pt and son x3 at bedside alongside Dr. Castrejon, Dr. Tomlinson and inpatient hospice team member, Anh SANTIAGO.  Pt reports she does not feel she can tolerate rehab and would like to remain in the hospital for EOL care.  Doctors explained that pt does not appear to be actively dying currently.  Inpatient hospice criteria discussed.  Explained that hospice can be provided in the home or a LTC facility.  Pt does not wish to return home.  CM updated to speak with pt and family r/t placement options.  Will follow and arrange hospice admission once pt is to facility.  Please, call [phone] if I can be of further assistance.         Inpatient hospice criteria is based on guidelines from Medicare/Medicaid. To meet eligibility requirements, one must not only be eligible for hospice, but have \"unmanaged symptoms\" which means symptoms (pain/nausea/dyspnea/agitation/anxiety) unable to be cared for at a lower level of care (home/nursing home). Usually requiring frequent medication titration and/or IV medication management/inability to take PO. Unfortunately non-symptomatic end of life (within days of passing) is not a sole qualification for inpatient hospice. Inpt hospice is also for short term acute symptom management and if symptoms are managed, disposition for discharge will be addressed.               Discharge Codes    No documentation.               Expected Discharge Date and Time     Expected Discharge Date Expected Discharge Time    May 15, 2023             Bonny Tavera RN    "

## 2023-05-16 LAB
GLUCOSE BLDC GLUCOMTR-MCNC: 123 MG/DL (ref 70–130)
GLUCOSE BLDC GLUCOMTR-MCNC: 139 MG/DL (ref 70–130)
GLUCOSE BLDC GLUCOMTR-MCNC: 89 MG/DL (ref 70–130)

## 2023-05-16 PROCEDURE — 94799 UNLISTED PULMONARY SVC/PX: CPT

## 2023-05-16 PROCEDURE — 82948 REAGENT STRIP/BLOOD GLUCOSE: CPT

## 2023-05-16 PROCEDURE — 94761 N-INVAS EAR/PLS OXIMETRY MLT: CPT

## 2023-05-16 PROCEDURE — 99232 SBSQ HOSP IP/OBS MODERATE 35: CPT | Performed by: NURSE PRACTITIONER

## 2023-05-16 PROCEDURE — 63710000001 PREDNISONE PER 1 MG: Performed by: INTERNAL MEDICINE

## 2023-05-16 PROCEDURE — 25010000002 HEPARIN (PORCINE) PER 1000 UNITS: Performed by: INTERNAL MEDICINE

## 2023-05-16 PROCEDURE — 94664 DEMO&/EVAL PT USE INHALER: CPT

## 2023-05-16 RX ORDER — MORPHINE SULFATE 20 MG/ML
5 SOLUTION ORAL 3 TIMES DAILY
Status: DISCONTINUED | OUTPATIENT
Start: 2023-05-16 | End: 2023-05-19

## 2023-05-16 RX ORDER — AMOXICILLIN 250 MG
2 CAPSULE ORAL ONCE
Status: COMPLETED | OUTPATIENT
Start: 2023-05-16 | End: 2023-05-16

## 2023-05-16 RX ADMIN — IPRATROPIUM BROMIDE AND ALBUTEROL SULFATE 3 ML: .5; 2.5 SOLUTION RESPIRATORY (INHALATION) at 12:32

## 2023-05-16 RX ADMIN — HEPARIN SODIUM 5000 UNITS: 5000 INJECTION, SOLUTION INTRAVENOUS; SUBCUTANEOUS at 06:15

## 2023-05-16 RX ADMIN — IPRATROPIUM BROMIDE AND ALBUTEROL SULFATE 3 ML: .5; 2.5 SOLUTION RESPIRATORY (INHALATION) at 19:54

## 2023-05-16 RX ADMIN — GUAIFENESIN 1200 MG: 600 TABLET, EXTENDED RELEASE ORAL at 21:36

## 2023-05-16 RX ADMIN — HYDROXYCHLOROQUINE SULFATE 200 MG: 200 TABLET, FILM COATED ORAL at 08:10

## 2023-05-16 RX ADMIN — PREDNISONE 40 MG: 20 TABLET ORAL at 08:10

## 2023-05-16 RX ADMIN — HEPARIN SODIUM 5000 UNITS: 5000 INJECTION, SOLUTION INTRAVENOUS; SUBCUTANEOUS at 13:49

## 2023-05-16 RX ADMIN — Medication: at 06:16

## 2023-05-16 RX ADMIN — HYDROXYCHLOROQUINE SULFATE 200 MG: 200 TABLET, FILM COATED ORAL at 21:36

## 2023-05-16 RX ADMIN — PANTOPRAZOLE SODIUM 40 MG: 40 TABLET, DELAYED RELEASE ORAL at 08:10

## 2023-05-16 RX ADMIN — LORAZEPAM 1 MG: 1 TABLET ORAL at 10:09

## 2023-05-16 RX ADMIN — SENNOSIDES AND DOCUSATE SODIUM 2 TABLET: 50; 8.6 TABLET ORAL at 15:42

## 2023-05-16 RX ADMIN — IPRATROPIUM BROMIDE AND ALBUTEROL SULFATE 3 ML: .5; 2.5 SOLUTION RESPIRATORY (INHALATION) at 07:44

## 2023-05-16 RX ADMIN — NYSTATIN: 100000 POWDER TOPICAL at 21:36

## 2023-05-16 RX ADMIN — NYSTATIN: 100000 POWDER TOPICAL at 08:11

## 2023-05-16 RX ADMIN — CETIRIZINE HYDROCHLORIDE 10 MG: 10 TABLET, FILM COATED ORAL at 08:10

## 2023-05-16 RX ADMIN — MORPHINE SULFATE 5 MG: 100 SOLUTION ORAL at 14:00

## 2023-05-16 RX ADMIN — GUAIFENESIN 1200 MG: 600 TABLET, EXTENDED RELEASE ORAL at 08:10

## 2023-05-16 RX ADMIN — Medication: at 13:49

## 2023-05-16 RX ADMIN — MORPHINE SULFATE 5 MG: 100 SOLUTION ORAL at 15:43

## 2023-05-16 RX ADMIN — IPRATROPIUM BROMIDE AND ALBUTEROL SULFATE 3 ML: .5; 2.5 SOLUTION RESPIRATORY (INHALATION) at 16:20

## 2023-05-16 RX ADMIN — AMLODIPINE BESYLATE 5 MG: 5 TABLET ORAL at 08:10

## 2023-05-16 RX ADMIN — SODIUM CHLORIDE 1 G: 1 TABLET ORAL at 17:37

## 2023-05-16 RX ADMIN — MONTELUKAST 10 MG: 10 TABLET, FILM COATED ORAL at 08:10

## 2023-05-16 RX ADMIN — HEPARIN SODIUM 5000 UNITS: 5000 INJECTION, SOLUTION INTRAVENOUS; SUBCUTANEOUS at 21:36

## 2023-05-16 RX ADMIN — HYDROXYZINE HYDROCHLORIDE 25 MG: 25 TABLET, FILM COATED ORAL at 08:10

## 2023-05-16 RX ADMIN — SODIUM CHLORIDE 1 G: 1 TABLET ORAL at 08:10

## 2023-05-16 RX ADMIN — Medication 5 MG: at 21:36

## 2023-05-16 RX ADMIN — MORPHINE SULFATE 5 MG: 100 SOLUTION ORAL at 11:00

## 2023-05-16 RX ADMIN — MORPHINE SULFATE 5 MG: 100 SOLUTION ORAL at 21:36

## 2023-05-16 RX ADMIN — Medication: at 21:36

## 2023-05-16 NOTE — CASE MANAGEMENT/SOCIAL WORK
Continued Stay Note  [facility]     Patient Name: Tala Ramsey  MRN: [id number]  Today's Date: 5/16/2023    Admit Date: 4/23/2023    Plan: Ongoing   Discharge Plan     Row Name 05/16/23 1914       Plan    Plan Ongoing    Patient/Family in Agreement with Plan yes    Plan Comments Met with patient and three sons at bedside to discuss discharge planning.  Discussed the following options for post hospital care at length: -home with Hospice, -home Home Health,  -private caregivers in home,  -SNF placement transitioning into LTC, -LTC placement/private pay if unable to participate with PT/OT.  Also discussed [employer] Medicare coverage for SNF, Home Health and Hospice; private pay vs. MC pending for LTC at a nursing facility.  Discussed patient not currently meeting inpatient Hospice criteria to stay at [facility], sons still with questions regarding home with Hospice and requesting to meet with Hospice CM.  Patient/family made aware Palliative Care Team continuing to follow for medication adjustments, sons report today patient is off HFNC and new medication changes are in place, so hopeful she may be able to now work with PT/OT.  Will f/u with PT/OT in am regarding re-consulting and GOC, also discussed with Hospice CM.  Family made aware of SNF/LTC process, will plan referrals to [facility] and [facility]. depending on if able to participate with therapy in next couple of days or if family decides LTC vs. home with services.  Discussed in unit multidisciplinary rounds this morning.  Sanjana Mares, Ext. [phone]    Final Discharge Disposition Code 30 - still a patient               Discharge Codes    No documentation.               Expected Discharge Date and Time     Expected Discharge Date Expected Discharge Time    May 19, 2023             BASSAM Britton

## 2023-05-16 NOTE — PROGRESS NOTES
Continued Stay Note  [facility]     Patient Name: Tala Ramsey  MRN: [id number]  Today's Date: 5/16/2023    Admit Date: 4/23/2023    Plan: undetermined   Discharge Plan     Row Name 05/16/23 1518       Plan    Plan undetermined    Plan Comments Call made to pt son, Hay, at [phone] as he requested hospice f/u. Again, discussed Hospice plan of care and goals of care.  Questions and concerns addressed.  Overview of hospice services provided.  Discussed hospice v. home health. Will continue to follow. Please call [phone] if I can be of further assistance.               Discharge Codes    No documentation.               Expected Discharge Date and Time     Expected Discharge Date Expected Discharge Time    May 19, 2023             Bonny Tavera RN

## 2023-05-16 NOTE — PROGRESS NOTES
[facility] Medicine Services  PROGRESS NOTE    Patient Name: Tala Ramsey  : 1950  MRN: [id number]    Date of Admission: 2023  Primary Care Physician: Sg Horne MD    Subjective   Subjective     CC:  Hypoxia, pneumonia    HPI:  Resting in bed, breathing is about the same. Dyspnea at rest, on 6LNC. 3 sons in room. States she was able to eat about half of breakfast tray     ROS:  Gen- No fevers, chills  CV- No chest pain, palpitations  Resp- No cough, + dyspnea  GI- No N/V/D, abd pain            Objective   Objective     Vital Signs:   Temp:  [97.5 °F (36.4 °C)] 97.5 °F (36.4 °C)  Heart Rate:  [] 81  Resp:  [20-22] 20  BP: (123-143)/(67-79) 143/67  Flow (L/min):  [6-7] 6     Physical Exam:  Constitutional: No acute distress, awake, alert, chronically ill appearing   HENT: NCAT, mucous membranes moist  Respiratory: CTAB with decreased bases with wheezing in upper lobes, respiratory effort mildly labored on 6LNC  Cardiovascular: RRR, no murmurs, rubs, or gallops  Gastrointestinal: Positive bowel sounds, soft, nontender, nondistended  Musculoskeletal: No bilateral ankle edema  Psychiatric: Appropriate affect, cooperative  Neurologic: Oriented x 3, strength symmetric in all extremities, Cranial Nerves grossly intact to confrontation, speech clear  Skin: No rashes           Results Reviewed:  LAB RESULTS:      Lab 23  0733   WBC 20.93*   HEMOGLOBIN 13.4   HEMATOCRIT 39.4   PLATELETS 328   NEUTROS ABS 17.19*   IMMATURE GRANS (ABS) 0.57*   LYMPHS ABS 1.61   MONOS ABS 1.47*   EOS ABS 0.01   MCV 87.9         Lab 23  0643 23  0616 23  0733 05/10/23  0734 23  1608   SODIUM 129* 128* 126* 124*  --    POTASSIUM 4.8 4.9 4.7 5.0 5.3*   CHLORIDE 97* 95* 94* 91*  --    CO2 22.0 22.0 22.0 22.0  --    ANION GAP 10.0 11.0 10.0 11.0  --    BUN 17 21 23 24*  --    CREATININE 0.37* 0.41* 0.51* 0.46*  --    EGFR 106.6 104.0 98.7 101.2  --    GLUCOSE  120* 99 102* 114*  --    CALCIUM 9.1 8.8 9.1 9.1  --                          Brief Urine Lab Results  (Last result in the past 365 days)      Color   Clarity   Blood   Leuk Est   Nitrite   Protein   CREAT   Urine HCG        04/23/23 1757 Yellow   Clear   Trace   Negative   Negative   Negative                 Microbiology Results Abnormal     Procedure Component Value - Date/Time    Blood Culture - Blood, Arm, Right [[id number]]  (Normal) Collected: 04/23/23 1125    Lab Status: Final result Specimen: Blood from Arm, Right Updated: 04/28/23 1145     Blood Culture No growth at 5 days    Blood Culture - Blood, Arm, Left [[id number]]  (Normal) Collected: 04/23/23 1125    Lab Status: Final result Specimen: Blood from Arm, Left Updated: 04/28/23 1145     Blood Culture No growth at 5 days    S. Pneumo Ag Urine or CSF - Urine, Urine, Clean Catch [[id number]]  (Normal) Collected: 04/23/23 1757    Lab Status: Final result Specimen: Urine, Clean Catch Updated: 04/24/23 0949     Strep Pneumo Ag Negative    Legionella Antigen, Urine - Urine, Urine, Clean Catch [[id number]]  (Normal) Collected: 04/23/23 1757    Lab Status: Final result Specimen: Urine, Clean Catch Updated: 04/24/23 0949     LEGIONELLA ANTIGEN, URINE Negative    Respiratory Panel PCR w/COVID-19(SARS-CoV-2) CHARI/MARISSA/APOLINAR/PAD/COR/MAD/AMY In-House, NP Swab in UTM/VTM, 3-4 HR TAT - Swab, Nasopharynx [[id number]]  (Normal) Collected: 04/24/23 0544    Lab Status: Final result Specimen: Swab from Nasopharynx Updated: 04/24/23 0647     ADENOVIRUS, PCR Not Detected     Coronavirus 229E Not Detected     Coronavirus HKU1 Not Detected     Coronavirus NL63 Not Detected     Coronavirus OC43 Not Detected     COVID19 Not Detected     Human Metapneumovirus Not Detected     Human Rhinovirus/Enterovirus Not Detected     Influenza A PCR Not Detected     Influenza B PCR Not Detected     Parainfluenza Virus 1 Not Detected     Parainfluenza Virus 2 Not Detected     Parainfluenza Virus 3 Not  Detected     Parainfluenza Virus 4 Not Detected     RSV, PCR Not Detected     Bordetella pertussis pcr Not Detected     Bordetella parapertussis PCR Not Detected     Chlamydophila pneumoniae PCR Not Detected     Mycoplasma pneumo by PCR Not Detected    Narrative:      In the setting of a positive respiratory panel with a viral infection PLUS a negative procalcitonin without other underlying concern for bacterial infection, consider observing off antibiotics or discontinuation of antibiotics and continue supportive care. If the respiratory panel is positive for atypical bacterial infection (Bordetella pertussis, Chlamydophila pneumoniae, or Mycoplasma pneumoniae), consider antibiotic de-escalation to target atypical bacterial infection.    MRSA Screen, PCR (Inpatient) - Swab, Nares [[id number]]  (Normal) Collected: 04/23/23 1540    Lab Status: Final result Specimen: Swab from Nares Updated: 04/23/23 1651     MRSA PCR Negative    Narrative:      The negative predictive value of this diagnostic test is high and should only be used to consider de-escalating anti-MRSA therapy. A positive result may indicate colonization with MRSA and must be correlated clinically.  MRSA Negative    COVID PRE-OP / PRE-PROCEDURE SCREENING ORDER (NO ISOLATION) - Swab, Nasopharynx [[id number]]  (Normal) Collected: 04/23/23 1125    Lab Status: Final result Specimen: Swab from Nasopharynx Updated: 04/23/23 1225    Narrative:      The following orders were created for panel order COVID PRE-OP / PRE-PROCEDURE SCREENING ORDER (NO ISOLATION) - Swab, Nasopharynx.  Procedure                               Abnormality         Status                     ---------                               -----------         ------                     COVID-19, FLU A/B, RSV P...[[id number]]  Normal              Final result                 Please view results for these tests on the individual orders.    COVID-19, FLU A/B, RSV PCR - Swab, Nasopharynx [[id number]]   (Normal) Collected: 04/23/23 1125    Lab Status: Final result Specimen: Swab from Nasopharynx Updated: 04/23/23 1225     COVID19 Not Detected     Influenza A PCR Not Detected     Influenza B PCR Not Detected     RSV, PCR Not Detected    Narrative:      Fact sheet for providers: https://www.fda.gov/media/399249/download    Fact sheet for patients: https://www.fda.gov/media/920035/download    Test performed by PCR.          No radiology results from the last 24 hrs        Current medications:  Scheduled Meds:amLODIPine, 5 mg, Oral, Q24H  cetirizine, 10 mg, Oral, Daily  fluticasone, 2 spray, Each Nare, Nightly  guaiFENesin, 1,200 mg, Oral, Q12H  heparin (porcine), 5,000 Units, Subcutaneous, Q8H  hydroxychloroquine, 200 mg, Oral, Q12H  insulin lispro, 0-7 Units, Subcutaneous, TID AC  ipratropium-albuterol, 3 mL, Nebulization, 4x Daily - RT  melatonin, 5 mg, Oral, Nightly  montelukast, 10 mg, Oral, Daily  morphine, 5 mg, Oral, TID  nystatin, , Topical, Q12H  palliative care oral rinse, , Mouth/Throat, Q8H  pantoprazole, 40 mg, Oral, Daily  predniSONE, 40 mg, Oral, Daily With Breakfast  senna-docusate sodium, 2 tablet, Oral, Once  sodium chloride, 10 mL, Intravenous, Q12H  sodium chloride, 1 g, Oral, BID With Meals  sulfamethoxazole-trimethoprim, 1 tablet, Oral, Once per day on Mon Wed Fri      Continuous Infusions:   PRN Meds:.•  acetaminophen **OR** acetaminophen **OR** acetaminophen  •  aluminum-magnesium hydroxide-simethicone **AND** Lidocaine Viscous HCl  •  senna-docusate sodium **AND** polyethylene glycol **AND** bisacodyl **AND** bisacodyl  •  calcium carbonate  •  cyclobenzaprine  •  dextrose  •  dextrose  •  glucagon (human recombinant)  •  hydrOXYzine  •  ipratropium-albuterol  •  LORazepam  •  Magnesium Standard Dose Replacement - Follow Nurse / BPA Driven Protocol  •  morphine  •  ondansetron **OR** ondansetron  •  simethicone  •  sodium chloride  •  sodium chloride  •  sodium chloride    Assessment & Plan    Assessment & Plan     Active Hospital Problems    Diagnosis  POA   • **Acute hypoxemic respiratory failure -likely due to ILD exacerbation [J96.01]  Yes   • Pulmonary fibrosis [J84.10]  Yes   • ILD (interstitial lung disease) -likely RA associated ILD with acute exacerbation [J84.9]  Yes   • Multifocal pneumonia [J18.9]  Yes      Resolved Hospital Problems   No resolved problems to display.        Brief Hospital Course to date:  Tala Ramsey is a 72 y.o. female w/ hx interstitial lung dz, RA (on humira until recently, recent steroid injections) who presents w/ progressive dyspnea, cough over ~10 days failing outpatient Levaquin. Pt was found to be hypoxic upon admission. She had progressive hypoxia evening after admission placed on hi flow canula       This patient's problems and plans were partially entered by my partner and updated as appropriate by me 05/16/23.    Assessment/Plan:      Acute hypoxic resp failure, worsening  Pulmonary fibrosis/ILD  Bilateral Pneumonia  -likely combination of progession/flare of interstitial lung disease and infection  -remains on High flow with difficulty weaning and significant drops with minimal exertion - currently on 6LNC with rest. Noted laboring with speech and O2 sats 88% with effort  -s/p full course of zosyn & azithromycin  -3x weekly bactrim ds (pcp prophylaxis as has been on humira and steroids)   -continue scheduled & PRN duonebs  -Pulm following: completed IV solumedrol 5/9 and transitioned po prednisone.  Continue 30mg bid  -s/p IV diuresis 5/6 and 7 without much improvement so holding.  - Palliative following- continue morphine and ativan prn. Discussed GOC and patient/ sons met with hospice, palliative, and Pulm. Aware that she is not inpatient hospice appropriate and will need to discuss transitioning to facility with Hospice     Hyponatremia  Poor po intake  --No IV access.  Full support but no CPR/intubation.  Sodium continues to drop.  Pt wishes to  keep IV out if at all possible   --added salt tabs bid 5/10.   Up to 129    Hyperkalemia  -- K+ now down to 4.8   - s/p lokelma      RA  -holding humira  -- continue plaquenil     HTN  -continue amlodpine  -- hold valsartan due to worsening hyperkalemia  --bp stable -off valsartan    Chronic anemia   -monitor    Goals/limits  -Patient is DNR/DNI (in event of further clinical decompensation patient would NOT want mechanical ventilation; in this case patient would wish to pursue comfort measures)        Expected Discharge Location and Transportation: home vs facility with hospice/ not inpatient appropriate   Expected Discharge TBD  Expected Discharge Date: 5/19/2023; Expected Discharge Time:      DVT prophylaxis:  Medical DVT prophylaxis orders are present. sq heparin    AM-PAC 6 Clicks Score (PT): (P) 11 (05/16/23 0800)    CODE STATUS:   Code Status and Medical Interventions:   Ordered at: 04/23/23 1310     Medical Intervention Limits:    NO intubation (DNI)     Level Of Support Discussed With:    Patient     Code Status (Patient has no pulse and is not breathing):    No CPR (Do Not Attempt to Resuscitate)     Medical Interventions (Patient has pulse or is breathing):    Limited Support       Gabriela Yang, APRN  05/16/23

## 2023-05-16 NOTE — PLAN OF CARE
Problem: Adult Inpatient Plan of Care  Goal: Plan of Care Review  Outcome: Ongoing, Progressing  Flowsheets  Taken 5/16/2023 1459 by Nell Quigley, RN Extern  Outcome Evaluation: Pt still on continuous humidified O2NC 6L. Dropping O2 sats noted this morning (87-89%) and IS reinforcement effective. Pt still experiences shortness of air w/ activity, eating, and talking. Pt keeping up w/ taking PRN morphine q3hrs to manage back and generalized pain. PRNs given for anxiety. Purewick and bedpan still adequate for elimination management. Pt still A&Ox4. Pt tolerating diet fairly better today. No sliding scale insulin needed before breakfast or lunch. Sons still present at bedside andare attentive to pt. (Pended)  Taken 5/15/2023 1433 by Nataly Aparicio, RN  Progress: improving  Plan of Care Reviewed With:  • patient  • son   Goal Outcome Evaluation:              Outcome Evaluation: (P) Pt still on continuous humidified O2NC 6L. Pt still experiences shortness of air w/ activity, eating, and talking. Pt keeping up w/ taking PRN morphine q3hrs to manage back and generalized pain. PRNs given for anxiety. Purewick and bedpan still adequate for elimination management. Pt still A&Ox4. Pt tolerating diet fairly better today. No sliding scale insulin needed before breakfast or lunch. Sons still present at bedside andare attentive to pt.

## 2023-05-16 NOTE — PLAN OF CARE
Problem: Adult Inpatient Plan of Care  Goal: Plan of Care Review  Outcome: Ongoing, Progressing  Goal: Patient-Specific Goal (Individualized)  Outcome: Ongoing, Progressing  Goal: Absence of Hospital-Acquired Illness or Injury  Outcome: Ongoing, Progressing  Intervention: Identify and Manage Fall Risk  Recent Flowsheet Documentation  Taken 5/16/2023 0220 by Rachele Marion RN  Safety Promotion/Fall Prevention:   assistive device/personal items within reach   clutter free environment maintained   room organization consistent   safety round/check completed  Taken 5/16/2023 0026 by Rachele Marion RN  Safety Promotion/Fall Prevention:   assistive device/personal items within reach   clutter free environment maintained   safety round/check completed   room organization consistent  Taken 5/15/2023 2243 by Rachele Marion RN  Safety Promotion/Fall Prevention:   assistive device/personal items within reach   clutter free environment maintained   room organization consistent   safety round/check completed  Taken 5/15/2023 2010 by Rachele Marion RN  Safety Promotion/Fall Prevention:   assistive device/personal items within reach   clutter free environment maintained   room organization consistent   safety round/check completed  Intervention: Prevent Skin Injury  Recent Flowsheet Documentation  Taken 5/16/2023 0220 by Rachele Marion RN  Body Position:   neutral body alignment   neutral head position   weight shifting  Skin Protection: incontinence pads utilized  Taken 5/16/2023 0026 by Rachele Marion RN  Body Position:   neutral body alignment   neutral head position   sitting up in bed   weight shifting  Skin Protection: incontinence pads utilized  Taken 5/15/2023 2243 by Rachele Marion RN  Body Position:   weight shifting   neutral body alignment   neutral head position  Skin Protection: incontinence pads utilized  Taken 5/15/2023 2010 by Rachele Marion RN  Body Position:   neutral body alignment   neutral head position   sitting up  in bed  Intervention: Prevent and Manage VTE (Venous Thromboembolism) Risk  Recent Flowsheet Documentation  Taken 5/16/2023 0220 by Rachele Marion RN  Activity Management: bedrest  Taken 5/16/2023 0026 by Rachele Marion RN  Activity Management: bedrest  Taken 5/15/2023 2243 by Rachele Marion RN  Activity Management: bedrest  Taken 5/15/2023 2010 by Rachele Marion RN  Activity Management: bedrest  Intervention: Prevent Infection  Recent Flowsheet Documentation  Taken 5/16/2023 0220 by Rachele Marion RN  Infection Prevention: rest/sleep promoted  Taken 5/16/2023 0026 by Rachele Marion RN  Infection Prevention: rest/sleep promoted  Goal: Optimal Comfort and Wellbeing  Outcome: Ongoing, Progressing  Intervention: Provide Person-Centered Care  Recent Flowsheet Documentation  Taken 5/15/2023 2010 by Rachele Marion RN  Trust Relationship/Rapport:   care explained   choices provided   questions answered  Goal: Readiness for Transition of Care  Outcome: Ongoing, Progressing     Problem: Asthma Comorbidity  Goal: Maintenance of Asthma Control  Outcome: Ongoing, Progressing  Intervention: Maintain Asthma Symptom Control  Recent Flowsheet Documentation  Taken 5/16/2023 0220 by Rachele Marion RN  Medication Review/Management: medications reviewed  Taken 5/16/2023 0026 by Rachele Marion RN  Medication Review/Management: medications reviewed     Problem: Behavioral Health Comorbidity  Goal: Maintenance of Behavioral Health Symptom Control  Outcome: Ongoing, Progressing  Intervention: Maintain Behavioral Health Symptom Control  Recent Flowsheet Documentation  Taken 5/16/2023 0220 by Rachele Marion RN  Medication Review/Management: medications reviewed  Taken 5/16/2023 0026 by Rachele Marion RN  Medication Review/Management: medications reviewed     Problem: COPD (Chronic Obstructive Pulmonary Disease) Comorbidity  Goal: Maintenance of COPD Symptom Control  Outcome: Ongoing, Progressing  Intervention: Maintain COPD-Symptom Control  Recent  Flowsheet Documentation  Taken 5/16/2023 0220 by Rachele Marion RN  Medication Review/Management: medications reviewed  Taken 5/16/2023 0026 by Rachele Marion RN  Medication Review/Management: medications reviewed     Problem: Diabetes Comorbidity  Goal: Blood Glucose Level Within Targeted Range  Outcome: Ongoing, Progressing     Problem: Heart Failure Comorbidity  Goal: Maintenance of Heart Failure Symptom Control  Outcome: Ongoing, Progressing  Intervention: Maintain Heart Failure-Management  Recent Flowsheet Documentation  Taken 5/16/2023 0220 by Rachele Marion RN  Medication Review/Management: medications reviewed  Taken 5/16/2023 0026 by Rachele Marion RN  Medication Review/Management: medications reviewed     Problem: Hypertension Comorbidity  Goal: Blood Pressure in Desired Range  Outcome: Ongoing, Progressing  Intervention: Maintain Blood Pressure Management  Recent Flowsheet Documentation  Taken 5/16/2023 0220 by Rachele Marion RN  Medication Review/Management: medications reviewed  Taken 5/16/2023 0026 by Rachele Marion RN  Medication Review/Management: medications reviewed     Problem: Obstructive Sleep Apnea Risk or Actual Comorbidity Management  Goal: Unobstructed Breathing During Sleep  Outcome: Ongoing, Progressing     Problem: Osteoarthritis Comorbidity  Goal: Maintenance of Osteoarthritis Symptom Control  Outcome: Ongoing, Progressing  Intervention: Maintain Osteoarthritis Symptom Control  Recent Flowsheet Documentation  Taken 5/16/2023 0220 by Rachele Marion RN  Activity Management: bedrest  Medication Review/Management: medications reviewed  Taken 5/16/2023 0026 by Rachele Marion RN  Activity Management: bedrest  Medication Review/Management: medications reviewed  Taken 5/15/2023 2243 by Rachele Marion RN  Activity Management: bedrest  Taken 5/15/2023 2010 by Rachele Marion RN  Activity Management: bedrest     Problem: Pain Chronic (Persistent) (Comorbidity Management)  Goal: Acceptable Pain Control and  Functional Ability  Outcome: Ongoing, Progressing  Intervention: Manage Persistent Pain  Recent Flowsheet Documentation  Taken 5/16/2023 0220 by Rachele Marion RN  Medication Review/Management: medications reviewed  Taken 5/16/2023 0026 by Rachele Marion RN  Medication Review/Management: medications reviewed  Taken 5/15/2023 2010 by Rachele Marion RN  Sleep/Rest Enhancement:   regular sleep/rest pattern promoted   relaxation techniques promoted  Intervention: Optimize Psychosocial Wellbeing  Recent Flowsheet Documentation  Taken 5/15/2023 2010 by Rachele Marion RN  Diversional Activities: television     Problem: Seizure Disorder Comorbidity  Goal: Maintenance of Seizure Control  Outcome: Ongoing, Progressing     Problem: Fall Injury Risk  Goal: Absence of Fall and Fall-Related Injury  Outcome: Ongoing, Progressing  Intervention: Identify and Manage Contributors  Recent Flowsheet Documentation  Taken 5/16/2023 0220 by Rachele Marion RN  Medication Review/Management: medications reviewed  Taken 5/16/2023 0026 by Rachele Marion RN  Medication Review/Management: medications reviewed  Intervention: Promote Injury-Free Environment  Recent Flowsheet Documentation  Taken 5/16/2023 0220 by Rachele Marion RN  Safety Promotion/Fall Prevention:   assistive device/personal items within reach   clutter free environment maintained   room organization consistent   safety round/check completed  Taken 5/16/2023 0026 by Rachele Marion RN  Safety Promotion/Fall Prevention:   assistive device/personal items within reach   clutter free environment maintained   safety round/check completed   room organization consistent  Taken 5/15/2023 2243 by Rachele Marion RN  Safety Promotion/Fall Prevention:   assistive device/personal items within reach   clutter free environment maintained   room organization consistent   safety round/check completed  Taken 5/15/2023 2010 by Rachele Marion RN  Safety Promotion/Fall Prevention:   assistive device/personal  items within reach   clutter free environment maintained   room organization consistent   safety round/check completed     Problem: Fluid Imbalance (Pneumonia)  Goal: Fluid Balance  Outcome: Ongoing, Progressing     Problem: Infection (Pneumonia)  Goal: Resolution of Infection Signs and Symptoms  Outcome: Ongoing, Progressing     Problem: Respiratory Compromise (Pneumonia)  Goal: Effective Oxygenation and Ventilation  Outcome: Ongoing, Progressing  Intervention: Optimize Oxygenation and Ventilation  Recent Flowsheet Documentation  Taken 5/16/2023 0220 by Rachele Marion RN  Head of Bed (HOB) Positioning: HOB at 30-45 degrees  Taken 5/16/2023 0026 by Rachele Marion RN  Head of Bed (HOB) Positioning: HOB at 30-45 degrees  Taken 5/15/2023 2243 by Rachele Marion RN  Head of Bed (HOB) Positioning: HOB at 30-45 degrees  Taken 5/15/2023 2010 by Rachele Marion RN  Head of Bed (HOB) Positioning: HOB at 30-45 degrees     Problem: Palliative Care  Goal: Enhanced Quality of Life  Outcome: Ongoing, Progressing  Intervention: Optimize Function  Recent Flowsheet Documentation  Taken 5/15/2023 2010 by Rachele Marion RN  Sleep/Rest Enhancement:   regular sleep/rest pattern promoted   relaxation techniques promoted     Problem: Skin Injury Risk Increased  Goal: Skin Health and Integrity  Outcome: Ongoing, Progressing  Intervention: Optimize Skin Protection  Recent Flowsheet Documentation  Taken 5/16/2023 0220 by Rachele Marion RN  Pressure Reduction Techniques:   positioned off wounds   pressure points protected   frequent weight shift encouraged  Head of Bed (HOB) Positioning: HOB at 30-45 degrees  Pressure Reduction Devices:   positioning supports utilized   pressure-redistributing mattress utilized  Skin Protection: incontinence pads utilized  Taken 5/16/2023 0026 by Rachele Marion RN  Pressure Reduction Techniques:   frequent weight shift encouraged   positioned off wounds   pressure points protected  Head of Bed (HOB) Positioning: HOB  at 30-45 degrees  Pressure Reduction Devices:   positioning supports utilized   pressure-redistributing mattress utilized  Skin Protection: incontinence pads utilized  Taken 5/15/2023 2243 by Rachele Marion RN  Pressure Reduction Techniques:   frequent weight shift encouraged   pressure points protected  Head of Bed (HOB) Positioning: HOB at 30-45 degrees  Pressure Reduction Devices:   positioning supports utilized   pressure-redistributing mattress utilized  Skin Protection: incontinence pads utilized  Taken 5/15/2023 2010 by Rachele Marion RN  Head of Bed (HOB) Positioning: HOB at 30-45 degrees   Goal Outcome Evaluation:

## 2023-05-17 LAB
ANION GAP SERPL CALCULATED.3IONS-SCNC: 13 MMOL/L (ref 5–15)
BUN SERPL-MCNC: 16 MG/DL (ref 8–23)
BUN/CREAT SERPL: 37.2 (ref 7–25)
CALCIUM SPEC-SCNC: 9.4 MG/DL (ref 8.6–10.5)
CHLORIDE SERPL-SCNC: 93 MMOL/L (ref 98–107)
CO2 SERPL-SCNC: 22 MMOL/L (ref 22–29)
CREAT SERPL-MCNC: 0.43 MG/DL (ref 0.57–1)
EGFRCR SERPLBLD CKD-EPI 2021: 102.8 ML/MIN/1.73
GLUCOSE BLDC GLUCOMTR-MCNC: 122 MG/DL (ref 70–130)
GLUCOSE BLDC GLUCOMTR-MCNC: 136 MG/DL (ref 70–130)
GLUCOSE BLDC GLUCOMTR-MCNC: 96 MG/DL (ref 70–130)
GLUCOSE SERPL-MCNC: 82 MG/DL (ref 65–99)
POTASSIUM SERPL-SCNC: 4.4 MMOL/L (ref 3.5–5.2)
SODIUM SERPL-SCNC: 128 MMOL/L (ref 136–145)

## 2023-05-17 PROCEDURE — 25010000002 HEPARIN (PORCINE) PER 1000 UNITS: Performed by: INTERNAL MEDICINE

## 2023-05-17 PROCEDURE — 82948 REAGENT STRIP/BLOOD GLUCOSE: CPT

## 2023-05-17 PROCEDURE — 94664 DEMO&/EVAL PT USE INHALER: CPT

## 2023-05-17 PROCEDURE — 80048 BASIC METABOLIC PNL TOTAL CA: CPT

## 2023-05-17 PROCEDURE — 99232 SBSQ HOSP IP/OBS MODERATE 35: CPT | Performed by: NURSE PRACTITIONER

## 2023-05-17 PROCEDURE — 63710000001 PREDNISONE PER 1 MG: Performed by: INTERNAL MEDICINE

## 2023-05-17 PROCEDURE — 94761 N-INVAS EAR/PLS OXIMETRY MLT: CPT

## 2023-05-17 PROCEDURE — 94799 UNLISTED PULMONARY SVC/PX: CPT

## 2023-05-17 RX ORDER — LIDOCAINE 50 MG/G
1 PATCH TOPICAL
Status: DISCONTINUED | OUTPATIENT
Start: 2023-05-17 | End: 2023-05-25 | Stop reason: HOSPADM

## 2023-05-17 RX ADMIN — NYSTATIN: 100000 POWDER TOPICAL at 21:55

## 2023-05-17 RX ADMIN — IPRATROPIUM BROMIDE AND ALBUTEROL SULFATE 3 ML: .5; 2.5 SOLUTION RESPIRATORY (INHALATION) at 09:13

## 2023-05-17 RX ADMIN — ACETAMINOPHEN 325MG 650 MG: 325 TABLET ORAL at 11:57

## 2023-05-17 RX ADMIN — IPRATROPIUM BROMIDE AND ALBUTEROL SULFATE 3 ML: .5; 2.5 SOLUTION RESPIRATORY (INHALATION) at 12:21

## 2023-05-17 RX ADMIN — IPRATROPIUM BROMIDE AND ALBUTEROL SULFATE 3 ML: .5; 2.5 SOLUTION RESPIRATORY (INHALATION) at 16:13

## 2023-05-17 RX ADMIN — MORPHINE SULFATE 5 MG: 100 SOLUTION ORAL at 07:28

## 2023-05-17 RX ADMIN — Medication 5 MG: at 21:54

## 2023-05-17 RX ADMIN — CALCIUM CARBONATE (ANTACID) CHEW TAB 500 MG 2 TABLET: 500 CHEW TAB at 17:55

## 2023-05-17 RX ADMIN — MORPHINE SULFATE 5 MG: 100 SOLUTION ORAL at 02:24

## 2023-05-17 RX ADMIN — GUAIFENESIN 1200 MG: 600 TABLET, EXTENDED RELEASE ORAL at 09:20

## 2023-05-17 RX ADMIN — SULFAMETHOXAZOLE AND TRIMETHOPRIM 1 TABLET: 800; 160 TABLET ORAL at 09:20

## 2023-05-17 RX ADMIN — HEPARIN SODIUM 5000 UNITS: 5000 INJECTION, SOLUTION INTRAVENOUS; SUBCUTANEOUS at 14:15

## 2023-05-17 RX ADMIN — LORAZEPAM 1 MG: 1 TABLET ORAL at 18:24

## 2023-05-17 RX ADMIN — MORPHINE SULFATE 5 MG: 100 SOLUTION ORAL at 16:40

## 2023-05-17 RX ADMIN — PREDNISONE 40 MG: 20 TABLET ORAL at 09:19

## 2023-05-17 RX ADMIN — Medication: at 14:16

## 2023-05-17 RX ADMIN — LIDOCAINE 1 PATCH: 50 PATCH CUTANEOUS at 11:57

## 2023-05-17 RX ADMIN — HYDROXYCHLOROQUINE SULFATE 200 MG: 200 TABLET, FILM COATED ORAL at 21:54

## 2023-05-17 RX ADMIN — HYDROXYZINE HYDROCHLORIDE 25 MG: 25 TABLET, FILM COATED ORAL at 09:32

## 2023-05-17 RX ADMIN — POLYETHYLENE GLYCOL 3350 17 G: 17 POWDER, FOR SOLUTION ORAL at 12:45

## 2023-05-17 RX ADMIN — SODIUM CHLORIDE 1 G: 1 TABLET ORAL at 09:20

## 2023-05-17 RX ADMIN — PANTOPRAZOLE SODIUM 40 MG: 40 TABLET, DELAYED RELEASE ORAL at 09:20

## 2023-05-17 RX ADMIN — CETIRIZINE HYDROCHLORIDE 10 MG: 10 TABLET, FILM COATED ORAL at 09:20

## 2023-05-17 RX ADMIN — NYSTATIN: 100000 POWDER TOPICAL at 09:20

## 2023-05-17 RX ADMIN — AMLODIPINE BESYLATE 5 MG: 5 TABLET ORAL at 09:20

## 2023-05-17 RX ADMIN — IPRATROPIUM BROMIDE AND ALBUTEROL SULFATE 3 ML: .5; 2.5 SOLUTION RESPIRATORY (INHALATION) at 19:43

## 2023-05-17 RX ADMIN — HEPARIN SODIUM 5000 UNITS: 5000 INJECTION, SOLUTION INTRAVENOUS; SUBCUTANEOUS at 21:55

## 2023-05-17 RX ADMIN — Medication: at 06:38

## 2023-05-17 RX ADMIN — SODIUM CHLORIDE 1 G: 1 TABLET ORAL at 17:55

## 2023-05-17 RX ADMIN — HYDROXYCHLOROQUINE SULFATE 200 MG: 200 TABLET, FILM COATED ORAL at 09:20

## 2023-05-17 RX ADMIN — MORPHINE SULFATE 5 MG: 100 SOLUTION ORAL at 21:54

## 2023-05-17 RX ADMIN — ACETAMINOPHEN 325MG 650 MG: 325 TABLET ORAL at 17:55

## 2023-05-17 RX ADMIN — GUAIFENESIN 1200 MG: 600 TABLET, EXTENDED RELEASE ORAL at 21:54

## 2023-05-17 RX ADMIN — MORPHINE SULFATE 5 MG: 100 SOLUTION ORAL at 09:19

## 2023-05-17 RX ADMIN — HEPARIN SODIUM 5000 UNITS: 5000 INJECTION, SOLUTION INTRAVENOUS; SUBCUTANEOUS at 06:37

## 2023-05-17 RX ADMIN — Medication: at 21:55

## 2023-05-17 RX ADMIN — MONTELUKAST 10 MG: 10 TABLET, FILM COATED ORAL at 09:20

## 2023-05-17 NOTE — PLAN OF CARE
Problem: Palliative Care  Goal: Enhanced Quality of Life  Intervention: Optimize Psychosocial Wellbeing  Recent Flowsheet Documentation  Taken 5/17/2023 1300 by Isidra Spaulding MSW  Supportive Measures: (Palliative IDT: DO; APRN; LCSW; RN) --  Taken 5/17/2023 1245 by Isidra Spaulding MSW  Supportive Measures: (symptom assessment)   active listening utilized   positive reinforcement provided   self-care encouraged   self-reflection promoted   verbalization of feelings encouraged   other (see comments)  Family/Support System Care:   support provided   self-care encouraged   caregiver stress acknowledged

## 2023-05-17 NOTE — PLAN OF CARE
Goal Outcome Evaluation:  Plan of Care Reviewed With: patient        Progress: no change  Outcome Evaluation: pt. rested well throughout day, 6 L NC, PRN morphine, atarax, and tylenol given, awaiting home or placement

## 2023-05-17 NOTE — PROGRESS NOTES
"Continued Stay Note  [facility]     Patient Name: Tala Ramsey  MRN: [id number]  Today's Date: 5/17/2023    Admit Date: 4/23/2023    Plan: To be determined   Discharge Plan     Row Name 05/17/23 1027       Plan    Plan To be determined    Plan Comments Visit made to pt, pt's daughter-in-law present. Pt stated does not want to talk, was \"just repositioned\" and is trying to sleep. Daughter-in-law stated the family has not made any decisions regarding discharge plans. Provided daughter-in-law with hospice liaison number for any  questions. Will continue to follow. Please call [phone] if can be of further assistance.               Discharge Codes    No documentation.               Expected Discharge Date and Time     Expected Discharge Date Expected Discharge Time    May 19, 2023             Vania Bansal RN    "

## 2023-05-17 NOTE — PROGRESS NOTES
[facility] Medicine Services  PROGRESS NOTE    Patient Name: Tala Ramsey  : 1950  MRN: [id number]    Date of Admission: 2023  Primary Care Physician: Sg Horne MD    Subjective   Subjective     CC:  Hypoxia, pneumonia    HPI:  Patient sitting up in bed with family at the bedside reporting neck pain/stiffness.  Patient also experiencing some anxiety and received Atarax 1 hour ago.  Patient does not want to take Ativan at this time.  We will add Lidoderm patch for neck pain/stiffness.BP elevated, will increase amlodipine to 10 mg daily.     Copied text in this note has been reviewed and is accurate as of 23.       ROS:  Gen- No fevers, chills  CV- No chest pain, palpitations  Resp- No cough, + dyspnea  GI- No N/V/D, abd pain  MS- (+) neck pain/stiffness  Psych-(+) anxiety            Objective   Objective     Vital Signs:   Temp:  [97.7 °F (36.5 °C)-97.9 °F (36.6 °C)] 97.7 °F (36.5 °C)  Heart Rate:  [] 94  Resp:  [18-20] 18  BP: (150-178)/(74-85) 150/74  Flow (L/min):  [6] 6     Physical Exam:  Constitutional: Awake, alert, chronically ill-appearing  HENT: NCAT, mucous membranes dry  Respiratory: Diminished in the bases with mild retractions  Cardiovascular: RRR, no murmurs, rubs, or gallop  Gastrointestinal: Positive bowel sounds, soft, nontender, nondistended  Musculoskeletal: No bilateral ankle edema  Psychiatric: Anxious affect, cooperative  Neurologic: Oriented x 3, nonfocal, speech clear  Skin: No rashes    Results Reviewed:  LAB RESULTS:      Lab 23  0733   WBC 20.93*   HEMOGLOBIN 13.4   HEMATOCRIT 39.4   PLATELETS 328   NEUTROS ABS 17.19*   IMMATURE GRANS (ABS) 0.57*   LYMPHS ABS 1.61   MONOS ABS 1.47*   EOS ABS 0.01   MCV 87.9         Lab 23  0720 23  0643 23  0616 23  0733   SODIUM 128* 129* 128* 126*   POTASSIUM 4.4 4.8 4.9 4.7   CHLORIDE 93* 97* 95* 94*   CO2 22.0 22.0 22.0 22.0   ANION GAP 13.0 10.0 11.0 10.0    BUN 16 17 21 23   CREATININE 0.43* 0.37* 0.41* 0.51*   EGFR 102.8 106.6 104.0 98.7   GLUCOSE 82 120* 99 102*   CALCIUM 9.4 9.1 8.8 9.1                         Brief Urine Lab Results  (Last result in the past 365 days)      Color   Clarity   Blood   Leuk Est   Nitrite   Protein   CREAT   Urine HCG        04/23/23 1757 Yellow   Clear   Trace   Negative   Negative   Negative                 Microbiology Results Abnormal     Procedure Component Value - Date/Time    Blood Culture - Blood, Arm, Right [[id number]]  (Normal) Collected: 04/23/23 1125    Lab Status: Final result Specimen: Blood from Arm, Right Updated: 04/28/23 1145     Blood Culture No growth at 5 days    Blood Culture - Blood, Arm, Left [[id number]]  (Normal) Collected: 04/23/23 1125    Lab Status: Final result Specimen: Blood from Arm, Left Updated: 04/28/23 1145     Blood Culture No growth at 5 days    S. Pneumo Ag Urine or CSF - Urine, Urine, Clean Catch [[id number]]  (Normal) Collected: 04/23/23 1757    Lab Status: Final result Specimen: Urine, Clean Catch Updated: 04/24/23 0949     Strep Pneumo Ag Negative    Legionella Antigen, Urine - Urine, Urine, Clean Catch [[id number]]  (Normal) Collected: 04/23/23 1757    Lab Status: Final result Specimen: Urine, Clean Catch Updated: 04/24/23 0949     LEGIONELLA ANTIGEN, URINE Negative    Respiratory Panel PCR w/COVID-19(SARS-CoV-2) CHARI/MARISSA/APOLINAR/PAD/COR/MAD/AMY In-House, NP Swab in UTM/VTM, 3-4 HR TAT - Swab, Nasopharynx [[id number]]  (Normal) Collected: 04/24/23 0544    Lab Status: Final result Specimen: Swab from Nasopharynx Updated: 04/24/23 0647     ADENOVIRUS, PCR Not Detected     Coronavirus 229E Not Detected     Coronavirus HKU1 Not Detected     Coronavirus NL63 Not Detected     Coronavirus OC43 Not Detected     COVID19 Not Detected     Human Metapneumovirus Not Detected     Human Rhinovirus/Enterovirus Not Detected     Influenza A PCR Not Detected     Influenza B PCR Not Detected     Parainfluenza Virus 1  Not Detected     Parainfluenza Virus 2 Not Detected     Parainfluenza Virus 3 Not Detected     Parainfluenza Virus 4 Not Detected     RSV, PCR Not Detected     Bordetella pertussis pcr Not Detected     Bordetella parapertussis PCR Not Detected     Chlamydophila pneumoniae PCR Not Detected     Mycoplasma pneumo by PCR Not Detected    Narrative:      In the setting of a positive respiratory panel with a viral infection PLUS a negative procalcitonin without other underlying concern for bacterial infection, consider observing off antibiotics or discontinuation of antibiotics and continue supportive care. If the respiratory panel is positive for atypical bacterial infection (Bordetella pertussis, Chlamydophila pneumoniae, or Mycoplasma pneumoniae), consider antibiotic de-escalation to target atypical bacterial infection.    MRSA Screen, PCR (Inpatient) - Swab, Nares [[id number]]  (Normal) Collected: 04/23/23 1540    Lab Status: Final result Specimen: Swab from Nares Updated: 04/23/23 1651     MRSA PCR Negative    Narrative:      The negative predictive value of this diagnostic test is high and should only be used to consider de-escalating anti-MRSA therapy. A positive result may indicate colonization with MRSA and must be correlated clinically.  MRSA Negative    COVID PRE-OP / PRE-PROCEDURE SCREENING ORDER (NO ISOLATION) - Swab, Nasopharynx [[id number]]  (Normal) Collected: 04/23/23 1125    Lab Status: Final result Specimen: Swab from Nasopharynx Updated: 04/23/23 1225    Narrative:      The following orders were created for panel order COVID PRE-OP / PRE-PROCEDURE SCREENING ORDER (NO ISOLATION) - Swab, Nasopharynx.  Procedure                               Abnormality         Status                     ---------                               -----------         ------                     COVID-19, FLU A/B, RSV P...[[id number]]  Normal              Final result                 Please view results for these tests on the  individual orders.    COVID-19, FLU A/B, RSV PCR - Swab, Nasopharynx [[id number]]  (Normal) Collected: 04/23/23 1125    Lab Status: Final result Specimen: Swab from Nasopharynx Updated: 04/23/23 1225     COVID19 Not Detected     Influenza A PCR Not Detected     Influenza B PCR Not Detected     RSV, PCR Not Detected    Narrative:      Fact sheet for providers: https://www.fda.gov/media/951019/download    Fact sheet for patients: https://www.fda.gov/media/580305/download    Test performed by PCR.          No radiology results from the last 24 hrs        Current medications:  Scheduled Meds:amLODIPine, 5 mg, Oral, Q24H  cetirizine, 10 mg, Oral, Daily  fluticasone, 2 spray, Each Nare, Nightly  guaiFENesin, 1,200 mg, Oral, Q12H  heparin (porcine), 5,000 Units, Subcutaneous, Q8H  hydroxychloroquine, 200 mg, Oral, Q12H  insulin lispro, 0-7 Units, Subcutaneous, TID AC  ipratropium-albuterol, 3 mL, Nebulization, 4x Daily - RT  lidocaine, 1 patch, Transdermal, Q24H  melatonin, 5 mg, Oral, Nightly  montelukast, 10 mg, Oral, Daily  morphine, 5 mg, Oral, TID  nystatin, , Topical, Q12H  palliative care oral rinse, , Mouth/Throat, Q8H  pantoprazole, 40 mg, Oral, Daily  predniSONE, 40 mg, Oral, Daily With Breakfast  sodium chloride, 1 g, Oral, BID With Meals  sulfamethoxazole-trimethoprim, 1 tablet, Oral, Once per day on Mon Wed Fri      Continuous Infusions:   PRN Meds:.•  acetaminophen **OR** acetaminophen **OR** acetaminophen  •  aluminum-magnesium hydroxide-simethicone **AND** Lidocaine Viscous HCl  •  senna-docusate sodium **AND** polyethylene glycol **AND** bisacodyl **AND** bisacodyl  •  calcium carbonate  •  cyclobenzaprine  •  dextrose  •  dextrose  •  glucagon (human recombinant)  •  hydrOXYzine  •  ipratropium-albuterol  •  LORazepam  •  Magnesium Standard Dose Replacement - Follow Nurse / BPA Driven Protocol  •  morphine  •  ondansetron **OR** ondansetron  •  simethicone  •  sodium chloride  •  sodium chloride  •  sodium  chloride    Assessment & Plan   Assessment & Plan     Active Hospital Problems    Diagnosis  POA   • **Acute hypoxemic respiratory failure -likely due to ILD exacerbation [J96.01]  Yes   • Pulmonary fibrosis [J84.10]  Yes   • ILD (interstitial lung disease) -likely RA associated ILD with acute exacerbation [J84.9]  Yes   • Multifocal pneumonia [J18.9]  Yes      Resolved Hospital Problems   No resolved problems to display.     Brief Hospital Course to date:  Tala Ramsey is a 72 y.o. female w/ hx interstitial lung dz, RA (on humira until recently, recent steroid injections) who presents w/ progressive dyspnea, cough over ~10 days failing outpatient Levaquin. Pt was found to be hypoxic upon admission. She had progressive hypoxia evening after admission placed on hi flow canula       This patient's problems and plans were partially entered by my partner and updated as appropriate by me 05/17/23.    Assessment/Plan:      Acute hypoxic resp failure, worsening  Pulmonary fibrosis/ILD  Bilateral Pneumonia  -likely combination of progession/flare of interstitial lung disease and infection  -initially on High flow with difficulty weaning and significant drops with minimal exertion - but currently on 6LNC with rest. Noted laboring with speech and O2 sats 88% with effort  -s/p full course of zosyn & azithromycin  -3x weekly bactrim ds (pcp prophylaxis as has been on humira and steroids)   -continue scheduled & PRN duonebs  -Pulm following: completed IV solumedrol 5/9 and transitioned po prednisone. Now on 40 mg daily.   -s/p IV diuresis 5/6 and 7 without much improvement so holding.  - Palliative following- continue morphine and ativan prn. Discussed GOC and patient/ sons met with hospice, palliative, and Pulm. Aware that she is not inpatient hospice appropriate and will need to discuss transitioning to facility with Hospice     Hyponatremia  Poor po intake  --No IV access.  Full support but no CPR/intubation.  Sodium  continues to drop.  Pt wishes to keep IV out if at all possible   --added salt tabs bid 5/10.  Na+ 128 today    Hyperkalemia  -- K+ now down to 4.4  - s/p lokelma      RA  -holding humira  -- continue plaquenil     HTN  -continue amlodpine, increase to 10 mg daily  -- hold valsartan due to worsening hyperkalemia  --bp stable -off valsartan    Chronic anemia   -monitor    Goals/limits  -Patient is DNR/DNI (in event of further clinical decompensation patient would NOT want mechanical ventilation; in this case patient would wish to pursue comfort measures)        Expected Discharge Location and Transportation: home vs facility with hospice/ not inpatient appropriate   Expected Discharge TBD  Expected Discharge Date: 5/19/2023; Expected Discharge Time:      DVT prophylaxis:  Medical DVT prophylaxis orders are present. sq heparin    AM-PAC 6 Clicks Score (PT): 11 (05/16/23 0800)    CODE STATUS:   Code Status and Medical Interventions:   Ordered at: 04/23/23 1310     Medical Intervention Limits:    NO intubation (DNI)     Level Of Support Discussed With:    Patient     Code Status (Patient has no pulse and is not breathing):    No CPR (Do Not Attempt to Resuscitate)     Medical Interventions (Patient has pulse or is breathing):    Limited Support       JOYCE Cosme  05/17/23

## 2023-05-17 NOTE — PLAN OF CARE
Problem: Palliative Care  Goal: Enhanced Quality of Life  Intervention: Optimize Psychosocial Wellbeing  Recent Flowsheet Documentation  Taken 5/16/2023 1440 by Isidra Spaulding MSW  Supportive Measures:   active listening utilized   counseling provided   decision-making supported   verbalization of feelings encouraged  Family/Support System Care: support provided  Taken 5/16/2023 1300 by Isidra Spaulding MSW  Family/Support System Care: (Palliative IDT: DO; APRN; LCSW; RN; MDiv) --  Taken 5/16/2023 1055 by Isidra Spaulding MSW  Supportive Measures: (symptom assessment)   active listening utilized   counseling provided   decision-making supported   positive reinforcement provided   self-responsibility promoted   self-care encouraged   verbalization of feelings encouraged  Family/Support System Care:   support provided   caregiver stress acknowledged  Visits at bedside with patient and sons today.  Initial visit, patient with anxiety and dyspnea, remains on nasal cannula.  Ongoing education on treatment of dyspnea-anxiety cycle. Patient received 5 doses of oral morphine yesterday - son states it was patient's best day, she was able to engage, talk, and eat with more comfort and ease.  Patient and son agreeable to scheduling oral morphine and continue prn doses - Dr. Castrejon scheduled oral morphine solution 5mg three times a day , continue current prn dose q3 hours.  Prn ativan and hydroxyzine remain.  Encouraged patient to take bowel regimen given constipation associated with morphine.  Patient and family gathering best information from CM and Hospice to make best disposition plan.  Palliative Care continues to follow for support and assist with symptom management and plan of care.

## 2023-05-17 NOTE — PLAN OF CARE
Problem: Adult Inpatient Plan of Care  Goal: Plan of Care Review  Outcome: Ongoing, Progressing  Goal: Patient-Specific Goal (Individualized)  Outcome: Ongoing, Progressing  Goal: Absence of Hospital-Acquired Illness or Injury  Outcome: Ongoing, Progressing  Intervention: Identify and Manage Fall Risk  Recent Flowsheet Documentation  Taken 5/17/2023 0224 by Rachele Marion RN  Safety Promotion/Fall Prevention:   assistive device/personal items within reach   clutter free environment maintained   room organization consistent   safety round/check completed  Taken 5/17/2023 0027 by Rachele Marion RN  Safety Promotion/Fall Prevention:   assistive device/personal items within reach   clutter free environment maintained   room organization consistent   safety round/check completed  Taken 5/16/2023 2210 by Rachele Marion RN  Safety Promotion/Fall Prevention:   clutter free environment maintained   room organization consistent   safety round/check completed  Taken 5/16/2023 2015 by Rachele Marion RN  Safety Promotion/Fall Prevention:   assistive device/personal items within reach   clutter free environment maintained   fall prevention program maintained   room organization consistent   safety round/check completed  Intervention: Prevent Skin Injury  Recent Flowsheet Documentation  Taken 5/17/2023 0224 by Rachele Marion RN  Body Position:   neutral body alignment   neutral head position   sitting up in bed   weight shifting  Skin Protection:   incontinence pads utilized   tubing/devices free from skin contact  Taken 5/17/2023 0027 by Rachele Marion RN  Body Position:   neutral body alignment   neutral head position  Skin Protection:   incontinence pads utilized   tubing/devices free from skin contact  Taken 5/16/2023 2210 by Rachele Marion RN  Body Position: position changed independently  Skin Protection:   incontinence pads utilized   tubing/devices free from skin contact  Taken 5/16/2023 2015 by Rachele Marion RN  Body  Position:   neutral body alignment   neutral head position   weight shifting   sitting up in bed  Skin Protection:   incontinence pads utilized   tubing/devices free from skin contact  Intervention: Prevent and Manage VTE (Venous Thromboembolism) Risk  Recent Flowsheet Documentation  Taken 5/17/2023 0224 by Rachele Marion RN  Activity Management: bedrest  Taken 5/17/2023 0027 by Rachele Marion RN  Activity Management:   bedrest   activity minimized  Taken 5/16/2023 2210 by Rachele Marion RN  Activity Management:   bedrest   activity minimized  Taken 5/16/2023 2015 by Rachele Marion RN  Activity Management: bedrest  Intervention: Prevent Infection  Recent Flowsheet Documentation  Taken 5/17/2023 0224 by Rachele Marion RN  Infection Prevention: rest/sleep promoted  Taken 5/17/2023 0027 by Rachele Marion RN  Infection Prevention:   hand hygiene promoted   rest/sleep promoted  Taken 5/16/2023 2210 by Rachele Marion RN  Infection Prevention:   rest/sleep promoted   hand hygiene promoted  Taken 5/16/2023 2015 by Rachele Marion RN  Infection Prevention:   rest/sleep promoted   hand hygiene promoted  Goal: Optimal Comfort and Wellbeing  Outcome: Ongoing, Progressing  Goal: Readiness for Transition of Care  Outcome: Ongoing, Progressing     Problem: Asthma Comorbidity  Goal: Maintenance of Asthma Control  Outcome: Ongoing, Progressing  Intervention: Maintain Asthma Symptom Control  Recent Flowsheet Documentation  Taken 5/16/2023 2015 by Rachele Marion RN  Medication Review/Management: medications reviewed     Problem: Behavioral Health Comorbidity  Goal: Maintenance of Behavioral Health Symptom Control  Outcome: Ongoing, Progressing  Intervention: Maintain Behavioral Health Symptom Control  Recent Flowsheet Documentation  Taken 5/16/2023 2015 by Rachele Marion RN  Medication Review/Management: medications reviewed     Problem: COPD (Chronic Obstructive Pulmonary Disease) Comorbidity  Goal: Maintenance of COPD Symptom Control  Outcome:  Ongoing, Progressing  Intervention: Maintain COPD-Symptom Control  Recent Flowsheet Documentation  Taken 5/16/2023 2015 by Rachele Marion RN  Medication Review/Management: medications reviewed     Problem: Diabetes Comorbidity  Goal: Blood Glucose Level Within Targeted Range  Outcome: Ongoing, Progressing     Problem: Heart Failure Comorbidity  Goal: Maintenance of Heart Failure Symptom Control  Outcome: Ongoing, Progressing  Intervention: Maintain Heart Failure-Management  Recent Flowsheet Documentation  Taken 5/16/2023 2015 by Rachele Marion RN  Medication Review/Management: medications reviewed     Problem: Hypertension Comorbidity  Goal: Blood Pressure in Desired Range  Outcome: Ongoing, Progressing  Intervention: Maintain Blood Pressure Management  Recent Flowsheet Documentation  Taken 5/16/2023 2015 by Rachele Marion RN  Medication Review/Management: medications reviewed     Problem: Obstructive Sleep Apnea Risk or Actual Comorbidity Management  Goal: Unobstructed Breathing During Sleep  Outcome: Ongoing, Progressing     Problem: Osteoarthritis Comorbidity  Goal: Maintenance of Osteoarthritis Symptom Control  Outcome: Ongoing, Progressing  Intervention: Maintain Osteoarthritis Symptom Control  Recent Flowsheet Documentation  Taken 5/17/2023 0224 by Rachele Marion RN  Activity Management: bedrest  Taken 5/17/2023 0027 by Rachele Marion RN  Activity Management:   bedrest   activity minimized  Taken 5/16/2023 2210 by Rachele Marion RN  Activity Management:   bedrest   activity minimized  Taken 5/16/2023 2015 by Rachele Marion RN  Activity Management: bedrest  Medication Review/Management: medications reviewed     Problem: Pain Chronic (Persistent) (Comorbidity Management)  Goal: Acceptable Pain Control and Functional Ability  Outcome: Ongoing, Progressing  Intervention: Manage Persistent Pain  Recent Flowsheet Documentation  Taken 5/16/2023 2015 by Rachele Marion RN  Medication Review/Management: medications  reviewed  Intervention: Optimize Psychosocial Wellbeing  Recent Flowsheet Documentation  Taken 5/16/2023 2015 by Rachele Marion RN  Diversional Activities: television     Problem: Seizure Disorder Comorbidity  Goal: Maintenance of Seizure Control  Outcome: Ongoing, Progressing     Problem: Fall Injury Risk  Goal: Absence of Fall and Fall-Related Injury  Outcome: Ongoing, Progressing  Intervention: Identify and Manage Contributors  Recent Flowsheet Documentation  Taken 5/16/2023 2015 by Rachele Marion RN  Medication Review/Management: medications reviewed  Intervention: Promote Injury-Free Environment  Recent Flowsheet Documentation  Taken 5/17/2023 0224 by Rachele Marion RN  Safety Promotion/Fall Prevention:   assistive device/personal items within reach   clutter free environment maintained   room organization consistent   safety round/check completed  Taken 5/17/2023 0027 by Rachele Marion RN  Safety Promotion/Fall Prevention:   assistive device/personal items within reach   clutter free environment maintained   room organization consistent   safety round/check completed  Taken 5/16/2023 2210 by Rachele Marion RN  Safety Promotion/Fall Prevention:   clutter free environment maintained   room organization consistent   safety round/check completed  Taken 5/16/2023 2015 by Rachele Marion RN  Safety Promotion/Fall Prevention:   assistive device/personal items within reach   clutter free environment maintained   fall prevention program maintained   room organization consistent   safety round/check completed     Problem: Fluid Imbalance (Pneumonia)  Goal: Fluid Balance  Outcome: Ongoing, Progressing     Problem: Infection (Pneumonia)  Goal: Resolution of Infection Signs and Symptoms  Outcome: Ongoing, Progressing     Problem: Respiratory Compromise (Pneumonia)  Goal: Effective Oxygenation and Ventilation  Outcome: Ongoing, Progressing  Intervention: Optimize Oxygenation and Ventilation  Recent Flowsheet Documentation  Taken  5/17/2023 0224 by Rachele Marion RN  Head of Bed (HOB) Positioning: HOB at 30-45 degrees  Taken 5/17/2023 0027 by Rachele Marion RN  Head of Bed (HOB) Positioning: HOB at 30-45 degrees  Taken 5/16/2023 2210 by Rachele Marion RN  Head of Bed (HOB) Positioning: HOB elevated  Taken 5/16/2023 2015 by Rachele Marion RN  Head of Bed (HOB) Positioning: HOB at 30-45 degrees     Problem: Palliative Care  Goal: Enhanced Quality of Life  Outcome: Ongoing, Progressing     Problem: Skin Injury Risk Increased  Goal: Skin Health and Integrity  Outcome: Ongoing, Progressing  Intervention: Optimize Skin Protection  Recent Flowsheet Documentation  Taken 5/17/2023 0224 by Rachele Marion RN  Pressure Reduction Techniques:   pressure points protected   positioned off wounds  Head of Bed (HOB) Positioning: HOB at 30-45 degrees  Pressure Reduction Devices:   specialty bed utilized   positioning supports utilized  Skin Protection:   incontinence pads utilized   tubing/devices free from skin contact  Taken 5/17/2023 0027 by Rachele Marion RN  Pressure Reduction Techniques: frequent weight shift encouraged  Head of Bed (HOB) Positioning: HOB at 30-45 degrees  Pressure Reduction Devices:   positioning supports utilized   specialty bed utilized  Skin Protection:   incontinence pads utilized   tubing/devices free from skin contact  Taken 5/16/2023 2210 by Rachele Marion RN  Pressure Reduction Techniques:   positioned off wounds   pressure points protected  Head of Bed (HOB) Positioning: HOB elevated  Pressure Reduction Devices:   positioning supports utilized   specialty bed utilized  Skin Protection:   incontinence pads utilized   tubing/devices free from skin contact  Taken 5/16/2023 2015 by Rachele Marion RN  Pressure Reduction Techniques: pressure points protected  Head of Bed (HOB) Positioning: HOB at 30-45 degrees  Pressure Reduction Devices:   positioning supports utilized   pressure-redistributing mattress utilized   specialty bed  utilized  Skin Protection:   incontinence pads utilized   tubing/devices free from skin contact   Goal Outcome Evaluation:

## 2023-05-17 NOTE — CASE MANAGEMENT/SOCIAL WORK
Continued Stay Note  [facility]     Patient Name: Tlaa Ramsey  MRN: [id number]  Today's Date: 5/17/2023    Admit Date: 4/23/2023    Plan: Ongoing   Discharge Plan     Row Name 05/17/23 1838       Plan    Plan Ongoing    Plan Comments Re-consulted PT and discussed possibility of SNF placement depending on ability to participate.  Case discussed with Hospice CM, daughter-in-law at bedside today and reported to Hospice family continues to consider all options regarding discharge plan.  Will plan to visit with family/patient, along with Hospice CM, tomorrow.  Discussed in unit multidisciplinary rounds this morning and with JOYCE Cosme.  Sanjana Mares, Ext. [phone]    Final Discharge Disposition Code 30 - still a patient               Discharge Codes    No documentation.               Expected Discharge Date and Time     Expected Discharge Date Expected Discharge Time    May 19, 2023             BASSAM Britton

## 2023-05-18 LAB
ANION GAP SERPL CALCULATED.3IONS-SCNC: 11 MMOL/L (ref 5–15)
BUN SERPL-MCNC: 11 MG/DL (ref 8–23)
BUN/CREAT SERPL: 32.4 (ref 7–25)
CALCIUM SPEC-SCNC: 9.2 MG/DL (ref 8.6–10.5)
CHLORIDE SERPL-SCNC: 94 MMOL/L (ref 98–107)
CO2 SERPL-SCNC: 24 MMOL/L (ref 22–29)
CREAT SERPL-MCNC: 0.34 MG/DL (ref 0.57–1)
EGFRCR SERPLBLD CKD-EPI 2021: 108.8 ML/MIN/1.73
GLUCOSE BLDC GLUCOMTR-MCNC: 103 MG/DL (ref 70–130)
GLUCOSE BLDC GLUCOMTR-MCNC: 136 MG/DL (ref 70–130)
GLUCOSE BLDC GLUCOMTR-MCNC: 85 MG/DL (ref 70–130)
GLUCOSE SERPL-MCNC: 77 MG/DL (ref 65–99)
POTASSIUM SERPL-SCNC: 4.4 MMOL/L (ref 3.5–5.2)
SODIUM SERPL-SCNC: 129 MMOL/L (ref 136–145)

## 2023-05-18 PROCEDURE — 25010000002 HEPARIN (PORCINE) PER 1000 UNITS: Performed by: INTERNAL MEDICINE

## 2023-05-18 PROCEDURE — 80048 BASIC METABOLIC PNL TOTAL CA: CPT | Performed by: NURSE PRACTITIONER

## 2023-05-18 PROCEDURE — 63710000001 PREDNISONE PER 1 MG: Performed by: INTERNAL MEDICINE

## 2023-05-18 PROCEDURE — 97168 OT RE-EVAL EST PLAN CARE: CPT

## 2023-05-18 PROCEDURE — 97530 THERAPEUTIC ACTIVITIES: CPT

## 2023-05-18 PROCEDURE — 97110 THERAPEUTIC EXERCISES: CPT

## 2023-05-18 PROCEDURE — 97164 PT RE-EVAL EST PLAN CARE: CPT

## 2023-05-18 PROCEDURE — 94664 DEMO&/EVAL PT USE INHALER: CPT

## 2023-05-18 PROCEDURE — 82948 REAGENT STRIP/BLOOD GLUCOSE: CPT

## 2023-05-18 PROCEDURE — 94799 UNLISTED PULMONARY SVC/PX: CPT

## 2023-05-18 PROCEDURE — 99232 SBSQ HOSP IP/OBS MODERATE 35: CPT | Performed by: NURSE PRACTITIONER

## 2023-05-18 PROCEDURE — 94761 N-INVAS EAR/PLS OXIMETRY MLT: CPT

## 2023-05-18 RX ORDER — POLYETHYLENE GLYCOL 3350 17 G/17G
17 POWDER, FOR SOLUTION ORAL DAILY
Status: DISCONTINUED | OUTPATIENT
Start: 2023-05-18 | End: 2023-05-22

## 2023-05-18 RX ADMIN — GUAIFENESIN 1200 MG: 600 TABLET, EXTENDED RELEASE ORAL at 08:15

## 2023-05-18 RX ADMIN — HEPARIN SODIUM 5000 UNITS: 5000 INJECTION, SOLUTION INTRAVENOUS; SUBCUTANEOUS at 21:00

## 2023-05-18 RX ADMIN — MORPHINE SULFATE 5 MG: 100 SOLUTION ORAL at 12:34

## 2023-05-18 RX ADMIN — POLYETHYLENE GLYCOL 3350 17 G: 17 POWDER, FOR SOLUTION ORAL at 14:04

## 2023-05-18 RX ADMIN — Medication: at 14:05

## 2023-05-18 RX ADMIN — HYDROXYCHLOROQUINE SULFATE 200 MG: 200 TABLET, FILM COATED ORAL at 08:14

## 2023-05-18 RX ADMIN — LIDOCAINE 1 PATCH: 50 PATCH CUTANEOUS at 08:17

## 2023-05-18 RX ADMIN — Medication 5 MG: at 21:00

## 2023-05-18 RX ADMIN — IPRATROPIUM BROMIDE AND ALBUTEROL SULFATE 3 ML: .5; 2.5 SOLUTION RESPIRATORY (INHALATION) at 13:39

## 2023-05-18 RX ADMIN — Medication: at 05:28

## 2023-05-18 RX ADMIN — MORPHINE SULFATE 5 MG: 100 SOLUTION ORAL at 20:59

## 2023-05-18 RX ADMIN — GUAIFENESIN 1200 MG: 600 TABLET, EXTENDED RELEASE ORAL at 21:02

## 2023-05-18 RX ADMIN — LORAZEPAM 1 MG: 1 TABLET ORAL at 21:12

## 2023-05-18 RX ADMIN — Medication: at 21:12

## 2023-05-18 RX ADMIN — CALCIUM CARBONATE (ANTACID) CHEW TAB 500 MG 2 TABLET: 500 CHEW TAB at 17:44

## 2023-05-18 RX ADMIN — CETIRIZINE HYDROCHLORIDE 10 MG: 10 TABLET, FILM COATED ORAL at 08:15

## 2023-05-18 RX ADMIN — HYDROXYCHLOROQUINE SULFATE 200 MG: 200 TABLET, FILM COATED ORAL at 21:01

## 2023-05-18 RX ADMIN — LORAZEPAM 1 MG: 1 TABLET ORAL at 14:04

## 2023-05-18 RX ADMIN — LORAZEPAM 1 MG: 1 TABLET ORAL at 08:24

## 2023-05-18 RX ADMIN — HEPARIN SODIUM 5000 UNITS: 5000 INJECTION, SOLUTION INTRAVENOUS; SUBCUTANEOUS at 05:28

## 2023-05-18 RX ADMIN — PREDNISONE 40 MG: 20 TABLET ORAL at 08:15

## 2023-05-18 RX ADMIN — SODIUM CHLORIDE 1 G: 1 TABLET ORAL at 17:41

## 2023-05-18 RX ADMIN — NYSTATIN: 100000 POWDER TOPICAL at 08:15

## 2023-05-18 RX ADMIN — MONTELUKAST 10 MG: 10 TABLET, FILM COATED ORAL at 08:15

## 2023-05-18 RX ADMIN — AMLODIPINE BESYLATE 5 MG: 5 TABLET ORAL at 08:15

## 2023-05-18 RX ADMIN — SODIUM CHLORIDE 1 G: 1 TABLET ORAL at 08:13

## 2023-05-18 RX ADMIN — IPRATROPIUM BROMIDE AND ALBUTEROL SULFATE 3 ML: .5; 2.5 SOLUTION RESPIRATORY (INHALATION) at 19:53

## 2023-05-18 RX ADMIN — MORPHINE SULFATE 5 MG: 100 SOLUTION ORAL at 08:15

## 2023-05-18 RX ADMIN — MORPHINE SULFATE 5 MG: 100 SOLUTION ORAL at 16:28

## 2023-05-18 RX ADMIN — HEPARIN SODIUM 5000 UNITS: 5000 INJECTION, SOLUTION INTRAVENOUS; SUBCUTANEOUS at 14:04

## 2023-05-18 RX ADMIN — NYSTATIN: 100000 POWDER TOPICAL at 21:02

## 2023-05-18 RX ADMIN — IPRATROPIUM BROMIDE AND ALBUTEROL SULFATE 3 ML: .5; 2.5 SOLUTION RESPIRATORY (INHALATION) at 08:40

## 2023-05-18 RX ADMIN — IPRATROPIUM BROMIDE AND ALBUTEROL SULFATE 3 ML: .5; 2.5 SOLUTION RESPIRATORY (INHALATION) at 16:55

## 2023-05-18 RX ADMIN — PANTOPRAZOLE SODIUM 40 MG: 40 TABLET, DELAYED RELEASE ORAL at 08:15

## 2023-05-18 RX ADMIN — MORPHINE SULFATE 5 MG: 100 SOLUTION ORAL at 03:56

## 2023-05-18 NOTE — PLAN OF CARE
Goal Outcome Evaluation:  Plan of Care Reviewed With: patient, family        Progress: improving  Outcome Evaluation: PT re-eval completed. Presents w/ MF PNA, ILD exac., high anx. level re: mobil, decr LE strength/endurance & impaired funct mobil. Rolled L/R w/ min A, using bedrail & draw sheet, to don mesh panty/pad, transf to sitting EOB w/ max A using draw sheet, monico EOB activ x 10 min, w/ onset dizziness, mod SOB, desat 85% on 6L, incr anx, , elev BP at baseline, neck pain despite lidoderm patch, & fatigue, then ret. to sup w/ max 2A & scooted to HOB w/ dep.3A using draw sheet. Recommend SNF at d/c.

## 2023-05-18 NOTE — CASE MANAGEMENT/SOCIAL WORK
Continued Stay Note  [facility]     Patient Name: Tala Ramsey  MRN: [id number]  Today's Date: 5/18/2023    Admit Date: 4/23/2023    Plan: SNF   Discharge Plan     Row Name 05/18/23 1726       Plan    Plan SNF    Patient/Family in Agreement with Plan yes  Patient and sons    Plan Comments Long conversation with patient and sons today regarding d/c plan.  Reviewed all options, patient reports she would like to continue with rehab and be able to get from side of bed to BSC and up to chair.  Worked with PT/OT today.  All in agreement with SNF for rehab-will make referrals to [place], [place] and [place].  Reviewed SNF placement process with patient/family.  Concerns regarding continuing with current medication regiment in SNF--discussed we would transfer to facility with current medications in place as long as facility able to provide.  If any issue with facility providing, MD would make appropriate adjustments prior to d/c.  Discussed options for post rehab-home with Hospice vs. Home Health or transitioning to LTC.  Patient/family hopeful she will improve enough to return home-aware should they choose to return home with Home Health can always transition from Home Health to Hospice as needed.  Anticipate transfer to SNF once bed obtained and receive insurance approval.  Updated Hospice CM with plan.  Sanjana Mares, Ext. [phone]    Final Discharge Disposition Code 03 - skilled nursing facility (SNF)    Row Name 05/18/23 1704       Plan    Plan Short term rehab    Plan Comments Telephone call from Sanjana PERSAUD, , stating opted to call pt's family instead of having a face to face meeting. Sanjana stated both sons were on the call, discussions was had on a plan of care for pt. Pt did work wit therapy today and told the staff is willing to keep working with therapy. Sanjana discussed short term rehab for pt, when pt is no  longer able to participate in rehab the family can make the decision to take pt home with or without hospice or do long term care with or without hospice. Sanjana stated the decision was made to move forward with finding short term rehab for pt. Hospice liaison will continue to follow. Please call [phone] if can be of further assistance.               Discharge Codes    No documentation.               Expected Discharge Date and Time     Expected Discharge Date Expected Discharge Time    May 23, 2023             BASSAM Britton

## 2023-05-18 NOTE — PLAN OF CARE
Goal Outcome Evaluation:              Outcome Evaluation: Patient remains in same condition today. Requiring 6L oxygen nasal cannula. Agreed to work with PT/OT today and was able to sit on side of the bed. Tolerating diet, but appetite is low. Plan still being made for discharge. Will continue to monitor.

## 2023-05-18 NOTE — PROGRESS NOTES
[facility] Medicine Services  PROGRESS NOTE    Patient Name: Tala Ramsey  : 1950  MRN: [id number]    Date of Admission: 2023  Primary Care Physician: Sg Horne MD    Subjective   Subjective     CC:  F/u hypoxia, PNA    HPI:  Patient resting in bed.  Daughter-in-law at bedside.  Long discussion with patient and DRIL regarding discharge plan options.  Patient concerned about her shortness of breath and anxiety and having her symptoms managed.  Notes she has not had a BM in 11 days.  Denies N/V. On 6L NC.    ROS:  Gen- No fevers, chills  CV- No chest pain, palpitations  Resp- No cough  GI- No abd pain    Objective   Objective     Vital Signs:   Temp:  [97.4 °F (36.3 °C)-97.8 °F (36.6 °C)] 97.4 °F (36.3 °C)  Heart Rate:  [81-99] 81  Resp:  [18-20] 18  BP: (136-144)/(65-89) 144/65  Flow (L/min):  [6] 6     Physical Exam:  Constitutional: No acute distress, awake, alert  HENT: NCAT, mucous membranes moist  Respiratory: Scattered rhonchi BLL, pursed-lip breathing, 6L NC  Cardiovascular: Tachycardic, no murmurs, rubs, or gallops  Gastrointestinal: Positive bowel sounds, soft, nontender, nondistended  Musculoskeletal: Trace bilateral ankle edema  Psychiatric: Appropriate affect, cooperative  Neurologic: Oriented x 3, knows all extremities, no focal deficits, speech clear  Skin: No rashes      Results Reviewed:  LAB RESULTS:          Lab 23  0641 23  0720 23  0643 23  0616   SODIUM 129* 128* 129* 128*   POTASSIUM 4.4 4.4 4.8 4.9   CHLORIDE 94* 93* 97* 95*   CO2 24.0 22.0 22.0 22.0   ANION GAP 11.0 13.0 10.0 11.0   BUN 11 16 17 21   CREATININE 0.34* 0.43* 0.37* 0.41*   EGFR 108.8 102.8 106.6 104.0   GLUCOSE 77 82 120* 99   CALCIUM 9.2 9.4 9.1 8.8                         Brief Urine Lab Results  (Last result in the past 365 days)      Color   Clarity   Blood   Leuk Est   Nitrite   Protein   CREAT   Urine HCG        23 1757 Yellow   Clear    Trace   Negative   Negative   Negative                 Microbiology Results Abnormal     Procedure Component Value - Date/Time    Blood Culture - Blood, Arm, Right [[id number]]  (Normal) Collected: 04/23/23 1125    Lab Status: Final result Specimen: Blood from Arm, Right Updated: 04/28/23 1145     Blood Culture No growth at 5 days    Blood Culture - Blood, Arm, Left [[id number]]  (Normal) Collected: 04/23/23 1125    Lab Status: Final result Specimen: Blood from Arm, Left Updated: 04/28/23 1145     Blood Culture No growth at 5 days    S. Pneumo Ag Urine or CSF - Urine, Urine, Clean Catch [[id number]]  (Normal) Collected: 04/23/23 1757    Lab Status: Final result Specimen: Urine, Clean Catch Updated: 04/24/23 0949     Strep Pneumo Ag Negative    Legionella Antigen, Urine - Urine, Urine, Clean Catch [[id number]]  (Normal) Collected: 04/23/23 1757    Lab Status: Final result Specimen: Urine, Clean Catch Updated: 04/24/23 0949     LEGIONELLA ANTIGEN, URINE Negative    Respiratory Panel PCR w/COVID-19(SARS-CoV-2) CHARI/MARISSA/APOLINAR/PAD/COR/MAD/AMY In-House, NP Swab in UTM/VTM, 3-4 HR TAT - Swab, Nasopharynx [[id number]]  (Normal) Collected: 04/24/23 0544    Lab Status: Final result Specimen: Swab from Nasopharynx Updated: 04/24/23 0647     ADENOVIRUS, PCR Not Detected     Coronavirus 229E Not Detected     Coronavirus HKU1 Not Detected     Coronavirus NL63 Not Detected     Coronavirus OC43 Not Detected     COVID19 Not Detected     Human Metapneumovirus Not Detected     Human Rhinovirus/Enterovirus Not Detected     Influenza A PCR Not Detected     Influenza B PCR Not Detected     Parainfluenza Virus 1 Not Detected     Parainfluenza Virus 2 Not Detected     Parainfluenza Virus 3 Not Detected     Parainfluenza Virus 4 Not Detected     RSV, PCR Not Detected     Bordetella pertussis pcr Not Detected     Bordetella parapertussis PCR Not Detected     Chlamydophila pneumoniae PCR Not Detected     Mycoplasma pneumo by PCR Not Detected     Narrative:      In the setting of a positive respiratory panel with a viral infection PLUS a negative procalcitonin without other underlying concern for bacterial infection, consider observing off antibiotics or discontinuation of antibiotics and continue supportive care. If the respiratory panel is positive for atypical bacterial infection (Bordetella pertussis, Chlamydophila pneumoniae, or Mycoplasma pneumoniae), consider antibiotic de-escalation to target atypical bacterial infection.    MRSA Screen, PCR (Inpatient) - Swab, Nares [[id number]]  (Normal) Collected: 04/23/23 1540    Lab Status: Final result Specimen: Swab from Nares Updated: 04/23/23 1651     MRSA PCR Negative    Narrative:      The negative predictive value of this diagnostic test is high and should only be used to consider de-escalating anti-MRSA therapy. A positive result may indicate colonization with MRSA and must be correlated clinically.  MRSA Negative    COVID PRE-OP / PRE-PROCEDURE SCREENING ORDER (NO ISOLATION) - Swab, Nasopharynx [[id number]]  (Normal) Collected: 04/23/23 1125    Lab Status: Final result Specimen: Swab from Nasopharynx Updated: 04/23/23 1225    Narrative:      The following orders were created for panel order COVID PRE-OP / PRE-PROCEDURE SCREENING ORDER (NO ISOLATION) - Swab, Nasopharynx.  Procedure                               Abnormality         Status                     ---------                               -----------         ------                     COVID-19, FLU A/B, RSV P...[[id number]]  Normal              Final result                 Please view results for these tests on the individual orders.    COVID-19, FLU A/B, RSV PCR - Swab, Nasopharynx [[id number]]  (Normal) Collected: 04/23/23 1125    Lab Status: Final result Specimen: Swab from Nasopharynx Updated: 04/23/23 1225     COVID19 Not Detected     Influenza A PCR Not Detected     Influenza B PCR Not Detected     RSV, PCR Not Detected    Narrative:      Fact  sheet for providers: https://www.fda.gov/media/581879/download    Fact sheet for patients: https://www.fda.gov/media/696945/download    Test performed by PCR.          No radiology results from the last 24 hrs        Current medications:  Scheduled Meds:amLODIPine, 5 mg, Oral, Q24H  cetirizine, 10 mg, Oral, Daily  fluticasone, 2 spray, Each Nare, Nightly  guaiFENesin, 1,200 mg, Oral, Q12H  heparin (porcine), 5,000 Units, Subcutaneous, Q8H  hydroxychloroquine, 200 mg, Oral, Q12H  insulin lispro, 0-7 Units, Subcutaneous, TID AC  ipratropium-albuterol, 3 mL, Nebulization, 4x Daily - RT  lidocaine, 1 patch, Transdermal, Q24H  melatonin, 5 mg, Oral, Nightly  montelukast, 10 mg, Oral, Daily  morphine, 5 mg, Oral, TID  nystatin, , Topical, Q12H  palliative care oral rinse, , Mouth/Throat, Q8H  pantoprazole, 40 mg, Oral, Daily  predniSONE, 40 mg, Oral, Daily With Breakfast  sodium chloride, 1 g, Oral, BID With Meals  sulfamethoxazole-trimethoprim, 1 tablet, Oral, Once per day on Mon Wed Fri      Continuous Infusions:   PRN Meds:.•  acetaminophen **OR** acetaminophen **OR** acetaminophen  •  aluminum-magnesium hydroxide-simethicone **AND** Lidocaine Viscous HCl  •  senna-docusate sodium **AND** polyethylene glycol **AND** bisacodyl **AND** bisacodyl  •  calcium carbonate  •  cyclobenzaprine  •  dextrose  •  dextrose  •  glucagon (human recombinant)  •  hydrOXYzine  •  ipratropium-albuterol  •  LORazepam  •  Magnesium Standard Dose Replacement - Follow Nurse / BPA Driven Protocol  •  morphine  •  ondansetron **OR** ondansetron  •  simethicone  •  sodium chloride  •  sodium chloride  •  sodium chloride    Assessment & Plan   Assessment & Plan     Active Hospital Problems    Diagnosis  POA   • **Acute hypoxemic respiratory failure -likely due to ILD exacerbation [J96.01]  Yes   • Pulmonary fibrosis [J84.10]  Yes   • ILD (interstitial lung disease) -likely RA associated ILD with acute exacerbation [J84.9]  Yes   • Multifocal  pneumonia [J18.9]  Yes      Resolved Hospital Problems   No resolved problems to display.        Brief Hospital Course to date:  Tala Ramsey is a 72 y.o. female  w/ hx interstitial lung dz, RA (on humira until recently, recent steroid injections) who presented w/ progressive dyspnea, cough over ~10 days who failed outpatient Levaquin. Pt was found to be hypoxic upon admission. She had progressive hypoxia the evening after admission and was placed on HFNC.        This patient's problems and plans were partially entered by my partner and updated as appropriate by me 05/18/23.      Assessment/Plan:        Acute hypoxic resp failure, worsening  Pulmonary fibrosis/ILD  Bilateral Pneumonia  -likely combination of progession/flare of interstitial lung disease and infection  -initially on HFNC with difficulty weaning and significant drops with minimal exertion but currently on 6LNC with rest.   -s/p full course of zosyn & azithromycin  -3x weekly bactrim ds (pcp prophylaxis as has been on humira and steroids)   -continue scheduled & PRN duonebs  -Pulm following: completed IV solumedrol 5/9 and transitioned po prednisone. Started on 40 mg daily on 5/16. Decrease by 5 mg  per week until at 10 mg daily. Can stop PCP prophylaxis (bactrim) once she is at 20 mg.  -s/p IV diuresis 5/6 and 7 without much improvement so holding.  -Palliative following- continue morphine and ativan prn.      Constipation  -pt notes no BM x 11 days, but BM charted 5/16  -add daily Miralax for now    Hyponatremia  Poor po intake  --No IV access.  Full support but no CPR/intubation.  Sodium continues to drop.  Pt wishes to keep IV out if at all possible   --added salt tabs bid 5/10.  Na+ 129 today     Hyperkalemia  - K+ now down to 4.4  - s/p lokelma       RA  -holding humira  -continue plaquenil      HTN  - continue amlodpine  - holding valsartan due to hyperkalemia  - bp stable off valsartan     Chronic anemia    -monitor     Goals/limits  -Patient is DNR/DNI (in event of further clinical decompensation patient would NOT want mechanical ventilation; in this case patient would wish to pursue comfort measures). Patient considering home with hospice vs home with home health vs SNF rehab.     Expected Discharge Location and Transportation: SNF vs home with hospice, EMS  Expected Discharge pending final disposition  Expected Discharge Date: 5/19/2023; Expected Discharge Time:      DVT prophylaxis:  Medical DVT prophylaxis orders are present.     AM-PAC 6 Clicks Score (PT): 9 (05/17/23 2000)    CODE STATUS:   Code Status and Medical Interventions:   Ordered at: 04/23/23 1310     Medical Intervention Limits:    NO intubation (DNI)     Level Of Support Discussed With:    Patient     Code Status (Patient has no pulse and is not breathing):    No CPR (Do Not Attempt to Resuscitate)     Medical Interventions (Patient has pulse or is breathing):    Limited Support       Liliana Luis, APRN  05/18/23

## 2023-05-18 NOTE — THERAPY RE-EVALUATION
Patient Name: Tala Ramsey  : 1950    MRN: [id number]                              Today's Date: 2023       Admit Date: 2023    Visit Dx:     ICD-10-CM ICD-9-CM   1. Multifocal pneumonia  J18.9 486   2. Failure of outpatient treatment  Z78.9 V49.89   3. Hypoxia  R09.02 799.02     Patient Active Problem List   Diagnosis   • Lumbar herniated disc   • Multifocal pneumonia   • Pulmonary fibrosis   • ILD (interstitial lung disease) -likely RA associated ILD with acute exacerbation   • Acute hypoxemic respiratory failure -likely due to ILD exacerbation     Past Medical History:   Diagnosis Date   • Anemia    • Back pain    • Bronchitis    • Hypertension    • Low back pain    • Pulmonary fibrosis    • Rheumatoid arthritis      Past Surgical History:   Procedure Laterality Date   • DILATION AND CURETTAGE, DIAGNOSTIC / THERAPEUTIC        General Information     Row Name 23 1252          Physical Therapy Time and Intention    Document Type re-evaluation  -DM     Mode of Treatment physical therapy  -DM     Row Name 23 1252          General Information    Patient Profile Reviewed yes  -DM     Prior Level of Function --  per IE  -DM     Existing Precautions/Restrictions fall;oxygen therapy device and L/min;other (see comments)  PT re-ev.order 5-17;PNA;ILD exac.;pulm fib.;now weaned fromHFNC to 6L nasal angel.; premed for neck pain(lidoderm patch) & Anx.(atarax);has sacral/butt.skin breakdown(mepilex);5-14: hospice consult  -DM     Barriers to Rehab medically complex;previous functional deficit;ineffective coping  -DM     Row Name 23 1252          Living Environment    People in Home --  per IE  -DM     Row Name 23 1252          Cognition    Orientation Status (Cognition) oriented x 3  -DM     Row Name 23 1252          Safety Issues, Functional Mobility    Safety Issues Affecting Function (Mobility) insight into deficits/self-awareness;safety precaution awareness;safety  precautions follow-through/compliance;sequencing abilities  -DM     Impairments Affecting Function (Mobility) balance;endurance/activity tolerance;pain;postural/trunk control;shortness of breath;strength  -DM     Comment, Safety Issues/Impairments (Mobility) pt is highly anx. w/ all mobil.  -DM           User Key  (r) = Recorded By, (t) = Taken By, (c) = Cosigned By    Initials Name Provider Type    DM Vania Shultz, PT Physical Therapist               Mobility     Row Name 05/18/23 0756          Bed Mobility    Bed Mobility rolling left;rolling right;scooting/bridging;sit-supine;sidelying-sit  -DM     Rolling Left [place] (Bed Mobility) verbal cues;minimum assist (75% patient effort)  rolled x 2 to don,then doff, mesh panty/pad & repl.soiled draw sheet & pads  -DM     Rolling Right [place] (Bed Mobility) verbal cues;minimum assist (75% patient effort)  -DM     Scooting/Bridging [place] (Bed Mobility) verbal cues;dependent (less than 25% patient effort);2 person assist  scooted to HOB w/ Dep.3A using draw sheet  -DM     Sit-Supine [place] (Bed Mobility) verbal cues;maximum assist (25% patient effort);2 person assist  max 2A to lift LE's to sup.  -DM     Sidelying-Sit [place] (Bed Mobility) verbal cues;nonverbal cues (demo/gesture);minimum assist (75% patient effort);2 person assist  Logrolling transf (d/t neck pain/stiffness) to sitting on L EOB  -DM     Assistive Device (Bed Mobility) bed rails;draw sheet  -DM     Comment, (Bed Mobility) Attempted re-eval this AM but pt/family stated pt had bad night & not feeling well, & req. check back this PM; pt had atarax 10 am for anx,& lidoderm patch for neck pain/stiffness, + Amio for elev BP;has bleeding skin lesion on L buttock(nsg will perform wd.care & apply dsg);soiled draw sheet/pads (pure wick leaking) were repl;issued mesh panty/pad,emesis basin(belching & c/o feeling poorly), td.socks;brought loaner R wx,port o2;once EOB,unable to don  tdsocks (OT placed),& had LOB post; req mod A of 2 to scoot forw.,then maintain upright posture;o2 desat 85% on 6L;cues for PLB exer;c/o onset dizziness;nsg informed & brought port.BP (2 trials RUE unsucc.);HR to 114;did LAQ,AP;s/p 10 EOB activ.,still mod SOB & pt req. to lie back;once sup, rolled to doff panty/pad;nsg brought new port BP & tried LUE;o2 sat.incr to 91%,then fell to 88% again;emesis basin given(feeling poorly)  -DM     Row Name 05/18/23 1252          Transfers    Comment, (Transfers) unable to attempt STS or SPT d/t highly anx, dizzy spell, mod SOB, desat 85% on 6L, neck pain, belching & feeling poorly  -DM     Row Name 05/18/23 1252          Bed-Chair Transfer    Bed-Chair [place] (Transfers) unable to assess  -DM     Row Name 05/18/23 1252          Sit-Stand Transfer    Sit-Stand [place] (Transfers) unable to assess  -DM     Row Name 05/18/23 1252          Gait/Stairs (Locomotion)    [place] Level (Gait) unable to assess  -DM     Comment, (Gait/Stairs) Pt. has not amb since PT eval 4-28 (2-3 sidesteps bed to chair w/ min2A)  -DM           User Key  (r) = Recorded By, (t) = Taken By, (c) = Cosigned By    Initials Name Provider Type    DM Vania Shultz, PT Physical Therapist               Obj/Interventions     Row Name 05/18/23 1252          Range of Motion Comprehensive    General Range of Motion lower extremity range of motion deficits identified  -DM     Comment, General Range of Motion B hips [place]. 10-15% (tissue approx)  -DM     Row Name 05/18/23 1252          Strength Comprehensive (MMT)    General Manual Muscle Testing (MMT) Assessment lower extremity strength deficits identified  -DM     Comment, General Manual Muscle Testing (MMT) Assessment BLE grossly 3- to 3+/5  -DM     Row Name 05/18/23 1252          Motor Skills    Therapeutic Exercise hip;knee;ankle  -DM     Row Name 05/18/23 1252          Hip (Therapeutic Exercise)    Hip (Therapeutic Exercise) AROM (active range of  motion);AAROM (active assistive range of motion)  -DM     Hip AROM (Therapeutic Exercise) bilateral;flexion;extension;supine;5 repetitions;other (see comments)  hip & knee F/E w/ h.slides;bridging to don panty,then attempting to scoot to HOB  -DM     Hip AAROM (Therapeutic Exercise) bilateral;aBduction;aDduction;supine;5 repetitions  -DM     Row Name 05/18/23 1252          Knee (Therapeutic Exercise)    Knee (Therapeutic Exercise) AROM (active range of motion)  -DM     Knee AROM (Therapeutic Exercise) bilateral;flexion;extension;LAQ (long arc quad);heel slides;sitting;supine;10 repetitions;5 repetitions  -DM     Row Name 05/18/23 1252          Ankle (Therapeutic Exercise)    Ankle (Therapeutic Exercise) AROM (active range of motion)  -DM     Ankle AROM (Therapeutic Exercise) bilateral;dorsiflexion;plantarflexion;10 repetitions;sitting  -DM     Row Name 05/18/23 1252          Balance    Balance Assessment sitting static balance;sitting dynamic balance  -DM     Static Sitting Balance verbal cues;moderate assist  trunk ext, PLB exer, LAQ  -DM     Dynamic Sitting Balance moderate assist;2-person assist  recip scooting F/B@EOB  -DM     Position, Sitting Balance supported;sitting edge of bed  -DM     Balance Interventions sitting;static;dynamic;weight shifting activity  -DM           User Key  (r) = Recorded By, (t) = Taken By, (c) = Cosigned By    Initials Name Provider Type    DM Vania Shultz, PT Physical Therapist               Goals/Plan     Row Name 05/18/23 1254          Bed Mobility Goal 1 (PT)    Activity/Assistive Device (Bed Mobility Goal 1, PT) bed mobility activities, all  -DM     [place] Level/Cues Needed (Bed Mobility Goal 1, PT) minimum assist (75% or more patient effort)  -DM     Time Frame (Bed Mobility Goal 1, PT) long term goal (LTG);2 weeks  -DM     Progress/Outcomes (Bed Mobility Goal 1, PT) new goal  -DM     Row Name 05/18/23 1256          Transfer Goal 1 (PT)    Activity/Assistive Device  (Transfer Goal 1, PT) sit-to-stand/stand-to-sit;bed-to-chair/chair-to-bed;walker, rolling  -DM     [place] Level/Cues Needed (Transfer Goal 1, PT) verbal cues required;maximum assist (25-49% patient effort)  -DM     Time Frame (Transfer Goal 1, PT) long term goal (LTG);2 weeks  -DM     Progress/Outcome (Transfer Goal 1, PT) new goal  -DM     Row Name 05/18/23 1252          Gait Training Goal 1 (PT)    Activity/Assistive Device (Gait Training Goal 1, PT) gait (walking locomotion);walker, rolling  stable VS; O2 sat > 92%  -DM     [place] Level (Gait Training Goal 1, PT) maximum assist (25-49% patient effort)  -DM     Distance (Gait Training Goal 1, PT) 5  -DM     Time Frame (Gait Training Goal 1, PT) long term goal (LTG);2 weeks  -DM     Progress/Outcome (Gait Training Goal 1, PT) new goal  -DM     Row Name 05/18/23 1252          Patient Education Goal (PT)    Activity (Patient Education Goal, PT) HEP exer  -DM     [place]/Cues/Accuracy (Memory Goal 2, PT) demonstrates adequately;verbalizes understanding  -DM     Time Frame (Patient Education Goal, PT) long term goal (LTG);2 weeks  -DM     Row Name 05/18/23 1252          Therapy Assessment/Plan (PT)    Planned Therapy Interventions (PT) balance training;bed mobility training;gait training;home exercise program;patient/family education;strengthening;transfer training  -DM           User Key  (r) = Recorded By, (t) = Taken By, (c) = Cosigned By    Initials Name Provider Type    DM Vania Shultz, PT Physical Therapist               Clinical Impression     Row Name 05/18/23 1252          Pain    Pretreatment Pain Rating 5/10  -DM     Posttreatment Pain Rating 5/10  -DM     Pain Location - Side/Orientation Bilateral  -DM     Pain Location posterior  -DM     Pain Location - neck  -DM     Pain Intervention(s) Medication (See MAR);Repositioned;Rest;Elevated  -DM     Additional Documentation Pain Scale: Numbers Pre/Post-Treatment (Group)  -DM     Row Name  05/18/23 1252          Plan of Care Review    Plan of Care Reviewed With patient;family  -DM     Progress improving  -DM     Outcome Evaluation PT re-eval completed. Presents w/ MF PNA, ILD exac., high anx. level re: mobil, decr LE strength/endurance & impaired funct mobil. Rolled L/R w/ min A, using bedrail & draw sheet, to don mesh panty/pad, transf to sitting EOB w/ max A using draw sheet, monico EOB activ x 10 min, w/ onset dizziness, mod SOB, desat 85% on 6L, incr anx, , elev BP at baseline, neck pain despite lidoderm patch, & fatigue, then ret. to sup w/ max 2A & scooted to HOB w/ dep.3A using draw sheet. Recommend SNF at d/c.  -DM     Row Name 05/18/23 1252          Therapy Assessment/Plan (PT)    Patient/Family Therapy Goals Statement (PT) improved funct mobil  -DM     Rehab Potential (PT) good, to achieve stated therapy goals  -DM     Criteria for Skilled Interventions Met (PT) yes;meets criteria;skilled treatment is necessary  -DM     Therapy Frequency (PT) daily  -DM     Row Name 05/18/23 1252          Vital Signs    Pre Systolic BP Rehab 144  -DM     Pre Treatment Diastolic BP 65  -DM     Pretreatment Heart Rate (beats/min) 95  -DM     Intratreatment Heart Rate (beats/min) 114  -DM     Posttreatment Heart Rate (beats/min) 108  -DM     Pre SpO2 (%) 90  -DM     O2 Delivery Pre Treatment nasal cannula  -DM     Intra SpO2 (%) 85  -DM     O2 Delivery Intra Treatment nasal cannula  -DM     Post SpO2 (%) 90  -DM     O2 Delivery Post Treatment nasal cannula  -DM     Pre Patient Position Supine  -DM     Intra Patient Position Sitting  -DM     Post Patient Position Supine  -DM     Row Name 05/18/23 1252          Positioning and Restraints    Pre-Treatment Position in bed  -DM     Post Treatment Position bed  -DM     In Bed notified nsg;supine;call light within reach;encouraged to call for assist;exit alarm on;with nsg;with family/caregiver  DIL; Nsg & PCT present & will bathe pt,then change bed  -DM            User Key  (r) = Recorded By, (t) = Taken By, (c) = Cosigned By    Initials Name Provider Type    Vania Brown, PT Physical Therapist               Outcome Measures     Row Name 05/18/23 1252 05/18/23 0800       How much help from another person do you currently need...    Turning from your back to your side while in flat bed without using bedrails? 2  -DM 2  -DD    Moving from lying on back to sitting on the side of a flat bed without bedrails? 2  -DM 2  -DD    Moving to and from a bed to a chair (including a wheelchair)? 1  -DM 2  -DD    Standing up from a chair using your arms (e.g., wheelchair, bedside chair)? 1  -DM 1  -DD    Climbing 3-5 steps with a railing? 1  -DM 1  -DD    To walk in hospital room? 1  -DM 1  -DD    AM-PAC 6 Clicks Score (PT) 8  -DM 9  -DD    Highest level of mobility 3 --> Sat at edge of bed  -DM 3 --> Sat at edge of bed  -DD    Row Name 05/18/23 1345 05/18/23 1252       Functional Assessment    Outcome Measure Options AM-PAC 6 Clicks Daily Activity (OT)  -JOSE AM-PAC 6 Clicks Basic Mobility (PT)  -ISHAN          User Key  (r) = Recorded By, (t) = Taken By, (c) = Cosigned By    Initials Name Provider Type    Jessica Rick, OT Occupational Therapist    Vania Brown, PT Physical Therapist    Hu Archer, RN Registered Nurse                             Physical Therapy Education     Title: PT OT SLP Therapies (In Progress)     Topic: Physical Therapy (In Progress)     Point: Mobility training (In Progress)     Learning Progress Summary           Patient Acceptance, E,D, NR by IHSAN at 5/18/2023 1412    Acceptance, E, NR by JM at 5/12/2023 1608    Acceptance, E, VU by JOSE at 5/5/2023 0447    Acceptance, E,D, VU,NR by AB at 5/1/2023 1537    Acceptance, E, NR by KG at 4/28/2023 1306    Acceptance, E, NR by KG at 4/25/2023 1402   Family Acceptance, E,D, NR by DM at 5/18/2023 1412                   Point: Home exercise program (In Progress)     Learning Progress Summary            Patient Acceptance, E,D, NR by DM at 5/18/2023 1412    Acceptance, E, NR by JM at 5/12/2023 1608    Acceptance, E, NR by KG at 4/28/2023 1306   Family Acceptance, E,D, NR by DM at 5/18/2023 1412                   Point: Body mechanics (In Progress)     Learning Progress Summary           Patient Acceptance, E,D, NR by DM at 5/18/2023 1412    Acceptance, E, NR by JM at 5/12/2023 1608    Acceptance, E, VU by JOSE at 5/5/2023 0447    Acceptance, E,D, VU,NR by AB at 5/1/2023 1537    Acceptance, E, NR by KG at 4/28/2023 1306    Acceptance, E, NR by KG at 4/25/2023 1402   Family Acceptance, E,D, NR by DM at 5/18/2023 1412                   Point: Precautions (In Progress)     Learning Progress Summary           Patient Acceptance, E,D, NR by DM at 5/18/2023 1412    Acceptance, E, NR by JM at 5/12/2023 1608    Acceptance, E, VU by JOSE at 5/5/2023 0447    Acceptance, E,D, VU,NR by AB at 5/1/2023 1537    Acceptance, E, NR by KG at 4/28/2023 1306    Acceptance, E, NR by KG at 4/25/2023 1402   Family Acceptance, E,D, NR by DM at 5/18/2023 1412                               User Key     Initials Effective Dates Name Provider Type Discipline    DM 02/03/23 -  Vania Shultz, PT Physical Therapist PT    JOSE 06/16/21 -  Jake Taylor, RN Registered Nurse Nurse    KG 05/22/20 -  Cici Salas, PT Physical Therapist PT     06/16/21 -  Leti Marie, RN Registered Nurse Nurse    AB 09/22/22 -  Oumou Phelps, PT Physical Therapist PT              PT Recommendation and Plan  Planned Therapy Interventions (PT): balance training, bed mobility training, gait training, home exercise program, patient/family education, strengthening, transfer training  Plan of Care Reviewed With: patient, family  Progress: improving  Outcome Evaluation: PT re-eval completed. Presents w/ MF PNA, ILD exac., high anx. level re: mobil, decr LE strength/endurance & impaired funct mobil. Rolled L/R w/ min A, using bedrail & draw sheet, to don  mesh panty/pad, transf to sitting EOB w/ max A using draw sheet, monico EOB activ x 10 min, w/ onset dizziness, mod SOB, desat 85% on 6L, incr anx, , elev BP at baseline, neck pain despite lidoderm patch, & fatigue, then ret. to sup w/ max 2A & scooted to HOB w/ dep.3A using draw sheet. Recommend SNF at d/c.     Time Calculation:    PT Charges     Row Name 05/18/23 1412             Time Calculation    Start Time 1252  -DM      PT Received On 05/18/23  -DM      PT Goal Re-Cert Due Date 05/28/23  -DM         Time Calculation- PT    Total Timed Code Minutes- PT 55 minute(s)  -DM         Timed Charges    16841 - PT Therapeutic Exercise Minutes 10  -DM      16133 - PT Therapeutic Activity Minutes 15  -DM         Untimed Charges    PT Eval/Re-eval Minutes 30  -DM         Total Minutes    Timed Charges Total Minutes 25  -DM      Untimed Charges Total Minutes 30  -DM       Total Minutes 55  -DM            User Key  (r) = Recorded By, (t) = Taken By, (c) = Cosigned By    Initials Name Provider Type    Vania Brown, PT Physical Therapist              Therapy Charges for Today     Code Description Service Date Service Provider Modifiers Qty    96291466058 HC PT THER PROC EA 15 MIN 5/18/2023 Vania Shultz, PT GP 1    12048301990 HC PT THERAPEUTIC ACT EA 15 MIN 5/18/2023 Vania Shultz, PT GP 1    75996550569 HC PT RE-EVAL ESTABLISHED PLAN 2 5/18/2023 Vania Shultz, PT GP 1          PT G-Codes  Outcome Measure Options: AM-PAC 6 Clicks Daily Activity (OT)  AM-PAC 6 Clicks Score (PT): 8  AM-PAC 6 Clicks Score (OT): 13  PT Discharge Summary  Anticipated Discharge Disposition (PT): skilled nursing facility, other (see comments) (nsg is req Hospice re-consult)    Vania Shultz, PT  5/18/2023

## 2023-05-18 NOTE — PROGRESS NOTES
Continued Stay Note  [facility]     Patient Name: Tala Ramsey  MRN: [id number]  Today's Date: 5/18/2023    Admit Date: 4/23/2023    Plan: Short term rehab   Discharge Plan     Row Name 05/18/23 1701       Plan    Plan Short term rehab    Plan Comments Telephone call from Sanjana PERSAUD, , stating opted to call pt's family instead of having a face to face meeting. Sanjana stated both sons were on the call, discussions was had on a plan of care for pt. Pt did work wit therapy today and told the staff is willing to keep working with therapy. Sanjana discussed short term rehab for pt, when pt is no longer able to participate in rehab the family can make the decision to take pt home with or without hospice or do long term care with or without hospice. Sanjana stated the decision was made to move forward with finding short term rehab for pt. Hospice liaison will continue to follow. Please call [phone] if can be of further assistance.               Discharge Codes    No documentation.               Expected Discharge Date and Time     Expected Discharge Date Expected Discharge Time    May 23, 2023             Vania Bansal RN

## 2023-05-18 NOTE — THERAPY RE-EVALUATION
Patient Name: Tala Ramsey  : 1950    MRN: [id number]                              Today's Date: 2023       Admit Date: 2023    Visit Dx:     ICD-10-CM ICD-9-CM   1. Multifocal pneumonia  J18.9 486   2. Failure of outpatient treatment  Z78.9 V49.89   3. Hypoxia  R09.02 799.02     Patient Active Problem List   Diagnosis   • Lumbar herniated disc   • Multifocal pneumonia   • Pulmonary fibrosis   • ILD (interstitial lung disease) -likely RA associated ILD with acute exacerbation   • Acute hypoxemic respiratory failure -likely due to ILD exacerbation     Past Medical History:   Diagnosis Date   • Anemia    • Back pain    • Bronchitis    • Hypertension    • Low back pain    • Pulmonary fibrosis    • Rheumatoid arthritis      Past Surgical History:   Procedure Laterality Date   • DILATION AND CURETTAGE, DIAGNOSTIC / THERAPEUTIC        General Information     Row Name 23 1328          OT Time and Intention    Document Type re-evaluation  -JOSE     Mode of Treatment occupational therapy  -JOSE     Row Name 23 1328          General Information    Patient Profile Reviewed yes  -JOSE     Prior Level of Function independent:;all household mobility;transfer;feeding;grooming;dressing;bathing;cooking;cleaning  -JOSE     Existing Precautions/Restrictions fall;oxygen therapy device and L/min  6 L NC, watch BP and 02 sats  -JOSE     Barriers to Rehab medically complex  -JOSE     Row Name 23 1328          Living Environment    People in Home child(nikolas), adult  2 sons, one works in home for now  -JOSE     Row Name 23 1328          Home Main Entrance    Number of Stairs, Main Entrance none  -JOSE     Row Name 23 1328          Stairs Within Home, Primary    Number of Stairs, Within Home, Primary twelve  -JOSE     Stairs Comment, Within Home, Primary pt. does not have to go down steps to basement  -JOSE     Row Name 23 1328          Cognition    Orientation Status (Cognition) oriented x 3  -JOSE      Row Name 05/18/23 1328          Safety Issues, Functional Mobility    Safety Issues Affecting Function (Mobility) insight into deficits/self-awareness;sequencing abilities  -JOSE     Impairments Affecting Function (Mobility) balance;endurance/activity tolerance;shortness of breath;strength;postural/trunk control  -JOSE     Comment, Safety Issues/Impairments (Mobility) Pt. with anxiety, but with prior anxiety meds, cues for PLB.  -JOSE           User Key  (r) = Recorded By, (t) = Taken By, (c) = Cosigned By    Initials Name Provider Type    Jessica Rick, OT Occupational Therapist                 Mobility/ADL's     Row Name 05/18/23 1330          Bed Mobility    Bed Mobility rolling left;rolling right;sidelying-sit;sit-supine  -JOSE     Rolling Left Holder (Bed Mobility) minimum assist (75% patient effort);verbal cues  -JOSE     Rolling Right Holder (Bed Mobility) minimum assist (75% patient effort);verbal cues  -JOSE     Scooting/Bridging Holder (Bed Mobility) dependent (less than 25% patient effort);2 person assist  to HOB  -JOSE     Sit-Supine Holder (Bed Mobility) maximum assist (25% patient effort);2 person assist;verbal cues  -JOSE     Sidelying-Sit Holder (Bed Mobility) minimum assist (75% patient effort);2 person assist;verbal cues;nonverbal cues (demo/gesture)  -     Bed Mobility, Safety Issues decreased use of arms for pushing/pulling;decreased use of legs for bridging/pushing;impaired trunk control for bed mobility  -     Assistive Device (Bed Mobility) bed rails;draw sheet  -     Comment, (Bed Mobility) some assist with LE's off EOB and side to sit, cues to sequence and initiate mvmt., pt. rolled twice for linen change and preeti and doffing undergarment  -     Row Name 05/18/23 1330          Transfers    Comment, (Transfers) unable to attempt due to SOA and dizziness, attempted BP at EOB twice, but unable to get a read  -     Row Name 05/18/23 1330          Activities of Daily  Living    BADL Assessment/Intervention lower body dressing  -     Row Name 05/18/23 1330          Lower Body Dressing Assessment/Training    [place] Level (Lower Body Dressing) doff;don;pants/bottoms;socks;maximum assist (25% patient effort);dependent (less than 25% patient effort)  -     Position (Lower Body Dressing) supine  -     Comment, (Lower Body Dressing) pt. able to roll and help pull up undergarment over hips with assist  -           User Key  (r) = Recorded By, (t) = Taken By, (c) = Cosigned By    Initials Name Provider Type    JOSE Jessica Keating OT Occupational Therapist               Obj/Interventions     [facility] Name 05/18/23 1334          Sensory Assessment (Somatosensory)    Sensory Assessment (Somatosensory) UE sensation intact  -[facility] Name 05/18/23 1334          Vision Assessment/Intervention    Visual Impairment/Limitations corrective lenses full-time  -[facility] Name 05/18/23 1334          Range of Motion Comprehensive    General Range of Motion bilateral upper extremity ROM WFL  -[facility] Name 05/18/23 1334          Strength Comprehensive (MMT)    General Manual Muscle Testing (MMT) Assessment upper extremity strength deficits identified  -     Comment, General Manual Muscle Testing (MMT) Assessment BUE grossly 3+/5 minus  4+/5  -[facility] Name 05/18/23 1334          Shoulder (Therapeutic Exercise)    Shoulder (Therapeutic Exercise) AROM (active range of motion)  -     Shoulder AROM (Therapeutic Exercise) bilateral;flexion;extension;aBduction;aDduction;horizontal aBduction/aDduction;10 repetitions;supine  -[facility] Name 05/18/23 1334          Elbow/Forearm (Therapeutic Exercise)    Elbow/Forearm (Therapeutic Exercise) strengthening exercise  -     Elbow/Forearm Strengthening (Therapeutic Exercise) bilateral;flexion;extension;supine;10 repetitions  light manual resistance given  -[facility] Name 05/18/23 1334          Motor Skills    Motor Skills functional  endurance  -JOSE     Functional Endurance 02 sats to 85% with EOB sitting, to 90% post returning supine with PLB several minutes  -JOSE     Therapeutic Exercise shoulder;elbow/forearm  -JOSE     Row Name 05/18/23 1332          Balance    Static Sitting Balance minimal assist  periods of CGA  -JOSE     Comment, Balance pt. sat grossly 10 minutes EOB, cues for PBL, UE bracing  -JOSE           User Key  (r) = Recorded By, (t) = Taken By, (c) = Cosigned By    Initials Name Provider Type    Jessica Rick, OT Occupational Therapist               Goals/Plan     Row Name 05/18/23 1343          Bed Mobility Goal 1 (OT)    Activity/Assistive Device (Bed Mobility Goal 1, OT) sit to supine;supine to sit  -JOSE     [place] Level/Cues Needed (Bed Mobility Goal 1, OT) contact guard required  -JOSE     Time Frame (Bed Mobility Goal 1, OT) long term goal (LTG);10 days  -JOSE     Progress/Outcomes (Bed Mobility Goal 1, OT) new goal  -JOSE     Row Name 05/18/23 1343          Transfer Goal 1 (OT)    Activity/Assistive Device (Transfer Goal 1, OT) sit-to-stand/stand-to-sit;bed-to-chair/chair-to-bed;toilet;commode, bedside without drop arms;walker, rolling  -JOES     Arthur Level/Cues Needed (Transfer Goal 1, OT) moderate assist (50-74% patient effort)  -JOSE     Time Frame (Transfer Goal 1, OT) long term goal (LTG);10 days  -JOSE     Progress/Outcome (Transfer Goal 1, OT) new goal  -JOSE     Row Name 05/18/23 1343          Grooming Goal 1 (OT)    Activity/Device (Grooming Goal 1, OT) oral care;hair care;wash face, hands  -JOSE     Arthur (Grooming Goal 1, OT) standby assist;set-up required  -JOSE     Time Frame (Grooming Goal 1, OT) long term goal (LTG);10 days  -JOSE     Strategies/Barriers (Grooming Goal 1, OT) unsupported sit, 02 sats 90%+  -JOSE     Progress/Outcome (Grooming Goal 1, OT) new goal  -JOSE     Row Name 05/18/23 1343          Strength Goal 1 (OT)    Strength Goal 1 (OT) Pt. will perform UE HEP with increasin reps and resistance with  min cueing using PLB as appropriate.  -JOSE     Time Frame (Strength Goal 1, OT) long term goal (LTG);10 days  -JOSE     Progress/Outcome (Strength Goal 1, OT) new goal  -JOSE     Row Name 05/18/23 1343          Therapy Assessment/Plan (OT)    Planned Therapy Interventions (OT) activity tolerance training;functional balance retraining;occupation/activity based interventions;ROM/therapeutic exercise;strengthening exercise;transfer/mobility retraining;BADL retraining  -JOSE           User Key  (r) = Recorded By, (t) = Taken By, (c) = Cosigned By    Initials Name Provider Type    Jessica Rick, OT Occupational Therapist               Clinical Impression     Row Name 05/18/23 8656          Pain Assessment    Pretreatment Pain Rating 5/10  -JOSE     Posttreatment Pain Rating 5/10  -JOSE     Pain Location - Side/Orientation Bilateral  -JOSE     Pain Location posterior  -JOSE     Pain Location - neck  -JOSE     Pain Intervention(s) Medication (See MAR);Ambulation/increased activity;Repositioned  -     Row Name 05/18/23 8256          Plan of Care Review    Plan of Care Reviewed With patient;family  -JOSE     Progress no change  -JOSE     Outcome Evaluation Pt. presents with impaired strength, balance and endurance impacting prior ADL independence level.  02 sats to 85% with EOB sitting, but with return to bed and PLB to 90%. Pt. is appropriate for skilled OT services to address changes in PLOF ADLs.  Recommned SNF at discharge.  -     Row Name 05/18/23 4174          Therapy Assessment/Plan (OT)    Patient/Family Therapy Goal Statement (OT) return to PLOF  -JOSE     Rehab Potential (OT) good, to achieve stated therapy goals  -JOSE     Criteria for Skilled Therapeutic Interventions Met (OT) yes  -OJSE     Therapy Frequency (OT) daily  -JOSE     Row Name 05/18/23 2245          Therapy Plan Review/Discharge Plan (OT)    Anticipated Discharge Disposition (OT) skilled nursing facility  -     Row Name 05/18/23 1336          Vital Signs     Intratreatment Heart Rate (beats/min) 114  -JOSE     Posttreatment Heart Rate (beats/min) 108  -JOSE     Pre SpO2 (%) 90  -JOSE     O2 Delivery Pre Treatment nasal cannula  -JOSE     Intra SpO2 (%) 85  -JOSE     O2 Delivery Intra Treatment nasal cannula  -JOSE     Post SpO2 (%) 90  -JOSE     O2 Delivery Post Treatment nasal cannula  -JOSE     Pre Patient Position Supine  -JOSE     Intra Patient Position Sitting  -JOSE     Post Patient Position Supine  -JOSE     Row Name 05/18/23 1336          Positioning and Restraints    Pre-Treatment Position in bed  -JOSE     Post Treatment Position bed  -JOSE     In Bed notified nsg;supine;call light within reach;encouraged to call for assist;exit alarm on;side rails up x2;with nsg;with PT;with family/caregiver  -JOSE           User Key  (r) = Recorded By, (t) = Taken By, (c) = Cosigned By    Initials Name Provider Type    Jessica Rick, OT Occupational Therapist               Outcome Measures     Row Name 05/18/23 1345          How much help from another is currently needed...    Putting on and taking off regular lower body clothing? 1  -JOSE     Bathing (including washing, rinsing, and drying) 2  -JOSE     Toileting (which includes using toilet bed pan or urinal) 2  -JOSE     Putting on and taking off regular upper body clothing 2  -JOSE     Taking care of personal grooming (such as brushing teeth) 3  -JOSE     Eating meals 3  -JOSE     AM-PAC 6 Clicks Score (OT) 13  -JOSE     Row Name 05/18/23 0800          How much help from another person do you currently need...    Turning from your back to your side while in flat bed without using bedrails? 2  -DD     Moving from lying on back to sitting on the side of a flat bed without bedrails? 2  -DD     Moving to and from a bed to a chair (including a wheelchair)? 2  -DD     Standing up from a chair using your arms (e.g., wheelchair, bedside chair)? 1  -DD     Climbing 3-5 steps with a railing? 1  -DD     To walk in hospital room? 1  -DD     AM-PAC 6 Clicks Score (PT) 9   -DD     Highest level of mobility 3 --> Sat at edge of bed  -DD     Row Name 05/18/23 1345          Functional Assessment    Outcome Measure Options AM-PAC 6 Clicks Daily Activity (OT)  -JOSE           User Key  (r) = Recorded By, (t) = Taken By, (c) = Cosigned By    Initials Name Provider Type    Jessica Rick, OT Occupational Therapist    Hu Archer, RN Registered Nurse                Occupational Therapy Education     Title: PT OT SLP Therapies (In Progress)     Topic: Occupational Therapy (In Progress)     Point: ADL training (Done)     Description:   Instruct learner(s) on proper safety adaptation and remediation techniques during self care or transfers.   Instruct in proper use of assistive devices.              Learning Progress Summary           Patient Acceptance, E,D, VU,NR by JOSE at 5/18/2023 1347    Comment: reason for consult, noted deficits, PLB, UE TE and benefit, bed mobility, vital results    Acceptance, E, NR by FAIZA at 5/12/2023 1608    Acceptance, E, VU by SHE at 5/5/2023 0447    Acceptance, E, VU by LOGAN at 4/25/2023 1543   Family Acceptance, E,D, VU,NR by JOSE at 5/18/2023 1347    Comment: reason for consult, noted deficits, PLB, UE TE and benefit, bed mobility, vital results    Acceptance, E, VU by LOGAN at 4/25/2023 1543                   Point: Home exercise program (Done)     Description:   Instruct learner(s) on appropriate technique for monitoring, assisting and/or progressing therapeutic exercises/activities.              Learning Progress Summary           Patient Acceptance, E,D, VU,NR by JOSE at 5/18/2023 1347    Comment: reason for consult, noted deficits, PLB, UE TE and benefit, bed mobility, vital results    Acceptance, E, NR by FAIZA at 5/12/2023 1608   Family Acceptance, E,D, VU,NR by JOSE at 5/18/2023 1347    Comment: reason for consult, noted deficits, PLB, UE TE and benefit, bed mobility, vital results                   Point: Precautions (In Progress)     Description:   Instruct  learner(s) on prescribed precautions during self-care and functional transfers.              Learning Progress Summary           Patient Acceptance, E, NR by FAIZA at 5/12/2023 1608    Acceptance, E, VU by AN at 4/25/2023 1543   Family Acceptance, E, VU by AN at 4/25/2023 1543                   Point: Body mechanics (Done)     Description:   Instruct learner(s) on proper positioning and spine alignment during self-care, functional mobility activities and/or exercises.              Learning Progress Summary           Patient Acceptance, E,D, VU,NR by JOSE at 5/18/2023 1347    Comment: reason for consult, noted deficits, PLB, UE TE and benefit, bed mobility, vital results    Acceptance, E, NR by FAIZA at 5/12/2023 1608    Acceptance, E, VU by SHE at 5/5/2023 0447    Acceptance, E, VU by LOGAN at 4/25/2023 1543   Family Acceptance, E,D, VU,NR by JOSE at 5/18/2023 1347    Comment: reason for consult, noted deficits, PLB, UE TE and benefit, bed mobility, vital results    Acceptance, E, VU by AN at 4/25/2023 1543                               User Key     Initials Effective Dates Name Provider Type Discipline     02/03/23 -  Jessica Keating OT Occupational Therapist OT    [facility] 06/16/21 -  Jake Taylor RN Registered Nurse Nurse     06/16/21 -  Leti Marie RN Registered Nurse Nurse    [facility] 09/21/21 -  Maddison Bean OT Occupational Therapist OT              OT Recommendation and Plan  Planned Therapy Interventions (OT): activity tolerance training, functional balance retraining, occupation/activity based interventions, ROM/therapeutic exercise, strengthening exercise, transfer/mobility retraining, BADL retraining  Therapy Frequency (OT): daily  Plan of Care Review  Plan of Care Reviewed With: patient, family  Progress: no change  Outcome Evaluation: Pt. presents with impaired strength, balance and endurance impacting prior ADL independence level.  02 sats to 85% with EOB sitting, but with return to bed and PLB to 90%.  Pt. is appropriate for skilled OT services to address changes in PLOF ADLs.  Recommned SNF at discharge.     Time Calculation:    Time Calculation- OT     Row Name 05/18/23 1348             Time Calculation- OT    OT Start Time 1244  -JOSE      OT Received On 05/18/23  -JOSE      OT Goal Re-Cert Due Date 05/28/23  -JOSE         Timed Charges    09638 - OT Therapeutic Exercise Minutes 10  -JOSE      56836 - OT Therapeutic Activity Minutes 8  -JOSE      42392 - OT Self Care/Mgmt Minutes 5  -JOSE         Untimed Charges    OT Eval/Re-eval Minutes 12  -JOSE         Total Minutes    Timed Charges Total Minutes 23  -JOSE      Untimed Charges Total Minutes 12  -JOSE       Total Minutes 35  -JOSE            User Key  (r) = Recorded By, (t) = Taken By, (c) = Cosigned By    Initials Name Provider Type    Jessica Rick OT Occupational Therapist              Therapy Charges for Today     Code Description Service Date Service Provider Modifiers Qty    46899500227 HC OT THER PROC EA 15 MIN 5/18/2023 Jessica Keating, OT GO 1    76248175937 HC OT THERAPEUTIC ACT EA 15 MIN 5/18/2023 Jessica Keating OT GO 1    35699583765 HC OT RE-EVAL 2 5/18/2023 Jessica Keating OT GO 1               Jessica Keating OT  5/18/2023

## 2023-05-18 NOTE — PLAN OF CARE
Goal Outcome Evaluation:  Plan of Care Reviewed With: patient, family        Progress: no change  Outcome Evaluation: Pt. rested well throughout the night, c/o pain of the neck and back, PRN pain medication given, 6 L NC, pt. family at bedside, will continue to monitor

## 2023-05-18 NOTE — PLAN OF CARE
Goal Outcome Evaluation:  Plan of Care Reviewed With: patient, family        Progress: no change  Outcome Evaluation: Pt. presents with impaired strength, balance and endurance impacting prior ADL independence level.  02 sats to 85% with EOB sitting, but with return to bed and PLB to 90%. Pt. is appropriate for skilled OT services to address changes in PLOF ADLs.  Recommned SNF at discharge.

## 2023-05-18 NOTE — PLAN OF CARE
Goal Outcome Evaluation:  Plan of Care Reviewed With: patient, son        Progress: no change  Outcome Evaluation: Pt worked with PT and OT today. Pt was premedicated before therapy with ativan and morphine,. Pt  reports some nausea, anxiety and dyspnea with sitting up and it was difficult. Family weighing pros and cons of Home Health and Hospice.  Discussed with family that pt is on morphine and ativan to control her dyspnea/anxiety and that her lung disease is chronic/uncurable. Advised to discuss with Dr Tomlinson about her lung disease and where she is in the trajectory.   Discussed that depending on pt's goals and what is important to her, that should guide her decisions. Discussed that if therapy continues to be burdensome and cause distress, then would need to evaluate their goals.    Family's goals are to pursue rehab and then home with home health or hospice.  Pt desires to be more comfortable. Family concerned about monitoring of labs etc.  Continue palliative support.

## 2023-05-19 LAB — GLUCOSE BLDC GLUCOMTR-MCNC: 103 MG/DL (ref 70–130)

## 2023-05-19 PROCEDURE — 94799 UNLISTED PULMONARY SVC/PX: CPT

## 2023-05-19 PROCEDURE — 99232 SBSQ HOSP IP/OBS MODERATE 35: CPT | Performed by: NURSE PRACTITIONER

## 2023-05-19 PROCEDURE — 82948 REAGENT STRIP/BLOOD GLUCOSE: CPT

## 2023-05-19 PROCEDURE — 25010000002 HEPARIN (PORCINE) PER 1000 UNITS: Performed by: INTERNAL MEDICINE

## 2023-05-19 PROCEDURE — 63710000001 PREDNISONE PER 1 MG: Performed by: INTERNAL MEDICINE

## 2023-05-19 PROCEDURE — 94761 N-INVAS EAR/PLS OXIMETRY MLT: CPT

## 2023-05-19 RX ORDER — MORPHINE SULFATE 20 MG/ML
5 SOLUTION ORAL 4 TIMES DAILY
Status: DISCONTINUED | OUTPATIENT
Start: 2023-05-19 | End: 2023-05-25 | Stop reason: HOSPADM

## 2023-05-19 RX ADMIN — POLYETHYLENE GLYCOL 3350 17 G: 17 POWDER, FOR SOLUTION ORAL at 09:22

## 2023-05-19 RX ADMIN — IPRATROPIUM BROMIDE AND ALBUTEROL SULFATE 3 ML: .5; 2.5 SOLUTION RESPIRATORY (INHALATION) at 13:20

## 2023-05-19 RX ADMIN — MORPHINE SULFATE 5 MG: 100 SOLUTION ORAL at 12:37

## 2023-05-19 RX ADMIN — GUAIFENESIN 1200 MG: 600 TABLET, EXTENDED RELEASE ORAL at 09:22

## 2023-05-19 RX ADMIN — MONTELUKAST 10 MG: 10 TABLET, FILM COATED ORAL at 09:22

## 2023-05-19 RX ADMIN — Medication 5 MG: at 20:50

## 2023-05-19 RX ADMIN — SODIUM CHLORIDE 1 G: 1 TABLET ORAL at 17:56

## 2023-05-19 RX ADMIN — HEPARIN SODIUM 5000 UNITS: 5000 INJECTION, SOLUTION INTRAVENOUS; SUBCUTANEOUS at 05:13

## 2023-05-19 RX ADMIN — SULFAMETHOXAZOLE AND TRIMETHOPRIM 1 TABLET: 800; 160 TABLET ORAL at 09:22

## 2023-05-19 RX ADMIN — LIDOCAINE 1 PATCH: 50 PATCH CUTANEOUS at 09:20

## 2023-05-19 RX ADMIN — MORPHINE SULFATE 5 MG: 100 SOLUTION ORAL at 17:56

## 2023-05-19 RX ADMIN — CETIRIZINE HYDROCHLORIDE 10 MG: 10 TABLET, FILM COATED ORAL at 09:22

## 2023-05-19 RX ADMIN — HEPARIN SODIUM 5000 UNITS: 5000 INJECTION, SOLUTION INTRAVENOUS; SUBCUTANEOUS at 20:54

## 2023-05-19 RX ADMIN — PANTOPRAZOLE SODIUM 40 MG: 40 TABLET, DELAYED RELEASE ORAL at 09:22

## 2023-05-19 RX ADMIN — MORPHINE SULFATE 5 MG: 100 SOLUTION ORAL at 07:23

## 2023-05-19 RX ADMIN — HYDROXYCHLOROQUINE SULFATE 200 MG: 200 TABLET, FILM COATED ORAL at 20:50

## 2023-05-19 RX ADMIN — IPRATROPIUM BROMIDE AND ALBUTEROL SULFATE 3 ML: .5; 2.5 SOLUTION RESPIRATORY (INHALATION) at 20:22

## 2023-05-19 RX ADMIN — Medication: at 20:53

## 2023-05-19 RX ADMIN — Medication: at 05:14

## 2023-05-19 RX ADMIN — Medication: at 14:25

## 2023-05-19 RX ADMIN — PREDNISONE 40 MG: 20 TABLET ORAL at 09:22

## 2023-05-19 RX ADMIN — NYSTATIN: 100000 POWDER TOPICAL at 09:23

## 2023-05-19 RX ADMIN — HEPARIN SODIUM 5000 UNITS: 5000 INJECTION, SOLUTION INTRAVENOUS; SUBCUTANEOUS at 14:23

## 2023-05-19 RX ADMIN — GUAIFENESIN 1200 MG: 600 TABLET, EXTENDED RELEASE ORAL at 20:50

## 2023-05-19 RX ADMIN — CALCIUM CARBONATE (ANTACID) CHEW TAB 500 MG 2 TABLET: 500 CHEW TAB at 00:39

## 2023-05-19 RX ADMIN — NYSTATIN: 100000 POWDER TOPICAL at 20:54

## 2023-05-19 RX ADMIN — IPRATROPIUM BROMIDE AND ALBUTEROL SULFATE 3 ML: .5; 2.5 SOLUTION RESPIRATORY (INHALATION) at 17:28

## 2023-05-19 RX ADMIN — MORPHINE SULFATE 5 MG: 100 SOLUTION ORAL at 20:50

## 2023-05-19 RX ADMIN — SODIUM CHLORIDE 1 G: 1 TABLET ORAL at 09:22

## 2023-05-19 RX ADMIN — LORAZEPAM 1 MG: 1 TABLET ORAL at 07:23

## 2023-05-19 RX ADMIN — HYDROXYCHLOROQUINE SULFATE 200 MG: 200 TABLET, FILM COATED ORAL at 09:22

## 2023-05-19 RX ADMIN — SIMETHICONE 80 MG: 80 TABLET, CHEWABLE ORAL at 21:07

## 2023-05-19 RX ADMIN — MORPHINE SULFATE 5 MG: 100 SOLUTION ORAL at 15:10

## 2023-05-19 RX ADMIN — AMLODIPINE BESYLATE 5 MG: 5 TABLET ORAL at 09:22

## 2023-05-19 NOTE — CASE MANAGEMENT/SOCIAL WORK
Continued Stay Note  [facility]     Patient Name: Tala Ramsey  MRN: [id number]  Today's Date: 5/19/2023    Admit Date: 4/23/2023    Plan: SNF   Discharge Plan     Row Name 05/19/23 0925       Plan    Plan SNF    Plan Comments Per pt and family request referrals faxed to [facility] [phone] fax: [phone], [facility] [phone] fax [phone] and [facility] [phone] fax: [phone]. Awaiting offer for a bed and insurance approval.    Final Discharge Disposition Code 03 - skilled nursing facility (SNF)               Discharge Codes    No documentation.               Expected Discharge Date and Time     Expected Discharge Date Expected Discharge Time    May 23, 2023             BASSAM Bejarano

## 2023-05-19 NOTE — PLAN OF CARE
Goal Outcome Evaluation:  Plan of Care Reviewed With: patient, son        Progress: no change  Outcome Evaluation: Pt. rested well throughout the night, c/o pain and anxiety through the night, PRN pain and anxiety medicattion given, remains on 6L humidied O2, pt. son is at bedside, purewick in place with adequate UOP, will continue to monitor

## 2023-05-19 NOTE — CASE MANAGEMENT/SOCIAL WORK
Continued Stay Note  [facility]     Patient Name: Tala Ramsey  MRN: [id number]  Today's Date: 5/19/2023    Admit Date: 4/23/2023    Plan: awaiting precert for SNF   Discharge Plan     Row Name 05/19/23 1416       Plan    Plan awaiting precert for SNF    Plan Comments Spoke with Alexandra at [facility]- states she can offer bed. Plans to start precert with insurance and will come see pt and family on Monday most likely.    Final Discharge Disposition Code 03 - skilled nursing facility (SNF)               Discharge Codes    No documentation.               Expected Discharge Date and Time     Expected Discharge Date Expected Discharge Time    May 23, 2023             BASSAM Bejarano

## 2023-05-19 NOTE — CASE MANAGEMENT/SOCIAL WORK
Continued Stay Note  [facility]     Patient Name: Tala Ramsey  MRN: [id number]  Today's Date: 5/19/2023    Admit Date: 4/23/2023    Plan: awaiting precert for SNF   Discharge Plan     Row Name 05/19/23 1433       Plan    Plan awaiting precert for SNF    Plan Comments Notified family (Guillermo Ramsey, Kalyan Ramsey and Sebastian Ramsey)  of possible bed at [facility].    Final Discharge Disposition Code 03 - skilled nursing facility (SNF)    Row Name 05/19/23 1416       Plan    Plan awaiting precert for SNF    Plan Comments Spoke with Alexandra at [facility]- states she can offer bed. Plans to start precert with insurance and will come see pt and family on Monday most likely.    Final Discharge Disposition Code 03 - skilled nursing facility (SNF)               Discharge Codes    No documentation.               Expected Discharge Date and Time     Expected Discharge Date Expected Discharge Time    May 23, 2023             BASSAM Bejarano

## 2023-05-19 NOTE — PROGRESS NOTES
[facility] Medicine Services  PROGRESS NOTE    Patient Name: Tala Ramsey  : 1950  MRN: [id number]    Date of Admission: 2023  Primary Care Physician: Sg Horne MD    Subjective   Subjective     CC:  F/u hypoxia, PNA    HPI:  Patient resting in bed. Son is at the bedside. Patient appears more alert, relaxed today. On 6L NC. Worked with therapy yesterday. Plan is SNF rehab, then home with  or hospice.    ROS:  Gen- No fevers, chills  CV- No chest pain, palpitations  Resp- No cough  GI- No abd pain    Objective   Objective     Vital Signs:   Temp:  [97 °F (36.1 °C)-97.5 °F (36.4 °C)] 97.5 °F (36.4 °C)  Heart Rate:  [] 111  Resp:  [16-20] 18  BP: ()/(60-77) 114/77  Flow (L/min):  [6] 6     Physical Exam:  Constitutional: No acute distress, awake, alert  HENT: NCAT, mucous membranes moist  Respiratory: Scattered rhonchi , respiratory effort normal, 6L NC  Cardiovascular: Tachycardic, no murmurs, rubs, or gallops  Gastrointestinal: Positive bowel sounds, soft, nontender, nondistended  Musculoskeletal: Trace bilateral ankle edema  Psychiatric: Appropriate affect, cooperative  Neurologic: Oriented x 3, knows all extremities, no focal deficits, speech clear  Skin: No rashes      Results Reviewed:  LAB RESULTS:          Lab 23  0641 23  0720 23  0643   SODIUM 129* 128* 129*   POTASSIUM 4.4 4.4 4.8   CHLORIDE 94* 93* 97*   CO2 24.0 22.0 22.0   ANION GAP 11.0 13.0 10.0   BUN 11 16 17   CREATININE 0.34* 0.43* 0.37*   EGFR 108.8 102.8 106.6   GLUCOSE 77 82 120*   CALCIUM 9.2 9.4 9.1                         Brief Urine Lab Results  (Last result in the past 365 days)      Color   Clarity   Blood   Leuk Est   Nitrite   Protein   CREAT   Urine HCG        23 1757 Yellow   Clear   Trace   Negative   Negative   Negative                 Microbiology Results Abnormal     Procedure Component Value - Date/Time    Blood Culture - Blood, Arm, Right  [[id number]]  (Normal) Collected: 04/23/23 1125    Lab Status: Final result Specimen: Blood from Arm, Right Updated: 04/28/23 1145     Blood Culture No growth at 5 days    Blood Culture - Blood, Arm, Left [[id number]]  (Normal) Collected: 04/23/23 1125    Lab Status: Final result Specimen: Blood from Arm, Left Updated: 04/28/23 1145     Blood Culture No growth at 5 days    S. Pneumo Ag Urine or CSF - Urine, Urine, Clean Catch [[id number]]  (Normal) Collected: 04/23/23 1757    Lab Status: Final result Specimen: Urine, Clean Catch Updated: 04/24/23 0949     Strep Pneumo Ag Negative    Legionella Antigen, Urine - Urine, Urine, Clean Catch [[id number]]  (Normal) Collected: 04/23/23 1757    Lab Status: Final result Specimen: Urine, Clean Catch Updated: 04/24/23 0949     LEGIONELLA ANTIGEN, URINE Negative    Respiratory Panel PCR w/COVID-19(SARS-CoV-2) CHARI/MARISSA/APOLINAR/PAD/COR/MAD/AMY In-House, NP Swab in UTM/VTM, 3-4 HR TAT - Swab, Nasopharynx [[id number]]  (Normal) Collected: 04/24/23 0544    Lab Status: Final result Specimen: Swab from Nasopharynx Updated: 04/24/23 0647     ADENOVIRUS, PCR Not Detected     Coronavirus 229E Not Detected     Coronavirus HKU1 Not Detected     Coronavirus NL63 Not Detected     Coronavirus OC43 Not Detected     COVID19 Not Detected     Human Metapneumovirus Not Detected     Human Rhinovirus/Enterovirus Not Detected     Influenza A PCR Not Detected     Influenza B PCR Not Detected     Parainfluenza Virus 1 Not Detected     Parainfluenza Virus 2 Not Detected     Parainfluenza Virus 3 Not Detected     Parainfluenza Virus 4 Not Detected     RSV, PCR Not Detected     Bordetella pertussis pcr Not Detected     Bordetella parapertussis PCR Not Detected     Chlamydophila pneumoniae PCR Not Detected     Mycoplasma pneumo by PCR Not Detected    Narrative:      In the setting of a positive respiratory panel with a viral infection PLUS a negative procalcitonin without other underlying concern for bacterial  infection, consider observing off antibiotics or discontinuation of antibiotics and continue supportive care. If the respiratory panel is positive for atypical bacterial infection (Bordetella pertussis, Chlamydophila pneumoniae, or Mycoplasma pneumoniae), consider antibiotic de-escalation to target atypical bacterial infection.    MRSA Screen, PCR (Inpatient) - Swab, Nares [[id number]]  (Normal) Collected: 04/23/23 1540    Lab Status: Final result Specimen: Swab from Nares Updated: 04/23/23 1651     MRSA PCR Negative    Narrative:      The negative predictive value of this diagnostic test is high and should only be used to consider de-escalating anti-MRSA therapy. A positive result may indicate colonization with MRSA and must be correlated clinically.  MRSA Negative    COVID PRE-OP / PRE-PROCEDURE SCREENING ORDER (NO ISOLATION) - Swab, Nasopharynx [[id number]]  (Normal) Collected: 04/23/23 1125    Lab Status: Final result Specimen: Swab from Nasopharynx Updated: 04/23/23 1225    Narrative:      The following orders were created for panel order COVID PRE-OP / PRE-PROCEDURE SCREENING ORDER (NO ISOLATION) - Swab, Nasopharynx.  Procedure                               Abnormality         Status                     ---------                               -----------         ------                     COVID-19, FLU A/B, RSV P...[[id number]]  Normal              Final result                 Please view results for these tests on the individual orders.    COVID-19, FLU A/B, RSV PCR - Swab, Nasopharynx [[id number]]  (Normal) Collected: 04/23/23 1125    Lab Status: Final result Specimen: Swab from Nasopharynx Updated: 04/23/23 1225     COVID19 Not Detected     Influenza A PCR Not Detected     Influenza B PCR Not Detected     RSV, PCR Not Detected    Narrative:      Fact sheet for providers: https://www.fda.gov/media/541360/download    Fact sheet for patients: https://www.fda.gov/media/724425/download    Test performed by PCR.           No radiology results from the last 24 hrs        Current medications:  Scheduled Meds:amLODIPine, 5 mg, Oral, Q24H  cetirizine, 10 mg, Oral, Daily  fluticasone, 2 spray, Each Nare, Nightly  guaiFENesin, 1,200 mg, Oral, Q12H  heparin (porcine), 5,000 Units, Subcutaneous, Q8H  hydroxychloroquine, 200 mg, Oral, Q12H  ipratropium-albuterol, 3 mL, Nebulization, 4x Daily - RT  lidocaine, 1 patch, Transdermal, Q24H  melatonin, 5 mg, Oral, Nightly  montelukast, 10 mg, Oral, Daily  morphine, 5 mg, Oral, 4x Daily  nystatin, , Topical, Q12H  palliative care oral rinse, , Mouth/Throat, Q8H  pantoprazole, 40 mg, Oral, Daily  polyethylene glycol, 17 g, Oral, Daily  predniSONE, 40 mg, Oral, Daily With Breakfast  sodium chloride, 1 g, Oral, BID With Meals  sulfamethoxazole-trimethoprim, 1 tablet, Oral, Once per day on Mon Wed Fri      Continuous Infusions:   PRN Meds:.•  acetaminophen **OR** acetaminophen **OR** acetaminophen  •  aluminum-magnesium hydroxide-simethicone **AND** Lidocaine Viscous HCl  •  senna-docusate sodium **AND** polyethylene glycol **AND** bisacodyl **AND** bisacodyl  •  calcium carbonate  •  cyclobenzaprine  •  hydrOXYzine  •  ipratropium-albuterol  •  LORazepam  •  Magnesium Standard Dose Replacement - Follow Nurse / BPA Driven Protocol  •  morphine  •  ondansetron **OR** ondansetron  •  simethicone  •  sodium chloride  •  sodium chloride  •  sodium chloride    Assessment & Plan   Assessment & Plan     Active Hospital Problems    Diagnosis  POA   • **Acute hypoxemic respiratory failure -likely due to ILD exacerbation [J96.01]  Yes   • Pulmonary fibrosis [J84.10]  Yes   • ILD (interstitial lung disease) -likely RA associated ILD with acute exacerbation [J84.9]  Yes   • Multifocal pneumonia [J18.9]  Yes      Resolved Hospital Problems   No resolved problems to display.        Brief Hospital Course to date:  Tala Ramsey is a 72 y.o. female  w/ hx interstitial lung dz, RA (on humira until recently,  recent steroid injections) who presented w/ progressive dyspnea, cough over ~10 days who failed outpatient Levaquin. Pt was found to be hypoxic upon admission. She had progressive hypoxia the evening after admission and was placed on HFNC.        This patient's problems and plans were partially entered by my partner and updated as appropriate by me 05/19/23.        Acute hypoxic resp failure, worsening  Pulmonary fibrosis/ILD  Bilateral Pneumonia  -likely combination of progession/flare of interstitial lung disease and infection  -initially on HFNC with difficulty weaning and significant drops with minimal exertion but currently on 6LNC with rest.   -s/p full course of zosyn & azithromycin  -3x weekly bactrim ds (pcp prophylaxis as has been on humira and steroids)   -continue scheduled & PRN duonebs  -Pulm following: completed IV solumedrol 5/9 and transitioned po prednisone. Started on 40 mg daily on 5/16. Decrease by 5 mg  per week until at 10 mg daily. Can stop PCP prophylaxis (bactrim) once she is at 20 mg.  -s/p IV diuresis 5/6 and 7 without much improvement so holding.  -Palliative following- continue morphine and ativan prn.      Constipation  -pt notes no BM x 11 days, but BM charted 5/16  -add daily Miralax for now    Hyponatremia  Poor po intake  --No IV access.  Full support but no CPR/intubation.    --added salt tabs bid 5/10 and fluid restriction. Pt and family note she has not followed fluid restriction, so will stop.  -- AM BMP     Hyperkalemia  - K+ now down to 4.4  - s/p lokelma       RA  -holding humira  -continue plaquenil      HTN  - continue amlodpine  - holding valsartan due to hyperkalemia  - bp stable off valsartan     Chronic anemia   -monitor     Goals/limits  -Patient is DNR/DNI (in event of further clinical decompensation patient would NOT want mechanical ventilation; in this case patient would wish to pursue comfort measures). Patient now wants to pursue skilled rehab, then go home with  HH vs hospice.     Expected Discharge Location and Transportation: SNF, EMS   Expected Discharge pending bed offer, insurance approval  Expected Discharge Date: 5/23/2023; Expected Discharge Time:      DVT prophylaxis:  Medical DVT prophylaxis orders are present.     AM-PAC 6 Clicks Score (PT): 9 (05/19/23 0800)    CODE STATUS:   Code Status and Medical Interventions:   Ordered at: 04/23/23 1310     Medical Intervention Limits:    NO intubation (DNI)     Level Of Support Discussed With:    Patient     Code Status (Patient has no pulse and is not breathing):    No CPR (Do Not Attempt to Resuscitate)     Medical Interventions (Patient has pulse or is breathing):    Limited Support       Liliana Luis, APRN  05/19/23

## 2023-05-19 NOTE — PLAN OF CARE
Goal Outcome Evaluation:  Plan of Care Reviewed With: patient        Progress: no change  Outcome Evaluation: resting well, PRN morphine and ativan given, no c/o, awaiting placement

## 2023-05-19 NOTE — PLAN OF CARE
"Goal Outcome Evaluation:  Plan of Care Reviewed With: patient, son        Progress: no change  Outcome Evaluation: Pt c/o dyspnea while trying to eat her lunch; observable accessory muscle use at rest, O2nc 6L.Pt's son reported had requested morphine this morning, as pt was awake and feeling SOA; it was not yet time for scheduled dose, but was given early with plan to delay 0900 dose and give if needed. Pt had not yet received another dose of morphine at time of Palliative RN encounter; notified unit RN of request for prn dose at that time; pt reported beginning to feel less air hunger approx 20 minutes after morphine administered. Pt and son both reported that pt has been able to move around and participate more with therapy when she has the morphine in her system, but activities and meal times do not always coincide with morphine schedule, and pt often forgets to request doses; they reported that the past two days have been her \"best yet,\" when, per MAR, she received 5 doses over the course of the days, with one being prn in the night. D/w Dr. Castrejon; rescheduled morphine doses through the day to manage dyspnea while eating and with activity; continue prn dosing, particularly through the night, as pt has requested doses at night recently and reported relief. Monitor response, adjust as appropriate. Pt reported last BM about 11 days ago; documentation indicates last BM 5/16, with daily Miralax added by hospitalist. Advised pt that she does have other prn interventions available, and needs to have BM at least every 3 days. Monitor, adjust scheduled and prn regimen as needed. Pt now planning STR at discharge, followed by home with Home Health or with Hospice. Pallitaive following for continued management of symptoms, support in ongoing GOC/POC.    1300 Palliative IDT meeting: JOYCE FULLER, RN    After hours, weekends and holidays, contact Palliative Provider by calling [phone]    Problem: Palliative Care  Goal: " Enhanced Quality of Life  Outcome: Ongoing, Progressing  Intervention: Promote Advance Care Planning  Flowsheets (Taken 5/19/2023 1352)  Life Transition/Adjustment: (plan for STR then home with HH or Hospice) other (see comments)  Intervention: Maximize Comfort  Flowsheets (Taken 5/19/2023 1352)  Pain Management Interventions: (schedule morphine while awake for management of air hunger) other (see comments)  Intervention: Optimize Function  Flowsheets (Taken 5/19/2023 1352)  Fatigue Management: paced activity encouraged  Sleep/Rest Enhancement: consistent schedule promoted  Intervention: Optimize Psychosocial Wellbeing  Flowsheets (Taken 5/19/2023 1352)  Supportive Measures:   active listening utilized   decision-making supported   goal-setting facilitated   positive reinforcement provided   problem-solving facilitated   relaxation techniques promoted   self-reflection promoted   self-responsibility promoted   verbalization of feelings encouraged  Family/Support System Care: support provided

## 2023-05-19 NOTE — DISCHARGE PLACEMENT REQUEST
" Contact Thea Urbina [phone]    Tala Luis (72 y.o. Female)     Date of Birth   1950    Social Security Number       Address   [address] [place] [address]    Home Phone   [phone]    MRN   [id number]       [facility]    Marital Status                               Admission Date   23    Admission Type   Emergency    Admitting Provider   Bette Altamirano MD    Attending Provider   Bette Altamirano MD    Department, Room/Bed   [facility] 5B, N531/1       Discharge Date       Discharge Disposition       Discharge Destination                               Attending Provider: Bette Altamirano MD    Allergies: No Known Allergies    Isolation: None   Infection: None   Code Status: No CPR    Ht: 153.7 cm (60.5\")   Wt: 80.3 kg (177 lb)    Admission Cmt: None   Principal Problem: Acute hypoxemic respiratory failure -likely due to ILD exacerbation [J96.01]                 Active Insurance as of 2023     Primary Coverage     Payor Plan Insurance Group Employer/Plan Group    HUMANA MEDICARE REPLACEMENT HUMANA MEDICARE REPLACEMENT [id number]     Payor Plan Address Payor Plan Phone Number Payor Plan Fax Number Effective Dates    [address] [address] [phone]  2015 - None Entered    [facility] [address]       Subscriber Name Subscriber Birth Date Member ID       TALA LUIS 1950 [id number]                 Emergency Contacts      (Rel.) Home Phone Work Phone Mobile Phone    CATHRYN LUIS (Son) [phone] -- [phone]    JEFFERSON LUIS (Son) -- -- [phone]    RODOLFO LUIS (Son) -- -- [phone]               History & Physical      Elias Morales MD at 23 1310              [facility] Medicine Services  HISTORY AND PHYSICAL    Patient Name: Tala Luis  : 1950  MRN: [id number]  Primary Care Physician: Sg Horne MD  Date of admission: " "4/23/2023      Subjective    Subjective     Chief Complaint:  Shortness of breath    HPI:  Tlaa Ramsey is a 72 y.o. female with history of HTN, iron deficiency anemia, RA/inflammatory arthritis, possible lupus and pulmonary fibrosis presents with increasing shortness of breath.  Symptoms started approximately 10 days ago, with Ms Ramsey visiting her PCP for \"ulcers in my mouth\" and throat discomfort.  Amoxicillin prescribed.  The patient revisited her PCP on Tuesday and was prescribed levaquin for possible pneumonia. She was also given a steroid injection. Since then, her shortness of breath has progressed with Ms Ramsey experiencing significant dyspnea with exertion.        Review of Systems   Constitutional: Negative for chills and fever.   HENT: Negative.    Eyes: Negative.    Respiratory: Positive for shortness of breath.    Gastrointestinal: Negative.    Endocrine: Negative.    Genitourinary: Negative.    Musculoskeletal: Positive for arthralgias.   Skin: Negative.    Allergic/Immunologic: Negative.    Neurological: Negative.    Hematological: Negative.    Psychiatric/Behavioral: Negative.           Personal History     Past Medical History:   Diagnosis Date   • Anemia    • Back pain    • Bronchitis    • Hypertension    • Low back pain    • Pulmonary fibrosis    • Rheumatoid arthritis              Past Surgical History:   Procedure Laterality Date   • DILATION AND CURETTAGE, DIAGNOSTIC / THERAPEUTIC         Family History: family history includes Cancer in her father; Diabetes in her son and son; Heart failure in her mother; Stroke in her mother.     Social History:  reports that she has never smoked. She has never used smokeless tobacco. She reports that she does not drink alcohol and does not use drugs.  Social History     Social History Narrative   • Not on file       Medications:  Available home medication information reviewed.  Medications Prior to Admission   Medication Sig Dispense Refill Last " Dose   • acetaminophen (TYLENOL) 325 MG tablet Take 2 tablets by mouth Every 4 (Four) Hours As Needed.      • amLODIPine-valsartan (EXFORGE) 5-160 MG per tablet TAKE 1 A DAY FOR BLOOD PRESSURE      • Calcium-Phosphorus-Vitamin D (CITRACAL +D3 PO) Take 1 tablet by mouth.      • cyclobenzaprine (FLEXERIL) 10 MG tablet Take 1 tablet by mouth 3 (Three) Times a Day As Needed for muscle spasms. 21 tablet 0    • fluticasone (FLONASE) 50 MCG/ACT nasal spray 2 sprays by Each Nare route Daily.      • Humira Pen 40 MG/0.4ML Pen-injector Kit Inject 40 mg under the skin into the appropriate area as directed Every 14 (Fourteen) Days.      • hydroxychloroquine (PLAQUENIL) 200 MG tablet Take  by mouth 2 (Two) Times a Day.      • Ibuprofen 200 MG capsule Take 1 tablet by mouth As Needed.      • Lidocaine Viscous HCl (XYLOCAINE) 2 % solution       • montelukast (SINGULAIR) 10 MG tablet Take 1 tablet by mouth Daily.      • Omega-3 Fatty Acids (fish oil) 1200 MG capsule capsule Take 1 capsule by mouth Daily.      • pantoprazole (PROTONIX) 40 MG EC tablet Take 1 tablet by mouth Daily.      • predniSONE (DELTASONE) 20 MG tablet Take 1.5 tablets by mouth Daily. 11 tablet 0    • simethicone (MYLICON) 125 MG chewable tablet Chew 1 tablet Every 6 (Six) Hours As Needed for Flatulence.      • tiZANidine (ZANAFLEX) 2 MG tablet 1'2-1 in the Am and afternoon, 1-2 at bedtime. as needed      • ferrous gluconate 324 (37.5 FE) MG tablet tablet Take  by mouth Daily With Breakfast.      • montelukast (SINGULAIR) 10 MG tablet Take 1 tablet by mouth Every Night.          No Known Allergies    Objective    Objective     Vital Signs:   Temp:  [97.7 °F (36.5 °C)] 97.7 °F (36.5 °C)  Heart Rate:  [80-91] 83  Resp:  [20] 20  BP: (137-170)/(44-76) 137/58  Flow (L/min):  [2-4] 2       Physical Exam   Constitutional - appears fatigue, in bed  HEENT-NCAT, mucous membranes moist  CV-RRR  Resp-dry crackles  Abd-soft, nontender, nondistended, normoactive bowel  sounds  Ext-No lower extremity cyanosis, clubbing or edema bilaterally  Neuro-alert, speech clear, moves all extremities   Psych-normal affect   Skin- No rash on exposed UE or LE bilaterally      Result Review:  I have personally reviewed the results from the time of this admission to 4/23/2023 13:11 EDT and agree with these findings:  [x]  Laboratory list / accordion  [x]  Microbiology  []  Radiology  []  EKG/Telemetry   []  Cardiology/Vascular   []  Pathology  []  Old records  []  Other:  Most notable findings include:         LAB RESULTS:      Lab 04/23/23  1048   WBC 14.06*   HEMOGLOBIN 11.5*   HEMATOCRIT 35.2   PLATELETS 592*   NEUTROS ABS 11.33*   IMMATURE GRANS (ABS) 0.22*   LYMPHS ABS 1.50   MONOS ABS 0.79   EOS ABS 0.14   MCV 89.6   LACTATE 1.1         Lab 04/23/23  1048   SODIUM 129*   POTASSIUM 4.4   CHLORIDE 92*   CO2 23.0   ANION GAP 14.0   BUN 14   CREATININE 0.50*   EGFR 99.2   GLUCOSE 104*   CALCIUM 9.0         Lab 04/23/23  1048   TOTAL PROTEIN 7.4   ALBUMIN 3.5   GLOBULIN 3.9   ALT (SGPT) 10   AST (SGOT) 23   BILIRUBIN 0.2   ALK PHOS 109         Lab 04/23/23  1048   PROBNP 163.6   HSTROP T 8                     Microbiology Results (last 10 days)     Procedure Component Value - Date/Time    COVID PRE-OP / PRE-PROCEDURE SCREENING ORDER (NO ISOLATION) - Swab, Nasopharynx [[id number]]  (Normal) Collected: 04/23/23 1125    Lab Status: Final result Specimen: Swab from Nasopharynx Updated: 04/23/23 1225    Narrative:      The following orders were created for panel order COVID PRE-OP / PRE-PROCEDURE SCREENING ORDER (NO ISOLATION) - Swab, Nasopharynx.  Procedure                               Abnormality         Status                     ---------                               -----------         ------                     COVID-19, FLU A/B, RSV P...[[id number]]  Normal              Final result                 Please view results for these tests on the individual orders.    COVID-19, FLU A/B, RSV PCR -  Swab, Nasopharynx [[id number]]  (Normal) Collected: 04/23/23 1125    Lab Status: Final result Specimen: Swab from Nasopharynx Updated: 04/23/23 1225     COVID19 Not Detected     Influenza A PCR Not Detected     Influenza B PCR Not Detected     RSV, PCR Not Detected    Narrative:      Fact sheet for providers: https://www.fda.gov/media/585463/download    Fact sheet for patients: https://www.fda.gov/media/645003/download    Test performed by PCR.          XR Chest 1 View    Result Date: 4/23/2023  XR CHEST 1 VW Date of Exam: 4/23/2023 10:25 AM EDT Indication: SOA triage protocol Comparison: High-res chest CT 3/20/2023 Findings: There is a background interstitial lung disease with peripheral septal thickening and bronchiectasis lower lobe predominant. There is increased opacity in the left lung likely due to superimposed multifocal pneumonia. There is some airspace opacity in the periphery of the right upper lobe that appears increased as well from previous CT chest. There is osteopenia. Heart size is normal.    Impression: Multifocal airspace opacities left greater than right superimposed on background of chronic interstitial lung disease. Multifocal pneumonia is suspected. Electronically Signed: Shelley Ulloa  4/23/2023 11:01 AM EDT  Workstation ID: [id number]          Assessment & Plan   Assessment & Plan     Active Hospital Problems    Diagnosis  POA   • **Multifocal pneumonia [J18.9]  Yes       Pneumonia  Pulmonary fibrosis  - Obtain CT chest  - check MRSA nares, legionella and strep pneumo urinary antigens, respiratory culture  - CBC, procalcitionin and CRP am  - continue zosyn and azithromycin  - duonebs  - consider Pulmonary/ID consultations  - Addendum: CT chest reviewed, concern for viral pneumonia.  Initial COVID-19/Influenza/RSV PCR negative.  Will obtain full respiratory PCR panel    RA    HTN    Hyponatremia  - may represent volume depletion vs SIADH  - check serum and urine osmoles, urine sodium, UA  -  currently asymptomatic    anemia      DVT prophylaxis:  heparin      CODE STATUS:  DNR -- patient says with her underlying pulmonary fibrosis she would not want CPR or intubation with mechanical ventilation should her medical condition worsen.  Son at bedside during this discussion.  Code Status and Medical Interventions:   Ordered at: 23 1310     Medical Intervention Limits:    NO intubation (DNI)     Level Of Support Discussed With:    Patient     Code Status (Patient has no pulse and is not breathing):    No CPR (Do Not Attempt to Resuscitate)     Medical Interventions (Patient has pulse or is breathing):    Limited Support       Expected Discharge        Elias Morales MD  23      Electronically signed by Elias Morales MD at 23 1624          Physician Progress Notes (most recent note)      Liliana Luis APRN at 23 0750              [facility] Medicine Services  PROGRESS NOTE    Patient Name: Tala Ramsey  : 1950  MRN: [id number]    Date of Admission: 2023  Primary Care Physician: Sg Horne MD    Subjective   Subjective     CC:  F/u hypoxia, PNA    HPI:  Patient resting in bed.  Daughter-in-law at bedside.  Long discussion with patient and DRIL regarding discharge plan options.  Patient concerned about her shortness of breath and anxiety and having her symptoms managed.  Notes she has not had a BM in 11 days.  Denies N/V. On 6L NC.    ROS:  Gen- No fevers, chills  CV- No chest pain, palpitations  Resp- No cough  GI- No abd pain    Objective   Objective     Vital Signs:   Temp:  [97.4 °F (36.3 °C)-97.8 °F (36.6 °C)] 97.4 °F (36.3 °C)  Heart Rate:  [81-99] 81  Resp:  [18-20] 18  BP: (136-144)/(65-89) 144/65  Flow (L/min):  [6] 6     Physical Exam:  Constitutional: No acute distress, awake, alert  HENT: NCAT, mucous membranes moist  Respiratory: Scattered rhonchi BLL, pursed-lip breathing, 6L NC  Cardiovascular: Tachycardic, no  murmurs, rubs, or gallops  Gastrointestinal: Positive bowel sounds, soft, nontender, nondistended  Musculoskeletal: Trace bilateral ankle edema  Psychiatric: Appropriate affect, cooperative  Neurologic: Oriented x 3, knows all extremities, no focal deficits, speech clear  Skin: No rashes      Results Reviewed:  LAB RESULTS:          Lab 05/18/23  0641 05/17/23  0720 05/14/23  0643 05/12/23  0616   SODIUM 129* 128* 129* 128*   POTASSIUM 4.4 4.4 4.8 4.9   CHLORIDE 94* 93* 97* 95*   CO2 24.0 22.0 22.0 22.0   ANION GAP 11.0 13.0 10.0 11.0   BUN 11 16 17 21   CREATININE 0.34* 0.43* 0.37* 0.41*   EGFR 108.8 102.8 106.6 104.0   GLUCOSE 77 82 120* 99   CALCIUM 9.2 9.4 9.1 8.8                         Brief Urine Lab Results  (Last result in the past 365 days)      Color   Clarity   Blood   Leuk Est   Nitrite   Protein   CREAT   Urine HCG        04/23/23 1757 Yellow   Clear   Trace   Negative   Negative   Negative                 Microbiology Results Abnormal     Procedure Component Value - Date/Time    Blood Culture - Blood, Arm, Right [[id number]]  (Normal) Collected: 04/23/23 1125    Lab Status: Final result Specimen: Blood from Arm, Right Updated: 04/28/23 1145     Blood Culture No growth at 5 days    Blood Culture - Blood, Arm, Left [[id number]]  (Normal) Collected: 04/23/23 1125    Lab Status: Final result Specimen: Blood from Arm, Left Updated: 04/28/23 1145     Blood Culture No growth at 5 days    S. Pneumo Ag Urine or CSF - Urine, Urine, Clean Catch [[id number]]  (Normal) Collected: 04/23/23 1757    Lab Status: Final result Specimen: Urine, Clean Catch Updated: 04/24/23 0949     Strep Pneumo Ag Negative    Legionella Antigen, Urine - Urine, Urine, Clean Catch [[id number]]  (Normal) Collected: 04/23/23 1757    Lab Status: Final result Specimen: Urine, Clean Catch Updated: 04/24/23 0949     LEGIONELLA ANTIGEN, URINE Negative    Respiratory Panel PCR w/COVID-19(SARS-CoV-2) CHARI/MARISSA/APOLINAR/PAD/COR/MAD/AMY In-House, NP Swab in  UTM/VTM, 3-4 HR TAT - Swab, Nasopharynx [[id number]]  (Normal) Collected: 04/24/23 0544    Lab Status: Final result Specimen: Swab from Nasopharynx Updated: 04/24/23 0647     ADENOVIRUS, PCR Not Detected     Coronavirus 229E Not Detected     Coronavirus HKU1 Not Detected     Coronavirus NL63 Not Detected     Coronavirus OC43 Not Detected     COVID19 Not Detected     Human Metapneumovirus Not Detected     Human Rhinovirus/Enterovirus Not Detected     Influenza A PCR Not Detected     Influenza B PCR Not Detected     Parainfluenza Virus 1 Not Detected     Parainfluenza Virus 2 Not Detected     Parainfluenza Virus 3 Not Detected     Parainfluenza Virus 4 Not Detected     RSV, PCR Not Detected     Bordetella pertussis pcr Not Detected     Bordetella parapertussis PCR Not Detected     Chlamydophila pneumoniae PCR Not Detected     Mycoplasma pneumo by PCR Not Detected    Narrative:      In the setting of a positive respiratory panel with a viral infection PLUS a negative procalcitonin without other underlying concern for bacterial infection, consider observing off antibiotics or discontinuation of antibiotics and continue supportive care. If the respiratory panel is positive for atypical bacterial infection (Bordetella pertussis, Chlamydophila pneumoniae, or Mycoplasma pneumoniae), consider antibiotic de-escalation to target atypical bacterial infection.    MRSA Screen, PCR (Inpatient) - Swab, Nares [[id number]]  (Normal) Collected: 04/23/23 1540    Lab Status: Final result Specimen: Swab from Nares Updated: 04/23/23 1651     MRSA PCR Negative    Narrative:      The negative predictive value of this diagnostic test is high and should only be used to consider de-escalating anti-MRSA therapy. A positive result may indicate colonization with MRSA and must be correlated clinically.  MRSA Negative    COVID PRE-OP / PRE-PROCEDURE SCREENING ORDER (NO ISOLATION) - Swab, Nasopharynx [[id number]]  (Normal) Collected: 04/23/23 1125     Lab Status: Final result Specimen: Swab from Nasopharynx Updated: 04/23/23 1225    Narrative:      The following orders were created for panel order COVID PRE-OP / PRE-PROCEDURE SCREENING ORDER (NO ISOLATION) - Swab, Nasopharynx.  Procedure                               Abnormality         Status                     ---------                               -----------         ------                     COVID-19, FLU A/B, RSV P...[[id number]]  Normal              Final result                 Please view results for these tests on the individual orders.    COVID-19, FLU A/B, RSV PCR - Swab, Nasopharynx [[id number]]  (Normal) Collected: 04/23/23 1125    Lab Status: Final result Specimen: Swab from Nasopharynx Updated: 04/23/23 1225     COVID19 Not Detected     Influenza A PCR Not Detected     Influenza B PCR Not Detected     RSV, PCR Not Detected    Narrative:      Fact sheet for providers: https://www.fda.gov/media/979620/download    Fact sheet for patients: https://www.fda.gov/media/081272/download    Test performed by PCR.          No radiology results from the last 24 hrs        Current medications:  Scheduled Meds:amLODIPine, 5 mg, Oral, Q24H  cetirizine, 10 mg, Oral, Daily  fluticasone, 2 spray, Each Nare, Nightly  guaiFENesin, 1,200 mg, Oral, Q12H  heparin (porcine), 5,000 Units, Subcutaneous, Q8H  hydroxychloroquine, 200 mg, Oral, Q12H  insulin lispro, 0-7 Units, Subcutaneous, TID AC  ipratropium-albuterol, 3 mL, Nebulization, 4x Daily - RT  lidocaine, 1 patch, Transdermal, Q24H  melatonin, 5 mg, Oral, Nightly  montelukast, 10 mg, Oral, Daily  morphine, 5 mg, Oral, TID  nystatin, , Topical, Q12H  palliative care oral rinse, , Mouth/Throat, Q8H  pantoprazole, 40 mg, Oral, Daily  predniSONE, 40 mg, Oral, Daily With Breakfast  sodium chloride, 1 g, Oral, BID With Meals  sulfamethoxazole-trimethoprim, 1 tablet, Oral, Once per day on Mon Wed Fri      Continuous Infusions:   PRN Meds:.•  acetaminophen **OR**  acetaminophen **OR** acetaminophen  •  aluminum-magnesium hydroxide-simethicone **AND** Lidocaine Viscous HCl  •  senna-docusate sodium **AND** polyethylene glycol **AND** bisacodyl **AND** bisacodyl  •  calcium carbonate  •  cyclobenzaprine  •  dextrose  •  dextrose  •  glucagon (human recombinant)  •  hydrOXYzine  •  ipratropium-albuterol  •  LORazepam  •  Magnesium Standard Dose Replacement - Follow Nurse / BPA Driven Protocol  •  morphine  •  ondansetron **OR** ondansetron  •  simethicone  •  sodium chloride  •  sodium chloride  •  sodium chloride    Assessment & Plan   Assessment & Plan     Active Hospital Problems    Diagnosis  POA   • **Acute hypoxemic respiratory failure -likely due to ILD exacerbation [J96.01]  Yes   • Pulmonary fibrosis [J84.10]  Yes   • ILD (interstitial lung disease) -likely RA associated ILD with acute exacerbation [J84.9]  Yes   • Multifocal pneumonia [J18.9]  Yes      Resolved Hospital Problems   No resolved problems to display.        Brief Hospital Course to date:  Tala Ramsey is a 72 y.o. female  w/ hx interstitial lung dz, RA (on humira until recently, recent steroid injections) who presented w/ progressive dyspnea, cough over ~10 days who failed outpatient Levaquin. Pt was found to be hypoxic upon admission. She had progressive hypoxia the evening after admission and was placed on HFNC.        This patient's problems and plans were partially entered by my partner and updated as appropriate by me 05/18/23.      Assessment/Plan:        Acute hypoxic resp failure, worsening  Pulmonary fibrosis/ILD  Bilateral Pneumonia  -likely combination of progession/flare of interstitial lung disease and infection  -initially on HFNC with difficulty weaning and significant drops with minimal exertion but currently on 6LNC with rest.   -s/p full course of zosyn & azithromycin  -3x weekly bactrim ds (pcp prophylaxis as has been on humira and steroids)   -continue scheduled & PRN  duonebs  -Pulm following: completed IV solumedrol 5/9 and transitioned po prednisone. Started on 40 mg daily on 5/16. Decrease by 5 mg  per week until at 10 mg daily. Can stop PCP prophylaxis (bactrim) once she is at 20 mg.  -s/p IV diuresis 5/6 and 7 without much improvement so holding.  -Palliative following- continue morphine and ativan prn.      Constipation  -pt notes no BM x 11 days, but BM charted 5/16  -add daily Miralax for now    Hyponatremia  Poor po intake  --No IV access.  Full support but no CPR/intubation.  Sodium continues to drop.  Pt wishes to keep IV out if at all possible   --added salt tabs bid 5/10.  Na+ 129 today     Hyperkalemia  - K+ now down to 4.4  - s/p lokelma       RA  -holding humira  -continue plaquenil      HTN  - continue amlodpine  - holding valsartan due to hyperkalemia  - bp stable off valsartan     Chronic anemia   -monitor     Goals/limits  -Patient is DNR/DNI (in event of further clinical decompensation patient would NOT want mechanical ventilation; in this case patient would wish to pursue comfort measures). Patient considering home with hospice vs home with home health vs SNF rehab.     Expected Discharge Location and Transportation: SNF vs home with hospice, EMS  Expected Discharge pending final disposition  Expected Discharge Date: 5/19/2023; Expected Discharge Time:      DVT prophylaxis:  Medical DVT prophylaxis orders are present.     AM-PAC 6 Clicks Score (PT): 9 (05/17/23 2000)    CODE STATUS:   Code Status and Medical Interventions:   Ordered at: 04/23/23 1310     Medical Intervention Limits:    NO intubation (DNI)     Level Of Support Discussed With:    Patient     Code Status (Patient has no pulse and is not breathing):    No CPR (Do Not Attempt to Resuscitate)     Medical Interventions (Patient has pulse or is breathing):    Limited Support       JOYCE Jones  05/18/23        Electronically signed by Liliana Luis APRN at 05/18/23 1312           Physical Therapy Notes (most recent note)      Vania Shultz, PT at 23 1416  Version 1 of 1         Patient Name: Tala Ramsey  : 1950    MRN: [id number]                              Today's Date: 2023       Admit Date: 2023    Visit Dx:     ICD-10-CM ICD-9-CM   1. Multifocal pneumonia  J18.9 486   2. Failure of outpatient treatment  Z78.9 V49.89   3. Hypoxia  R09.02 799.02     Patient Active Problem List   Diagnosis   • Lumbar herniated disc   • Multifocal pneumonia   • Pulmonary fibrosis   • ILD (interstitial lung disease) -likely RA associated ILD with acute exacerbation   • Acute hypoxemic respiratory failure -likely due to ILD exacerbation     Past Medical History:   Diagnosis Date   • Anemia    • Back pain    • Bronchitis    • Hypertension    • Low back pain    • Pulmonary fibrosis    • Rheumatoid arthritis      Past Surgical History:   Procedure Laterality Date   • DILATION AND CURETTAGE, DIAGNOSTIC / THERAPEUTIC        General Information     Row Name 23 1252          Physical Therapy Time and Intention    Document Type re-evaluation  -DM     Mode of Treatment physical therapy  -DM     Row Name 23 1252          General Information    Patient Profile Reviewed yes  -DM     Prior Level of Function --  per IE  -DM     Existing Precautions/Restrictions fall;oxygen therapy device and L/min;other (see comments)  PT re-ev.order 5-17;PNA;ILD exac.;pulm fib.;now weaned fromHFNC to 6L nasal angel.; premed for neck pain(lidoderm patch) & Anx.(atarax);has sacral/butt.skin breakdown(mepilex);5-14: hospice consult  -DM     Barriers to Rehab medically complex;previous functional deficit;ineffective coping  -DM     Row Name 23 1252          Living Environment    People in Home --  per IE  -DM     Row Name 23 1252          Cognition    Orientation Status (Cognition) oriented x 3  -DM     Row Name 23 1252          Safety Issues, Functional Mobility    Safety  Issues Affecting Function (Mobility) insight into deficits/self-awareness;safety precaution awareness;safety precautions follow-through/compliance;sequencing abilities  -DM     Impairments Affecting Function (Mobility) balance;endurance/activity tolerance;pain;postural/trunk control;shortness of breath;strength  -DM     Comment, Safety Issues/Impairments (Mobility) pt is highly anx. w/ all mobil.  -DM           User Key  (r) = Recorded By, (t) = Taken By, (c) = Cosigned By    Initials Name Provider Type    DM Vania Shultz, PT Physical Therapist               Mobility     Row Name 05/18/23 3407          Bed Mobility    Bed Mobility rolling left;rolling right;scooting/bridging;sit-supine;sidelying-sit  -DM     Rolling Left Larue (Bed Mobility) verbal cues;minimum assist (75% patient effort)  rolled x 2 to don,then doff, mesh panty/pad & repl.soiled draw sheet & pads  -DM     Rolling Right [place] (Bed Mobility) verbal cues;minimum assist (75% patient effort)  -DM     Scooting/Bridging [place] (Bed Mobility) verbal cues;dependent (less than 25% patient effort);2 person assist  scooted to HOB w/ Dep.3A using draw sheet  -DM     Sit-Supine [place] (Bed Mobility) verbal cues;maximum assist (25% patient effort);2 person assist  max 2A to lift LE's to sup.  -DM     Sidelying-Sit [place] (Bed Mobility) verbal cues;nonverbal cues (demo/gesture);minimum assist (75% patient effort);2 person assist  Logrolling transf (d/t neck pain/stiffness) to sitting on L EOB  -DM     Assistive Device (Bed Mobility) bed rails;draw sheet  -DM     Comment, (Bed Mobility) Attempted re-eval this AM but pt/family stated pt had bad night & not feeling well, & req. check back this PM; pt had atarax 10 am for anx,& lidoderm patch for neck pain/stiffness, + Amio for elev BP;has bleeding skin lesion on L buttock(nsg will perform wd.care & apply dsg);soiled draw sheet/pads (pure wick leaking) were repl;issued mesh  panty/pad,emesis basin(belching & c/o feeling poorly), td.socks;brought loaner R wx,port o2;once EOB,unable to don tdsocks (OT placed),& had LOB post; req mod A of 2 to scoot forw.,then maintain upright posture;o2 desat 85% on 6L;cues for PLB exer;c/o onset dizziness;nsg informed & brought port.BP (2 trials RUE unsucc.);HR to 114;did LAQ,AP;s/p 10 EOB activ.,still mod SOB & pt req. to lie back;once sup, rolled to doff panty/pad;nsg brought new port BP & tried LUE;o2 sat.incr to 91%,then fell to 88% again;emesis basin given(feeling poorly)  -DM     Row Name 05/18/23 1252          Transfers    Comment, (Transfers) unable to attempt STS or SPT d/t highly anx, dizzy spell, mod SOB, desat 85% on 6L, neck pain, belching & feeling poorly  -DM     Row Name 05/18/23 1252          Bed-Chair Transfer    Bed-Chair [place] (Transfers) unable to assess  -DM     Row Name 05/18/23 1252          Sit-Stand Transfer    Sit-Stand [place] (Transfers) unable to assess  -DM     Row Name 05/18/23 1252          Gait/Stairs (Locomotion)    [place] Level (Gait) unable to assess  -DM     Comment, (Gait/Stairs) Pt. has not amb since PT eval 4-28 (2-3 sidesteps bed to chair w/ min2A)  -DM           User Key  (r) = Recorded By, (t) = Taken By, (c) = Cosigned By    Initials Name Provider Type    DM Vania Shultz, PT Physical Therapist               Obj/Interventions     Row Name 05/18/23 1252          Range of Motion Comprehensive    General Range of Motion lower extremity range of motion deficits identified  -DM     Comment, General Range of Motion B hips [place]. 10-15% (tissue approx)  -DM     Row Name 05/18/23 1252          Strength Comprehensive (MMT)    General Manual Muscle Testing (MMT) Assessment lower extremity strength deficits identified  -DM     Comment, General Manual Muscle Testing (MMT) Assessment BLE grossly 3- to 3+/5  -DM     Row Name 05/18/23 1252          Motor Skills    Therapeutic Exercise hip;knee;ankle  -DM      Row Name 05/18/23 1252          Hip (Therapeutic Exercise)    Hip (Therapeutic Exercise) AROM (active range of motion);AAROM (active assistive range of motion)  -DM     Hip AROM (Therapeutic Exercise) bilateral;flexion;extension;supine;5 repetitions;other (see comments)  hip & knee F/E w/ h.slides;bridging to don panty,then attempting to scoot to HOB  -DM     Hip AAROM (Therapeutic Exercise) bilateral;aBduction;aDduction;supine;5 repetitions  -DM     Row Name 05/18/23 1252          Knee (Therapeutic Exercise)    Knee (Therapeutic Exercise) AROM (active range of motion)  -DM     Knee AROM (Therapeutic Exercise) bilateral;flexion;extension;LAQ (long arc quad);heel slides;sitting;supine;10 repetitions;5 repetitions  -DM     Row Name 05/18/23 1252          Ankle (Therapeutic Exercise)    Ankle (Therapeutic Exercise) AROM (active range of motion)  -DM     Ankle AROM (Therapeutic Exercise) bilateral;dorsiflexion;plantarflexion;10 repetitions;sitting  -DM     Row Name 05/18/23 1252          Balance    Balance Assessment sitting static balance;sitting dynamic balance  -DM     Static Sitting Balance verbal cues;moderate assist  trunk ext, PLB exer, LAQ  -DM     Dynamic Sitting Balance moderate assist;2-person assist  recip scooting F/B@EOB  -DM     Position, Sitting Balance supported;sitting edge of bed  -DM     Balance Interventions sitting;static;dynamic;weight shifting activity  -DM           User Key  (r) = Recorded By, (t) = Taken By, (c) = Cosigned By    Initials Name Provider Type    DM Vania Shultz, PT Physical Therapist               Goals/Plan     Row Name 05/18/23 1252          Bed Mobility Goal 1 (PT)    Activity/Assistive Device (Bed Mobility Goal 1, PT) bed mobility activities, all  -DM     [place] Level/Cues Needed (Bed Mobility Goal 1, PT) minimum assist (75% or more patient effort)  -DM     Time Frame (Bed Mobility Goal 1, PT) long term goal (LTG);2 weeks  -DM     Progress/Outcomes (Bed Mobility  Goal 1, PT) new goal  -DM     Row Name 05/18/23 1252          Transfer Goal 1 (PT)    Activity/Assistive Device (Transfer Goal 1, PT) sit-to-stand/stand-to-sit;bed-to-chair/chair-to-bed;walker, rolling  -DM     [place] Level/Cues Needed (Transfer Goal 1, PT) verbal cues required;maximum assist (25-49% patient effort)  -DM     Time Frame (Transfer Goal 1, PT) long term goal (LTG);2 weeks  -DM     Progress/Outcome (Transfer Goal 1, PT) new goal  -DM     Row Name 05/18/23 1252          Gait Training Goal 1 (PT)    Activity/Assistive Device (Gait Training Goal 1, PT) gait (walking locomotion);walker, rolling  stable VS; O2 sat > 92%  -DM     [place] Level (Gait Training Goal 1, PT) maximum assist (25-49% patient effort)  -DM     Distance (Gait Training Goal 1, PT) 5  -DM     Time Frame (Gait Training Goal 1, PT) long term goal (LTG);2 weeks  -DM     Progress/Outcome (Gait Training Goal 1, PT) new goal  -DM     Row Name 05/18/23 1252          Patient Education Goal (PT)    Activity (Patient Education Goal, PT) HEP exer  -DM     [place]/Cues/Accuracy (Memory Goal 2, PT) demonstrates adequately;verbalizes understanding  -DM     Time Frame (Patient Education Goal, PT) long term goal (LTG);2 weeks  -DM     Row Name 05/18/23 1252          Therapy Assessment/Plan (PT)    Planned Therapy Interventions (PT) balance training;bed mobility training;gait training;home exercise program;patient/family education;strengthening;transfer training  -DM           User Key  (r) = Recorded By, (t) = Taken By, (c) = Cosigned By    Initials Name Provider Type    DM Vania Shultz, PT Physical Therapist               Clinical Impression     Row Name 05/18/23 1252          Pain    Pretreatment Pain Rating 5/10  -DM     Posttreatment Pain Rating 5/10  -DM     Pain Location - Side/Orientation Bilateral  -DM     Pain Location posterior  -DM     Pain Location - neck  -DM     Pain Intervention(s) Medication (See  MAR);Repositioned;Rest;Elevated  -DM     Additional Documentation Pain Scale: Numbers Pre/Post-Treatment (Group)  -DM     Row Name 05/18/23 1252          Plan of Care Review    Plan of Care Reviewed With patient;family  -DM     Progress improving  -DM     Outcome Evaluation PT re-eval completed. Presents w/ MF PNA, ILD exac., high anx. level re: mobil, decr LE strength/endurance & impaired funct mobil. Rolled L/R w/ min A, using bedrail & draw sheet, to don mesh panty/pad, transf to sitting EOB w/ max A using draw sheet, monico EOB activ x 10 min, w/ onset dizziness, mod SOB, desat 85% on 6L, incr anx, , elev BP at baseline, neck pain despite lidoderm patch, & fatigue, then ret. to sup w/ max 2A & scooted to HOB w/ dep.3A using draw sheet. Recommend SNF at d/c.  -DM     Row Name 05/18/23 1252          Therapy Assessment/Plan (PT)    Patient/Family Therapy Goals Statement (PT) improved funct mobil  -DM     Rehab Potential (PT) good, to achieve stated therapy goals  -DM     Criteria for Skilled Interventions Met (PT) yes;meets criteria;skilled treatment is necessary  -DM     Therapy Frequency (PT) daily  -DM     Row Name 05/18/23 1252          Vital Signs    Pre Systolic BP Rehab 144  -DM     Pre Treatment Diastolic BP 65  -DM     Pretreatment Heart Rate (beats/min) 95  -DM     Intratreatment Heart Rate (beats/min) 114  -DM     Posttreatment Heart Rate (beats/min) 108  -DM     Pre SpO2 (%) 90  -DM     O2 Delivery Pre Treatment nasal cannula  -DM     Intra SpO2 (%) 85  -DM     O2 Delivery Intra Treatment nasal cannula  -DM     Post SpO2 (%) 90  -DM     O2 Delivery Post Treatment nasal cannula  -DM     Pre Patient Position Supine  -DM     Intra Patient Position Sitting  -DM     Post Patient Position Supine  -DM     Row Name 05/18/23 1252          Positioning and Restraints    Pre-Treatment Position in bed  -DM     Post Treatment Position bed  -DM     In Bed notified nsg;supine;call light within reach;encouraged to  call for assist;exit alarm on;with nsg;with family/caregiver  DIL; Nsg & PCT present & will bathe pt,then change bed  -DM           User Key  (r) = Recorded By, (t) = Taken By, (c) = Cosigned By    Initials Name Provider Type    Vania Brown, PT Physical Therapist               Outcome Measures     Row Name 05/18/23 1252 05/18/23 0800       How much help from another person do you currently need...    Turning from your back to your side while in flat bed without using bedrails? 2  -DM 2  -DD    Moving from lying on back to sitting on the side of a flat bed without bedrails? 2  -DM 2  -DD    Moving to and from a bed to a chair (including a wheelchair)? 1  -DM 2  -DD    Standing up from a chair using your arms (e.g., wheelchair, bedside chair)? 1  -DM 1  -DD    Climbing 3-5 steps with a railing? 1  -DM 1  -DD    To walk in hospital room? 1  -DM 1  -DD    AM-PAC 6 Clicks Score (PT) 8  -DM 9  -DD    Highest level of mobility 3 --> Sat at edge of bed  -DM 3 --> Sat at edge of bed  -DD    Row Name 05/18/23 1345 05/18/23 1252       Functional Assessment    Outcome Measure Options AM-PAC 6 Clicks Daily Activity (OT)  -JOSE AM-PAC 6 Clicks Basic Mobility (PT)  -DM          User Key  (r) = Recorded By, (t) = Taken By, (c) = Cosigned By    Initials Name Provider Type    Jessica Rick, OT Occupational Therapist    Vania Brown, PT Physical Therapist    Hu Archer, RN Registered Nurse                             Physical Therapy Education     Title: PT OT SLP Therapies (In Progress)     Topic: Physical Therapy (In Progress)     Point: Mobility training (In Progress)     Learning Progress Summary           Patient Acceptance, E,D, NR by IHSAN at 5/18/2023 1412    Acceptance, E, NR by FAIZA at 5/12/2023 1608    Acceptance, E, VU by JOSE at 5/5/2023 0447    Acceptance, E,D, VU,NR by AB at 5/1/2023 1537    Acceptance, E, NR by KG at 4/28/2023 1306    Acceptance, E, NR by KG at 4/25/2023 1402   Family Acceptance, E,D,  NR by DM at 5/18/2023 1412                   Point: Home exercise program (In Progress)     Learning Progress Summary           Patient Acceptance, E,D, NR by DM at 5/18/2023 1412    Acceptance, E, NR by JM at 5/12/2023 1608    Acceptance, E, NR by KG at 4/28/2023 1306   Family Acceptance, E,D, NR by DM at 5/18/2023 1412                   Point: Body mechanics (In Progress)     Learning Progress Summary           Patient Acceptance, E,D, NR by DM at 5/18/2023 1412    Acceptance, E, NR by JM at 5/12/2023 1608    Acceptance, E, VU by JOSE at 5/5/2023 0447    Acceptance, E,D, VU,NR by AB at 5/1/2023 1537    Acceptance, E, NR by KG at 4/28/2023 1306    Acceptance, E, NR by KG at 4/25/2023 1402   Family Acceptance, E,D, NR by DM at 5/18/2023 1412                   Point: Precautions (In Progress)     Learning Progress Summary           Patient Acceptance, E,D, NR by DM at 5/18/2023 1412    Acceptance, E, NR by FAIZA at 5/12/2023 1608    Acceptance, E, VU by JOSE at 5/5/2023 0447    Acceptance, E,D, VU,NR by AB at 5/1/2023 1537    Acceptance, E, NR by KG at 4/28/2023 1306    Acceptance, E, NR by KG at 4/25/2023 1402   Family Acceptance, E,D, NR by DM at 5/18/2023 1412                               User Key     Initials Effective Dates Name Provider Type Discipline    DM 02/03/23 -  Vania Shultz, PT Physical Therapist PT    JOSE 06/16/21 -  Jake Taylor, RN Registered Nurse Nurse    KG 05/22/20 -  Cici Salas, PT Physical Therapist PT    JM 06/16/21 -  Leti Marie, RN Registered Nurse Nurse    AB 09/22/22 -  Oumou Phelps, PT Physical Therapist PT              PT Recommendation and Plan  Planned Therapy Interventions (PT): balance training, bed mobility training, gait training, home exercise program, patient/family education, strengthening, transfer training  Plan of Care Reviewed With: patient, family  Progress: improving  Outcome Evaluation: PT re-eval completed. Presents w/ MF PNA, ILD exac., high anx.  level re: mobil, decr LE strength/endurance & impaired funct mobil. Rolled L/R w/ min A, using bedrail & draw sheet, to don mesh panty/pad, transf to sitting EOB w/ max A using draw sheet, monico EOB activ x 10 min, w/ onset dizziness, mod SOB, desat 85% on 6L, incr anx, , elev BP at baseline, neck pain despite lidoderm patch, & fatigue, then ret. to sup w/ max 2A & scooted to HOB w/ dep.3A using draw sheet. Recommend SNF at d/c.     Time Calculation:    PT Charges     Row Name 05/18/23 1412             Time Calculation    Start Time 1252  -DM      PT Received On 05/18/23  -DM      PT Goal Re-Cert Due Date 05/28/23  -DM         Time Calculation- PT    Total Timed Code Minutes- PT 55 minute(s)  -DM         Timed Charges    79889 - PT Therapeutic Exercise Minutes 10  -DM      60396 - PT Therapeutic Activity Minutes 15  -DM         Untimed Charges    PT Eval/Re-eval Minutes 30  -DM         Total Minutes    Timed Charges Total Minutes 25  -DM      Untimed Charges Total Minutes 30  -DM       Total Minutes 55  -DM            User Key  (r) = Recorded By, (t) = Taken By, (c) = Cosigned By    Initials Name Provider Type    Vania Brown, PT Physical Therapist              Therapy Charges for Today     Code Description Service Date Service Provider Modifiers Qty    63983683238 HC PT THER PROC EA 15 MIN 5/18/2023 Vania Shultz, PT GP 1    06879568819 HC PT THERAPEUTIC ACT EA 15 MIN 5/18/2023 Vania Shultz, PT GP 1    28361530073 HC PT RE-EVAL ESTABLISHED PLAN 2 5/18/2023 Vania Shultz, PT GP 1          PT G-Codes  Outcome Measure Options: AM-PAC 6 Clicks Daily Activity (OT)  AM-PAC 6 Clicks Score (PT): 8  AM-PAC 6 Clicks Score (OT): 13  PT Discharge Summary  Anticipated Discharge Disposition (PT): skilled nursing facility, other (see comments) (nsg is req Hospice re-consult)    Vania Shultz, PT  5/18/2023      Electronically signed by Vania Shultz, PT at 05/18/23 1416          Occupational Therapy  Notes (most recent note)      Jessica Keating, OT at 23 1244          Patient Name: Tala Ramsey  : 1950    MRN: [id number]                              Today's Date: 2023       Admit Date: 2023    Visit Dx:     ICD-10-CM ICD-9-CM   1. Multifocal pneumonia  J18.9 486   2. Failure of outpatient treatment  Z78.9 V49.89   3. Hypoxia  R09.02 799.02     Patient Active Problem List   Diagnosis   • Lumbar herniated disc   • Multifocal pneumonia   • Pulmonary fibrosis   • ILD (interstitial lung disease) -likely RA associated ILD with acute exacerbation   • Acute hypoxemic respiratory failure -likely due to ILD exacerbation     Past Medical History:   Diagnosis Date   • Anemia    • Back pain    • Bronchitis    • Hypertension    • Low back pain    • Pulmonary fibrosis    • Rheumatoid arthritis      Past Surgical History:   Procedure Laterality Date   • DILATION AND CURETTAGE, DIAGNOSTIC / THERAPEUTIC        General Information     Row Name 23 1328          OT Time and Intention    Document Type re-evaluation  -JOSE     Mode of Treatment occupational therapy  -JOSE     Row Name 23 1323          General Information    Patient Profile Reviewed yes  -JOSE     Prior Level of Function independent:;all household mobility;transfer;feeding;grooming;dressing;bathing;cooking;cleaning  -JOSE     Existing Precautions/Restrictions fall;oxygen therapy device and L/min  6 L NC, watch BP and 02 sats  -JOSE     Barriers to Rehab medically complex  -JOSE     Row Name 23 1328          Living Environment    People in Home child(nikolas), adult  2 sons, one works in home for now  -JOSE     Row Name 23 1328          Home Main Entrance    Number of Stairs, Main Entrance none  -JOSE     Row Name 23 1328          Stairs Within Home, Primary    Number of Stairs, Within Home, Primary twelve  -JOSE     Stairs Comment, Within Home, Primary pt. does not have to go down steps to basement  -JOSE     Row Name 23 1326           Cognition    Orientation Status (Cognition) oriented x 3  -     Row Name 05/18/23 1328          Safety Issues, Functional Mobility    Safety Issues Affecting Function (Mobility) insight into deficits/self-awareness;sequencing abilities  -     Impairments Affecting Function (Mobility) balance;endurance/activity tolerance;shortness of breath;strength;postural/trunk control  -JOSE     Comment, Safety Issues/Impairments (Mobility) Pt. with anxiety, but with prior anxiety meds, cues for PLB.  -           User Key  (r) = Recorded By, (t) = Taken By, (c) = Cosigned By    Initials Name Provider Type    JOSE Jessica Keating, OT Occupational Therapist                 Mobility/ADL's     Row Name 05/18/23 1330          Bed Mobility    Bed Mobility rolling left;rolling right;sidelying-sit;sit-supine  -JOSE     Rolling Left [place] (Bed Mobility) minimum assist (75% patient effort);verbal cues  -JOSE     Rolling Right [place] (Bed Mobility) minimum assist (75% patient effort);verbal cues  -JOSE     Scooting/Bridging [place] (Bed Mobility) dependent (less than 25% patient effort);2 person assist  to HOB  -JOSE     Sit-Supine [place] (Bed Mobility) maximum assist (25% patient effort);2 person assist;verbal cues  -JOSE     Sidelying-Sit [place] (Bed Mobility) minimum assist (75% patient effort);2 person assist;verbal cues;nonverbal cues (demo/gesture)  -     Bed Mobility, Safety Issues decreased use of arms for pushing/pulling;decreased use of legs for bridging/pushing;impaired trunk control for bed mobility  -     Assistive Device (Bed Mobility) bed rails;draw sheet  -     Comment, (Bed Mobility) some assist with LE's off EOB and side to sit, cues to sequence and initiate mvmt., pt. rolled twice for linen change and preeti and doffing undergarment  -     Row Name 05/18/23 1330          Transfers    Comment, (Transfers) unable to attempt due to SOA and dizziness, attempted BP at EOB twice, but unable  to get a read  -     Row Name 05/18/23 1330          Activities of Daily Living    BADL Assessment/Intervention lower body dressing  -     Row Name 05/18/23 1330          Lower Body Dressing Assessment/Training    [place] Level (Lower Body Dressing) doff;don;pants/bottoms;socks;maximum assist (25% patient effort);dependent (less than 25% patient effort)  -     Position (Lower Body Dressing) supine  -     Comment, (Lower Body Dressing) pt. able to roll and help pull up undergarment over hips with assist  -           User Key  (r) = Recorded By, (t) = Taken By, (c) = Cosigned By    Initials Name Provider Type    JOSE Jessica Keating OT Occupational Therapist               Obj/Interventions     Row Name 05/18/23 1334          Sensory Assessment (Somatosensory)    Sensory Assessment (Somatosensory) UE sensation intact  -[facility] Name 05/18/23 1334          Vision Assessment/Intervention    Visual Impairment/Limitations corrective lenses full-time  -     Row Name 05/18/23 1334          Range of Motion Comprehensive    General Range of Motion bilateral upper extremity ROM WFL  -[facility] Name 05/18/23 1334          Strength Comprehensive (MMT)    General Manual Muscle Testing (MMT) Assessment upper extremity strength deficits identified  -     Comment, General Manual Muscle Testing (MMT) Assessment BUE grossly 3+/5 minus  4+/5  -     Row Name 05/18/23 1334          Shoulder (Therapeutic Exercise)    Shoulder (Therapeutic Exercise) AROM (active range of motion)  -     Shoulder AROM (Therapeutic Exercise) bilateral;flexion;extension;aBduction;aDduction;horizontal aBduction/aDduction;10 repetitions;supine  -     Row Name 05/18/23 1334          Elbow/Forearm (Therapeutic Exercise)    Elbow/Forearm (Therapeutic Exercise) strengthening exercise  -     Elbow/Forearm Strengthening (Therapeutic Exercise) bilateral;flexion;extension;supine;10 repetitions  light manual resistance given  -[facility]  Name 05/18/23 1334          Motor Skills    Motor Skills functional endurance  -JOSE     Functional Endurance 02 sats to 85% with EOB sitting, to 90% post returning supine with PLB several minutes  -JOSE     Therapeutic Exercise shoulder;elbow/forearm  -JOSE     Row Name 05/18/23 1334          Balance    Static Sitting Balance minimal assist  periods of CGA  -JOSE     Comment, Balance pt. sat grossly 10 minutes EOB, cues for PBL, UE bracing  -JOSE           User Key  (r) = Recorded By, (t) = Taken By, (c) = Cosigned By    Initials Name Provider Type    Jessica Rick, OT Occupational Therapist               Goals/Plan     Row Name 05/18/23 9508          Bed Mobility Goal 1 (OT)    Activity/Assistive Device (Bed Mobility Goal 1, OT) sit to supine;supine to sit  -JOSE     [place] Level/Cues Needed (Bed Mobility Goal 1, OT) contact guard required  -JOSE     Time Frame (Bed Mobility Goal 1, OT) long term goal (LTG);10 days  -JOSE     Progress/Outcomes (Bed Mobility Goal 1, OT) new goal  -JOSE     Row Name 05/18/23 1343          Transfer Goal 1 (OT)    Activity/Assistive Device (Transfer Goal 1, OT) sit-to-stand/stand-to-sit;bed-to-chair/chair-to-bed;toilet;commode, bedside without drop arms;walker, rolling  -JOSE     [place] Level/Cues Needed (Transfer Goal 1, OT) moderate assist (50-74% patient effort)  -JOSE     Time Frame (Transfer Goal 1, OT) long term goal (LTG);10 days  -JOSE     Progress/Outcome (Transfer Goal 1, OT) new goal  -JOSE     Row Name 05/18/23 1343          Grooming Goal 1 (OT)    Activity/Device (Grooming Goal 1, OT) oral care;hair care;wash face, hands  -JOSE     [place] (Grooming Goal 1, OT) standby assist;set-up required  -JOSE     Time Frame (Grooming Goal 1, OT) long term goal (LTG);10 days  -JOSE     Strategies/Barriers (Grooming Goal 1, OT) unsupported sit, 02 sats 90%+  -JOSE     Progress/Outcome (Grooming Goal 1, OT) new goal  -JOSE     Row Name 05/18/23 8543          Strength Goal 1 (OT)    Strength Goal 1  (OT) Pt. will perform UE HEP with increasin reps and resistance with min cueing using PLB as appropriate.  -JOSE     Time Frame (Strength Goal 1, OT) long term goal (LTG);10 days  -JOSE     Progress/Outcome (Strength Goal 1, OT) new goal  -JOSE     Row Name 05/18/23 1557          Therapy Assessment/Plan (OT)    Planned Therapy Interventions (OT) activity tolerance training;functional balance retraining;occupation/activity based interventions;ROM/therapeutic exercise;strengthening exercise;transfer/mobility retraining;BADL retraining  -JOSE           User Key  (r) = Recorded By, (t) = Taken By, (c) = Cosigned By    Initials Name Provider Type    Jessica Rick, OT Occupational Therapist               Clinical Impression     Row Name 05/18/23 5768          Pain Assessment    Pretreatment Pain Rating 5/10  -JOSE     Posttreatment Pain Rating 5/10  -JOSE     Pain Location - Side/Orientation Bilateral  -JOSE     Pain Location posterior  -JOSE     Pain Location - neck  -JOSE     Pain Intervention(s) Medication (See MAR);Ambulation/increased activity;Repositioned  -JOSE     Row Name 05/18/23 8066          Plan of Care Review    Plan of Care Reviewed With patient;family  -JOSE     Progress no change  -JOSE     Outcome Evaluation Pt. presents with impaired strength, balance and endurance impacting prior ADL independence level.  02 sats to 85% with EOB sitting, but with return to bed and PLB to 90%. Pt. is appropriate for skilled OT services to address changes in PLOF ADLs.  Recommned SNF at discharge.  -JOSE     Row Name 05/18/23 4766          Therapy Assessment/Plan (OT)    Patient/Family Therapy Goal Statement (OT) return to PLOF  -JOSE     Rehab Potential (OT) good, to achieve stated therapy goals  -JOSE     Criteria for Skilled Therapeutic Interventions Met (OT) yes  -JOSE     Therapy Frequency (OT) daily  -JOSE     Row Name 05/18/23 5957          Therapy Plan Review/Discharge Plan (OT)    Anticipated Discharge Disposition (OT) skilled nursing  facility  -     Row Name 05/18/23 1336          Vital Signs    Intratreatment Heart Rate (beats/min) 114  -JOSE     Posttreatment Heart Rate (beats/min) 108  -JOSE     Pre SpO2 (%) 90  -JOSE     O2 Delivery Pre Treatment nasal cannula  -JOSE     Intra SpO2 (%) 85  -JOSE     O2 Delivery Intra Treatment nasal cannula  -JOSE     Post SpO2 (%) 90  -JOSE     O2 Delivery Post Treatment nasal cannula  -JOSE     Pre Patient Position Supine  -JOSE     Intra Patient Position Sitting  -JOSE     Post Patient Position Supine  -JOSE     Row Name 05/18/23 1336          Positioning and Restraints    Pre-Treatment Position in bed  -JOSE     Post Treatment Position bed  -JOSE     In Bed notified nsg;supine;call light within reach;encouraged to call for assist;exit alarm on;side rails up x2;with nsg;with PT;with family/caregiver  -           User Key  (r) = Recorded By, (t) = Taken By, (c) = Cosigned By    Initials Name Provider Type    Jessica Rick, OT Occupational Therapist               Outcome Measures     Row Name 05/18/23 1345          How much help from another is currently needed...    Putting on and taking off regular lower body clothing? 1  -JOSE     Bathing (including washing, rinsing, and drying) 2  -JOSE     Toileting (which includes using toilet bed pan or urinal) 2  -JOSE     Putting on and taking off regular upper body clothing 2  -JOSE     Taking care of personal grooming (such as brushing teeth) 3  -JOSE     Eating meals 3  -JOSE     AM-PAC 6 Clicks Score (OT) 13  -JOES     Row Name 05/18/23 0800          How much help from another person do you currently need...    Turning from your back to your side while in flat bed without using bedrails? 2  -DD     Moving from lying on back to sitting on the side of a flat bed without bedrails? 2  -DD     Moving to and from a bed to a chair (including a wheelchair)? 2  -DD     Standing up from a chair using your arms (e.g., wheelchair, bedside chair)? 1  -DD     Climbing 3-5 steps with a railing? 1  -DD      To walk in hospital room? 1  -DD     AM-PAC 6 Clicks Score (PT) 9  -DD     Highest level of mobility 3 --> Sat at edge of bed  -DD     Row Name 05/18/23 1345          Functional Assessment    Outcome Measure Options AM-PAC 6 Clicks Daily Activity (OT)  -JOSE           User Key  (r) = Recorded By, (t) = Taken By, (c) = Cosigned By    Initials Name Provider Type    Jessica Rick, OT Occupational Therapist    Hu Archer RN Registered Nurse                Occupational Therapy Education     Title: PT OT SLP Therapies (In Progress)     Topic: Occupational Therapy (In Progress)     Point: ADL training (Done)     Description:   Instruct learner(s) on proper safety adaptation and remediation techniques during self care or transfers.   Instruct in proper use of assistive devices.              Learning Progress Summary           Patient Acceptance, E,D, VU,NR by JOSE at 5/18/2023 1347    Comment: reason for consult, noted deficits, PLB, UE TE and benefit, bed mobility, vital results    Acceptance, E, NR by FAIZA at 5/12/2023 1608    Acceptance, E, VU by SHE at 5/5/2023 0447    Acceptance, E, VU by LOGAN at 4/25/2023 1543   Family Acceptance, E,D, VU,NR by JOSE at 5/18/2023 1347    Comment: reason for consult, noted deficits, PLB, UE TE and benefit, bed mobility, vital results    Acceptance, E, VU by AN at 4/25/2023 1543                   Point: Home exercise program (Done)     Description:   Instruct learner(s) on appropriate technique for monitoring, assisting and/or progressing therapeutic exercises/activities.              Learning Progress Summary           Patient Acceptance, E,D, VU,NR by JOSE at 5/18/2023 1347    Comment: reason for consult, noted deficits, PLB, UE TE and benefit, bed mobility, vital results    Acceptance, E, NR by FAIZA at 5/12/2023 1608   Family Acceptance, E,D, VU,NR by JOSE at 5/18/2023 1347    Comment: reason for consult, noted deficits, PLB, UE TE and benefit, bed mobility, vital results                    Point: Precautions (In Progress)     Description:   Instruct learner(s) on prescribed precautions during self-care and functional transfers.              Learning Progress Summary           Patient Acceptance, E, NR by FAIZA at 5/12/2023 1608    Acceptance, E, VU by AN at 4/25/2023 1543   Family Acceptance, E, VU by AN at 4/25/2023 1543                   Point: Body mechanics (Done)     Description:   Instruct learner(s) on proper positioning and spine alignment during self-care, functional mobility activities and/or exercises.              Learning Progress Summary           Patient Acceptance, E,D, VU,NR by JOSE at 5/18/2023 1347    Comment: reason for consult, noted deficits, PLB, UE TE and benefit, bed mobility, vital results    Acceptance, E, NR by FAIZA at 5/12/2023 1608    Acceptance, E, VU by SHE at 5/5/2023 0447    Acceptance, E, VU by AN at 4/25/2023 1543   Family Acceptance, E,D, VU,NR by JOSE at 5/18/2023 1347    Comment: reason for consult, noted deficits, PLB, UE TE and benefit, bed mobility, vital results    Acceptance, E, VU by AN at 4/25/2023 1543                               User Key     Initials Effective Dates Name Provider Type Discipline     02/03/23 -  Jessica Keating OT Occupational Therapist OT    1 06/16/21 -  Jake Taylor RN Registered Nurse Nurse     06/16/21 -  Leti Marie RN Registered Nurse Nurse    LOGAN 09/21/21 -  Maddison Bean OT Occupational Therapist OT              OT Recommendation and Plan  Planned Therapy Interventions (OT): activity tolerance training, functional balance retraining, occupation/activity based interventions, ROM/therapeutic exercise, strengthening exercise, transfer/mobility retraining, BADL retraining  Therapy Frequency (OT): daily  Plan of Care Review  Plan of Care Reviewed With: patient, family  Progress: no change  Outcome Evaluation: Pt. presents with impaired strength, balance and endurance impacting prior ADL independence level.  02 sats  to 85% with EOB sitting, but with return to bed and PLB to 90%. Pt. is appropriate for skilled OT services to address changes in PLOF ADLs.  Recommned SNF at discharge.     Time Calculation:    Time Calculation- OT     Row Name 05/18/23 1348             Time Calculation- OT    OT Start Time 1244  -JOSE      OT Received On 05/18/23  -JOSE      OT Goal Re-Cert Due Date 05/28/23  -JOSE         Timed Charges    58704 - OT Therapeutic Exercise Minutes 10  -JOSE      16296 - OT Therapeutic Activity Minutes 8  -JOSE      72217 - OT Self Care/Mgmt Minutes 5  -JOSE         Untimed Charges    OT Eval/Re-eval Minutes 12  -JOSE         Total Minutes    Timed Charges Total Minutes 23  -JOSE      Untimed Charges Total Minutes 12  -JOSE       Total Minutes 35  -JOSE            User Key  (r) = Recorded By, (t) = Taken By, (c) = Cosigned By    Initials Name Provider Type    Jessica Rick OT Occupational Therapist              Therapy Charges for Today     Code Description Service Date Service Provider Modifiers Qty    91506591491 HC OT THER PROC EA 15 MIN 5/18/2023 Jessica Keating OT GO 1    54522501058 HC OT THERAPEUTIC ACT EA 15 MIN 5/18/2023 Jessica Keating OT GO 1    26270304835 HC OT RE-EVAL 2 5/18/2023 Jessica Keating OT GO 1               Jessica Keating OT  5/18/2023    Electronically signed by Jessica Keating OT at 05/18/23 1350

## 2023-05-20 LAB
ANION GAP SERPL CALCULATED.3IONS-SCNC: 14 MMOL/L (ref 5–15)
BUN SERPL-MCNC: 14 MG/DL (ref 8–23)
BUN/CREAT SERPL: 28 (ref 7–25)
CALCIUM SPEC-SCNC: 8.8 MG/DL (ref 8.6–10.5)
CHLORIDE SERPL-SCNC: 95 MMOL/L (ref 98–107)
CO2 SERPL-SCNC: 18 MMOL/L (ref 22–29)
CREAT SERPL-MCNC: 0.5 MG/DL (ref 0.57–1)
EGFRCR SERPLBLD CKD-EPI 2021: 99.2 ML/MIN/1.73
GLUCOSE SERPL-MCNC: 84 MG/DL (ref 65–99)
POTASSIUM SERPL-SCNC: 4.9 MMOL/L (ref 3.5–5.2)
SODIUM SERPL-SCNC: 127 MMOL/L (ref 136–145)

## 2023-05-20 PROCEDURE — 94799 UNLISTED PULMONARY SVC/PX: CPT

## 2023-05-20 PROCEDURE — 63710000001 PREDNISONE PER 1 MG: Performed by: INTERNAL MEDICINE

## 2023-05-20 PROCEDURE — 94664 DEMO&/EVAL PT USE INHALER: CPT

## 2023-05-20 PROCEDURE — 99232 SBSQ HOSP IP/OBS MODERATE 35: CPT | Performed by: NURSE PRACTITIONER

## 2023-05-20 PROCEDURE — 80048 BASIC METABOLIC PNL TOTAL CA: CPT | Performed by: NURSE PRACTITIONER

## 2023-05-20 PROCEDURE — 25010000002 HEPARIN (PORCINE) PER 1000 UNITS: Performed by: INTERNAL MEDICINE

## 2023-05-20 RX ORDER — FUROSEMIDE 20 MG/1
20 TABLET ORAL DAILY
Status: DISCONTINUED | OUTPATIENT
Start: 2023-05-20 | End: 2023-05-25 | Stop reason: HOSPADM

## 2023-05-20 RX ORDER — TIZANIDINE 4 MG/1
2 TABLET ORAL EVERY 8 HOURS PRN
Status: DISCONTINUED | OUTPATIENT
Start: 2023-05-20 | End: 2023-05-25 | Stop reason: HOSPADM

## 2023-05-20 RX ORDER — MAG HYDROX/ALUMINUM HYD/SIMETH 400-400-40
1 SUSPENSION, ORAL (FINAL DOSE FORM) ORAL ONCE
Status: COMPLETED | OUTPATIENT
Start: 2023-05-21 | End: 2023-05-21

## 2023-05-20 RX ORDER — LORAZEPAM 1 MG/1
1 TABLET ORAL EVERY 6 HOURS PRN
Status: DISCONTINUED | OUTPATIENT
Start: 2023-05-20 | End: 2023-05-25 | Stop reason: HOSPADM

## 2023-05-20 RX ADMIN — MORPHINE SULFATE 5 MG: 100 SOLUTION ORAL at 06:23

## 2023-05-20 RX ADMIN — MORPHINE SULFATE 5 MG: 100 SOLUTION ORAL at 14:04

## 2023-05-20 RX ADMIN — GUAIFENESIN 1200 MG: 600 TABLET, EXTENDED RELEASE ORAL at 08:44

## 2023-05-20 RX ADMIN — IPRATROPIUM BROMIDE AND ALBUTEROL SULFATE 3 ML: .5; 2.5 SOLUTION RESPIRATORY (INHALATION) at 08:50

## 2023-05-20 RX ADMIN — Medication: at 21:02

## 2023-05-20 RX ADMIN — NYSTATIN: 100000 POWDER TOPICAL at 08:50

## 2023-05-20 RX ADMIN — HEPARIN SODIUM 5000 UNITS: 5000 INJECTION, SOLUTION INTRAVENOUS; SUBCUTANEOUS at 05:40

## 2023-05-20 RX ADMIN — PANTOPRAZOLE SODIUM 40 MG: 40 TABLET, DELAYED RELEASE ORAL at 08:43

## 2023-05-20 RX ADMIN — CETIRIZINE HYDROCHLORIDE 10 MG: 10 TABLET, FILM COATED ORAL at 08:44

## 2023-05-20 RX ADMIN — Medication: at 05:40

## 2023-05-20 RX ADMIN — MORPHINE SULFATE 5 MG: 100 SOLUTION ORAL at 10:47

## 2023-05-20 RX ADMIN — IPRATROPIUM BROMIDE AND ALBUTEROL SULFATE 3 ML: .5; 2.5 SOLUTION RESPIRATORY (INHALATION) at 13:46

## 2023-05-20 RX ADMIN — MORPHINE SULFATE 5 MG: 100 SOLUTION ORAL at 16:06

## 2023-05-20 RX ADMIN — IPRATROPIUM BROMIDE AND ALBUTEROL SULFATE 3 ML: .5; 2.5 SOLUTION RESPIRATORY (INHALATION) at 20:29

## 2023-05-20 RX ADMIN — HYDROXYCHLOROQUINE SULFATE 200 MG: 200 TABLET, FILM COATED ORAL at 21:01

## 2023-05-20 RX ADMIN — NYSTATIN: 100000 POWDER TOPICAL at 21:02

## 2023-05-20 RX ADMIN — IPRATROPIUM BROMIDE AND ALBUTEROL SULFATE 3 ML: .5; 2.5 SOLUTION RESPIRATORY (INHALATION) at 17:00

## 2023-05-20 RX ADMIN — MONTELUKAST 10 MG: 10 TABLET, FILM COATED ORAL at 08:43

## 2023-05-20 RX ADMIN — FUROSEMIDE 20 MG: 20 TABLET ORAL at 16:06

## 2023-05-20 RX ADMIN — HYDROXYCHLOROQUINE SULFATE 200 MG: 200 TABLET, FILM COATED ORAL at 08:46

## 2023-05-20 RX ADMIN — HEPARIN SODIUM 5000 UNITS: 5000 INJECTION, SOLUTION INTRAVENOUS; SUBCUTANEOUS at 14:09

## 2023-05-20 RX ADMIN — SODIUM CHLORIDE 1 G: 1 TABLET ORAL at 17:42

## 2023-05-20 RX ADMIN — GUAIFENESIN 1200 MG: 600 TABLET, EXTENDED RELEASE ORAL at 21:00

## 2023-05-20 RX ADMIN — Medication 5 MG: at 21:00

## 2023-05-20 RX ADMIN — MORPHINE SULFATE 5 MG: 100 SOLUTION ORAL at 08:42

## 2023-05-20 RX ADMIN — HEPARIN SODIUM 5000 UNITS: 5000 INJECTION, SOLUTION INTRAVENOUS; SUBCUTANEOUS at 21:00

## 2023-05-20 RX ADMIN — BISACODYL 10 MG: 10 SUPPOSITORY RECTAL at 16:07

## 2023-05-20 RX ADMIN — POLYETHYLENE GLYCOL 3350 17 G: 17 POWDER, FOR SOLUTION ORAL at 08:43

## 2023-05-20 RX ADMIN — CALCIUM CARBONATE (ANTACID) CHEW TAB 500 MG 2 TABLET: 500 CHEW TAB at 16:15

## 2023-05-20 RX ADMIN — AMLODIPINE BESYLATE 5 MG: 5 TABLET ORAL at 08:44

## 2023-05-20 RX ADMIN — LORAZEPAM 1 MG: 1 TABLET ORAL at 09:08

## 2023-05-20 RX ADMIN — SODIUM CHLORIDE 1 G: 1 TABLET ORAL at 08:44

## 2023-05-20 RX ADMIN — LIDOCAINE 1 PATCH: 50 PATCH CUTANEOUS at 08:44

## 2023-05-20 RX ADMIN — PREDNISONE 40 MG: 20 TABLET ORAL at 08:43

## 2023-05-20 RX ADMIN — MORPHINE SULFATE 5 MG: 100 SOLUTION ORAL at 21:01

## 2023-05-20 NOTE — PROGRESS NOTES
[facility] Medicine Services  PROGRESS NOTE    Patient Name: Tala Ramsey  : 1950  MRN: [id number]    Date of Admission: 2023  Primary Care Physician: Sg Horne MD    Subjective   Subjective     CC:  F/u hypoxia, PNA    HPI:  Patient resting in bed, doing breathing treatment. Son, Sebastian, is at the bedside. Patient says she had a rough morning, felt like she wasn't breathing as well. Added prn Ativan back on med rec. She is starting to feel constipated.     ROS:  Gen- No fevers, chills  CV- No chest pain, palpitations  Resp- No cough  GI- No N/V/D, abd pain    Objective   Objective     Vital Signs:   Temp:  [97.5 °F (36.4 °C)] 97.5 °F (36.4 °C)  Heart Rate:  [90-97] 93  Resp:  [16-18] 16  BP: (118)/(69) 118/69  Flow (L/min):  [6] 6     Physical Exam:  Constitutional: No acute distress, awake, alert  HENT: NCAT, mucous membranes moist  Respiratory: Scattered rhonchi R>L, diminished left base, respiratory effort normal, 6L NC  Cardiovascular: Tachycardic, no murmurs, rubs, or gallops  Gastrointestinal: Positive bowel sounds, soft, nontender, nondistended  Musculoskeletal: Trace bilateral ankle edema  Psychiatric: Appropriate affect, cooperative  Neurologic: Oriented x 3, moves all extremities, no focal deficits, speech clear  Skin: No rashes      Results Reviewed:  LAB RESULTS:          Lab 23  0613 23  0641 23  0720 23  0643   SODIUM 127* 129* 128* 129*   POTASSIUM 4.9 4.4 4.4 4.8   CHLORIDE 95* 94* 93* 97*   CO2 18.0* 24.0 22.0 22.0   ANION GAP 14.0 11.0 13.0 10.0   BUN 14 11 16 17   CREATININE 0.50* 0.34* 0.43* 0.37*   EGFR 99.2 108.8 102.8 106.6   GLUCOSE 84 77 82 120*   CALCIUM 8.8 9.2 9.4 9.1                         Brief Urine Lab Results  (Last result in the past 365 days)      Color   Clarity   Blood   Leuk Est   Nitrite   Protein   CREAT   Urine HCG        23 1757 Yellow   Clear   Trace   Negative   Negative   Negative                  Microbiology Results Abnormal     Procedure Component Value - Date/Time    Blood Culture - Blood, Arm, Right [[id number]]  (Normal) Collected: 04/23/23 1125    Lab Status: Final result Specimen: Blood from Arm, Right Updated: 04/28/23 1145     Blood Culture No growth at 5 days    Blood Culture - Blood, Arm, Left [[id number]]  (Normal) Collected: 04/23/23 1125    Lab Status: Final result Specimen: Blood from Arm, Left Updated: 04/28/23 1145     Blood Culture No growth at 5 days    S. Pneumo Ag Urine or CSF - Urine, Urine, Clean Catch [[id number]]  (Normal) Collected: 04/23/23 1757    Lab Status: Final result Specimen: Urine, Clean Catch Updated: 04/24/23 0949     Strep Pneumo Ag Negative    Legionella Antigen, Urine - Urine, Urine, Clean Catch [[id number]]  (Normal) Collected: 04/23/23 1757    Lab Status: Final result Specimen: Urine, Clean Catch Updated: 04/24/23 0949     LEGIONELLA ANTIGEN, URINE Negative    Respiratory Panel PCR w/COVID-19(SARS-CoV-2) CHARI/MARISSA/APOLINAR/PAD/COR/MAD/AMY In-House, NP Swab in UTM/VTM, 3-4 HR TAT - Swab, Nasopharynx [[id number]]  (Normal) Collected: 04/24/23 0544    Lab Status: Final result Specimen: Swab from Nasopharynx Updated: 04/24/23 0647     ADENOVIRUS, PCR Not Detected     Coronavirus 229E Not Detected     Coronavirus HKU1 Not Detected     Coronavirus NL63 Not Detected     Coronavirus OC43 Not Detected     COVID19 Not Detected     Human Metapneumovirus Not Detected     Human Rhinovirus/Enterovirus Not Detected     Influenza A PCR Not Detected     Influenza B PCR Not Detected     Parainfluenza Virus 1 Not Detected     Parainfluenza Virus 2 Not Detected     Parainfluenza Virus 3 Not Detected     Parainfluenza Virus 4 Not Detected     RSV, PCR Not Detected     Bordetella pertussis pcr Not Detected     Bordetella parapertussis PCR Not Detected     Chlamydophila pneumoniae PCR Not Detected     Mycoplasma pneumo by PCR Not Detected    Narrative:      In the setting of a positive  respiratory panel with a viral infection PLUS a negative procalcitonin without other underlying concern for bacterial infection, consider observing off antibiotics or discontinuation of antibiotics and continue supportive care. If the respiratory panel is positive for atypical bacterial infection (Bordetella pertussis, Chlamydophila pneumoniae, or Mycoplasma pneumoniae), consider antibiotic de-escalation to target atypical bacterial infection.    MRSA Screen, PCR (Inpatient) - Swab, Nares [[id number]]  (Normal) Collected: 04/23/23 1540    Lab Status: Final result Specimen: Swab from Nares Updated: 04/23/23 1651     MRSA PCR Negative    Narrative:      The negative predictive value of this diagnostic test is high and should only be used to consider de-escalating anti-MRSA therapy. A positive result may indicate colonization with MRSA and must be correlated clinically.  MRSA Negative    COVID PRE-OP / PRE-PROCEDURE SCREENING ORDER (NO ISOLATION) - Swab, Nasopharynx [[id number]]  (Normal) Collected: 04/23/23 1125    Lab Status: Final result Specimen: Swab from Nasopharynx Updated: 04/23/23 1225    Narrative:      The following orders were created for panel order COVID PRE-OP / PRE-PROCEDURE SCREENING ORDER (NO ISOLATION) - Swab, Nasopharynx.  Procedure                               Abnormality         Status                     ---------                               -----------         ------                     COVID-19, FLU A/B, RSV P...[[id number]]  Normal              Final result                 Please view results for these tests on the individual orders.    COVID-19, FLU A/B, RSV PCR - Swab, Nasopharynx [[id number]]  (Normal) Collected: 04/23/23 1125    Lab Status: Final result Specimen: Swab from Nasopharynx Updated: 04/23/23 1225     COVID19 Not Detected     Influenza A PCR Not Detected     Influenza B PCR Not Detected     RSV, PCR Not Detected    Narrative:      Fact sheet for providers:  https://www.fda.gov/media/612021/download    Fact sheet for patients: https://www.fda.gov/media/621336/download    Test performed by PCR.          No radiology results from the last 24 hrs        Current medications:  Scheduled Meds:amLODIPine, 5 mg, Oral, Q24H  cetirizine, 10 mg, Oral, Daily  fluticasone, 2 spray, Each Nare, Nightly  guaiFENesin, 1,200 mg, Oral, Q12H  heparin (porcine), 5,000 Units, Subcutaneous, Q8H  hydroxychloroquine, 200 mg, Oral, Q12H  ipratropium-albuterol, 3 mL, Nebulization, 4x Daily - RT  lidocaine, 1 patch, Transdermal, Q24H  melatonin, 5 mg, Oral, Nightly  montelukast, 10 mg, Oral, Daily  morphine, 5 mg, Oral, 4x Daily  nystatin, , Topical, Q12H  palliative care oral rinse, , Mouth/Throat, Q8H  pantoprazole, 40 mg, Oral, Daily  polyethylene glycol, 17 g, Oral, Daily  predniSONE, 40 mg, Oral, Daily With Breakfast  sodium chloride, 1 g, Oral, BID With Meals  sulfamethoxazole-trimethoprim, 1 tablet, Oral, Once per day on Mon Wed Fri      Continuous Infusions:   PRN Meds:.•  acetaminophen **OR** acetaminophen **OR** acetaminophen  •  aluminum-magnesium hydroxide-simethicone **AND** Lidocaine Viscous HCl  •  senna-docusate sodium **AND** polyethylene glycol **AND** bisacodyl **AND** bisacodyl  •  calcium carbonate  •  cyclobenzaprine  •  hydrOXYzine  •  ipratropium-albuterol  •  Magnesium Standard Dose Replacement - Follow Nurse / BPA Driven Protocol  •  morphine  •  ondansetron **OR** ondansetron  •  simethicone  •  sodium chloride  •  sodium chloride  •  sodium chloride    Assessment & Plan   Assessment & Plan     Active Hospital Problems    Diagnosis  POA   • **Acute hypoxemic respiratory failure -likely due to ILD exacerbation [J96.01]  Yes   • Pulmonary fibrosis [J84.10]  Yes   • ILD (interstitial lung disease) -likely RA associated ILD with acute exacerbation [J84.9]  Yes   • Multifocal pneumonia [J18.9]  Yes      Resolved Hospital Problems   No resolved problems to display.         Brief Hospital Course to date:  Tala Ramsey is a 72 y.o. female  w/ hx interstitial lung dz, RA (on humira until recently, recent steroid injections) who presented w/ progressive dyspnea, cough over ~10 days who failed outpatient Levaquin. Pt was found to be hypoxic upon admission. She had progressive hypoxia the evening after admission and was placed on HFNC.        This patient's problems and plans were partially entered by my partner and updated as appropriate by me 05/20/23.        Acute hypoxic resp failure, worsening  Pulmonary fibrosis/ILD  Bilateral Pneumonia  -likely combination of progession/flare of interstitial lung disease and infection  -initially on HFNC with difficulty weaning and significant drops with minimal exertion but currently on 6LNC with rest.   -s/p full course of zosyn & azithromycin  -3x weekly bactrim ds (pcp prophylaxis as has been on humira and steroids)   -Pulm following: completed IV solumedrol 5/9 and transitioned po prednisone. Started on 40 mg daily on 5/16. Decrease by 5 mg  per week until at 10 mg daily. Can stop PCP prophylaxis (bactrim) once she is at 20 mg.  -PPI for GI ppx  -continue scheduled & PRN duonebs  -Lasix 20 mg daily for crackles on exam, may help with low sodium  -Palliative following- continue morphine and ativan prn.      Constipation  -pt notes no BM x 11 days, but BM charted 5/16  -daily Miralax  -discussed using suppository this afternoon if no BM    Hyponatremia  Poor po intake  --No IV access.  Full support but no CPR/intubation.    --added salt tabs bid 5/10 and fluid restriction. Pt and family note she has not followed fluid restriction, so will stop. Continue salt tabs.  --Encourage PO intake  -- Na 127 today  -- trial low-dose Lasix, watch BP  -- AM BMP     Hyperkalemia, resolved  - s/p lokelma  - K 4.9 today       RA  -holding humira  -continue plaquenil      HTN  - continue amlodpine   - holding valsartan due to hyperkalemia  - bp stable off  valsartan     Chronic anemia   -monitor  -AM CBC     Goals/limits  -Patient is DNR/DNI (in event of further clinical decompensation patient would NOT want mechanical ventilation; in this case patient would wish to pursue comfort measures). Patient now wants to pursue skilled rehab, then go home with HH vs hospice.     Expected Discharge Location and Transportation: SNF, EMS   Expected Discharge pending bed offer, insurance approval  Expected Discharge Date: 5/23/2023; Expected Discharge Time:      DVT prophylaxis:  Medical DVT prophylaxis orders are present.     AM-PAC 6 Clicks Score (PT): 9 (05/19/23 0800)    CODE STATUS:   Code Status and Medical Interventions:   Ordered at: 04/23/23 1310     Medical Intervention Limits:    NO intubation (DNI)     Level Of Support Discussed With:    Patient     Code Status (Patient has no pulse and is not breathing):    No CPR (Do Not Attempt to Resuscitate)     Medical Interventions (Patient has pulse or is breathing):    Limited Support       Liliana Luis, APRN  05/20/23

## 2023-05-20 NOTE — PLAN OF CARE
Goal Outcome Evaluation:  Plan of Care Reviewed With: patient, family        Progress: no change  Outcome Evaluation: VSS. 6L with humidifier. Episode of shortness of breath/anxiety/pain this morning and took some time and PRN morphine and Ativan to resettle. Suppository given this afternoon, no BM yet. Poor oral intake. WCON consult for skin tear vs. PI on L gluteal. Awaiting discharge plan, no further needs identified at this time.

## 2023-05-20 NOTE — PLAN OF CARE
Problem: Adult Inpatient Plan of Care  Goal: Absence of Hospital-Acquired Illness or Injury  Intervention: Prevent Skin Injury  Recent Flowsheet Documentation  Taken 5/20/2023 0600 by Alyx Friedman RN  Skin Protection: adhesive use limited  Taken 5/20/2023 0400 by Alyx Friedman RN  Body Position: position changed independently  Taken 5/20/2023 0200 by Alyx Friedman RN  Body Position: position changed independently  Taken 5/20/2023 0000 by Alyx Friedman RN  Body Position: position changed independently  Taken 5/19/2023 2050 by Alyx Friedman RN  Skin Protection:   adhesive use limited   tubing/devices free from skin contact     Problem: Adult Inpatient Plan of Care  Goal: Absence of Hospital-Acquired Illness or Injury  Intervention: Prevent Infection  Recent Flowsheet Documentation  Taken 5/20/2023 0600 by Alyx Friedman RN  Infection Prevention: rest/sleep promoted  Taken 5/20/2023 0400 by Alyx Friedman RN  Infection Prevention: rest/sleep promoted  Taken 5/20/2023 0200 by Alyx Friedman RN  Infection Prevention: rest/sleep promoted  Taken 5/20/2023 0000 by Alyx Friedman RN  Infection Prevention: rest/sleep promoted  Taken 5/19/2023 2050 by Alyx Friedman RN  Infection Prevention: rest/sleep promoted     Problem: Adult Inpatient Plan of Care  Goal: Optimal Comfort and Wellbeing  Intervention: Provide Person-Centered Care  Recent Flowsheet Documentation  Taken 5/19/2023 2050 by Alyx Friedman RN  Trust Relationship/Rapport:   care explained   choices provided   reassurance provided   thoughts/feelings acknowledged   Goal Outcome Evaluation:   Pt is A&OX4 with VSS and some c/o SOA requiring PRN morphina. Son supportive and at bedside. CM seeking SNF placement.

## 2023-05-21 LAB
ANION GAP SERPL CALCULATED.3IONS-SCNC: 12 MMOL/L (ref 5–15)
BUN SERPL-MCNC: 12 MG/DL (ref 8–23)
BUN/CREAT SERPL: 24 (ref 7–25)
CALCIUM SPEC-SCNC: 9.6 MG/DL (ref 8.6–10.5)
CHLORIDE SERPL-SCNC: 93 MMOL/L (ref 98–107)
CO2 SERPL-SCNC: 24 MMOL/L (ref 22–29)
CREAT SERPL-MCNC: 0.5 MG/DL (ref 0.57–1)
DEPRECATED RDW RBC AUTO: 58.7 FL (ref 37–54)
EGFRCR SERPLBLD CKD-EPI 2021: 99.2 ML/MIN/1.73
ERYTHROCYTE [DISTWIDTH] IN BLOOD BY AUTOMATED COUNT: 18.1 % (ref 12.3–15.4)
GLUCOSE SERPL-MCNC: 120 MG/DL (ref 65–99)
HCT VFR BLD AUTO: 33.8 % (ref 34–46.6)
HGB BLD-MCNC: 11.2 G/DL (ref 12–15.9)
MCH RBC QN AUTO: 29.8 PG (ref 26.6–33)
MCHC RBC AUTO-ENTMCNC: 33.1 G/DL (ref 31.5–35.7)
MCV RBC AUTO: 89.9 FL (ref 79–97)
PLATELET # BLD AUTO: 269 10*3/MM3 (ref 140–450)
PMV BLD AUTO: 10 FL (ref 6–12)
POTASSIUM SERPL-SCNC: 4.1 MMOL/L (ref 3.5–5.2)
RBC # BLD AUTO: 3.76 10*6/MM3 (ref 3.77–5.28)
SODIUM SERPL-SCNC: 129 MMOL/L (ref 136–145)
WBC NRBC COR # BLD: 14.1 10*3/MM3 (ref 3.4–10.8)

## 2023-05-21 PROCEDURE — 85027 COMPLETE CBC AUTOMATED: CPT

## 2023-05-21 PROCEDURE — 97530 THERAPEUTIC ACTIVITIES: CPT

## 2023-05-21 PROCEDURE — 94664 DEMO&/EVAL PT USE INHALER: CPT

## 2023-05-21 PROCEDURE — 94799 UNLISTED PULMONARY SVC/PX: CPT

## 2023-05-21 PROCEDURE — 80048 BASIC METABOLIC PNL TOTAL CA: CPT | Performed by: NURSE PRACTITIONER

## 2023-05-21 PROCEDURE — 97535 SELF CARE MNGMENT TRAINING: CPT

## 2023-05-21 PROCEDURE — 63710000001 PREDNISONE PER 1 MG: Performed by: INTERNAL MEDICINE

## 2023-05-21 PROCEDURE — 94761 N-INVAS EAR/PLS OXIMETRY MLT: CPT

## 2023-05-21 PROCEDURE — 25010000002 HEPARIN (PORCINE) PER 1000 UNITS: Performed by: INTERNAL MEDICINE

## 2023-05-21 PROCEDURE — 99232 SBSQ HOSP IP/OBS MODERATE 35: CPT | Performed by: INTERNAL MEDICINE

## 2023-05-21 RX ORDER — AMOXICILLIN 250 MG
2 CAPSULE ORAL NIGHTLY
Status: DISCONTINUED | OUTPATIENT
Start: 2023-05-21 | End: 2023-05-25 | Stop reason: HOSPADM

## 2023-05-21 RX ADMIN — IPRATROPIUM BROMIDE AND ALBUTEROL SULFATE 3 ML: .5; 2.5 SOLUTION RESPIRATORY (INHALATION) at 09:18

## 2023-05-21 RX ADMIN — GUAIFENESIN 1200 MG: 600 TABLET, EXTENDED RELEASE ORAL at 20:27

## 2023-05-21 RX ADMIN — IPRATROPIUM BROMIDE AND ALBUTEROL SULFATE 3 ML: .5; 2.5 SOLUTION RESPIRATORY (INHALATION) at 21:12

## 2023-05-21 RX ADMIN — CALCIUM CARBONATE (ANTACID) CHEW TAB 500 MG 2 TABLET: 500 CHEW TAB at 20:36

## 2023-05-21 RX ADMIN — AMLODIPINE BESYLATE 5 MG: 5 TABLET ORAL at 09:38

## 2023-05-21 RX ADMIN — MORPHINE SULFATE 5 MG: 100 SOLUTION ORAL at 07:37

## 2023-05-21 RX ADMIN — SODIUM CHLORIDE 1 G: 1 TABLET ORAL at 17:39

## 2023-05-21 RX ADMIN — LORAZEPAM 1 MG: 1 TABLET ORAL at 16:44

## 2023-05-21 RX ADMIN — HEPARIN SODIUM 5000 UNITS: 5000 INJECTION, SOLUTION INTRAVENOUS; SUBCUTANEOUS at 13:36

## 2023-05-21 RX ADMIN — HEPARIN SODIUM 5000 UNITS: 5000 INJECTION, SOLUTION INTRAVENOUS; SUBCUTANEOUS at 20:28

## 2023-05-21 RX ADMIN — Medication 5 MG: at 20:27

## 2023-05-21 RX ADMIN — LORAZEPAM 1 MG: 1 TABLET ORAL at 00:52

## 2023-05-21 RX ADMIN — Medication: at 13:36

## 2023-05-21 RX ADMIN — LIDOCAINE 1 PATCH: 50 PATCH CUTANEOUS at 09:38

## 2023-05-21 RX ADMIN — MORPHINE SULFATE 5 MG: 100 SOLUTION ORAL at 13:36

## 2023-05-21 RX ADMIN — NYSTATIN: 100000 POWDER TOPICAL at 09:38

## 2023-05-21 RX ADMIN — CALCIUM CARBONATE (ANTACID) CHEW TAB 500 MG 2 TABLET: 500 CHEW TAB at 00:51

## 2023-05-21 RX ADMIN — MONTELUKAST 10 MG: 10 TABLET, FILM COATED ORAL at 09:38

## 2023-05-21 RX ADMIN — MORPHINE SULFATE 5 MG: 100 SOLUTION ORAL at 10:42

## 2023-05-21 RX ADMIN — PANTOPRAZOLE SODIUM 40 MG: 40 TABLET, DELAYED RELEASE ORAL at 09:38

## 2023-05-21 RX ADMIN — FUROSEMIDE 20 MG: 20 TABLET ORAL at 09:39

## 2023-05-21 RX ADMIN — NYSTATIN: 100000 POWDER TOPICAL at 20:29

## 2023-05-21 RX ADMIN — HEPARIN SODIUM 5000 UNITS: 5000 INJECTION, SOLUTION INTRAVENOUS; SUBCUTANEOUS at 05:20

## 2023-05-21 RX ADMIN — SENNOSIDES AND DOCUSATE SODIUM 2 TABLET: 50; 8.6 TABLET ORAL at 20:36

## 2023-05-21 RX ADMIN — IPRATROPIUM BROMIDE AND ALBUTEROL SULFATE 3 ML: .5; 2.5 SOLUTION RESPIRATORY (INHALATION) at 17:13

## 2023-05-21 RX ADMIN — GLYCERIN 1 SUPPOSITORY: 2 SUPPOSITORY RECTAL at 09:39

## 2023-05-21 RX ADMIN — HYDROXYCHLOROQUINE SULFATE 200 MG: 200 TABLET, FILM COATED ORAL at 20:36

## 2023-05-21 RX ADMIN — MORPHINE SULFATE 5 MG: 100 SOLUTION ORAL at 16:33

## 2023-05-21 RX ADMIN — MORPHINE SULFATE 5 MG: 100 SOLUTION ORAL at 20:27

## 2023-05-21 RX ADMIN — PREDNISONE 40 MG: 20 TABLET ORAL at 09:38

## 2023-05-21 RX ADMIN — IPRATROPIUM BROMIDE AND ALBUTEROL SULFATE 3 ML: .5; 2.5 SOLUTION RESPIRATORY (INHALATION) at 13:39

## 2023-05-21 RX ADMIN — CETIRIZINE HYDROCHLORIDE 10 MG: 10 TABLET, FILM COATED ORAL at 09:39

## 2023-05-21 RX ADMIN — GUAIFENESIN 1200 MG: 600 TABLET, EXTENDED RELEASE ORAL at 09:38

## 2023-05-21 RX ADMIN — Medication: at 05:20

## 2023-05-21 RX ADMIN — MORPHINE SULFATE 5 MG: 100 SOLUTION ORAL at 00:52

## 2023-05-21 RX ADMIN — HYDROXYCHLOROQUINE SULFATE 200 MG: 200 TABLET, FILM COATED ORAL at 09:39

## 2023-05-21 RX ADMIN — SODIUM CHLORIDE 1 G: 1 TABLET ORAL at 09:39

## 2023-05-21 RX ADMIN — POLYETHYLENE GLYCOL 3350 17 G: 17 POWDER, FOR SOLUTION ORAL at 09:38

## 2023-05-21 NOTE — THERAPY TREATMENT NOTE
Patient Name: Tala Ramsey  : 1950    MRN: [id number]                              Today's Date: 2023       Admit Date: 2023    Visit Dx:     ICD-10-CM ICD-9-CM   1. Multifocal pneumonia  J18.9 486   2. Failure of outpatient treatment  Z78.9 V49.89   3. Hypoxia  R09.02 799.02     Patient Active Problem List   Diagnosis   • Lumbar herniated disc   • Multifocal pneumonia   • Pulmonary fibrosis   • ILD (interstitial lung disease) -likely RA associated ILD with acute exacerbation   • Acute hypoxemic respiratory failure -likely due to ILD exacerbation     Past Medical History:   Diagnosis Date   • Anemia    • Back pain    • Bronchitis    • Hypertension    • Low back pain    • Pulmonary fibrosis    • Rheumatoid arthritis      Past Surgical History:   Procedure Laterality Date   • DILATION AND CURETTAGE, DIAGNOSTIC / THERAPEUTIC        General Information     Row Name 23 1245          OT Time and Intention    Document Type therapy note (daily note)  -     Mode of Treatment occupational therapy  -     Row Name 23 1245          General Information    Patient Profile Reviewed yes  -JR     Existing Precautions/Restrictions fall;oxygen therapy device and L/min  6L O2, watch BP, O2 sats  -     Barriers to Rehab medically complex;previous functional deficit;cognitive status  -JR     Row Name 23 1245          Cognition    Orientation Status (Cognition) oriented x 3  -JR     Row Name 23 1245          Safety Issues, Functional Mobility    Safety Issues Affecting Function (Mobility) awareness of need for assistance;insight into deficits/self-awareness  -JR     Impairments Affecting Function (Mobility) endurance/activity tolerance;shortness of breath;strength;pain;postural/trunk control  -           User Key  (r) = Recorded By, (t) = Taken By, (c) = Cosigned By    Initials Name Provider Type    JR Sanjana Lopez OT Occupational Therapist                 Mobility/ADL's     Row  Name 05/21/23 1249          Bed Mobility    Bed Mobility supine-sit;sit-supine;rolling left;rolling right;scooting/bridging  -JR     Rolling Left [place] (Bed Mobility) minimum assist (75% patient effort);verbal cues  -JR     Rolling Right [place] (Bed Mobility) minimum assist (75% patient effort);verbal cues  -JR     Scooting/Bridging [place] (Bed Mobility) dependent (less than 25% patient effort);2 person assist;verbal cues  -JR     Supine-Sit [place] (Bed Mobility) standby assist;verbal cues  -JR     Sit-Supine [place] (Bed Mobility) minimum assist (75% patient effort);verbal cues  -JR     Assistive Device (Bed Mobility) bed rails;head of bed elevated;draw sheet  -     Comment, (Bed Mobility) Pt initially sat EOB x 5 minutes, then rolled to straighten linens and assisted to HOB. PT SOA EOB, provided pt with verbal cues for PLB technique and multiple rest periods throughout.  -     Row Name 05/21/23 1249          Activities of Daily Living    BADL Assessment/Intervention lower body dressing  -     Row Name 05/21/23 1249          Lower Body Dressing Assessment/Training    [place] Level (Lower Body Dressing) don;doff;socks;dependent (less than 25% patient effort)  -     Position (Lower Body Dressing) supine  -           User Key  (r) = Recorded By, (t) = Taken By, (c) = Cosigned By    Initials Name Provider Type    Sanjana Almaguer OT Occupational Therapist               Obj/Interventions     Row Name 05/21/23 1251          Shoulder (Therapeutic Exercise)    Shoulder AROM (Therapeutic Exercise) bilateral;flexion;extension;aBduction;aDduction;10 repetitions;supine  Multiple rest periods throughout therex this date. Provided HEP and encouraged pt to start completing exercises on her own.  -     Row Name 05/21/23 1251          Elbow/Forearm (Therapeutic Exercise)    Elbow/Forearm (Therapeutic Exercise) AROM (active range of motion)  -     Elbow/Forearm AROM (Therapeutic  Exercise) bilateral;flexion;extension;10 repetitions  -     Row Name 05/21/23 1251          Motor Skills    Therapeutic Exercise shoulder;elbow/forearm  -     Row Name 05/21/23 1251          Balance    Balance Assessment sitting static balance  -JR     Static Sitting Balance independent  -           User Key  (r) = Recorded By, (t) = Taken By, (c) = Cosigned By    Initials Name Provider Type    JR Sanjana Lopez, OT Occupational Therapist               Goals/Plan    No documentation.                Clinical Impression     Row Name 05/21/23 1254          Pain Assessment    Pretreatment Pain Rating 0/10 - no pain  -JR     Posttreatment Pain Rating 0/10 - no pain  -JR     Pain Intervention(s) Repositioned  -     Row Name 05/21/23 1254          Plan of Care Review    Plan of Care Reviewed With patient;family  -     Outcome Evaluation Pt improving with motivation to participate with therapy as well as activity tolerance for participation. Pt sat EOB x 5 minutes this date. O2 sat fluctuating throughout. Lowest 83% on 6L O2, with highest HR reading 186. Pt continues with decreased activity tolerance and remains below baseline for ADL's and mobility. Recommend continued skilled OT services and transfer to SNF at d/c.  -     Row Name 05/21/23 1254          Therapy Assessment/Plan (OT)    Patient/Family Therapy Goal Statement (OT) go home  -     Row Name 05/21/23 1254          Therapy Plan Review/Discharge Plan (OT)    Anticipated Discharge Disposition (OT) skilled nursing facility  -     Row Name 05/21/23 1254          Vital Signs    Intratreatment Heart Rate (beats/min) 186  -JR     Posttreatment Heart Rate (beats/min) 105  -JR     Pre SpO2 (%) 91  -JR     O2 Delivery Pre Treatment supplemental O2  -JR     Intra SpO2 (%) 83   -JR     O2 Delivery Intra Treatment supplemental O2  -JR     Post SpO2 (%) 90  -JR     O2 Delivery Post Treatment supplemental O2  -JR     Pre Patient Position Supine  -JR     Intra  Patient Position Sitting  -JR     Post Patient Position Supine  -JR     Row Name 05/21/23 1254          Positioning and Restraints    Pre-Treatment Position in bed  -JR     Post Treatment Position bed  -JR     In Bed notified nsg;supine;call light within reach;encouraged to call for assist;exit alarm on;with family/caregiver  -JR           User Key  (r) = Recorded By, (t) = Taken By, (c) = Cosigned By    Initials Name Provider Type    Sanjana Almaguer, OT Occupational Therapist               Outcome Measures     Row Name 05/21/23 1257          How much help from another is currently needed...    Putting on and taking off regular lower body clothing? 1  -JR     Bathing (including washing, rinsing, and drying) 2  -JR     Toileting (which includes using toilet bed pan or urinal) 1  -JR     Putting on and taking off regular upper body clothing 2  -JR     Taking care of personal grooming (such as brushing teeth) 3  -JR     Eating meals 3  -JR     AM-PAC 6 Clicks Score (OT) 12  -JR     Row Name 05/21/23 0800          How much help from another person do you currently need...    Turning from your back to your side while in flat bed without using bedrails? 2  -KF     Moving from lying on back to sitting on the side of a flat bed without bedrails? 2  -KF     Moving to and from a bed to a chair (including a wheelchair)? 2  -KF     Standing up from a chair using your arms (e.g., wheelchair, bedside chair)? 1  -KF     Climbing 3-5 steps with a railing? 1  -KF     To walk in hospital room? 1  -KF     AM-PAC 6 Clicks Score (PT) 9  -KF     Highest level of mobility 3 --> Sat at edge of bed  -KF     Row Name 05/21/23 1257          Functional Assessment    Outcome Measure Options AM-PAC 6 Clicks Daily Activity (OT)  -JR           User Key  (r) = Recorded By, (t) = Taken By, (c) = Cosigned By    Initials Name Provider Type    Sanjana Almaguer, OT Occupational Therapist    Vivienne Sandoval RN Registered Nurse                 Occupational Therapy Education     Title: PT OT SLP Therapies (In Progress)     Topic: Occupational Therapy (In Progress)     Point: ADL training (Done)     Description:   Instruct learner(s) on proper safety adaptation and remediation techniques during self care or transfers.   Instruct in proper use of assistive devices.              Learning Progress Summary           Patient Acceptance, E,TB,D,H, VU,NR by JR at 5/21/2023 1100    Comment: Educated pt and son regarding therex and vital results    Acceptance, E,D, VU,NR by JOSE at 5/18/2023 1347    Comment: reason for consult, noted deficits, PLB, UE TE and benefit, bed mobility, vital results    Acceptance, E, NR by FAIZA at 5/12/2023 1608    Acceptance, E, VU by JB1 at 5/5/2023 0447    Acceptance, E, VU by AN at 4/25/2023 1543   Family Acceptance, E,TB,D,H, VU,NR by JR at 5/21/2023 1100    Comment: Educated pt and son regarding therex and vital results    Acceptance, E,D, VU,NR by JOSE at 5/18/2023 1347    Comment: reason for consult, noted deficits, PLB, UE TE and benefit, bed mobility, vital results    Acceptance, E, VU by AN at 4/25/2023 1543                   Point: Home exercise program (Done)     Description:   Instruct learner(s) on appropriate technique for monitoring, assisting and/or progressing therapeutic exercises/activities.              Learning Progress Summary           Patient Acceptance, E,TB,D,H, VU,NR by JR at 5/21/2023 1100    Comment: Educated pt and son regarding therex and vital results    Acceptance, E,D, VU,NR by JOSE at 5/18/2023 1347    Comment: reason for consult, noted deficits, PLB, UE TE and benefit, bed mobility, vital results    Acceptance, E, NR by  at 5/12/2023 1608   Family Acceptance, E,TB,D,H, VU,NR by JR at 5/21/2023 1100    Comment: Educated pt and son regarding therex and vital results    Acceptance, E,D, VU,NR by JOSE at 5/18/2023 1347    Comment: reason for consult, noted deficits, PLB, UE TE and benefit, bed mobility,  vital results                   Point: Precautions (In Progress)     Description:   Instruct learner(s) on prescribed precautions during self-care and functional transfers.              Learning Progress Summary           Patient Acceptance, E, NR by BERNARDINO at 5/21/2023 0046    Acceptance, E, NR by FAIZA at 5/12/2023 1608    Acceptance, E, VU by AN at 4/25/2023 1543   Family Acceptance, E, VU by AN at 4/25/2023 1543                   Point: Body mechanics (Done)     Description:   Instruct learner(s) on proper positioning and spine alignment during self-care, functional mobility activities and/or exercises.              Learning Progress Summary           Patient Acceptance, E,D, VU,NR by JOSE at 5/18/2023 1347    Comment: reason for consult, noted deficits, PLB, UE TE and benefit, bed mobility, vital results    Acceptance, E, NR by FAIZA at 5/12/2023 1608    Acceptance, E, VU by SHE at 5/5/2023 0447    Acceptance, E, VU by AN at 4/25/2023 1543   Family Acceptance, E,D, VU,NR by JOSE at 5/18/2023 1347    Comment: reason for consult, noted deficits, PLB, UE TE and benefit, bed mobility, vital results    Acceptance, E, VU by AN at 4/25/2023 1543                               User Key     Initials Effective Dates Name Provider Type Discipline     02/03/23 -  Jessica Keating, OT Occupational Therapist OT     02/03/23 -  Sanjana Lopez OT Occupational Therapist OT    [facility] 06/16/21 -  Jake Taylor, RN Registered Nurse Nurse    [facility] 06/16/21 -  Leti Marie, RN Registered Nurse Nurse    [facility] 09/21/21 -  Maddison Bean OT Occupational Therapist OT     01/11/23 -  Schilder, Claire, RN Registered Nurse Nurse              OT Recommendation and Plan     Plan of Care Review  Plan of Care Reviewed With: patient, family  Outcome Evaluation: Pt improving with motivation to participate with therapy as well as activity tolerance for participation. Pt sat EOB x 5 minutes this date. O2 sat fluctuating throughout. Lowest 83% on 6L  O2, with highest HR reading 186. Pt continues with decreased activity tolerance and remains below baseline for ADL's and mobility. Recommend continued skilled OT services and transfer to SNF at d/c.     Time Calculation:    Time Calculation- OT     Row Name 05/21/23 1259             Timed Charges    91902 - OT Therapeutic Activity Minutes 15  -JR      96612 - OT Self Care/Mgmt Minutes 23  -JR         Total Minutes    Timed Charges Total Minutes 38  -JR       Total Minutes 38  -JR            User Key  (r) = Recorded By, (t) = Taken By, (c) = Cosigned By    Initials Name Provider Type     Sanjana Lopez OT Occupational Therapist              Therapy Charges for Today     Code Description Service Date Service Provider Modifiers Qty    61252653881 HC OT THERAPEUTIC ACT EA 15 MIN 5/21/2023 Sanjana Lopez OT GO 1    20949519698 HC OT SELF CARE/MGMT/TRAIN EA 15 MIN 5/21/2023 Sanjana Lopez OT GO 2               Sanjana Lopez OT  5/21/2023

## 2023-05-21 NOTE — PLAN OF CARE
Goal Outcome Evaluation:  Plan of Care Reviewed With: patient        Progress: no change          Patient stable on 6L NC humidified. PRNs administered for dyspnea, heartburn and anxiety; effective per patient. Family at bedside. Refusing turns, only allowing weight shift.

## 2023-05-21 NOTE — PLAN OF CARE
Goal Outcome Evaluation:  Plan of Care Reviewed With: patient        Progress: no change  Outcome Evaluation: resting well,  worked with OT today, PRN morphine and ativan given, no c/o n/v, awaiting discharge plans

## 2023-05-21 NOTE — PROGRESS NOTES
[facility] Medicine Services  PROGRESS NOTE    Patient Name: Tala Ramsey  : 1950  MRN: [id number]    Date of Admission: 2023  Primary Care Physician: Sg Horne MD    Subjective   Subjective     CC:  F/u hypoxia, PNA    HPI:  Tired today.  Still coughing with shortness of breath. Takes her some time to get going in the morning.     ROS:  Gen: no fever  Pulm: dyspnea and cough    Objective   Objective     Vital Signs:   Temp:  [97 °F (36.1 °C)-97.2 °F (36.2 °C)] 97.2 °F (36.2 °C)  Heart Rate:  [] 105  Resp:  [16-24] 20  BP: (131-155)/(73-77) 155/77  Flow (L/min):  [6] 6     Physical Exam:  Constitutional - appears fatigued, in bed  HEENT-NCAT, mucous membranes moist  CV-RRR  Resp-coarse breath sounds bilaterally  Abd-soft, nontender, nondistended, normoactive bowel sounds  Ext-No lower extremity cyanosis, clubbing or edema bilaterally  Neuro-alert, speech clear  Psych-slightly flat affect   Skin- No rash on exposed UE or LE bilaterally        Results Reviewed:  LAB RESULTS:      Lab 23  0808   WBC 14.10*   HEMOGLOBIN 11.2*   HEMATOCRIT 33.8*   PLATELETS 269   MCV 89.9         Lab 23  1217 23  0613 23  0641 23  0720   SODIUM 129* 127* 129* 128*   POTASSIUM 4.1 4.9 4.4 4.4   CHLORIDE 93* 95* 94* 93*   CO2 24.0 18.0* 24.0 22.0   ANION GAP 12.0 14.0 11.0 13.0   BUN 12 14 11 16   CREATININE 0.50* 0.50* 0.34* 0.43*   EGFR 99.2 99.2 108.8 102.8   GLUCOSE 120* 84 77 82   CALCIUM 9.6 8.8 9.2 9.4                         Brief Urine Lab Results  (Last result in the past 365 days)      Color   Clarity   Blood   Leuk Est   Nitrite   Protein   CREAT   Urine HCG        23 1757 Yellow   Clear   Trace   Negative   Negative   Negative                 Microbiology Results Abnormal     Procedure Component Value - Date/Time    Blood Culture - Blood, Arm, Right [[id number]]  (Normal) Collected: 23 1125    Lab Status: Final result Specimen:  Blood from Arm, Right Updated: 04/28/23 1145     Blood Culture No growth at 5 days    Blood Culture - Blood, Arm, Left [[id number]]  (Normal) Collected: 04/23/23 1125    Lab Status: Final result Specimen: Blood from Arm, Left Updated: 04/28/23 1145     Blood Culture No growth at 5 days    S. Pneumo Ag Urine or CSF - Urine, Urine, Clean Catch [[id number]]  (Normal) Collected: 04/23/23 1757    Lab Status: Final result Specimen: Urine, Clean Catch Updated: 04/24/23 0949     Strep Pneumo Ag Negative    Legionella Antigen, Urine - Urine, Urine, Clean Catch [[id number]]  (Normal) Collected: 04/23/23 1757    Lab Status: Final result Specimen: Urine, Clean Catch Updated: 04/24/23 0949     LEGIONELLA ANTIGEN, URINE Negative    Respiratory Panel PCR w/COVID-19(SARS-CoV-2) CHARI/MARISSA/APOLINAR/PAD/COR/MAD/AMY In-House, NP Swab in UTM/VTM, 3-4 HR TAT - Swab, Nasopharynx [[id number]]  (Normal) Collected: 04/24/23 0544    Lab Status: Final result Specimen: Swab from Nasopharynx Updated: 04/24/23 0647     ADENOVIRUS, PCR Not Detected     Coronavirus 229E Not Detected     Coronavirus HKU1 Not Detected     Coronavirus NL63 Not Detected     Coronavirus OC43 Not Detected     COVID19 Not Detected     Human Metapneumovirus Not Detected     Human Rhinovirus/Enterovirus Not Detected     Influenza A PCR Not Detected     Influenza B PCR Not Detected     Parainfluenza Virus 1 Not Detected     Parainfluenza Virus 2 Not Detected     Parainfluenza Virus 3 Not Detected     Parainfluenza Virus 4 Not Detected     RSV, PCR Not Detected     Bordetella pertussis pcr Not Detected     Bordetella parapertussis PCR Not Detected     Chlamydophila pneumoniae PCR Not Detected     Mycoplasma pneumo by PCR Not Detected    Narrative:      In the setting of a positive respiratory panel with a viral infection PLUS a negative procalcitonin without other underlying concern for bacterial infection, consider observing off antibiotics or discontinuation of antibiotics and  continue supportive care. If the respiratory panel is positive for atypical bacterial infection (Bordetella pertussis, Chlamydophila pneumoniae, or Mycoplasma pneumoniae), consider antibiotic de-escalation to target atypical bacterial infection.    MRSA Screen, PCR (Inpatient) - Swab, Nares [[id number]]  (Normal) Collected: 04/23/23 1540    Lab Status: Final result Specimen: Swab from Nares Updated: 04/23/23 1651     MRSA PCR Negative    Narrative:      The negative predictive value of this diagnostic test is high and should only be used to consider de-escalating anti-MRSA therapy. A positive result may indicate colonization with MRSA and must be correlated clinically.  MRSA Negative    COVID PRE-OP / PRE-PROCEDURE SCREENING ORDER (NO ISOLATION) - Swab, Nasopharynx [[id number]]  (Normal) Collected: 04/23/23 1125    Lab Status: Final result Specimen: Swab from Nasopharynx Updated: 04/23/23 1225    Narrative:      The following orders were created for panel order COVID PRE-OP / PRE-PROCEDURE SCREENING ORDER (NO ISOLATION) - Swab, Nasopharynx.  Procedure                               Abnormality         Status                     ---------                               -----------         ------                     COVID-19, FLU A/B, RSV P...[[id number]]  Normal              Final result                 Please view results for these tests on the individual orders.    COVID-19, FLU A/B, RSV PCR - Swab, Nasopharynx [[id number]]  (Normal) Collected: 04/23/23 1125    Lab Status: Final result Specimen: Swab from Nasopharynx Updated: 04/23/23 1225     COVID19 Not Detected     Influenza A PCR Not Detected     Influenza B PCR Not Detected     RSV, PCR Not Detected    Narrative:      Fact sheet for providers: https://www.fda.gov/media/222068/download    Fact sheet for patients: https://www.fda.gov/media/008169/download    Test performed by PCR.          No radiology results from the last 24 hrs        Current  medications:  Scheduled Meds:amLODIPine, 5 mg, Oral, Q24H  cetirizine, 10 mg, Oral, Daily  fluticasone, 2 spray, Each Nare, Nightly  furosemide, 20 mg, Oral, Daily  guaiFENesin, 1,200 mg, Oral, Q12H  heparin (porcine), 5,000 Units, Subcutaneous, Q8H  hydroxychloroquine, 200 mg, Oral, Q12H  ipratropium-albuterol, 3 mL, Nebulization, 4x Daily - RT  lidocaine, 1 patch, Transdermal, Q24H  melatonin, 5 mg, Oral, Nightly  montelukast, 10 mg, Oral, Daily  morphine, 5 mg, Oral, 4x Daily  nystatin, , Topical, Q12H  palliative care oral rinse, , Mouth/Throat, Q8H  pantoprazole, 40 mg, Oral, Daily  polyethylene glycol, 17 g, Oral, Daily  predniSONE, 40 mg, Oral, Daily With Breakfast  sodium chloride, 1 g, Oral, BID With Meals  sulfamethoxazole-trimethoprim, 1 tablet, Oral, Once per day on Mon Wed Fri      Continuous Infusions:   PRN Meds:.•  acetaminophen **OR** acetaminophen **OR** acetaminophen  •  aluminum-magnesium hydroxide-simethicone **AND** Lidocaine Viscous HCl  •  senna-docusate sodium **AND** polyethylene glycol **AND** bisacodyl **AND** bisacodyl  •  calcium carbonate  •  hydrOXYzine  •  ipratropium-albuterol  •  LORazepam  •  Magnesium Standard Dose Replacement - Follow Nurse / BPA Driven Protocol  •  morphine  •  ondansetron **OR** ondansetron  •  simethicone  •  sodium chloride  •  sodium chloride  •  sodium chloride  •  tiZANidine    Assessment & Plan   Assessment & Plan     Active Hospital Problems    Diagnosis  POA   • **Acute hypoxemic respiratory failure -likely due to ILD exacerbation [J96.01]  Yes   • Pulmonary fibrosis [J84.10]  Yes   • ILD (interstitial lung disease) -likely RA associated ILD with acute exacerbation [J84.9]  Yes   • Multifocal pneumonia [J18.9]  Yes      Resolved Hospital Problems   No resolved problems to display.        Brief Hospital Course to date:  Tala Ramsey is a 72 y.o. female  w/ hx interstitial lung dz, RA (on humira until recently, recent steroid injections) who  presented w/ progressive dyspnea, cough over ~10 days who failed outpatient Levaquin. Pt was found to be hypoxic upon admission. She had progressive hypoxia the evening after admission and was placed on HFNC.        This patient's problems and plans were partially entered by my partner and updated as appropriate by me 05/21/23.        Acute hypoxic resp failure, worsening  Pulmonary fibrosis/ILD  Bilateral Pneumonia  -likely combination of progession/flare of interstitial lung disease and infection  -initially on HFNC with difficulty weaning and significant drops with minimal exertion but currently on 6LNC with rest.   -s/p full course of zosyn & azithromycin  -3x weekly bactrim ds (pcp prophylaxis as has been on humira and steroids)   -Pulm following: completed IV solumedrol 5/9 and transitioned po prednisone. Started on 40 mg daily on 5/16. Decrease by 5 mg  per week until at 10 mg daily. Can stop PCP prophylaxis (bactrim) once she is at 20 mg.  -PPI for GI ppx  -continue scheduled & PRN duonebs  -Lasix 20 mg daily for crackles on exam, may help with low sodium  -Palliative following- continue morphine and ativan prn.      Constipation  -daily Miralax, pericolace    Hyponatremia  Poor po intake  --No IV access.  Full support but no CPR/intubation.    --added salt tabs bid 5/10 and fluid restriction. Pt and family noted she has not followed fluid restriction, so will stop. Continue salt tabs.  --Encourage PO intake  -- Na 129 today  -- trial low-dose Lasix, watch BP     Hyperkalemia, resolved  - s/p lokelma     RA  -holding humira  -continue plaquenil      HTN  - continue amlodpine   - holding valsartan due to hyperkalemia  - bp stable off valsartan     Chronic anemia   -monitor     Goals/limits  -Patient is DNR/DNI (in event of further clinical decompensation patient would NOT want mechanical ventilation; in this case patient would wish to pursue comfort measures). Patient now wants to pursue skilled rehab, then  go home with HH vs hospice.     Expected Discharge Location and Transportation: SNF, EMS   Expected Discharge pending bed offer, insurance approval  Expected Discharge Date: 5/23/2023; Expected Discharge Time:      DVT prophylaxis:  Medical DVT prophylaxis orders are present.     AM-PAC 6 Clicks Score (PT): 9 (05/21/23 0800)    CODE STATUS:   Code Status and Medical Interventions:   Ordered at: 04/23/23 1310     Medical Intervention Limits:    NO intubation (DNI)     Level Of Support Discussed With:    Patient     Code Status (Patient has no pulse and is not breathing):    No CPR (Do Not Attempt to Resuscitate)     Medical Interventions (Patient has pulse or is breathing):    Limited Support       Elias Morales MD  05/21/23

## 2023-05-21 NOTE — PLAN OF CARE
Goal Outcome Evaluation:  Plan of Care Reviewed With: patient, family           Outcome Evaluation: Pt improving with motivation to participate with therapy as well as activity tolerance for participation. Pt sat EOB x 5 minutes this date. O2 sat fluctuating throughout. Lowest 83% on 6L O2, with highest HR reading 186. Pt continues with decreased activity tolerance and remains below baseline for ADL's and mobility. Recommend continued skilled OT services and transfer to SNF at d/c.

## 2023-05-22 PROCEDURE — 94799 UNLISTED PULMONARY SVC/PX: CPT

## 2023-05-22 PROCEDURE — 25010000002 HEPARIN (PORCINE) PER 1000 UNITS: Performed by: INTERNAL MEDICINE

## 2023-05-22 PROCEDURE — 99232 SBSQ HOSP IP/OBS MODERATE 35: CPT | Performed by: STUDENT IN AN ORGANIZED HEALTH CARE EDUCATION/TRAINING PROGRAM

## 2023-05-22 PROCEDURE — 94664 DEMO&/EVAL PT USE INHALER: CPT

## 2023-05-22 PROCEDURE — 97530 THERAPEUTIC ACTIVITIES: CPT

## 2023-05-22 PROCEDURE — 63710000001 PREDNISONE PER 5 MG: Performed by: INTERNAL MEDICINE

## 2023-05-22 PROCEDURE — 94761 N-INVAS EAR/PLS OXIMETRY MLT: CPT

## 2023-05-22 PROCEDURE — 63710000001 PREDNISONE PER 1 MG: Performed by: INTERNAL MEDICINE

## 2023-05-22 RX ORDER — BISACODYL 10 MG
10 SUPPOSITORY, RECTAL RECTAL ONCE
Status: COMPLETED | OUTPATIENT
Start: 2023-05-22 | End: 2023-05-22

## 2023-05-22 RX ORDER — FUROSEMIDE 10 MG/ML
20 INJECTION INTRAMUSCULAR; INTRAVENOUS ONCE
Status: DISCONTINUED | OUTPATIENT
Start: 2023-05-22 | End: 2023-05-22

## 2023-05-22 RX ORDER — CASTOR OIL AND BALSAM, PERU 788; 87 MG/G; MG/G
1 OINTMENT TOPICAL EVERY 12 HOURS SCHEDULED
Status: DISCONTINUED | OUTPATIENT
Start: 2023-05-22 | End: 2023-05-23

## 2023-05-22 RX ORDER — POLYETHYLENE GLYCOL 3350 17 G/17G
17 POWDER, FOR SOLUTION ORAL 2 TIMES DAILY
Status: DISCONTINUED | OUTPATIENT
Start: 2023-05-22 | End: 2023-05-25 | Stop reason: HOSPADM

## 2023-05-22 RX ADMIN — FUROSEMIDE 20 MG: 20 TABLET ORAL at 08:49

## 2023-05-22 RX ADMIN — Medication: at 15:09

## 2023-05-22 RX ADMIN — Medication: at 21:01

## 2023-05-22 RX ADMIN — MORPHINE SULFATE 5 MG: 100 SOLUTION ORAL at 08:47

## 2023-05-22 RX ADMIN — HYDROXYZINE HYDROCHLORIDE 25 MG: 25 TABLET, FILM COATED ORAL at 08:49

## 2023-05-22 RX ADMIN — POLYETHYLENE GLYCOL 3350 17 G: 17 POWDER, FOR SOLUTION ORAL at 08:48

## 2023-05-22 RX ADMIN — IPRATROPIUM BROMIDE AND ALBUTEROL SULFATE 3 ML: .5; 2.5 SOLUTION RESPIRATORY (INHALATION) at 20:07

## 2023-05-22 RX ADMIN — NYSTATIN: 100000 POWDER TOPICAL at 20:55

## 2023-05-22 RX ADMIN — LORAZEPAM 1 MG: 1 TABLET ORAL at 20:54

## 2023-05-22 RX ADMIN — MONTELUKAST 10 MG: 10 TABLET, FILM COATED ORAL at 08:49

## 2023-05-22 RX ADMIN — PANTOPRAZOLE SODIUM 40 MG: 40 TABLET, DELAYED RELEASE ORAL at 08:49

## 2023-05-22 RX ADMIN — CASTOR OIL AND BALSAM, PERU 1 APPLICATION: 788; 87 OINTMENT TOPICAL at 17:42

## 2023-05-22 RX ADMIN — HEPARIN SODIUM 5000 UNITS: 5000 INJECTION, SOLUTION INTRAVENOUS; SUBCUTANEOUS at 06:42

## 2023-05-22 RX ADMIN — HEPARIN SODIUM 5000 UNITS: 5000 INJECTION, SOLUTION INTRAVENOUS; SUBCUTANEOUS at 15:09

## 2023-05-22 RX ADMIN — AMLODIPINE BESYLATE 5 MG: 5 TABLET ORAL at 08:49

## 2023-05-22 RX ADMIN — POLYETHYLENE GLYCOL 3350 17 G: 17 POWDER, FOR SOLUTION ORAL at 20:54

## 2023-05-22 RX ADMIN — BISACODYL 10 MG: 10 SUPPOSITORY RECTAL at 15:09

## 2023-05-22 RX ADMIN — IPRATROPIUM BROMIDE AND ALBUTEROL SULFATE 3 ML: .5; 2.5 SOLUTION RESPIRATORY (INHALATION) at 12:31

## 2023-05-22 RX ADMIN — MORPHINE SULFATE 5 MG: 100 SOLUTION ORAL at 20:54

## 2023-05-22 RX ADMIN — NYSTATIN: 100000 POWDER TOPICAL at 08:49

## 2023-05-22 RX ADMIN — Medication: at 06:42

## 2023-05-22 RX ADMIN — IPRATROPIUM BROMIDE AND ALBUTEROL SULFATE 3 ML: .5; 2.5 SOLUTION RESPIRATORY (INHALATION) at 06:58

## 2023-05-22 RX ADMIN — LIDOCAINE 1 PATCH: 50 PATCH CUTANEOUS at 08:47

## 2023-05-22 RX ADMIN — HYDROXYCHLOROQUINE SULFATE 200 MG: 200 TABLET, FILM COATED ORAL at 08:49

## 2023-05-22 RX ADMIN — GUAIFENESIN 1200 MG: 600 TABLET, EXTENDED RELEASE ORAL at 20:54

## 2023-05-22 RX ADMIN — MORPHINE SULFATE 5 MG: 100 SOLUTION ORAL at 12:05

## 2023-05-22 RX ADMIN — IPRATROPIUM BROMIDE AND ALBUTEROL SULFATE 3 ML: .5; 2.5 SOLUTION RESPIRATORY (INHALATION) at 16:24

## 2023-05-22 RX ADMIN — SULFAMETHOXAZOLE AND TRIMETHOPRIM 1 TABLET: 800; 160 TABLET ORAL at 08:49

## 2023-05-22 RX ADMIN — SODIUM CHLORIDE 1 G: 1 TABLET ORAL at 17:35

## 2023-05-22 RX ADMIN — CETIRIZINE HYDROCHLORIDE 10 MG: 10 TABLET, FILM COATED ORAL at 08:49

## 2023-05-22 RX ADMIN — MORPHINE SULFATE 5 MG: 100 SOLUTION ORAL at 03:34

## 2023-05-22 RX ADMIN — SODIUM CHLORIDE 1 G: 1 TABLET ORAL at 08:49

## 2023-05-22 RX ADMIN — HEPARIN SODIUM 5000 UNITS: 5000 INJECTION, SOLUTION INTRAVENOUS; SUBCUTANEOUS at 21:01

## 2023-05-22 RX ADMIN — Medication 5 MG: at 20:55

## 2023-05-22 RX ADMIN — SENNOSIDES AND DOCUSATE SODIUM 2 TABLET: 50; 8.6 TABLET ORAL at 20:54

## 2023-05-22 RX ADMIN — GUAIFENESIN 1200 MG: 600 TABLET, EXTENDED RELEASE ORAL at 08:49

## 2023-05-22 RX ADMIN — LORAZEPAM 1 MG: 1 TABLET ORAL at 03:49

## 2023-05-22 RX ADMIN — PREDNISONE 35 MG: 20 TABLET ORAL at 08:49

## 2023-05-22 RX ADMIN — CASTOR OIL AND BALSAM, PERU 1 APPLICATION: 788; 87 OINTMENT TOPICAL at 20:57

## 2023-05-22 RX ADMIN — MORPHINE SULFATE 5 MG: 100 SOLUTION ORAL at 15:15

## 2023-05-22 NOTE — PROGRESS NOTES
Continued Stay Note  [facility]     Patient Name: Tala Ramsey  MRN: [id number]  Today's Date: 5/22/2023    Admit Date: 4/23/2023    Plan: Placement   Discharge Plan     Row Name 05/22/23 1242       Plan    Plan Placement    Plan Comments Hospice liaison continues to follow on the periphery while waiting for insurance to approve pt's admission to rehab. Please call [phone] if can be of further assistance.    Row Name 05/22/23 1114       Plan    Plan     Plan Comments     Final Discharge Disposition Code                Discharge Codes    No documentation.               Expected Discharge Date and Time     Expected Discharge Date Expected Discharge Time    May 23, 2023             Vania Bansal RN

## 2023-05-22 NOTE — PLAN OF CARE
Goal Outcome Evaluation:  Plan of Care Reviewed With: patient, son        Progress: no change  Outcome Evaluation: Physical therapy treatment complete. The patient required mod-maxA for static sitting balance at EOB. Patient remained on 6 LPM throughout, with oxygen desaturation to 81%. Patient placed on NRB to assist with recovery. The patient's resting oxygen requirements are not meeting activity demands. Patient with poor tolerance to mobility. Patient continues to present below baseline for mobility. Continue current PT POC.

## 2023-05-22 NOTE — NURSING NOTE
"Reason for Wound, Ostomy and Continence (WOC) Nursing Consultation: \"Comments: Identify skin tear vs. Pressure injury and treatment plan. Patient refusing turns due to respiratory status.\"    Patient in bed, eating, agreeable to assessment.  Family/support person present.      Wounds noted by WOC:Evolving bilateral gluteal DTPI, needs second WOC confirmation    1.Wound Assessment  Wound Type: Pressure Injury Deep Tissue Pressure Injury (DTPI)  Location: bilateral gluteal  Measurements:too extensive and irregular to measure  Wound Bed: non-blanchable, purple and red  Wound Edges: Irregular and Jagged  Periwound Skin: blanchable, blistered, non-blanchable and pink   Drainage Characteristics/Odor: sanguineous  Drainage Amount: scant  Pain: Yes   Care provided: cleansed, lightly crusted, z guard  Notes: educated on importance of turning, allowed offloading with pillow, asked PCT to apply heel boots  Wound Image:       Recommendation(s) for management of wound:   -Refer to wound care orders for specific instructions on how to treat/manage wound.  -Will order venelex and z guard  -needs good 30 degree turning q2 hour. Continue to educate if refuses and offer q2 hours.  -Practice pressure injury prevention protocol.    Most recent Nura Scale score:  Sensory Perception: 3-->slightly limited  Moisture: 3-->occasionally moist  Activity: 2-->chairfast  Mobility: 2-->very limited  Nutrition: 2-->probably inadequate  Friction and Shear: 2-->potential problem  Nura Score: 14 (05/22/23 0800)   Specialty support surface: Low Air Loss (ANANDA) Mattress     Please be sure to choose \"AP and ANANDA\" function on bed control box and reduce layers under patient to 2 (flat sheet used as draw and one to two incontinence pads.)    Pressure Injury Prevention Protocol (initiate for Nura Score of 18 or less):   *Keep skin dry, turn q 2 hr, keep heels elevated and offloaded with offloading heel boots.    *Apply z-guard to bottom and bony " prominences daily and as needed with incontinence episodes.  *Follow C.A.R.E protocol if medical devices (Bipap, sanchez, Ng tube, etc) are being used.     WOC Team will continue to follow for second WOC confirmation.  Please re consult if the wound(s) worsens.

## 2023-05-22 NOTE — PROGRESS NOTES
[facility] Medicine Services  PROGRESS NOTE    Patient Name: Tala Ramsey  : 1950  MRN: [id number]    Date of Admission: 2023  Primary Care Physician: Sg Horne MD    Subjective   Subjective     CC:  F/u hypoxia, PNA    HPI:  She still feels pretty tired today. Did feel that the lasix yesterday helped a little    ROS:  Gen: no fever  Pulm: dyspnea and cough    Objective   Objective     Vital Signs:   Temp:  [98 °F (36.7 °C)-98.1 °F (36.7 °C)] 98.1 °F (36.7 °C)  Heart Rate:  [] 96  Resp:  [16-20] 20  BP: (111-150)/(70-77) 150/77  Flow (L/min):  [6] 6     Physical Exam:  Constitutional - appears fatigued, in bed  HEENT-NCAT, mucous membranes moist  CV-RRR  Resp-coarse breath sounds bilaterally, velcro sounds  Abd-soft, nontender, nondistended, normoactive bowel sounds  Ext-No lower extremity cyanosis, clubbing or edema bilaterally  Neuro-alert, speech clear  Psych-slightly flat affect   Skin- No rash on exposed UE or LE bilaterally        Results Reviewed:  LAB RESULTS:      Lab 23  0808   WBC 14.10*   HEMOGLOBIN 11.2*   HEMATOCRIT 33.8*   PLATELETS 269   MCV 89.9         Lab 23  1217 23  0613 23  0641 23  0720   SODIUM 129* 127* 129* 128*   POTASSIUM 4.1 4.9 4.4 4.4   CHLORIDE 93* 95* 94* 93*   CO2 24.0 18.0* 24.0 22.0   ANION GAP 12.0 14.0 11.0 13.0   BUN 12 14 11 16   CREATININE 0.50* 0.50* 0.34* 0.43*   EGFR 99.2 99.2 108.8 102.8   GLUCOSE 120* 84 77 82   CALCIUM 9.6 8.8 9.2 9.4                         Brief Urine Lab Results  (Last result in the past 365 days)      Color   Clarity   Blood   Leuk Est   Nitrite   Protein   CREAT   Urine HCG        23 1757 Yellow   Clear   Trace   Negative   Negative   Negative                 Microbiology Results Abnormal     Procedure Component Value - Date/Time    Blood Culture - Blood, Arm, Right [[id number]]  (Normal) Collected: 23 1125    Lab Status: Final result Specimen:  Blood from Arm, Right Updated: 04/28/23 1145     Blood Culture No growth at 5 days    Blood Culture - Blood, Arm, Left [[id number]]  (Normal) Collected: 04/23/23 1125    Lab Status: Final result Specimen: Blood from Arm, Left Updated: 04/28/23 1145     Blood Culture No growth at 5 days    S. Pneumo Ag Urine or CSF - Urine, Urine, Clean Catch [[id number]]  (Normal) Collected: 04/23/23 1757    Lab Status: Final result Specimen: Urine, Clean Catch Updated: 04/24/23 0949     Strep Pneumo Ag Negative    Legionella Antigen, Urine - Urine, Urine, Clean Catch [[id number]]  (Normal) Collected: 04/23/23 1757    Lab Status: Final result Specimen: Urine, Clean Catch Updated: 04/24/23 0949     LEGIONELLA ANTIGEN, URINE Negative    Respiratory Panel PCR w/COVID-19(SARS-CoV-2) CHARI/MARISSA/APOLINAR/PAD/COR/MAD/AMY In-House, NP Swab in UTM/VTM, 3-4 HR TAT - Swab, Nasopharynx [[id number]]  (Normal) Collected: 04/24/23 0544    Lab Status: Final result Specimen: Swab from Nasopharynx Updated: 04/24/23 0647     ADENOVIRUS, PCR Not Detected     Coronavirus 229E Not Detected     Coronavirus HKU1 Not Detected     Coronavirus NL63 Not Detected     Coronavirus OC43 Not Detected     COVID19 Not Detected     Human Metapneumovirus Not Detected     Human Rhinovirus/Enterovirus Not Detected     Influenza A PCR Not Detected     Influenza B PCR Not Detected     Parainfluenza Virus 1 Not Detected     Parainfluenza Virus 2 Not Detected     Parainfluenza Virus 3 Not Detected     Parainfluenza Virus 4 Not Detected     RSV, PCR Not Detected     Bordetella pertussis pcr Not Detected     Bordetella parapertussis PCR Not Detected     Chlamydophila pneumoniae PCR Not Detected     Mycoplasma pneumo by PCR Not Detected    Narrative:      In the setting of a positive respiratory panel with a viral infection PLUS a negative procalcitonin without other underlying concern for bacterial infection, consider observing off antibiotics or discontinuation of antibiotics and  continue supportive care. If the respiratory panel is positive for atypical bacterial infection (Bordetella pertussis, Chlamydophila pneumoniae, or Mycoplasma pneumoniae), consider antibiotic de-escalation to target atypical bacterial infection.    MRSA Screen, PCR (Inpatient) - Swab, Nares [[id number]]  (Normal) Collected: 04/23/23 1540    Lab Status: Final result Specimen: Swab from Nares Updated: 04/23/23 1651     MRSA PCR Negative    Narrative:      The negative predictive value of this diagnostic test is high and should only be used to consider de-escalating anti-MRSA therapy. A positive result may indicate colonization with MRSA and must be correlated clinically.  MRSA Negative    COVID PRE-OP / PRE-PROCEDURE SCREENING ORDER (NO ISOLATION) - Swab, Nasopharynx [[id number]]  (Normal) Collected: 04/23/23 1125    Lab Status: Final result Specimen: Swab from Nasopharynx Updated: 04/23/23 1225    Narrative:      The following orders were created for panel order COVID PRE-OP / PRE-PROCEDURE SCREENING ORDER (NO ISOLATION) - Swab, Nasopharynx.  Procedure                               Abnormality         Status                     ---------                               -----------         ------                     COVID-19, FLU A/B, RSV P...[[id number]]  Normal              Final result                 Please view results for these tests on the individual orders.    COVID-19, FLU A/B, RSV PCR - Swab, Nasopharynx [[id number]]  (Normal) Collected: 04/23/23 1125    Lab Status: Final result Specimen: Swab from Nasopharynx Updated: 04/23/23 1225     COVID19 Not Detected     Influenza A PCR Not Detected     Influenza B PCR Not Detected     RSV, PCR Not Detected    Narrative:      Fact sheet for providers: https://www.fda.gov/media/312093/download    Fact sheet for patients: https://www.fda.gov/media/940932/download    Test performed by PCR.          No radiology results from the last 24 hrs        Current  medications:  Scheduled Meds:amLODIPine, 5 mg, Oral, Q24H  bisacodyl, 10 mg, Rectal, Once  castor oil-balsam peru, 1 application, Topical, Q12H  cetirizine, 10 mg, Oral, Daily  fluticasone, 2 spray, Each Nare, Nightly  furosemide, 20 mg, Oral, Daily  guaiFENesin, 1,200 mg, Oral, Q12H  heparin (porcine), 5,000 Units, Subcutaneous, Q8H  hydroxychloroquine, 200 mg, Oral, Q12H  ipratropium-albuterol, 3 mL, Nebulization, 4x Daily - RT  lidocaine, 1 patch, Transdermal, Q24H  melatonin, 5 mg, Oral, Nightly  montelukast, 10 mg, Oral, Daily  morphine, 5 mg, Oral, 4x Daily  nystatin, , Topical, Q12H  palliative care oral rinse, , Mouth/Throat, Q8H  pantoprazole, 40 mg, Oral, Daily  polyethylene glycol, 17 g, Oral, BID  predniSONE, 35 mg, Oral, Daily With Breakfast  senna-docusate sodium, 2 tablet, Oral, Nightly  sodium chloride, 1 g, Oral, BID With Meals  sulfamethoxazole-trimethoprim, 1 tablet, Oral, Once per day on Mon Wed Fri      Continuous Infusions:   PRN Meds:.•  acetaminophen **OR** acetaminophen **OR** acetaminophen  •  aluminum-magnesium hydroxide-simethicone **AND** Lidocaine Viscous HCl  •  senna-docusate sodium **AND** polyethylene glycol **AND** bisacodyl **AND** bisacodyl  •  calcium carbonate  •  hydrOXYzine  •  ipratropium-albuterol  •  LORazepam  •  Magnesium Standard Dose Replacement - Follow Nurse / BPA Driven Protocol  •  morphine  •  ondansetron **OR** ondansetron  •  simethicone  •  sodium chloride  •  sodium chloride  •  sodium chloride  •  tiZANidine    Assessment & Plan   Assessment & Plan     Active Hospital Problems    Diagnosis  POA   • **Acute hypoxemic respiratory failure -likely due to ILD exacerbation [J96.01]  Yes   • Pulmonary fibrosis [J84.10]  Yes   • ILD (interstitial lung disease) -likely RA associated ILD with acute exacerbation [J84.9]  Yes   • Multifocal pneumonia [J18.9]  Yes      Resolved Hospital Problems   No resolved problems to display.        Brief Hospital Course to  date:  Tala Ramsey is a 72 y.o. female  w/ hx interstitial lung dz, RA (on humira until recently, recent steroid injections) who presented w/ progressive dyspnea, cough over ~10 days who failed outpatient Levaquin. Pt was found to be hypoxic upon admission. She had progressive hypoxia the evening after admission and was placed on HFNC.        This patient's problems and plans were partially entered by my partner and updated as appropriate by me 05/22/23.        Acute hypoxic resp failure, worsening  Pulmonary fibrosis/ILD  Bilateral Pneumonia  -likely combination of progession/flare of interstitial lung disease and infection  -initially on HFNC with difficulty weaning and significant drops with minimal exertion but currently on 6LNC with rest.   -s/p full course of zosyn & azithromycin  -3x weekly bactrim ds (pcp prophylaxis as has been on humira and steroids)   -Pulm following: completed IV solumedrol 5/9 and transitioned po prednisone. Started on 40 mg daily on 5/16. Decrease by 5 mg  per week until at 10 mg daily. Can stop PCP prophylaxis (bactrim) once she is at 20 mg.  -PPI for GI ppx  -continue scheduled & PRN duonebs  -Lasix 20 mg daily for crackles on exam, may help with low sodium  -Palliative following- continue morphine and ativan prn.      Constipation  -daily Miralax, pericolace    Hyponatremia  Poor po intake  --No IV access.  Full support but no CPR/intubation.    --added salt tabs bid 5/10 and fluid restriction. Pt and family noted she has not followed fluid restriction, so that was stopped. Continue salt tabs.  --Encourage PO intake  -- Na 129 today, has been stable  --continue lasix while watching BP     Hyperkalemia, resolved  - s/p lokelma     RA  -holding humira  -continue plaquenil      HTN  - continue amlodpine   - holding valsartan due to hyperkalemia  - bp stable off valsartan     Chronic anemia   -monitor     Goals/limits  -Patient is DNR/DNI (in event of further clinical  decompensation patient would NOT want mechanical ventilation; in this case patient would wish to pursue comfort measures). Patient now wants to pursue skilled rehab, then go home with HH vs hospice.     Expected Discharge Location and Transportation: SNF, EMS   Expected Discharge pending bed offer, insurance approval  Expected Discharge Date: 5/23/2023; Expected Discharge Time:      DVT prophylaxis:  Medical DVT prophylaxis orders are present.     AM-PAC 6 Clicks Score (PT): 8 (05/22/23 1321)    CODE STATUS:   Code Status and Medical Interventions:   Ordered at: 04/23/23 1310     Medical Intervention Limits:    NO intubation (DNI)     Level Of Support Discussed With:    Patient     Code Status (Patient has no pulse and is not breathing):    No CPR (Do Not Attempt to Resuscitate)     Medical Interventions (Patient has pulse or is breathing):    Limited Support       Janet Tobin MD  05/22/23

## 2023-05-22 NOTE — PLAN OF CARE
Goal Outcome Evaluation:  Plan of Care Reviewed With: patient        Progress: no change  Outcome Evaluation: resting well, PRN atarax given, turned q2h with pillow and tolerating well,  will try the wedge after dinner, plan to go to facility

## 2023-05-22 NOTE — CASE MANAGEMENT/SOCIAL WORK
Continued Stay Note  [facility]     Patient Name: Tala Ramsey  MRN: [id number]  Today's Date: 5/22/2023    Admit Date: 4/23/2023    Plan: awaiting precert for SNF   Discharge Plan     Row Name 05/22/23 1114       Plan    Plan awaiting precert for SNF    Plan Comments Spoke with Alexandra at [facility] [phone] states insurance is requesting updated  PT note. MSW sent message to PT. Alexandra plans to come see pt and family today to discuss admission. Still waiting on insurance approval.    Final Discharge Disposition Code 03 - skilled nursing facility (SNF)               Discharge Codes    No documentation.               Expected Discharge Date and Time     Expected Discharge Date Expected Discharge Time    May 23, 2023             BASSAM Bejarano

## 2023-05-22 NOTE — CASE MANAGEMENT/SOCIAL WORK
Continued Stay Note  [facility]     Patient Name: Tala Ramsey  MRN: [id number]  Today's Date: 5/22/2023    Admit Date: 4/23/2023    Plan: waiting on precert for SNF   Discharge Plan     Row Name 05/22/23 1437       Plan    Plan waiting on precert for SNF    Plan Comments Faxed updated PT note to Alexandra at [facility] [phone], Fax [phone].    Final Discharge Disposition Code 03 - skilled nursing facility (SNF)    Row Name 05/22/23 1242       Plan    Plan Placement    Plan Comments Hospice liaison continues to follow on the periphery while waiting for insurance to approve pt's admission to rehab. Please call [phone] if can be of further assistance.               Discharge Codes    No documentation.               Expected Discharge Date and Time     Expected Discharge Date Expected Discharge Time    May 23, 2023             BASSAM Bejarano

## 2023-05-22 NOTE — PLAN OF CARE
Problem: Adult Inpatient Plan of Care  Goal: Optimal Comfort and Wellbeing  Intervention: Provide Person-Centered Care  Recent Flowsheet Documentation  Taken 5/21/2023 2030 by Alyx Friedman RN  Trust Relationship/Rapport:   care explained   choices provided   reassurance provided   thoughts/feelings acknowledged   Goal Outcome Evaluation:

## 2023-05-22 NOTE — PLAN OF CARE
"Goal Outcome Evaluation:  Plan of Care Reviewed With: patient, son        Progress: no change  Outcome Evaluation: Pt very fatigued at time of Palliative RN encounter; reported has had a \"difficult morning,\" unable to get comfortable in positioning to offload back and buttocks skin breakdown. Bowel regimen increased; monitor. Plan for STR prior to home with hospice vs. home health. Palliative following for continued symptom management, support in ongoing GOC discussion.    1300 Palliative IDT meeting: MD, APRN, RN, SW,   After hours, weekends and holidays, contact Palliative Provider by calling [phone]    Problem: Palliative Care  Goal: Enhanced Quality of Life  Outcome: Ongoing, Progressing  Intervention: Promote Advance Care Planning  Flowsheets (Taken 5/22/2023 2439)  Life Transition/Adjustment: (Hospice following peripherally for follow up after discharge and STR) other (see comments)  Intervention: Maximize Comfort  Flowsheets (Taken 5/22/2023 3877)  Pain Management Interventions: (bowel regimen increased)   around-the-clock dosing utilized   pain management plan reviewed with patient/caregiver   other (see comments)  Intervention: Optimize Function  Flowsheets (Taken 5/22/2023 8855)  Sensory Stimulation Regulation: quiet environment promoted  Fatigue Management:   frequent rest breaks encouraged   paced activity encouraged  Sleep/Rest Enhancement:   consistent schedule promoted   family presence promoted  Intervention: Optimize Psychosocial Wellbeing  Flowsheets (Taken 5/22/2023 6303)  Supportive Measures:   active listening utilized   positive reinforcement provided   self-reflection promoted  Family/Support System Care:   caregiver stress acknowledged   self-care encouraged   support provided        "

## 2023-05-23 LAB
ANION GAP SERPL CALCULATED.3IONS-SCNC: 12 MMOL/L (ref 5–15)
BUN SERPL-MCNC: 15 MG/DL (ref 8–23)
BUN/CREAT SERPL: 46.9 (ref 7–25)
CALCIUM SPEC-SCNC: 8.8 MG/DL (ref 8.6–10.5)
CHLORIDE SERPL-SCNC: 94 MMOL/L (ref 98–107)
CO2 SERPL-SCNC: 21 MMOL/L (ref 22–29)
CREAT SERPL-MCNC: 0.32 MG/DL (ref 0.57–1)
EGFRCR SERPLBLD CKD-EPI 2021: 110.4 ML/MIN/1.73
GLUCOSE SERPL-MCNC: 87 MG/DL (ref 65–99)
POTASSIUM SERPL-SCNC: 3.7 MMOL/L (ref 3.5–5.2)
SODIUM SERPL-SCNC: 127 MMOL/L (ref 136–145)

## 2023-05-23 PROCEDURE — 63710000001 PREDNISONE PER 1 MG: Performed by: INTERNAL MEDICINE

## 2023-05-23 PROCEDURE — 80048 BASIC METABOLIC PNL TOTAL CA: CPT | Performed by: STUDENT IN AN ORGANIZED HEALTH CARE EDUCATION/TRAINING PROGRAM

## 2023-05-23 PROCEDURE — 25010000002 HEPARIN (PORCINE) PER 1000 UNITS: Performed by: INTERNAL MEDICINE

## 2023-05-23 PROCEDURE — 94799 UNLISTED PULMONARY SVC/PX: CPT

## 2023-05-23 PROCEDURE — 97530 THERAPEUTIC ACTIVITIES: CPT

## 2023-05-23 PROCEDURE — 94761 N-INVAS EAR/PLS OXIMETRY MLT: CPT

## 2023-05-23 PROCEDURE — 97535 SELF CARE MNGMENT TRAINING: CPT

## 2023-05-23 PROCEDURE — 63710000001 PREDNISONE PER 5 MG: Performed by: INTERNAL MEDICINE

## 2023-05-23 PROCEDURE — 94664 DEMO&/EVAL PT USE INHALER: CPT

## 2023-05-23 PROCEDURE — 99232 SBSQ HOSP IP/OBS MODERATE 35: CPT | Performed by: STUDENT IN AN ORGANIZED HEALTH CARE EDUCATION/TRAINING PROGRAM

## 2023-05-23 RX ADMIN — MORPHINE SULFATE 5 MG: 100 SOLUTION ORAL at 01:02

## 2023-05-23 RX ADMIN — HEPARIN SODIUM 5000 UNITS: 5000 INJECTION, SOLUTION INTRAVENOUS; SUBCUTANEOUS at 14:59

## 2023-05-23 RX ADMIN — Medication: at 14:59

## 2023-05-23 RX ADMIN — MORPHINE SULFATE 5 MG: 100 SOLUTION ORAL at 05:27

## 2023-05-23 RX ADMIN — HYDROXYCHLOROQUINE SULFATE 200 MG: 200 TABLET, FILM COATED ORAL at 20:37

## 2023-05-23 RX ADMIN — IPRATROPIUM BROMIDE AND ALBUTEROL SULFATE 3 ML: .5; 2.5 SOLUTION RESPIRATORY (INHALATION) at 07:40

## 2023-05-23 RX ADMIN — GUAIFENESIN 1200 MG: 600 TABLET, EXTENDED RELEASE ORAL at 08:15

## 2023-05-23 RX ADMIN — PANTOPRAZOLE SODIUM 40 MG: 40 TABLET, DELAYED RELEASE ORAL at 08:15

## 2023-05-23 RX ADMIN — SENNOSIDES AND DOCUSATE SODIUM 2 TABLET: 50; 8.6 TABLET ORAL at 20:37

## 2023-05-23 RX ADMIN — CASTOR OIL AND BALSAM, PERU 1 APPLICATION: 788; 87 OINTMENT TOPICAL at 08:15

## 2023-05-23 RX ADMIN — NYSTATIN: 100000 POWDER TOPICAL at 20:38

## 2023-05-23 RX ADMIN — MORPHINE SULFATE 5 MG: 100 SOLUTION ORAL at 08:15

## 2023-05-23 RX ADMIN — HEPARIN SODIUM 5000 UNITS: 5000 INJECTION, SOLUTION INTRAVENOUS; SUBCUTANEOUS at 05:27

## 2023-05-23 RX ADMIN — SODIUM CHLORIDE 1 G: 1 TABLET ORAL at 18:30

## 2023-05-23 RX ADMIN — SODIUM CHLORIDE 1 G: 1 TABLET ORAL at 08:27

## 2023-05-23 RX ADMIN — CETIRIZINE HYDROCHLORIDE 10 MG: 10 TABLET, FILM COATED ORAL at 08:15

## 2023-05-23 RX ADMIN — Medication 5 MG: at 20:37

## 2023-05-23 RX ADMIN — MORPHINE SULFATE 5 MG: 100 SOLUTION ORAL at 16:14

## 2023-05-23 RX ADMIN — PREDNISONE 35 MG: 20 TABLET ORAL at 08:15

## 2023-05-23 RX ADMIN — LORAZEPAM 1 MG: 1 TABLET ORAL at 03:02

## 2023-05-23 RX ADMIN — LIDOCAINE 1 PATCH: 50 PATCH CUTANEOUS at 08:15

## 2023-05-23 RX ADMIN — Medication: at 21:26

## 2023-05-23 RX ADMIN — AMLODIPINE BESYLATE 5 MG: 5 TABLET ORAL at 08:16

## 2023-05-23 RX ADMIN — MORPHINE SULFATE 5 MG: 100 SOLUTION ORAL at 20:36

## 2023-05-23 RX ADMIN — POLYETHYLENE GLYCOL 3350 17 G: 17 POWDER, FOR SOLUTION ORAL at 20:38

## 2023-05-23 RX ADMIN — POLYETHYLENE GLYCOL 3350 17 G: 17 POWDER, FOR SOLUTION ORAL at 08:15

## 2023-05-23 RX ADMIN — IPRATROPIUM BROMIDE AND ALBUTEROL SULFATE 3 ML: .5; 2.5 SOLUTION RESPIRATORY (INHALATION) at 15:23

## 2023-05-23 RX ADMIN — IPRATROPIUM BROMIDE AND ALBUTEROL SULFATE 3 ML: .5; 2.5 SOLUTION RESPIRATORY (INHALATION) at 19:48

## 2023-05-23 RX ADMIN — FUROSEMIDE 20 MG: 20 TABLET ORAL at 08:15

## 2023-05-23 RX ADMIN — HEPARIN SODIUM 5000 UNITS: 5000 INJECTION, SOLUTION INTRAVENOUS; SUBCUTANEOUS at 21:26

## 2023-05-23 RX ADMIN — IPRATROPIUM BROMIDE AND ALBUTEROL SULFATE 3 ML: .5; 2.5 SOLUTION RESPIRATORY (INHALATION) at 11:44

## 2023-05-23 RX ADMIN — Medication: at 05:28

## 2023-05-23 RX ADMIN — GUAIFENESIN 1200 MG: 600 TABLET, EXTENDED RELEASE ORAL at 20:37

## 2023-05-23 RX ADMIN — NYSTATIN: 100000 POWDER TOPICAL at 08:16

## 2023-05-23 RX ADMIN — HYDROXYCHLOROQUINE SULFATE 200 MG: 200 TABLET, FILM COATED ORAL at 08:27

## 2023-05-23 RX ADMIN — MORPHINE SULFATE 5 MG: 100 SOLUTION ORAL at 12:17

## 2023-05-23 RX ADMIN — MONTELUKAST 10 MG: 10 TABLET, FILM COATED ORAL at 08:15

## 2023-05-23 RX ADMIN — ACETAMINOPHEN 325MG 650 MG: 325 TABLET ORAL at 01:48

## 2023-05-23 NOTE — NURSING NOTE
[facility] follow-up for suspected deep tissue injury to the left gluteal.    The left gluteal does have an area where the epithelium has blistered and rolled away the edges of rolled away.  But the wound bed is red dermis and blanching.  Now with a few applications of Venelex it is bleeding so we will DC the Venelex.    The other right gluteal is very sporadic irregular open areas with dry peeled epithelium all other blanches this is typical irritant contact dermatitis.    Have modified ordered orders to keep the left gluteal covered with silicone foam and the right gluteal with Z guard.  If patient has fecal incontinence if silicone foam is not practical may try to do both sides with pH no rinse foam and blue wipes and Z guard but the Z guard does not really stick to open with dermis did try to crust this however even after 2 layers of crusting the Z guard did not stick that so I went with a silicone    We will follow.

## 2023-05-23 NOTE — PLAN OF CARE
Goal Outcome Evaluation:              Outcome Evaluation: VSS. Patient remains on six liters oxygen and continuous pulse ox. Worked with PT today and did very well. Patient still constipated so enema ordered. Pain controlled with morphine. Awaiting insurance approval at facility, will continue to monitor.

## 2023-05-23 NOTE — THERAPY TREATMENT NOTE
Patient Name: Tala Ramsey  : 1950    MRN: [id number]                              Today's Date: 2023       Admit Date: 2023    Visit Dx:     ICD-10-CM ICD-9-CM   1. Multifocal pneumonia  J18.9 486   2. Failure of outpatient treatment  Z78.9 V49.89   3. Hypoxia  R09.02 799.02     Patient Active Problem List   Diagnosis   • Lumbar herniated disc   • Multifocal pneumonia   • Pulmonary fibrosis   • ILD (interstitial lung disease) -likely RA associated ILD with acute exacerbation   • Acute hypoxemic respiratory failure -likely due to ILD exacerbation     Past Medical History:   Diagnosis Date   • Anemia    • Back pain    • Bronchitis    • Hypertension    • Low back pain    • Pulmonary fibrosis    • Rheumatoid arthritis      Past Surgical History:   Procedure Laterality Date   • DILATION AND CURETTAGE, DIAGNOSTIC / THERAPEUTIC        General Information     Row Name 23 1434          OT Time and Intention    Document Type therapy note (daily note)  -     Mode of Treatment occupational therapy;individual therapy  -     Row Name 23 1434          General Information    Patient Profile Reviewed yes  -     Existing Precautions/Restrictions fall;oxygen therapy device and L/min;other (see comments)  NRB prior to any EOB/OOB activity d/t O2 desat w/ minimal activity  -     Barriers to Rehab medically complex;previous functional deficit  -     Row Name 23 1434          Cognition    Orientation Status (Cognition) oriented x 3  -     Row Name 23 1434          Safety Issues, Functional Mobility    Safety Issues Affecting Function (Mobility) awareness of need for assistance;insight into deficits/self-awareness;safety precaution awareness;safety precautions follow-through/compliance;sequencing abilities  -     Impairments Affecting Function (Mobility) endurance/activity tolerance;shortness of breath;strength;pain;postural/trunk control;balance;sensation/sensory awareness   -     Comment, Safety Issues/Impairments (Mobility) Pt increased anxiety w/ mobility  -           User Key  (r) = Recorded By, (t) = Taken By, (c) = Cosigned By    Initials Name Provider Type     Suzi Baugh OT Occupational Therapist                 Mobility/ADL's     Row Name 05/23/23 1435          Bed Mobility    Bed Mobility supine-sit;sit-supine;scooting/bridging  -     Scooting/Bridging Russell (Bed Mobility) maximum assist (25% patient effort);1 person assist;verbal cues  -     Supine-Sit Russell (Bed Mobility) minimum assist (75% patient effort);1 person assist;verbal cues  -     Sit-Supine Russell (Bed Mobility) moderate assist (50% patient effort);1 person assist;verbal cues  -     Bed Mobility, Safety Issues decreased use of arms for pushing/pulling;decreased use of legs for bridging/pushing;impaired trunk control for bed mobility  -     Assistive Device (Bed Mobility) bed rails;head of bed elevated;draw sheet  -     Comment, (Bed Mobility) Pt able to tolerate 12 minutes sitting EOB this date. VC's for PLB.  -     Row Name 05/23/23 1435          Transfers    Transfers sit-stand transfer;stand-sit transfer  -     Comment, (Transfers) Pt performed STSx2 w/ max Ax1 & BUE support, only able to achieve ~50% stand on 1st STS, but able to achieve full stand w/ 2nd STS.  -     Row Name 05/23/23 1435          Sit-Stand Transfer    Sit-Stand Russell (Transfers) maximum assist (25% patient effort);1 person assist;verbal cues  -     Assistive Device (Sit-Stand Transfers) other (see comments)  -     Row Name 05/23/23 1435          Stand-Sit Transfer    Stand-Sit Russell (Transfers) maximum assist (25% patient effort);1 person assist;verbal cues  -     Assistive Device (Stand-Sit Transfers) other (see comments)  -     Row Name 05/23/23 1435          Activities of Daily Living    BADL Assessment/Intervention lower body dressing  -     Row Name 05/23/23  1435          Lower Body Dressing Assessment/Training    [place] Level (Lower Body Dressing) don;socks;dependent (less than 25% patient effort)  -     Position (Lower Body Dressing) supine  -           User Key  (r) = Recorded By, (t) = Taken By, (c) = Cosigned By    Initials Name Provider Type    Suzi Lee OT Occupational Therapist               Obj/Interventions     Row Name 05/23/23 1436          Motor Skills    Therapeutic Exercise other (see comments)  B ankle DF/PF x10 reps  -     Row Name 05/23/23 1436          Balance    Balance Assessment sitting static balance;sitting dynamic balance;sit to stand dynamic balance;standing static balance  -     Static Sitting Balance contact guard;verbal cues  -     Dynamic Sitting Balance contact guard;verbal cues  -     Position, Sitting Balance unsupported;sitting edge of bed  -     Sit to Stand Dynamic Balance maximum assist;1-person assist;verbal cues  -     Static Standing Balance maximum assist;1-person assist;verbal cues  -     Position/Device Used, Standing Balance supported  -     Balance Interventions sitting;sit to stand;occupation based/functional task  -           User Key  (r) = Recorded By, (t) = Taken By, (c) = Cosigned By    Initials Name Provider Type    Suzi Lee OT Occupational Therapist               Goals/Plan    No documentation.                Clinical Impression     Row Name 05/23/23 1437          Pain Assessment    Additional Documentation Pain Scale: FACES Pre/Post-Treatment (Group)  -     Row Name 05/23/23 1437          Pain Scale: FACES Pre/Post-Treatment    Pain: FACES Scale, Pretreatment 6-->hurts even more  -     Posttreatment Pain Rating 6-->hurts even more  -     Pain Location generalized  -     Pain Location - other (see comments)  buttock  -     Row Name 05/23/23 1437          Plan of Care Review    Plan of Care Reviewed With patient;son  -     Progress improving  -      Outcome Evaluation Pt presents w/ dyspnea on exertion, generalized weakness, decreased functional endurance, and balance deficits limiting her ADL independence. NRB donned prior to any EOB/OOB activity this date and O2 sats maintained >90%, pt tolerated 12 minutes sitting EOB & max Ax1 for STSx2 w/ BUE support. Will continue to progress pt as tolerated per OT POC. Rec SNF at d/c.  -     Row Name 05/23/23 1437          Therapy Assessment/Plan (OT)    Rehab Potential (OT) good, to achieve stated therapy goals  -     Criteria for Skilled Therapeutic Interventions Met (OT) yes  -     Therapy Frequency (OT) daily  -     Row Name 05/23/23 1437          Therapy Plan Review/Discharge Plan (OT)    Anticipated Discharge Disposition (OT) skilled nursing facility  -     Row Name 05/23/23 1437          Vital Signs    Pre Systolic BP Rehab --  VSS - RN cleared OT  -     Pre SpO2 (%) 92  -MC     O2 Delivery Pre Treatment nasal cannula  -     Intra SpO2 (%) 93  -MC     O2 Delivery Intra Treatment non-rebreather  -     Post SpO2 (%) 93  -MC     O2 Delivery Post Treatment nasal cannula  -MC     Pre Patient Position Supine  -     Intra Patient Position Standing  -     Post Patient Position Supine  -     Row Name 05/23/23 1437          Positioning and Restraints    Pre-Treatment Position in bed  -     Post Treatment Position bed  -     In Bed notified nsg;supine;call light within reach;encouraged to call for assist;exit alarm on;with family/caregiver;side rails up x3;heels elevated;RUE elevated;LUE elevated  -           User Key  (r) = Recorded By, (t) = Taken By, (c) = Cosigned By    Initials Name Provider Type    Suzi Lee, OT Occupational Therapist               Outcome Measures     Row Name 05/23/23 1430          How much help from another is currently needed...    Putting on and taking off regular lower body clothing? 1  -     Bathing (including washing, rinsing, and drying) 2  -      Toileting (which includes using toilet bed pan or urinal) 1  -MC     Putting on and taking off regular upper body clothing 2  -MC     Taking care of personal grooming (such as brushing teeth) 3  -MC     Eating meals 3  -     AM-PAC 6 Clicks Score (OT) 12  -     Row Name 05/23/23 0800          How much help from another person do you currently need...    Turning from your back to your side while in flat bed without using bedrails? 2  -DD     Moving from lying on back to sitting on the side of a flat bed without bedrails? 2  -DD     Moving to and from a bed to a chair (including a wheelchair)? 2  -DD     Standing up from a chair using your arms (e.g., wheelchair, bedside chair)? 1  -DD     Climbing 3-5 steps with a railing? 1  -DD     To walk in hospital room? 1  -DD     AM-PAC 6 Clicks Score (PT) 9  -DD     Highest level of mobility 3 --> Sat at edge of bed  -     Row Name 05/23/23 1439          Functional Assessment    Outcome Measure Options AM-PAC 6 Clicks Daily Activity (OT)  -           User Key  (r) = Recorded By, (t) = Taken By, (c) = Cosigned By    Initials Name Provider Type     Suzi Baugh OT Occupational Therapist    Hu Archer, RN Registered Nurse                Occupational Therapy Education     Title: PT OT SLP Therapies (In Progress)     Topic: Occupational Therapy (In Progress)     Point: ADL training (In Progress)     Description:   Instruct learner(s) on proper safety adaptation and remediation techniques during self care or transfers.   Instruct in proper use of assistive devices.              Learning Progress Summary           Patient Acceptance, E, NR by  at 5/23/2023 1440    Acceptance, E,TB,D,H, VU,NR by  at 5/21/2023 1100    Comment: Educated pt and son regarding therex and vital results    Acceptance, E,D, VU,NR by JOSE at 5/18/2023 1347    Comment: reason for consult, noted deficits, PLB, UE TE and benefit, bed mobility, vital results    Acceptance, E, NR by FAIZA at  5/12/2023 1608    Acceptance, E, VU by SHE at 5/5/2023 0447    Acceptance, E, VU by LOGAN at 4/25/2023 1543   Family Acceptance, E, NR by  at 5/23/2023 1440    Acceptance, E,TB,D,H, VU,NR by JR at 5/21/2023 1100    Comment: Educated pt and son regarding therex and vital results    Acceptance, E,D, VU,NR by JOSE at 5/18/2023 1347    Comment: reason for consult, noted deficits, PLB, UE TE and benefit, bed mobility, vital results    Acceptance, E, VU by AN at 4/25/2023 1543                   Point: Home exercise program (In Progress)     Description:   Instruct learner(s) on appropriate technique for monitoring, assisting and/or progressing therapeutic exercises/activities.              Learning Progress Summary           Patient Acceptance, E, NR by  at 5/23/2023 1440    Acceptance, E,TB,D,H, VU,NR by JR at 5/21/2023 1100    Comment: Educated pt and son regarding therex and vital results    Acceptance, E,D, VU,NR by JOSE at 5/18/2023 1347    Comment: reason for consult, noted deficits, PLB, UE TE and benefit, bed mobility, vital results    Acceptance, E, NR by FAIZA at 5/12/2023 1608   Family Acceptance, E, NR by  at 5/23/2023 1440    Acceptance, E,TB,D,H, VU,NR by JR at 5/21/2023 1100    Comment: Educated pt and son regarding therex and vital results    Acceptance, E,D, VU,NR by JOSE at 5/18/2023 1347    Comment: reason for consult, noted deficits, PLB, UE TE and benefit, bed mobility, vital results                   Point: Precautions (In Progress)     Description:   Instruct learner(s) on prescribed precautions during self-care and functional transfers.              Learning Progress Summary           Patient Acceptance, E, NR by MC at 5/23/2023 1440    Acceptance, E, NR by BERNARDINO at 5/21/2023 0046    Acceptance, E, NR by FAIZA at 5/12/2023 1608    Acceptance, E, VU by AN at 4/25/2023 1543   Family Acceptance, E, NR by MC at 5/23/2023 1440    Acceptance, E, VU by AN at 4/25/2023 5572                   Point: Body mechanics (In  Progress)     Description:   Instruct learner(s) on proper positioning and spine alignment during self-care, functional mobility activities and/or exercises.              Learning Progress Summary           Patient Acceptance, E, NR by  at 5/23/2023 1440    Acceptance, E,D, VU,NR by JOSE at 5/18/2023 1347    Comment: reason for consult, noted deficits, PLB, UE TE and benefit, bed mobility, vital results    Acceptance, E, NR by  at 5/12/2023 1608    Acceptance, E, VU by JB at 5/5/2023 0447    Acceptance, E, VU by AN at 4/25/2023 1543   Family Acceptance, E, NR by  at 5/23/2023 1440    Acceptance, E,D, VU,NR by JOSE at 5/18/2023 1347    Comment: reason for consult, noted deficits, PLB, UE TE and benefit, bed mobility, vital results    Acceptance, E, VU by AN at 4/25/2023 1543                               User Key     Initials Effective Dates Name Provider Type Discipline     02/03/23 -  Jessica Keating, OT Occupational Therapist OT    JR 02/03/23 -  Sanjana Lopez OT Occupational Therapist OT    JB1 06/16/21 -  Jake Taylor RN Registered Nurse Nurse     06/16/21 -  Leti Marie, RN Registered Nurse Nurse     10/14/22 -  Suzi Baugh OT Occupational Therapist OT    AN 09/21/21 -  Maddison Bean OT Occupational Therapist OT     01/11/23 -  Schilder, Claire, RN Registered Nurse Nurse              OT Recommendation and Plan  Therapy Frequency (OT): daily  Plan of Care Review  Plan of Care Reviewed With: patient, son  Progress: improving  Outcome Evaluation: Pt presents w/ dyspnea on exertion, generalized weakness, decreased functional endurance, and balance deficits limiting her ADL independence. NRB donned prior to any EOB/OOB activity this date and O2 sats maintained >90%, pt tolerated 12 minutes sitting EOB & max Ax1 for STSx2 w/ BUE support. Will continue to progress pt as tolerated per OT POC. Rec SNF at d/c.     Time Calculation:    Time Calculation- OT     Row Name 05/23/23  1440             Time Calculation- OT    OT Start Time 1354  -      OT Received On 05/23/23  -      OT Goal Re-Cert Due Date 05/28/23  -         Timed Charges    01607 - OT Therapeutic Exercise Minutes 3  -      14561 - OT Therapeutic Activity Minutes 22  -      77615 - OT Self Care/Mgmt Minutes 8  -         Total Minutes    Timed Charges Total Minutes 33  -       Total Minutes 33  -            User Key  (r) = Recorded By, (t) = Taken By, (c) = Cosigned By    Initials Name Provider Type     Suzi Baugh OT Occupational Therapist              Therapy Charges for Today     Code Description Service Date Service Provider Modifiers Qty    [id number]  OT THERAPEUTIC ACT EA 15 MIN 5/23/2023 Suzi Baugh OT GO 1    [id number]  OT SELF CARE/MGMT/TRAIN EA 15 MIN 5/23/2023 Suzi Baugh OT GO 1               Suzi Baugh OT  5/23/2023

## 2023-05-23 NOTE — PLAN OF CARE
Goal Outcome Evaluation:  Plan of Care Reviewed With: patient, son        Progress: no change  Outcome Evaluation: Pt. rested well throughout the night, c/o pain in the neck and the back, PRN pain medication given, pt. also c/o anxiety, PRN anxiety medication given, pt. repositioned q2 hours, pt. gluteal dressing changed, pt. family remains at bedside, will continue to monitor

## 2023-05-23 NOTE — PROGRESS NOTES
[facility] Medicine Services  PROGRESS NOTE    Patient Name: Tala Ramsey  : 1950  MRN: [id number]    Date of Admission: 2023  Primary Care Physician: Sg Horne MD    Subjective   Subjective     CC:  F/u hypoxia, PNA    HPI:  Feels a little bit better today. Awaiting to work w PT    ROS:  Gen: no fever  Pulm: dyspnea and cough    Objective   Objective     Vital Signs:   Temp:  [97 °F (36.1 °C)-98.3 °F (36.8 °C)] 97 °F (36.1 °C)  Heart Rate:  [] 88  Resp:  [20-28] 28  BP: (134-150)/(77-95) 134/77  Flow (L/min):  [4-6] 4     Physical Exam:  Constitutional - appears fatigued, in bed  HEENT-NCAT, mucous membranes moist  CV-RRR  Resp-coarse breath sounds bilaterally, velcro sounds  Abd-soft, nontender, nondistended, normoactive bowel sounds  Ext-No lower extremity cyanosis, clubbing or edema bilaterally  Neuro-alert, speech clear  Psych-slightly flat affect   Skin- No rash on exposed UE or LE bilaterally    Unchanged from yesterday    Results Reviewed:  LAB RESULTS:      Lab 23  0808   WBC 14.10*   HEMOGLOBIN 11.2*   HEMATOCRIT 33.8*   PLATELETS 269   MCV 89.9         Lab 23  0443 23  1217 23  0613 23  0641 23  0720   SODIUM 127* 129* 127* 129* 128*   POTASSIUM 3.7 4.1 4.9 4.4 4.4   CHLORIDE 94* 93* 95* 94* 93*   CO2 21.0* 24.0 18.0* 24.0 22.0   ANION GAP 12.0 12.0 14.0 11.0 13.0   BUN 15 12 14 11 16   CREATININE 0.32* 0.50* 0.50* 0.34* 0.43*   EGFR 110.4 99.2 99.2 108.8 102.8   GLUCOSE 87 120* 84 77 82   CALCIUM 8.8 9.6 8.8 9.2 9.4                         Brief Urine Lab Results  (Last result in the past 365 days)      Color   Clarity   Blood   Leuk Est   Nitrite   Protein   CREAT   Urine HCG        04/23/23 7852 Yellow   Clear   Trace   Negative   Negative   Negative                 Microbiology Results Abnormal     Procedure Component Value - Date/Time    Blood Culture - Blood, Arm, Right [[id number]]  (Normal) Collected:  04/23/23 1125    Lab Status: Final result Specimen: Blood from Arm, Right Updated: 04/28/23 1145     Blood Culture No growth at 5 days    Blood Culture - Blood, Arm, Left [[id number]]  (Normal) Collected: 04/23/23 1125    Lab Status: Final result Specimen: Blood from Arm, Left Updated: 04/28/23 1145     Blood Culture No growth at 5 days    S. Pneumo Ag Urine or CSF - Urine, Urine, Clean Catch [[id number]]  (Normal) Collected: 04/23/23 1757    Lab Status: Final result Specimen: Urine, Clean Catch Updated: 04/24/23 0949     Strep Pneumo Ag Negative    Legionella Antigen, Urine - Urine, Urine, Clean Catch [[id number]]  (Normal) Collected: 04/23/23 1757    Lab Status: Final result Specimen: Urine, Clean Catch Updated: 04/24/23 0949     LEGIONELLA ANTIGEN, URINE Negative    Respiratory Panel PCR w/COVID-19(SARS-CoV-2) CHARI/MARISSA/APOLINAR/PAD/COR/MAD/AMY In-House, NP Swab in UTM/VTM, 3-4 HR TAT - Swab, Nasopharynx [[id number]]  (Normal) Collected: 04/24/23 0544    Lab Status: Final result Specimen: Swab from Nasopharynx Updated: 04/24/23 0647     ADENOVIRUS, PCR Not Detected     Coronavirus 229E Not Detected     Coronavirus HKU1 Not Detected     Coronavirus NL63 Not Detected     Coronavirus OC43 Not Detected     COVID19 Not Detected     Human Metapneumovirus Not Detected     Human Rhinovirus/Enterovirus Not Detected     Influenza A PCR Not Detected     Influenza B PCR Not Detected     Parainfluenza Virus 1 Not Detected     Parainfluenza Virus 2 Not Detected     Parainfluenza Virus 3 Not Detected     Parainfluenza Virus 4 Not Detected     RSV, PCR Not Detected     Bordetella pertussis pcr Not Detected     Bordetella parapertussis PCR Not Detected     Chlamydophila pneumoniae PCR Not Detected     Mycoplasma pneumo by PCR Not Detected    Narrative:      In the setting of a positive respiratory panel with a viral infection PLUS a negative procalcitonin without other underlying concern for bacterial infection, consider observing off  antibiotics or discontinuation of antibiotics and continue supportive care. If the respiratory panel is positive for atypical bacterial infection (Bordetella pertussis, Chlamydophila pneumoniae, or Mycoplasma pneumoniae), consider antibiotic de-escalation to target atypical bacterial infection.    MRSA Screen, PCR (Inpatient) - Swab, Nares [[id number]]  (Normal) Collected: 04/23/23 1540    Lab Status: Final result Specimen: Swab from Nares Updated: 04/23/23 1651     MRSA PCR Negative    Narrative:      The negative predictive value of this diagnostic test is high and should only be used to consider de-escalating anti-MRSA therapy. A positive result may indicate colonization with MRSA and must be correlated clinically.  MRSA Negative    COVID PRE-OP / PRE-PROCEDURE SCREENING ORDER (NO ISOLATION) - Swab, Nasopharynx [[id number]]  (Normal) Collected: 04/23/23 1125    Lab Status: Final result Specimen: Swab from Nasopharynx Updated: 04/23/23 1225    Narrative:      The following orders were created for panel order COVID PRE-OP / PRE-PROCEDURE SCREENING ORDER (NO ISOLATION) - Swab, Nasopharynx.  Procedure                               Abnormality         Status                     ---------                               -----------         ------                     COVID-19, FLU A/B, RSV P...[[id number]]  Normal              Final result                 Please view results for these tests on the individual orders.    COVID-19, FLU A/B, RSV PCR - Swab, Nasopharynx [[id number]]  (Normal) Collected: 04/23/23 1125    Lab Status: Final result Specimen: Swab from Nasopharynx Updated: 04/23/23 1225     COVID19 Not Detected     Influenza A PCR Not Detected     Influenza B PCR Not Detected     RSV, PCR Not Detected    Narrative:      Fact sheet for providers: https://www.fda.gov/media/749066/download    Fact sheet for patients: https://www.fda.gov/media/153935/download    Test performed by PCR.          No radiology results from the  last 24 hrs        Current medications:  Scheduled Meds:amLODIPine, 5 mg, Oral, Q24H  castor oil-balsam peru, 1 application, Topical, Q12H  cetirizine, 10 mg, Oral, Daily  fluticasone, 2 spray, Each Nare, Nightly  furosemide, 20 mg, Oral, Daily  guaiFENesin, 1,200 mg, Oral, Q12H  heparin (porcine), 5,000 Units, Subcutaneous, Q8H  hydroxychloroquine, 200 mg, Oral, Q12H  ipratropium-albuterol, 3 mL, Nebulization, 4x Daily - RT  lidocaine, 1 patch, Transdermal, Q24H  melatonin, 5 mg, Oral, Nightly  montelukast, 10 mg, Oral, Daily  morphine, 5 mg, Oral, 4x Daily  nystatin, , Topical, Q12H  palliative care oral rinse, , Mouth/Throat, Q8H  pantoprazole, 40 mg, Oral, Daily  polyethylene glycol, 17 g, Oral, BID  predniSONE, 35 mg, Oral, Daily With Breakfast  senna-docusate sodium, 2 tablet, Oral, Nightly  sodium chloride, 1 g, Oral, BID With Meals  sulfamethoxazole-trimethoprim, 1 tablet, Oral, Once per day on Mon Wed Fri      Continuous Infusions:   PRN Meds:.•  acetaminophen **OR** acetaminophen **OR** acetaminophen  •  aluminum-magnesium hydroxide-simethicone **AND** Lidocaine Viscous HCl  •  senna-docusate sodium **AND** polyethylene glycol **AND** bisacodyl **AND** bisacodyl  •  calcium carbonate  •  hydrOXYzine  •  ipratropium-albuterol  •  LORazepam  •  Magnesium Standard Dose Replacement - Follow Nurse / BPA Driven Protocol  •  morphine  •  ondansetron **OR** ondansetron  •  simethicone  •  sodium chloride  •  sodium chloride  •  sodium chloride  •  tiZANidine    Assessment & Plan   Assessment & Plan     Active Hospital Problems    Diagnosis  POA   • **Acute hypoxemic respiratory failure -likely due to ILD exacerbation [J96.01]  Yes   • Pulmonary fibrosis [J84.10]  Yes   • ILD (interstitial lung disease) -likely RA associated ILD with acute exacerbation [J84.9]  Yes   • Multifocal pneumonia [J18.9]  Yes      Resolved Hospital Problems   No resolved problems to display.        Brief Hospital Course to date:  Tala  Chrissy Ramsey is a 72 y.o. female  w/ hx interstitial lung dz, RA (on humira until recently, recent steroid injections) who presented w/ progressive dyspnea, cough over ~10 days who failed outpatient Levaquin. Pt was found to be hypoxic upon admission. She had progressive hypoxia the evening after admission and was placed on HFNC.        This patient's problems and plans were partially entered by my partner and updated as appropriate by me 05/23/23.        Acute hypoxic resp failure, worsening  Pulmonary fibrosis/ILD  Bilateral Pneumonia  -likely combination of progession/flare of interstitial lung disease and infection  -initially on HFNC with difficulty weaning and significant drops with minimal exertion but currently on 6LNC with rest.   -s/p full course of zosyn & azithromycin  -3x weekly bactrim ds (pcp prophylaxis as has been on humira and steroids)   -Pulm recommendations: completed IV solumedrol 5/9 and transitioned po prednisone. Started on 40 mg daily on 5/16. Decrease by 5 mg  per week until at 10 mg daily. Can stop PCP prophylaxis (bactrim) once she is at 20 mg.  -PPI for GI ppx  -continue scheduled & PRN duonebs  -Lasix 20 mg daily for crackles on exam, may help with low sodium  -Palliative following- continue morphine and ativan prn.      Constipation  -daily Miralax, pericolace    Hyponatremia  Poor po intake  --No IV access.  Full support but no CPR/intubation.    --added salt tabs bid 5/10 and fluid restriction. Pt and family noted she has not followed fluid restriction, so that was stopped. Continue salt tabs.  --Encourage PO intake  -- Na 129 today, has been stable  --continue lasix while watching BP     Hyperkalemia, resolved  - s/p lokelma     RA  -holding humira  -continue plaquenil      HTN  - continue amlodpine   - holding valsartan due to hyperkalemia  - bp stable off valsartan     Chronic anemia   -monitor     Goals/limits  -Patient is DNR/DNI (in event of further clinical decompensation  patient would NOT want mechanical ventilation; in this case patient would wish to pursue comfort measures). Patient now wants to pursue skilled rehab, then go home with HH vs hospice.     Expected Discharge Location and Transportation: SNF, EMS   Expected Discharge pending bed offer, insurance approval  Expected Discharge Date: 5/24/2023; Expected Discharge Time:      DVT prophylaxis:  Medical DVT prophylaxis orders are present.     AM-PAC 6 Clicks Score (PT): 9 (05/23/23 0800)    CODE STATUS:   Code Status and Medical Interventions:   Ordered at: 04/23/23 1310     Medical Intervention Limits:    NO intubation (DNI)     Level Of Support Discussed With:    Patient     Code Status (Patient has no pulse and is not breathing):    No CPR (Do Not Attempt to Resuscitate)     Medical Interventions (Patient has pulse or is breathing):    Limited Support       Janet Tobin MD  05/23/23

## 2023-05-23 NOTE — PLAN OF CARE
Goal Outcome Evaluation:  Plan of Care Reviewed With: patient, son        Progress: improving  Outcome Evaluation: Pt presents w/ dyspnea on exertion, generalized weakness, decreased functional endurance, and balance deficits limiting her ADL independence. NRB donned prior to any EOB/OOB activity this date and O2 sats maintained >90%, pt tolerated 12 minutes sitting EOB & max Ax1 for STSx2 w/ BUE support. Will continue to progress pt as tolerated per OT POC. Rec SNF at d/c.

## 2023-05-23 NOTE — CASE MANAGEMENT/SOCIAL WORK
Continued Stay Note  [facility]     Patient Name: Tala Ramsey  MRN: [id number]  Today's Date: 5/23/2023    Admit Date: 4/23/2023    Plan: SNF   Discharge Plan     Row Name 05/23/23 1541       Plan    Plan SNF    Patient/Family in Agreement with Plan yes    Plan Comments Continuing to await insurance approval for skilled rehab.  I spoke with Alexandra in Admissions at [facility] [place], will tentatively plan transfer on Thursday, 5/25/23.  Arranged [facility] stretcher for 5/25 at 1:00pm.  Sanjana Mares, Ext. [phone]    Final Discharge Disposition Code 03 - skilled nursing facility (SNF)               Discharge Codes    No documentation.               Expected Discharge Date and Time     Expected Discharge Date Expected Discharge Time    May 24, 2023             BASSAM Britton

## 2023-05-24 ENCOUNTER — APPOINTMENT (OUTPATIENT)
Dept: GENERAL RADIOLOGY | Facility: HOSPITAL | Age: 73
End: 2023-05-24
Payer: MEDICARE

## 2023-05-24 LAB
ANION GAP SERPL CALCULATED.3IONS-SCNC: 13 MMOL/L (ref 5–15)
BUN SERPL-MCNC: 15 MG/DL (ref 8–23)
BUN/CREAT SERPL: 42.9 (ref 7–25)
CALCIUM SPEC-SCNC: 8.6 MG/DL (ref 8.6–10.5)
CHLORIDE SERPL-SCNC: 93 MMOL/L (ref 98–107)
CO2 SERPL-SCNC: 22 MMOL/L (ref 22–29)
CREAT SERPL-MCNC: 0.35 MG/DL (ref 0.57–1)
EGFRCR SERPLBLD CKD-EPI 2021: 108.1 ML/MIN/1.73
GLUCOSE SERPL-MCNC: 98 MG/DL (ref 65–99)
POTASSIUM SERPL-SCNC: 3.3 MMOL/L (ref 3.5–5.2)
POTASSIUM SERPL-SCNC: 4.4 MMOL/L (ref 3.5–5.2)
SODIUM SERPL-SCNC: 128 MMOL/L (ref 136–145)

## 2023-05-24 PROCEDURE — 94799 UNLISTED PULMONARY SVC/PX: CPT

## 2023-05-24 PROCEDURE — 97530 THERAPEUTIC ACTIVITIES: CPT

## 2023-05-24 PROCEDURE — 84132 ASSAY OF SERUM POTASSIUM: CPT | Performed by: STUDENT IN AN ORGANIZED HEALTH CARE EDUCATION/TRAINING PROGRAM

## 2023-05-24 PROCEDURE — 74018 RADEX ABDOMEN 1 VIEW: CPT

## 2023-05-24 PROCEDURE — 97110 THERAPEUTIC EXERCISES: CPT

## 2023-05-24 PROCEDURE — 63710000001 PREDNISONE PER 5 MG: Performed by: INTERNAL MEDICINE

## 2023-05-24 PROCEDURE — 94761 N-INVAS EAR/PLS OXIMETRY MLT: CPT

## 2023-05-24 PROCEDURE — 99232 SBSQ HOSP IP/OBS MODERATE 35: CPT | Performed by: STUDENT IN AN ORGANIZED HEALTH CARE EDUCATION/TRAINING PROGRAM

## 2023-05-24 PROCEDURE — 63710000001 PREDNISONE PER 1 MG: Performed by: INTERNAL MEDICINE

## 2023-05-24 PROCEDURE — 25010000002 HEPARIN (PORCINE) PER 1000 UNITS: Performed by: INTERNAL MEDICINE

## 2023-05-24 PROCEDURE — 80048 BASIC METABOLIC PNL TOTAL CA: CPT | Performed by: STUDENT IN AN ORGANIZED HEALTH CARE EDUCATION/TRAINING PROGRAM

## 2023-05-24 RX ORDER — POTASSIUM CHLORIDE 750 MG/1
40 CAPSULE, EXTENDED RELEASE ORAL EVERY 4 HOURS
Status: COMPLETED | OUTPATIENT
Start: 2023-05-24 | End: 2023-05-24

## 2023-05-24 RX ORDER — LACTULOSE 10 G/15ML
300 SOLUTION ORAL ONCE
Status: COMPLETED | OUTPATIENT
Start: 2023-05-24 | End: 2023-05-24

## 2023-05-24 RX ORDER — LACTULOSE 10 G/15ML
10 SOLUTION ORAL ONCE
Status: CANCELLED | OUTPATIENT
Start: 2023-05-24

## 2023-05-24 RX ORDER — LACTULOSE 10 G/15ML
30 SOLUTION ORAL ONCE
Status: COMPLETED | OUTPATIENT
Start: 2023-05-24 | End: 2023-05-24

## 2023-05-24 RX ADMIN — LORAZEPAM 1 MG: 1 TABLET ORAL at 21:41

## 2023-05-24 RX ADMIN — Medication: at 15:00

## 2023-05-24 RX ADMIN — CETIRIZINE HYDROCHLORIDE 10 MG: 10 TABLET, FILM COATED ORAL at 08:23

## 2023-05-24 RX ADMIN — NYSTATIN: 100000 POWDER TOPICAL at 20:29

## 2023-05-24 RX ADMIN — SODIUM CHLORIDE 1 G: 1 TABLET ORAL at 08:22

## 2023-05-24 RX ADMIN — SENNOSIDES AND DOCUSATE SODIUM 2 TABLET: 50; 8.6 TABLET ORAL at 20:30

## 2023-05-24 RX ADMIN — LACTULOSE 30 G: 20 SOLUTION ORAL at 17:03

## 2023-05-24 RX ADMIN — MORPHINE SULFATE 5 MG: 100 SOLUTION ORAL at 20:32

## 2023-05-24 RX ADMIN — TIZANIDINE 2 MG: 4 TABLET ORAL at 20:44

## 2023-05-24 RX ADMIN — MORPHINE SULFATE 5 MG: 100 SOLUTION ORAL at 16:14

## 2023-05-24 RX ADMIN — Medication: at 21:42

## 2023-05-24 RX ADMIN — MORPHINE SULFATE 5 MG: 100 SOLUTION ORAL at 01:22

## 2023-05-24 RX ADMIN — MORPHINE SULFATE 5 MG: 100 SOLUTION ORAL at 12:23

## 2023-05-24 RX ADMIN — GUAIFENESIN 1200 MG: 600 TABLET, EXTENDED RELEASE ORAL at 08:22

## 2023-05-24 RX ADMIN — Medication: at 05:02

## 2023-05-24 RX ADMIN — HEPARIN SODIUM 5000 UNITS: 5000 INJECTION, SOLUTION INTRAVENOUS; SUBCUTANEOUS at 15:00

## 2023-05-24 RX ADMIN — POLYETHYLENE GLYCOL 3350 17 G: 17 POWDER, FOR SOLUTION ORAL at 08:22

## 2023-05-24 RX ADMIN — HYDROXYZINE HYDROCHLORIDE 25 MG: 25 TABLET, FILM COATED ORAL at 16:18

## 2023-05-24 RX ADMIN — HYDROXYZINE HYDROCHLORIDE 25 MG: 25 TABLET, FILM COATED ORAL at 01:46

## 2023-05-24 RX ADMIN — SULFAMETHOXAZOLE AND TRIMETHOPRIM 1 TABLET: 800; 160 TABLET ORAL at 08:23

## 2023-05-24 RX ADMIN — Medication 5 MG: at 20:30

## 2023-05-24 RX ADMIN — IPRATROPIUM BROMIDE AND ALBUTEROL SULFATE 3 ML: .5; 2.5 SOLUTION RESPIRATORY (INHALATION) at 12:50

## 2023-05-24 RX ADMIN — FUROSEMIDE 20 MG: 20 TABLET ORAL at 08:23

## 2023-05-24 RX ADMIN — HYDROXYCHLOROQUINE SULFATE 200 MG: 200 TABLET, FILM COATED ORAL at 20:44

## 2023-05-24 RX ADMIN — LIDOCAINE 1 PATCH: 50 PATCH CUTANEOUS at 08:22

## 2023-05-24 RX ADMIN — POLYETHYLENE GLYCOL 3350 17 G: 17 POWDER, FOR SOLUTION ORAL at 20:29

## 2023-05-24 RX ADMIN — SODIUM CHLORIDE 1 G: 1 TABLET ORAL at 17:03

## 2023-05-24 RX ADMIN — PANTOPRAZOLE SODIUM 40 MG: 40 TABLET, DELAYED RELEASE ORAL at 08:23

## 2023-05-24 RX ADMIN — ACETAMINOPHEN 325MG 650 MG: 325 TABLET ORAL at 11:00

## 2023-05-24 RX ADMIN — HEPARIN SODIUM 5000 UNITS: 5000 INJECTION, SOLUTION INTRAVENOUS; SUBCUTANEOUS at 05:01

## 2023-05-24 RX ADMIN — POTASSIUM CHLORIDE 40 MEQ: 10 CAPSULE, COATED, EXTENDED RELEASE ORAL at 12:23

## 2023-05-24 RX ADMIN — ACETAMINOPHEN 650 MG: 650 SOLUTION ORAL at 06:28

## 2023-05-24 RX ADMIN — LORAZEPAM 1 MG: 1 TABLET ORAL at 11:05

## 2023-05-24 RX ADMIN — PREDNISONE 35 MG: 20 TABLET ORAL at 08:23

## 2023-05-24 RX ADMIN — GUAIFENESIN 1200 MG: 600 TABLET, EXTENDED RELEASE ORAL at 20:30

## 2023-05-24 RX ADMIN — POTASSIUM CHLORIDE 40 MEQ: 10 CAPSULE, COATED, EXTENDED RELEASE ORAL at 16:13

## 2023-05-24 RX ADMIN — MONTELUKAST 10 MG: 10 TABLET, FILM COATED ORAL at 08:23

## 2023-05-24 RX ADMIN — AMLODIPINE BESYLATE 5 MG: 5 TABLET ORAL at 08:23

## 2023-05-24 RX ADMIN — LORAZEPAM 1 MG: 1 TABLET ORAL at 01:21

## 2023-05-24 RX ADMIN — NYSTATIN: 100000 POWDER TOPICAL at 08:23

## 2023-05-24 RX ADMIN — MORPHINE SULFATE 5 MG: 100 SOLUTION ORAL at 08:23

## 2023-05-24 RX ADMIN — HYDROXYCHLOROQUINE SULFATE 200 MG: 200 TABLET, FILM COATED ORAL at 08:22

## 2023-05-24 RX ADMIN — HEPARIN SODIUM 5000 UNITS: 5000 INJECTION, SOLUTION INTRAVENOUS; SUBCUTANEOUS at 21:42

## 2023-05-24 RX ADMIN — LACTULOSE 300 ML: 10 SOLUTION ORAL at 15:00

## 2023-05-24 RX ADMIN — IPRATROPIUM BROMIDE AND ALBUTEROL SULFATE 3 ML: .5; 2.5 SOLUTION RESPIRATORY (INHALATION) at 09:16

## 2023-05-24 NOTE — CASE MANAGEMENT/SOCIAL WORK
Continued Stay Note  [facility]     Patient Name: Tala Ramsey  MRN: [id number]  Today's Date: 5/24/2023    Admit Date: 4/23/2023    Plan: SNF/Rehab   Discharge Plan     Row Name 05/24/23 4130       Plan    Plan SNF/Rehab    Patient/Family in Agreement with Plan yes  Patient/family    Plan Comments Continuing to await insurance authorization for skilled rehab at [facility]. [facility] stretcher service in place to transport 5/25/23 at 1:00pm pending approval.  Updated son Acosta today.  Sanjana Mares, Ext. [phone]    Final Discharge Disposition Code 03 - skilled nursing facility (SNF)               Discharge Codes    No documentation.               Expected Discharge Date and Time     Expected Discharge Date Expected Discharge Time    May 25, 2023             BASSAM Britton

## 2023-05-24 NOTE — PROGRESS NOTES
[facility] Medicine Services  PROGRESS NOTE    Patient Name: Tala Ramsey  : 1950  MRN: [id number]    Date of Admission: 2023  Primary Care Physician: Sg Horne MD    Subjective   Subjective     CC:  F/u hypoxia, PNA    HPI:  Awaiting to have a bowel movement. Feels like lasix is helping breathing status    ROS:  Gen: no fever  Pulm: dyspnea and cough    +constipation    Objective   Objective     Vital Signs:   Temp:  [97 °F (36.1 °C)-97.1 °F (36.2 °C)] 97.1 °F (36.2 °C)  Heart Rate:  [] 89  Resp:  [18-28] 18  BP: (120-154)/(65-82) 120/65  Flow (L/min):  [4-8] 4     Physical Exam:  Constitutional - appears fatigued, in bed  HEENT-NCAT, mucous membranes moist  CV-RRR  Resp-coarse breath sounds bilaterally, velcro sounds, currently comfortable appearing on 4L NC  Abd-soft, nontender, nondistended, normoactive bowel sounds  Ext-No lower extremity cyanosis, clubbing or edema bilaterally  Neuro-alert, speech clear  Psych-slightly flat affect   Skin- No rash on exposed UE or LE bilaterally    Unchanged from yesterday    Results Reviewed:  LAB RESULTS:      Lab 23  0808   WBC 14.10*   HEMOGLOBIN 11.2*   HEMATOCRIT 33.8*   PLATELETS 269   MCV 89.9         Lab 23  0741 23  0443 23  1217 23  0613 23  0641   SODIUM 128* 127* 129* 127* 129*   POTASSIUM 3.3* 3.7 4.1 4.9 4.4   CHLORIDE 93* 94* 93* 95* 94*   CO2 22.0 21.0* 24.0 18.0* 24.0   ANION GAP 13.0 12.0 12.0 14.0 11.0   BUN 15 15 12 14 11   CREATININE 0.35* 0.32* 0.50* 0.50* 0.34*   EGFR 108.1 110.4 99.2 99.2 108.8   GLUCOSE 98 87 120* 84 77   CALCIUM 8.6 8.8 9.6 8.8 9.2                         Brief Urine Lab Results  (Last result in the past 365 days)      Color   Clarity   Blood   Leuk Est   Nitrite   Protein   CREAT   Urine HCG        23 1757 Yellow   Clear   Trace   Negative   Negative   Negative                 Microbiology Results Abnormal     Procedure Component  Value - Date/Time    Blood Culture - Blood, Arm, Right [[id number]]  (Normal) Collected: 04/23/23 1125    Lab Status: Final result Specimen: Blood from Arm, Right Updated: 04/28/23 1145     Blood Culture No growth at 5 days    Blood Culture - Blood, Arm, Left [[id number]]  (Normal) Collected: 04/23/23 1125    Lab Status: Final result Specimen: Blood from Arm, Left Updated: 04/28/23 1145     Blood Culture No growth at 5 days    S. Pneumo Ag Urine or CSF - Urine, Urine, Clean Catch [[id number]]  (Normal) Collected: 04/23/23 1757    Lab Status: Final result Specimen: Urine, Clean Catch Updated: 04/24/23 0949     Strep Pneumo Ag Negative    Legionella Antigen, Urine - Urine, Urine, Clean Catch [[id number]]  (Normal) Collected: 04/23/23 1757    Lab Status: Final result Specimen: Urine, Clean Catch Updated: 04/24/23 0949     LEGIONELLA ANTIGEN, URINE Negative    Respiratory Panel PCR w/COVID-19(SARS-CoV-2) CHARI/MARISSA/APOLINAR/PAD/COR/MAD/AMY In-House, NP Swab in UTM/VTM, 3-4 HR TAT - Swab, Nasopharynx [[id number]]  (Normal) Collected: 04/24/23 0544    Lab Status: Final result Specimen: Swab from Nasopharynx Updated: 04/24/23 0647     ADENOVIRUS, PCR Not Detected     Coronavirus 229E Not Detected     Coronavirus HKU1 Not Detected     Coronavirus NL63 Not Detected     Coronavirus OC43 Not Detected     COVID19 Not Detected     Human Metapneumovirus Not Detected     Human Rhinovirus/Enterovirus Not Detected     Influenza A PCR Not Detected     Influenza B PCR Not Detected     Parainfluenza Virus 1 Not Detected     Parainfluenza Virus 2 Not Detected     Parainfluenza Virus 3 Not Detected     Parainfluenza Virus 4 Not Detected     RSV, PCR Not Detected     Bordetella pertussis pcr Not Detected     Bordetella parapertussis PCR Not Detected     Chlamydophila pneumoniae PCR Not Detected     Mycoplasma pneumo by PCR Not Detected    Narrative:      In the setting of a positive respiratory panel with a viral infection PLUS a negative  procalcitonin without other underlying concern for bacterial infection, consider observing off antibiotics or discontinuation of antibiotics and continue supportive care. If the respiratory panel is positive for atypical bacterial infection (Bordetella pertussis, Chlamydophila pneumoniae, or Mycoplasma pneumoniae), consider antibiotic de-escalation to target atypical bacterial infection.    MRSA Screen, PCR (Inpatient) - Swab, Nares [[id number]]  (Normal) Collected: 04/23/23 1540    Lab Status: Final result Specimen: Swab from Nares Updated: 04/23/23 1651     MRSA PCR Negative    Narrative:      The negative predictive value of this diagnostic test is high and should only be used to consider de-escalating anti-MRSA therapy. A positive result may indicate colonization with MRSA and must be correlated clinically.  MRSA Negative    COVID PRE-OP / PRE-PROCEDURE SCREENING ORDER (NO ISOLATION) - Swab, Nasopharynx [[id number]]  (Normal) Collected: 04/23/23 1125    Lab Status: Final result Specimen: Swab from Nasopharynx Updated: 04/23/23 1225    Narrative:      The following orders were created for panel order COVID PRE-OP / PRE-PROCEDURE SCREENING ORDER (NO ISOLATION) - Swab, Nasopharynx.  Procedure                               Abnormality         Status                     ---------                               -----------         ------                     COVID-19, FLU A/B, RSV P...[[id number]]  Normal              Final result                 Please view results for these tests on the individual orders.    COVID-19, FLU A/B, RSV PCR - Swab, Nasopharynx [[id number]]  (Normal) Collected: 04/23/23 1125    Lab Status: Final result Specimen: Swab from Nasopharynx Updated: 04/23/23 1225     COVID19 Not Detected     Influenza A PCR Not Detected     Influenza B PCR Not Detected     RSV, PCR Not Detected    Narrative:      Fact sheet for providers: https://www.fda.gov/media/213759/download    Fact sheet for patients:  https://www.fda.gov/media/240243/download    Test performed by PCR.          XR Abdomen KUB    Result Date: 5/24/2023  XR ABDOMEN KUB Date of Exam: 5/24/2023 10:55 AM EDT Indication: No BM x 8 days Comparison: None available. Findings: Lung bases are clear. Nonobstructive bowel gas pattern. Mild colonic stool burden with few hyperdense stool ball seen within the rectum. Degenerative changes of the spine. No evidence of acute displaced osseous abnormality. Degenerative changes of the hips.     Impression: Impression: Nonobstructive bowel gas pattern. Mild colonic stool burden with few hyperdense stool balls seen within the rectum. Electronically Signed: Clive RothmanCarmen  5/24/2023 11:33 AM EDT  Workstation ID: [id number]          Current medications:  Scheduled Meds:amLODIPine, 5 mg, Oral, Q24H  cetirizine, 10 mg, Oral, Daily  fluticasone, 2 spray, Each Nare, Nightly  furosemide, 20 mg, Oral, Daily  guaiFENesin, 1,200 mg, Oral, Q12H  heparin (porcine), 5,000 Units, Subcutaneous, Q8H  hydroxychloroquine, 200 mg, Oral, Q12H  ipratropium-albuterol, 3 mL, Nebulization, 4x Daily - RT  lidocaine, 1 patch, Transdermal, Q24H  melatonin, 5 mg, Oral, Nightly  montelukast, 10 mg, Oral, Daily  morphine, 5 mg, Oral, 4x Daily  nystatin, , Topical, Q12H  palliative care oral rinse, , Mouth/Throat, Q8H  pantoprazole, 40 mg, Oral, Daily  polyethylene glycol, 17 g, Oral, BID  potassium chloride ER, 40 mEq, Oral, Q4H  predniSONE, 35 mg, Oral, Daily With Breakfast  senna-docusate sodium, 2 tablet, Oral, Nightly  sodium chloride, 1 g, Oral, BID With Meals      Continuous Infusions:   PRN Meds:.•  acetaminophen **OR** acetaminophen **OR** acetaminophen  •  aluminum-magnesium hydroxide-simethicone **AND** Lidocaine Viscous HCl  •  senna-docusate sodium **AND** polyethylene glycol **AND** bisacodyl **AND** bisacodyl  •  calcium carbonate  •  hydrOXYzine  •  ipratropium-albuterol  •  LORazepam  •  Magnesium Standard Dose Replacement - Follow  Nurse / BPA Driven Protocol  •  morphine  •  ondansetron **OR** ondansetron  •  Potassium Replacement - Follow Nurse / BPA Driven Protocol  •  simethicone  •  sodium chloride  •  sodium chloride  •  sodium chloride  •  tiZANidine    Assessment & Plan   Assessment & Plan     Active Hospital Problems    Diagnosis  POA   • **Acute hypoxemic respiratory failure -likely due to ILD exacerbation [J96.01]  Yes   • Pulmonary fibrosis [J84.10]  Yes   • ILD (interstitial lung disease) -likely RA associated ILD with acute exacerbation [J84.9]  Yes   • Multifocal pneumonia [J18.9]  Yes      Resolved Hospital Problems   No resolved problems to display.        Brief Hospital Course to date:  Tala Ramsey is a 72 y.o. female  w/ hx interstitial lung dz, RA (on humira until recently, recent steroid injections) who presented w/ progressive dyspnea, cough over ~10 days who failed outpatient Levaquin. Pt was found to be hypoxic upon admission. She had progressive hypoxia the evening after admission and was placed on HFNC.        This patient's problems and plans were partially entered by my partner and updated as appropriate by me 05/24/23.        Acute hypoxic resp failure, worsening  Pulmonary fibrosis/ILD  Bilateral Pneumonia  -likely combination of progession/flare of interstitial lung disease and infection  -initially on HFNC with difficulty weaning and significant drops with minimal exertion but currently on 4LNC with rest.   -s/p full course of zosyn & azithromycin  -3x weekly bactrim ds (pcp prophylaxis as has been on humira and steroids)   -Pulm recommendations: completed IV solumedrol 5/9 and transitioned po prednisone. Started on 40 mg daily on 5/16. Decrease by 5 mg  per week until at 10 mg daily. Can stop PCP prophylaxis (bactrim) once she is at 20 mg.  -PPI for GI ppx  -continue scheduled & PRN duonebs  -Lasix 20 mg daily for crackles on exam, may help with low sodium  -Palliative following- continue morphine and  ativan prn.      Constipation  -daily Miralax, pericolace  -giving soap suds enema today      Hyponatremia  Poor po intake  --No IV access.  Full support but no CPR/intubation.    --added salt tabs bid 5/10 and fluid restriction. Pt and family noted she has not followed fluid restriction, so that was stopped. Continue salt tabs.  --Encourage PO intake  -- Na 128 today, has been stable  --continue lasix while watching BP     Hyperkalemia, resolved  - s/p lokelma     RA  -holding humira  -continue plaquenil      HTN  - continue amlodpine   - holding valsartan due to hyperkalemia  - bp stable off valsartan     Chronic anemia   -monitor     Goals/limits  -Patient is DNR/DNI (in event of further clinical decompensation patient would NOT want mechanical ventilation; in this case patient would wish to pursue comfort measures). Patient now wants to pursue skilled rehab, then go home with  vs hospice.     Expected Discharge Location and Transportation: SNF, EMS   Expected Discharge pending bed offer, insurance approval  Expected Discharge Date: 5/25/2023; Expected Discharge Time:      DVT prophylaxis:  Medical DVT prophylaxis orders are present.     AM-PAC 6 Clicks Score (PT): 11 (05/24/23 5315)    CODE STATUS:   Code Status and Medical Interventions:   Ordered at: 04/23/23 1310     Medical Intervention Limits:    NO intubation (DNI)     Level Of Support Discussed With:    Patient     Code Status (Patient has no pulse and is not breathing):    No CPR (Do Not Attempt to Resuscitate)     Medical Interventions (Patient has pulse or is breathing):    Limited Support       Janet Tobin MD  05/24/23

## 2023-05-24 NOTE — PLAN OF CARE
Goal Outcome Evaluation:              Outcome Evaluation: VSS. Pt remains on 4L oxygen and doing well. Scheduled morphine given with PRN dose of ativan and atarax required. LActulose enema and oral lactualose given; patient was able to have small BM this evening. Purewick in place. Potential discharge tomorrow.

## 2023-05-24 NOTE — THERAPY TREATMENT NOTE
Patient Name: Tala Ramsey  : 1950    MRN: [id number]                              Today's Date: 2023       Admit Date: 2023    Visit Dx:     ICD-10-CM ICD-9-CM   1. Multifocal pneumonia  J18.9 486   2. Failure of outpatient treatment  Z78.9 V49.89   3. Hypoxia  R09.02 799.02     Patient Active Problem List   Diagnosis   • Lumbar herniated disc   • Multifocal pneumonia   • Pulmonary fibrosis   • ILD (interstitial lung disease) -likely RA associated ILD with acute exacerbation   • Acute hypoxemic respiratory failure -likely due to ILD exacerbation     Past Medical History:   Diagnosis Date   • Anemia    • Back pain    • Bronchitis    • Hypertension    • Low back pain    • Pulmonary fibrosis    • Rheumatoid arthritis      Past Surgical History:   Procedure Laterality Date   • DILATION AND CURETTAGE, DIAGNOSTIC / THERAPEUTIC        General Information     Row Name 23 0940          Physical Therapy Time and Intention    Document Type therapy note (daily note)  -KG     Mode of Treatment physical therapy  -KG     Row Name 23          General Information    Patient Profile Reviewed yes  -KG     Existing Precautions/Restrictions fall;oxygen therapy device and L/min;other (see comments)  NRB prior to any EOB/OOB activity d/t O2 desat w/ minimal activity  -KG     Row Name 23          Cognition    Orientation Status (Cognition) oriented x 3  -KG     Row Name 2379          Safety Issues, Functional Mobility    Impairments Affecting Function (Mobility) endurance/activity tolerance;shortness of breath;strength;pain;postural/trunk control;balance;sensation/sensory awareness  -KG           User Key  (r) = Recorded By, (t) = Taken By, (c) = Cosigned By    Initials Name Provider Type    KG Jasmin Millan Physical Therapist               Mobility     Row Name 23 0940          Bed Mobility    Bed Mobility supine-sit;sit-supine;scooting/bridging  -KG     Rolling Left  [place] (Bed Mobility) minimum assist (75% patient effort);verbal cues  -KG     Rolling Right [place] (Bed Mobility) minimum assist (75% patient effort);verbal cues  -KG     Scooting/Bridging [place] (Bed Mobility) maximum assist (25% patient effort);verbal cues;2 person assist  -KG     Supine-Sit [place] (Bed Mobility) minimum assist (75% patient effort);1 person assist;verbal cues  -KG     Sit-Supine [place] (Bed Mobility) moderate assist (50% patient effort);1 person assist;verbal cues  -KG     Assistive Device (Bed Mobility) bed rails;head of bed elevated;draw sheet  -KG     Comment, (Bed Mobility) Pt became very anxious sitting EOB, difficulty with being able to focus on breathing and desat to 84%, returned to supine  -KG     Row Name 05/24/23 0940          Transfers    Comment, (Transfers) unable to tolerate attempts today  -KG     Row Name 05/24/23 0940          Gait/Stairs (Locomotion)    [place] Level (Gait) unable to assess  -KG           User Key  (r) = Recorded By, (t) = Taken By, (c) = Cosigned By    Initials Name Provider Type    Jasmin Eduardo Physical Therapist               Obj/Interventions     Row Name 05/24/23 0974          Motor Skills    Therapeutic Exercise --  heel slides, ankle pumps, SLR x10  -KG     Row Name 05/24/23 0996          Balance    Balance Assessment sitting static balance  -KG     Static Sitting Balance contact guard  -KG     Balance Interventions sitting  -KG           User Key  (r) = Recorded By, (t) = Taken By, (c) = Cosigned By    Initials Name Provider Type    Jasmin Eduardo Physical Therapist               Goals/Plan    No documentation.                Clinical Impression     Row Name 05/24/23 0952          Pain    Pretreatment Pain Rating 5/10  -KG     Posttreatment Pain Rating 5/10  -KG     Pain Location lower  -KG     Pain Location - back  -KG     Pain Intervention(s) Repositioned  -KG     Row Name 05/24/23 0921          Plan of  Care Review    Plan of Care Reviewed With patient;family  -KG     Progress no change  -KG     Outcome Evaluation Pt able to achieve sitting EOB with min-A x1.  Pt became very anxious about OOB activity, difficulty focusing on correct breathing technique, desat to 84%, then mod-A x1 to achieve supine again.  Once supine, pt better able to focus on efficient breathing techniques and LE exercises.  -KG     Row Name 05/24/23 0952          Vital Signs    Pre SpO2 (%) 95  -KG     Intra SpO2 (%) 84  -KG     Post SpO2 (%) 95  -KG     Row Name 05/24/23 0952          Positioning and Restraints    Pre-Treatment Position in bed  -KG     Post Treatment Position bed  -KG     In Bed notified nsg;fowlers;call light within reach;encouraged to call for assist;with family/caregiver;with other staff  with respiratory therapy  -KG           User Key  (r) = Recorded By, (t) = Taken By, (c) = Cosigned By    Initials Name Provider Type    Jasmin Eduardo Physical Therapist               Outcome Measures     Row Name 05/24/23 0956          How much help from another person do you currently need...    Turning from your back to your side while in flat bed without using bedrails? 3  -KG     Moving from lying on back to sitting on the side of a flat bed without bedrails? 3  -KG     Moving to and from a bed to a chair (including a wheelchair)? 1  -KG     Standing up from a chair using your arms (e.g., wheelchair, bedside chair)? 2  -KG     Climbing 3-5 steps with a railing? 1  -KG     To walk in hospital room? 1  -KG     AM-PAC 6 Clicks Score (PT) 11  -KG     Highest level of mobility 4 --> Transferred to chair/commode  -KG     Row Name 05/24/23 0956          Functional Assessment    Outcome Measure Options AM-PAC 6 Clicks Basic Mobility (PT)  -KG           User Key  (r) = Recorded By, (t) = Taken By, (c) = Cosigned By    Initials Name Provider Type    Jasmin Eduardo Physical Therapist                             Physical Therapy  Education     Title: PT OT SLP Therapies (In Progress)     Topic: Physical Therapy (In Progress)     Point: Mobility training (In Progress)     Learning Progress Summary           Patient Acceptance, E, NR by KG at 5/24/2023 0957    Acceptance, E, NR by SHARONA at 5/22/2023 1321    Acceptance, E,D, NR by DM at 5/18/2023 1412    Acceptance, E, NR by FAIZA at 5/12/2023 1608    Acceptance, E, VU by JOSE at 5/5/2023 0447    Acceptance, E,D, VU,NR by AB at 5/1/2023 1537    Acceptance, E, NR by KG1 at 4/28/2023 1306    Acceptance, E, NR by KG1 at 4/25/2023 1402   Family Acceptance, E, NR by ML at 5/22/2023 1321    Acceptance, E,D, NR by DM at 5/18/2023 1412                   Point: Home exercise program (In Progress)     Learning Progress Summary           Patient Acceptance, E, NR by KG at 5/24/2023 0957    Acceptance, E,D, NR by DM at 5/18/2023 1412    Acceptance, E, NR by FAIZA at 5/12/2023 1608    Acceptance, E, NR by KG1 at 4/28/2023 1306   Family Acceptance, E,D, NR by DM at 5/18/2023 1412                   Point: Body mechanics (In Progress)     Learning Progress Summary           Patient Acceptance, E, NR by KG at 5/24/2023 0957    Acceptance, E,D, NR by IHSAN at 5/18/2023 1412    Acceptance, E, NR by FAIZA at 5/12/2023 1608    Acceptance, E, VU by JOSE at 5/5/2023 0447    Acceptance, E,D, VU,NR by AB at 5/1/2023 1537    Acceptance, E, NR by KG1 at 4/28/2023 1306    Acceptance, E, NR by KG1 at 4/25/2023 1402   Family Acceptance, E,D, NR by DM at 5/18/2023 1412                   Point: Precautions (In Progress)     Learning Progress Summary           Patient Acceptance, E, NR by KG at 5/24/2023 0957    Acceptance, E, NR by SHARONA at 5/22/2023 1321    Acceptance, E,D, NR by DM at 5/18/2023 1412    Acceptance, E, NR by JM at 5/12/2023 1608    Acceptance, E, VU by JOSE at 5/5/2023 0447    Acceptance, E,D, VU,NR by AB at 5/1/2023 1537    Acceptance, E, NR by KG1 at 4/28/2023 1306    Acceptance, E, NR by KG1 at 4/25/2023 1402   Family Acceptance,  E, NR by ML at 5/22/2023 1321    Acceptance, E,D, NR by DM at 5/18/2023 1412                               User Key     Initials Effective Dates Name Provider Type Discipline    DM 02/03/23 -  Vania Shultz, PT Physical Therapist PT    JOSE 06/16/21 -  Jake Taylor, RN Registered Nurse Nurse    KG1 05/22/20 -  Cici Salas, PT Physical Therapist PT    KG 01/04/23 -  Jasmin Millan Physical Therapist PT    JM 06/16/21 -  Leti Marie RN Registered Nurse Nurse    ML 04/22/21 -  Irma Duenas Physical Therapist PT    AB 09/22/22 -  Oumou Phelps, PT Physical Therapist PT              PT Recommendation and Plan     Plan of Care Reviewed With: patient, family  Progress: no change  Outcome Evaluation: Pt able to achieve sitting EOB with min-A x1.  Pt became very anxious about OOB activity, difficulty focusing on correct breathing technique, desat to 84%, then mod-A x1 to achieve supine again.  Once supine, pt better able to focus on efficient breathing techniques and LE exercises.     Time Calculation:    PT Charges     Row Name 05/24/23 0957             Time Calculation    Start Time 0845  -KG      PT Received On 05/24/23  -KG      PT Goal Re-Cert Due Date 05/28/23  -KG         Time Calculation- PT    Total Timed Code Minutes- PT 25 minute(s)  -KG         Timed Charges    32364 - PT Therapeutic Exercise Minutes 10  -KG      90001 - PT Therapeutic Activity Minutes 15  -KG         Total Minutes    Timed Charges Total Minutes 25  -KG       Total Minutes 25  -KG            User Key  (r) = Recorded By, (t) = Taken By, (c) = Cosigned By    Initials Name Provider Type    KG Monty Jasmin Physical Therapist              Therapy Charges for Today     Code Description Service Date Service Provider Modifiers Qty    66021841700 HC PT THER PROC EA 15 MIN 5/24/2023 Jasmin Millan GP 1    58846242078 HC PT THERAPEUTIC ACT EA 15 MIN 5/24/2023 Jasmin Millan GP 1          PT G-Codes  Outcome Measure  Options: AM-PAC 6 Clicks Basic Mobility (PT)  AM-PAC 6 Clicks Score (PT): 11  AM-PAC 6 Clicks Score (OT): 12       Jasmin Millan  5/24/2023

## 2023-05-24 NOTE — PLAN OF CARE
Problem: Palliative Care  Goal: Enhanced Quality of Life  Intervention: Optimize Psychosocial Wellbeing  Recent Flowsheet Documentation  Taken 5/23/2023 1300 by Isidra Spaulding MSW  Supportive Measures: (Palliative IDT: MD; APRN; ROBW; RN; MDiv) --  Taken 5/23/2023 1130 by Isidra Spaulding MSW  Supportive Measures: (symptom assessment)   active listening utilized   positive reinforcement provided   verbalization of feelings encouraged   other (see comments)  Family/Support System Care: support provided  Visit with patient and son at bedside, Patient +dyspnea, anxiety, pain, nausea, constipation.  MD ordered enema to help with constipation.  CM working on placement at [facility]. Palliative Care continues to follow for support and assist with plan of care and symptom management.

## 2023-05-24 NOTE — PROGRESS NOTES
Continued Stay Note  [facility]     Patient Name: Tala Ramsey  MRN: [id number]  Today's Date: 5/24/2023    Admit Date: 4/23/2023    Plan: SNF   Discharge Plan     Row Name 05/24/23 1308       Plan    Plan SNF    Plan Comments Hospice liaison continues to follow on the periphery while placement is sought. Please call [phone] if can be of further assistance.               Discharge Codes    No documentation.               Expected Discharge Date and Time     Expected Discharge Date Expected Discharge Time    May 25, 2023             Vania Bansal RN

## 2023-05-24 NOTE — PLAN OF CARE
Goal Outcome Evaluation:  Plan of Care Reviewed With: patient        Progress: no change            Patient due to have BM. Family at bedside. Weaned to 4L nc during day; currently on 5L nc to maintain sats. PRN morphine 1x, ativan 1x and atarax 1x. Awaiting d/c.

## 2023-05-24 NOTE — PLAN OF CARE
Goal Outcome Evaluation:  Plan of Care Reviewed With: patient, family        Progress: no change  Outcome Evaluation: Pt able to achieve sitting EOB with min-A x1.  Pt became very anxious about OOB activity, difficulty focusing on correct breathing technique, desat to 84%, then mod-A x1 to achieve supine again.  Once supine, pt better able to focus on efficient breathing techniques and LE exercises.

## 2023-05-24 NOTE — PLAN OF CARE
Goal Outcome Evaluation:  Plan of Care Reviewed With: patient, family (DIL present)        Progress: no change  Outcome Evaluation: Pt c/o headache, notified RN of request for tylenol. Hospitalist has ordered KUB, ongoing interventions pending result. Suggested Lactulose enema, and PO; hospitalist APRN will follow up after Xray result. Plan for STR, CM working on placement; then home with home health or hospice, per family.    1300 Palliative IDT meeting: APRN, RN, SW,   After hours, weekends and holidays, contact Palliative Provider by calling [phone]    Problem: Palliative Care  Goal: Enhanced Quality of Life  Outcome: Ongoing, Progressing  Intervention: Promote Advance Care Planning  Flowsheets (Taken 5/24/2023 1316)  Life Transition/Adjustment: (plan STR at discharge, then home with  v. hospice) other (see comments)  Intervention: Maximize Comfort  Flowsheets (Taken 5/24/2023 1316)  Pain Management Interventions:   pain management plan reviewed with patient/caregiver   around-the-clock dosing utilized  Intervention: Optimize Function  Flowsheets (Taken 5/24/2023 1316)  Fatigue Management:   frequent rest breaks encouraged   paced activity encouraged  Sleep/Rest Enhancement: family presence promoted  Intervention: Optimize Psychosocial Wellbeing  Flowsheets (Taken 5/24/2023 1316)  Supportive Measures:   active listening utilized   positive reinforcement provided   relaxation techniques promoted   self-reflection promoted   self-responsibility promoted   verbalization of feelings encouraged  Family/Support System Care:   caregiver stress acknowledged   involvement promoted   self-care encouraged   support provided

## 2023-05-25 VITALS
RESPIRATION RATE: 20 BRPM | BODY MASS INDEX: 33.42 KG/M2 | HEART RATE: 92 BPM | OXYGEN SATURATION: 98 % | TEMPERATURE: 97.4 F | WEIGHT: 177 LBS | SYSTOLIC BLOOD PRESSURE: 98 MMHG | HEIGHT: 61 IN | DIASTOLIC BLOOD PRESSURE: 68 MMHG

## 2023-05-25 PROBLEM — J18.9 MULTIFOCAL PNEUMONIA: Status: RESOLVED | Noted: 2023-04-23 | Resolved: 2023-05-25

## 2023-05-25 LAB
ANION GAP SERPL CALCULATED.3IONS-SCNC: 16 MMOL/L (ref 5–15)
BUN SERPL-MCNC: 17 MG/DL (ref 8–23)
BUN/CREAT SERPL: 29.8 (ref 7–25)
CALCIUM SPEC-SCNC: 9.1 MG/DL (ref 8.6–10.5)
CHLORIDE SERPL-SCNC: 99 MMOL/L (ref 98–107)
CO2 SERPL-SCNC: 15 MMOL/L (ref 22–29)
CREAT SERPL-MCNC: 0.57 MG/DL (ref 0.57–1)
EGFRCR SERPLBLD CKD-EPI 2021: 96.1 ML/MIN/1.73
GLUCOSE SERPL-MCNC: 80 MG/DL (ref 65–99)
POTASSIUM SERPL-SCNC: 4.1 MMOL/L (ref 3.5–5.2)
SODIUM SERPL-SCNC: 130 MMOL/L (ref 136–145)

## 2023-05-25 PROCEDURE — 63710000001 PREDNISONE PER 5 MG: Performed by: INTERNAL MEDICINE

## 2023-05-25 PROCEDURE — 63710000001 PREDNISONE PER 1 MG: Performed by: INTERNAL MEDICINE

## 2023-05-25 PROCEDURE — 94799 UNLISTED PULMONARY SVC/PX: CPT

## 2023-05-25 PROCEDURE — 25010000002 HEPARIN (PORCINE) PER 1000 UNITS: Performed by: INTERNAL MEDICINE

## 2023-05-25 PROCEDURE — 94761 N-INVAS EAR/PLS OXIMETRY MLT: CPT

## 2023-05-25 PROCEDURE — 80048 BASIC METABOLIC PNL TOTAL CA: CPT | Performed by: STUDENT IN AN ORGANIZED HEALTH CARE EDUCATION/TRAINING PROGRAM

## 2023-05-25 PROCEDURE — 99239 HOSP IP/OBS DSCHRG MGMT >30: CPT | Performed by: STUDENT IN AN ORGANIZED HEALTH CARE EDUCATION/TRAINING PROGRAM

## 2023-05-25 RX ORDER — FUROSEMIDE 20 MG/1
20 TABLET ORAL DAILY
Qty: 7 TABLET | Refills: 0 | Status: SHIPPED | OUTPATIENT
Start: 2023-05-26

## 2023-05-25 RX ORDER — NALOXONE HYDROCHLORIDE 4 MG/.1ML
SPRAY NASAL
Qty: 2 EACH | Refills: 0 | Status: SHIPPED | OUTPATIENT
Start: 2023-05-25

## 2023-05-25 RX ORDER — AMLODIPINE BESYLATE 5 MG/1
5 TABLET ORAL
Qty: 30 TABLET | Refills: 0 | Status: SHIPPED | OUTPATIENT
Start: 2023-05-26

## 2023-05-25 RX ORDER — SULFAMETHOXAZOLE AND TRIMETHOPRIM 800; 160 MG/1; MG/1
1 TABLET ORAL 3 TIMES WEEKLY
Status: DISCONTINUED | OUTPATIENT
Start: 2023-05-26 | End: 2023-05-25 | Stop reason: HOSPADM

## 2023-05-25 RX ORDER — POLYETHYLENE GLYCOL 3350 17 G/17G
17 POWDER, FOR SOLUTION ORAL 2 TIMES DAILY
Qty: 10 EACH | Refills: 0 | Status: SHIPPED | OUTPATIENT
Start: 2023-05-25

## 2023-05-25 RX ORDER — LORAZEPAM 1 MG/1
1 TABLET ORAL EVERY 6 HOURS PRN
Qty: 28 TABLET | Refills: 0 | Status: SHIPPED | OUTPATIENT
Start: 2023-05-25 | End: 2023-06-01

## 2023-05-25 RX ORDER — MORPHINE SULFATE 20 MG/ML
5 SOLUTION ORAL 4 TIMES DAILY
Qty: 7 ML | Refills: 0 | Status: SHIPPED | OUTPATIENT
Start: 2023-05-25 | End: 2023-06-01

## 2023-05-25 RX ORDER — IPRATROPIUM BROMIDE AND ALBUTEROL SULFATE 2.5; .5 MG/3ML; MG/3ML
3 SOLUTION RESPIRATORY (INHALATION)
Qty: 360 ML | Refills: 0 | Status: SHIPPED | OUTPATIENT
Start: 2023-05-25

## 2023-05-25 RX ORDER — PREDNISONE 5 MG/1
35 TABLET ORAL
Qty: 21 TABLET | Refills: 0 | Status: SHIPPED | OUTPATIENT
Start: 2023-05-26 | End: 2023-05-29

## 2023-05-25 RX ORDER — HYDROXYZINE HYDROCHLORIDE 25 MG/1
25 TABLET, FILM COATED ORAL EVERY 6 HOURS PRN
Qty: 21 TABLET | Refills: 0 | Status: SHIPPED | OUTPATIENT
Start: 2023-05-25 | End: 2023-06-01

## 2023-05-25 RX ORDER — AMOXICILLIN 250 MG
2 CAPSULE ORAL NIGHTLY
Qty: 30 TABLET | Refills: 0 | Status: SHIPPED | OUTPATIENT
Start: 2023-05-25

## 2023-05-25 RX ORDER — GUAIFENESIN 600 MG/1
1200 TABLET, EXTENDED RELEASE ORAL EVERY 12 HOURS SCHEDULED
Qty: 14 TABLET | Refills: 0 | Status: SHIPPED | OUTPATIENT
Start: 2023-05-25

## 2023-05-25 RX ORDER — SULFAMETHOXAZOLE AND TRIMETHOPRIM 800; 160 MG/1; MG/1
1 TABLET ORAL 3 TIMES WEEKLY
Qty: 8 TABLET | Refills: 0 | Status: SHIPPED | OUTPATIENT
Start: 2023-05-26 | End: 2023-06-13

## 2023-05-25 RX ORDER — PREDNISONE 20 MG/1
30 TABLET ORAL DAILY
Qty: 11 TABLET | Refills: 0 | Status: SHIPPED | OUTPATIENT
Start: 2023-05-30 | End: 2023-06-06

## 2023-05-25 RX ORDER — IPRATROPIUM BROMIDE AND ALBUTEROL SULFATE 2.5; .5 MG/3ML; MG/3ML
3 SOLUTION RESPIRATORY (INHALATION) EVERY 4 HOURS PRN
Qty: 360 ML | Refills: 0 | Status: SHIPPED | OUTPATIENT
Start: 2023-05-25

## 2023-05-25 RX ORDER — MORPHINE SULFATE 20 MG/ML
5 SOLUTION ORAL
Qty: 14 ML | Refills: 0 | Status: SHIPPED | OUTPATIENT
Start: 2023-05-25 | End: 2023-06-01

## 2023-05-25 RX ADMIN — CETIRIZINE HYDROCHLORIDE 10 MG: 10 TABLET, FILM COATED ORAL at 09:29

## 2023-05-25 RX ADMIN — GUAIFENESIN 1200 MG: 600 TABLET, EXTENDED RELEASE ORAL at 09:29

## 2023-05-25 RX ADMIN — HYDROXYCHLOROQUINE SULFATE 200 MG: 200 TABLET, FILM COATED ORAL at 09:32

## 2023-05-25 RX ADMIN — Medication: at 05:12

## 2023-05-25 RX ADMIN — LORAZEPAM 1 MG: 1 TABLET ORAL at 05:27

## 2023-05-25 RX ADMIN — ACETAMINOPHEN 325MG 650 MG: 325 TABLET ORAL at 05:27

## 2023-05-25 RX ADMIN — PANTOPRAZOLE SODIUM 40 MG: 40 TABLET, DELAYED RELEASE ORAL at 09:29

## 2023-05-25 RX ADMIN — SIMETHICONE 80 MG: 80 TABLET, CHEWABLE ORAL at 11:23

## 2023-05-25 RX ADMIN — NYSTATIN: 100000 POWDER TOPICAL at 09:33

## 2023-05-25 RX ADMIN — SODIUM CHLORIDE 1 G: 1 TABLET ORAL at 09:32

## 2023-05-25 RX ADMIN — MORPHINE SULFATE 5 MG: 100 SOLUTION ORAL at 09:29

## 2023-05-25 RX ADMIN — MONTELUKAST 10 MG: 10 TABLET, FILM COATED ORAL at 09:30

## 2023-05-25 RX ADMIN — PREDNISONE 35 MG: 20 TABLET ORAL at 09:29

## 2023-05-25 RX ADMIN — HEPARIN SODIUM 5000 UNITS: 5000 INJECTION, SOLUTION INTRAVENOUS; SUBCUTANEOUS at 05:12

## 2023-05-25 RX ADMIN — MORPHINE SULFATE 5 MG: 100 SOLUTION ORAL at 12:47

## 2023-05-25 RX ADMIN — HYDROXYZINE HYDROCHLORIDE 25 MG: 25 TABLET, FILM COATED ORAL at 09:58

## 2023-05-25 RX ADMIN — FUROSEMIDE 20 MG: 20 TABLET ORAL at 09:32

## 2023-05-25 RX ADMIN — IPRATROPIUM BROMIDE AND ALBUTEROL SULFATE 3 ML: .5; 2.5 SOLUTION RESPIRATORY (INHALATION) at 08:50

## 2023-05-25 RX ADMIN — HYDROXYZINE HYDROCHLORIDE 25 MG: 25 TABLET, FILM COATED ORAL at 01:05

## 2023-05-25 RX ADMIN — LORAZEPAM 1 MG: 1 TABLET ORAL at 12:47

## 2023-05-25 RX ADMIN — LIDOCAINE 1 PATCH: 50 PATCH CUTANEOUS at 09:34

## 2023-05-25 RX ADMIN — CALCIUM CARBONATE (ANTACID) CHEW TAB 500 MG 2 TABLET: 500 CHEW TAB at 01:10

## 2023-05-25 NOTE — DISCHARGE SUMMARY
[facility] Medicine Services  DISCHARGE SUMMARY    Patient Name: Tala Ramsey  : 1950  MRN: [id number]    Date of Admission: 2023 10:17 AM  Date of Discharge:  2023  Primary Care Physician: Sg Horne MD    Consults     Date and Time Order Name Status Description    2023 10:33 AM Inpatient Palliative Care MD Consult Completed     2023  5:32 AM Inpatient Pulmonology Consult Completed           Hospital Course     Presenting Problem:   Multifocal pneumonia [J18.9]    Active Hospital Problems    Diagnosis  POA   • Pulmonary fibrosis [J84.10]  Yes   • ILD (interstitial lung disease) -likely RA associated ILD with acute exacerbation [J84.9]  Yes      Resolved Hospital Problems    Diagnosis Date Resolved POA   • **Acute hypoxemic respiratory failure -likely due to ILD exacerbation [J96.01] 2023 Yes   • Multifocal pneumonia [J18.9] 2023 Yes          Hospital Course:  Tala Ramsey is a 72 y.o. female w/ hx interstitial lung dz, RA (on humira until recently, recent steroid injections) who presented w/ progressive dyspnea, cough over ~10 days who failed outpatient Levaquin. Pt was found to be hypoxic upon admission. She had progressive hypoxia the evening after admission and was placed on HFNC.  She was admitted for acute on chronic hypoxic respiratory failure presumed to be an exacerbation of her interstitial lung disease with superimposed bilateral pneumonia.  She was initially on high flow nasal cannula and this was difficult to wean but with antibiotics and supportive care she eventually was able to be weaned to 4 L of nasal cannula at rest.  She does have some desaturations and anxiety with minimal exertion.  She completed a full course of Zosyn and azithromycin.  Pulmonary had followed, she was initially on IV Solu-Medrol which was completed on  and transition to a long p.o. prednisone taper.  She is to start decreasing 5 mg/week  until at 10 mg daily.  PCP prophylaxis with Bactrim should continue until she is at 20 mg.  Continue on PPI for GI prophylaxis from the prolonged steroid taper.  Continue scheduled and as needed DuoNebs.  Lasix was added for crackles and this improved her symptoms.    Palliative was consulted given her ongoing pain.  Would recommend palliative care follow at facility if available.  Continue morphine and Ativan.    For her constipation continue daily MiraLAX and Christel-Colace.  Lactulose as needed.    For hypertension can continue just the amlodipine, she has been stable off of her home valsartan      Discharge Follow Up Recommendations for outpatient labs/diagnostics:  Follow-up with PCP, pulmonology, palliative care    Day of Discharge     HPI:   She eventually was able to have a bowel movement overnight.  Did have a restless night but feels more comfortable this morning    Review of Systems  Gen- No fevers, chills  CV- No chest pain, palpitations  Resp- No cough, dyspnea  GI- No N/V/D, abd pain        Vital Signs:   Temp:  [97.2 °F (36.2 °C)-97.4 °F (36.3 °C)] 97.4 °F (36.3 °C)  Heart Rate:  [] 92  Resp:  [18-20] 20  BP: ()/(68-69) 98/68  Flow (L/min):  [4-5] 4      Physical Exam:  Constitutional - appears fatigued, in bed  HEENT-NCAT, mucous membranes moist  CV-RRR  Resp-coarse breath sounds bilaterally, velcro sounds, currently comfortable appearing on 4L NC  Abd-soft, nontender, nondistended, normoactive bowel sounds  Ext-No lower extremity cyanosis, clubbing or edema bilaterally  Neuro-alert, speech clear  Psych-slightly flat affect   Skin- No rash on exposed UE or LE bilaterally    Pertinent  and/or Most Recent Results     LAB RESULTS:      Lab 05/21/23  0808   WBC 14.10*   HEMOGLOBIN 11.2*   HEMATOCRIT 33.8*   PLATELETS 269   MCV 89.9         Lab 05/25/23  0732 05/24/23  1955 05/24/23  0741 05/23/23  0443 05/21/23  1217 05/20/23  0613   SODIUM 130*  --  128* 127* 129* 127*   POTASSIUM 4.1 4.4 3.3*  3.7 4.1 4.9   CHLORIDE 99  --  93* 94* 93* 95*   CO2 15.0*  --  22.0 21.0* 24.0 18.0*   ANION GAP 16.0*  --  13.0 12.0 12.0 14.0   BUN 17  --  15 15 12 14   CREATININE 0.57  --  0.35* 0.32* 0.50* 0.50*   EGFR 96.1  --  108.1 110.4 99.2 99.2   GLUCOSE 80  --  98 87 120* 84   CALCIUM 9.1  --  8.6 8.8 9.6 8.8                         Brief Urine Lab Results  (Last result in the past 365 days)      Color   Clarity   Blood   Leuk Est   Nitrite   Protein   CREAT   Urine HCG        04/23/23 1757 Yellow   Clear   Trace   Negative   Negative   Negative               Microbiology Results (last 10 days)     ** No results found for the last 240 hours. **          XR Abdomen KUB    Result Date: 5/24/2023  XR ABDOMEN KUB Date of Exam: 5/24/2023 10:55 AM EDT Indication: No BM x 8 days Comparison: None available. Findings: Lung bases are clear. Nonobstructive bowel gas pattern. Mild colonic stool burden with few hyperdense stool ball seen within the rectum. Degenerative changes of the spine. No evidence of acute displaced osseous abnormality. Degenerative changes of the hips.     Impression: Nonobstructive bowel gas pattern. Mild colonic stool burden with few hyperdense stool balls seen within the rectum. Electronically Signed: Clive Morales  5/24/2023 11:33 AM EDT  Workstation ID: [id number]                  Plan for Follow-up of Pending Labs/Results: No pending results    Discharge Details        Discharge Medications      New Medications      Instructions Start Date   amLODIPine 5 MG tablet  Commonly known as: NORVASC  Replaces: amLODIPine-valsartan 5-160 MG per tablet   5 mg, Oral, Every 24 Hours Scheduled   Start Date: May 26, 2023     furosemide 20 MG tablet  Commonly known as: LASIX   20 mg, Oral, Daily   Start Date: May 26, 2023     guaiFENesin 600 MG 12 hr tablet  Commonly known as: MUCINEX   1,200 mg, Oral, Every 12 Hours Scheduled      hydrOXYzine 25 MG tablet  Commonly known as: ATARAX   25 mg, Oral, Every 6 Hours PRN       ipratropium-albuterol 0.5-2.5 mg/3 ml nebulizer  Commonly known as: DUO-NEB   3 mL, Nebulization, 4 Times Daily - RT      ipratropium-albuterol 0.5-2.5 mg/3 ml nebulizer  Commonly known as: DUO-NEB   3 mL, Nebulization, Every 4 Hours PRN      LORazepam 1 MG tablet  Commonly known as: ATIVAN   1 mg, Oral, Every 6 Hours PRN      morphine 20 mg/mL solution concentrated solution   5 mg, Oral, Every 3 Hours PRN      morphine 20 mg/mL solution concentrated solution   5 mg, Oral, 4 Times Daily      naloxone 4 MG/0.1ML nasal spray  Commonly known as: NARCAN   Call 911. Don't prime. [place] in 1 nostril for overdose. Repeat in 2-3 minutes in other nostril if no or minimal breathing/responsiveness.      polyethylene glycol 17 g packet  Commonly known as: MIRALAX   17 g, Oral, 2 Times Daily      sennosides-docusate 8.6-50 MG per tablet  Commonly known as: PERICOLACE   2 tablets, Oral, Nightly      sulfamethoxazole-trimethoprim 800-160 MG per tablet  Commonly known as: BACTRIM DS,SEPTRA DS   1 tablet, Oral, 3 Times Weekly   Start Date: May 26, 2023        Changes to Medications      Instructions Start Date   predniSONE 5 MG tablet  Commonly known as: DELTASONE  What changed: You were already taking a medication with the same name, and this prescription was added. Make sure you understand how and when to take each.   35 mg, Oral, Daily With Breakfast   Start Date: May 26, 2023     predniSONE 20 MG tablet  Commonly known as: DELTASONE  What changed: These instructions start on May 30, 2023. If you are unsure what to do until then, ask your doctor or other care provider.   30 mg, Oral, Daily   Start Date: May 30, 2023        Continue These Medications      Instructions Start Date   acetaminophen 325 MG tablet  Commonly known as: TYLENOL   650 mg, Oral, Every 4 Hours PRN      CITRACAL +D3 PO   1 tablet, Oral      cyclobenzaprine 10 MG tablet  Commonly known as: FLEXERIL   10 mg, Oral, 3 Times Daily PRN      fish oil 1200 MG  capsule capsule   1,200 mg, Oral, Daily      fluticasone 50 MCG/ACT nasal spray  Commonly known as: FLONASE   2 sprays, Each Nare, Daily      hydroxychloroquine 200 MG tablet  Commonly known as: PLAQUENIL   Oral, 2 Times Daily      Lidocaine Viscous HCl 2 % solution  Commonly known as: XYLOCAINE   No dose, route, or frequency recorded.      montelukast 10 MG tablet  Commonly known as: SINGULAIR   1 tablet, Oral, Daily      pantoprazole 40 MG EC tablet  Commonly known as: PROTONIX   40 mg, Oral, Daily      simethicone 125 MG chewable tablet  Commonly known as: MYLICON   125 mg, Oral, Every 6 Hours PRN      tiZANidine 2 MG tablet  Commonly known as: ZANAFLEX   1'2-1 in the Am and afternoon, 1-2 at bedtime. as needed         Stop These Medications    amLODIPine-valsartan 5-160 MG per tablet  Commonly known as: EXFORGE  Replaced by: amLODIPine 5 MG tablet     Humira Pen 40 MG/0.4ML Pen-injector Kit  Generic drug: Adalimumab     Ibuprofen 200 MG capsule            No Known Allergies      Discharge Disposition:  Skilled Nursing Facility ([place] - [facility]    Diet:  Hospital:  Diet Order   Procedures   • Diet: Regular/House Diet; Texture: Soft to Chew (NDD 3); Soft to Chew: Chopped Meat; Fluid Consistency: Thin (IDDSI 0)       Activity:  As tolerated    Restrictions or Other Recommendations:  None       CODE STATUS:    Code Status and Medical Interventions:   Ordered at: 04/23/23 1310     Medical Intervention Limits:    NO intubation (DNI)     Level Of Support Discussed With:    Patient     Code Status (Patient has no pulse and is not breathing):    No CPR (Do Not Attempt to Resuscitate)     Medical Interventions (Patient has pulse or is breathing):    Limited Support       No future appointments.              Janet Tobin MD  05/25/23      Time Spent on Discharge:  I spent  45  minutes on this discharge activity which included: face-to-face encounter with the patient, reviewing the data in the system, coordination  of the care with the nursing staff as well as consultants, documentation, and entering orders.

## 2023-05-25 NOTE — PLAN OF CARE
Goal Outcome Evaluation:  Plan of Care Reviewed With: patient        Progress: no change              Patient remains on 4 L nc tonight; VSS. Family at bedside. Multiple solid BM overnight. PRN ativan 1x tums 1x and atarax 1x administered. Possible D/C today pending insurance.

## 2023-05-25 NOTE — PROGRESS NOTES
Continued Stay Note  [facility]     Patient Name: Tala Ramsey  MRN: [id number]  Today's Date: 5/25/2023    Admit Date: 4/23/2023    Plan: SNF   Discharge Plan     Row Name 05/25/23 1145       Plan    Plan SNF    Final Discharge Disposition Code 03 - skilled nursing facility (SNF)    Final Note Pt discharging today to [facility] for rehab. Telephone call from Sanjana ALVARADO, stated discussed the EMS/DNR form with pt, pt did want to sign the form, Sanjana also stated has informed Guillermo that the home hospice team will make a follow up call to Guillermo in 10 days. Visit made to pt, pt's daughter-in-law present. Reviewed the EMS/DNR form with pt, pt signed the form, form given to Sanjana. Informed pt and daughter-in-law that hospice will follow up via phone call with Guillermo in 10 days, pt agreeable. Please call [phone] if can be of further assistance.               Discharge Codes    No documentation.               Expected Discharge Date and Time     Expected Discharge Date Expected Discharge Time    May 25, 2023             Vania Bansal RN

## 2023-05-25 NOTE — CASE MANAGEMENT/SOCIAL WORK
Continued Stay Note  [facility]     Patient Name: Tala Ramsey  MRN: [id number]  Today's Date: 5/25/2023    Admit Date: 4/23/2023    Plan: SNF   Discharge Plan     Row Name 05/25/23 1613       Plan    Plan SNF    Patient/Family in Agreement with Plan yes  Patient/family    Plan Comments Transferred to [facility] today at 1:00pm.  [facility] stretcher service transport.  Updated Hospice with plan, they will f/u with son Guillermo in 2 weeks. Will arrange [facility] to follow at facility.  Discussed with Chelly Meeks/Admissions at facility.  Sanjana Mares, Ext. [phone]    Final Discharge Disposition Code 03 - skilled nursing facility (SNF)               Discharge Codes    No documentation.               Expected Discharge Date and Time     Expected Discharge Date Expected Discharge Time    May 25, 2023             BASSAM Britton